# Patient Record
Sex: MALE | Race: WHITE
[De-identification: names, ages, dates, MRNs, and addresses within clinical notes are randomized per-mention and may not be internally consistent; named-entity substitution may affect disease eponyms.]

---

## 2017-04-04 ENCOUNTER — PRIOR ORIGINAL RECORDS (OUTPATIENT)
Dept: OTHER | Age: 82
End: 2017-04-04

## 2017-04-18 ENCOUNTER — PRIOR ORIGINAL RECORDS (OUTPATIENT)
Dept: OTHER | Age: 82
End: 2017-04-18

## 2017-04-27 LAB
ALBUMIN: 3.7 G/DL
ALKALINE PHOSPHATATE(ALK PHOS): 97 IU/L
ALT (SGPT): 38 U/L
AST (SGOT): 21 U/L
BILIRUBIN TOTAL: 0.8 MG/DL
BUN: 17 MG/DL
CALCIUM: 8.7 MG/DL
CHLORIDE: 100 MEQ/L
CHOLESTEROL, TOTAL: 133 MG/DL
CREATININE, SERUM: 0.75 MG/DL
GLUCOSE: 116 MG/DL
GLUCOSE: 116 MG/DL
GLYCOHEMOGLOBIN: 6.8 %
HDL CHOLESTEROL: 40 MG/DL
LDL CHOLESTEROL: 74 MG/DL
NON-HDL CHOLESTEROL: 93 MG/DL
POTASSIUM, SERUM: 4 MEQ/L
PROTEIN, TOTAL: 7.4 G/DL
SGOT (AST): 21 IU/L
SGPT (ALT): 38 IU/L
SODIUM: 138 MEQ/L
THYROID STIMULATING HORMONE: 1 MLU/L
TRIGLYCERIDES: 93 MG/DL

## 2017-04-28 ENCOUNTER — PRIOR ORIGINAL RECORDS (OUTPATIENT)
Dept: OTHER | Age: 82
End: 2017-04-28

## 2017-09-18 ENCOUNTER — HOSPITAL ENCOUNTER (OUTPATIENT)
Dept: GENERAL RADIOLOGY | Facility: HOSPITAL | Age: 82
Discharge: HOME | End: 2017-09-18
Attending: INTERNAL MEDICINE
Payer: MEDICARE

## 2017-09-18 DIAGNOSIS — J44.9 COPD (CHRONIC OBSTRUCTIVE PULMONARY DISEASE) (HCC): ICD-10-CM

## 2017-09-18 PROCEDURE — 71020 XR CHEST PA + LAT CHEST (CPT=71020): CPT | Performed by: INTERNAL MEDICINE

## 2017-10-05 ENCOUNTER — LAB REQUISITION (OUTPATIENT)
Dept: LAB | Facility: HOSPITAL | Age: 82
End: 2017-10-05
Payer: MEDICARE

## 2017-10-05 DIAGNOSIS — C44.311 BASAL CELL CARCINOMA OF SKIN OF NOSE: ICD-10-CM

## 2017-10-05 PROCEDURE — 88305 TISSUE EXAM BY PATHOLOGIST: CPT | Performed by: DERMATOLOGY

## 2017-10-05 PROCEDURE — 88331 PATH CONSLTJ SURG 1 BLK 1SPC: CPT | Performed by: DERMATOLOGY

## 2017-10-10 ENCOUNTER — PRIOR ORIGINAL RECORDS (OUTPATIENT)
Dept: OTHER | Age: 82
End: 2017-10-10

## 2017-12-15 ENCOUNTER — PRIOR ORIGINAL RECORDS (OUTPATIENT)
Dept: OTHER | Age: 82
End: 2017-12-15

## 2017-12-15 ENCOUNTER — MYAURORA ACCOUNT LINK (OUTPATIENT)
Dept: OTHER | Age: 82
End: 2017-12-15

## 2017-12-21 ENCOUNTER — PRIOR ORIGINAL RECORDS (OUTPATIENT)
Dept: OTHER | Age: 82
End: 2017-12-21

## 2017-12-21 LAB
ALT (SGPT): 57 U/L
AST (SGOT): 29 U/L
CHOLESTEROL, TOTAL: 146 MG/DL
HDL CHOLESTEROL: 46 MG/DL
HEMOGLOBIN A1C: 6.6 %
LDL CHOLESTEROL: 74 MG/DL
TRIGLYCERIDES: 131 MG/DL

## 2018-02-05 ENCOUNTER — APPOINTMENT (OUTPATIENT)
Dept: CT IMAGING | Facility: HOSPITAL | Age: 83
DRG: 516 | End: 2018-02-05
Attending: EMERGENCY MEDICINE
Payer: MEDICARE

## 2018-02-05 ENCOUNTER — APPOINTMENT (OUTPATIENT)
Dept: GENERAL RADIOLOGY | Facility: HOSPITAL | Age: 83
DRG: 516 | End: 2018-02-05
Payer: MEDICARE

## 2018-02-05 ENCOUNTER — HOSPITAL ENCOUNTER (INPATIENT)
Facility: HOSPITAL | Age: 83
LOS: 7 days | Discharge: NURSING, REHAB OR LONG-TERM CARE | DRG: 516 | End: 2018-02-12
Attending: EMERGENCY MEDICINE | Admitting: INTERNAL MEDICINE
Payer: MEDICARE

## 2018-02-05 DIAGNOSIS — E87.1 HYPONATREMIA: Primary | ICD-10-CM

## 2018-02-05 DIAGNOSIS — G44.209 TENSION HEADACHE: ICD-10-CM

## 2018-02-05 DIAGNOSIS — B34.9 VIRAL SYNDROME: ICD-10-CM

## 2018-02-05 DIAGNOSIS — R79.1 SUPRATHERAPEUTIC INR: ICD-10-CM

## 2018-02-05 LAB
ANION GAP SERPL CALC-SCNC: 11 MMOL/L (ref 0–18)
ANTIBODY SCREEN: NEGATIVE
BASOPHILS # BLD: 0.1 K/UL (ref 0–0.2)
BASOPHILS NFR BLD: 0 %
BUN SERPL-MCNC: 11 MG/DL (ref 8–20)
BUN/CREAT SERPL: 16.7 (ref 10–20)
CALCIUM SERPL-MCNC: 8.9 MG/DL (ref 8.5–10.5)
CHLORIDE SERPL-SCNC: 87 MMOL/L (ref 95–110)
CO2 SERPL-SCNC: 27 MMOL/L (ref 22–32)
CREAT SERPL-MCNC: 0.66 MG/DL (ref 0.5–1.5)
EOSINOPHIL # BLD: 0.2 K/UL (ref 0–0.7)
EOSINOPHIL NFR BLD: 2 %
ERYTHROCYTE [DISTWIDTH] IN BLOOD BY AUTOMATED COUNT: 13.2 % (ref 11–15)
FLUAV + FLUBV RNA SPEC NAA+PROBE: NEGATIVE
GLUCOSE SERPL-MCNC: 131 MG/DL (ref 70–99)
HCT VFR BLD AUTO: 42.1 % (ref 41–52)
HGB BLD-MCNC: 14.1 G/DL (ref 13.5–17.5)
INR BLD: 5 (ref 0.9–1.2)
LYMPHOCYTES # BLD: 2 K/UL (ref 1–4)
LYMPHOCYTES NFR BLD: 13 %
MCH RBC QN AUTO: 30.4 PG (ref 27–32)
MCHC RBC AUTO-ENTMCNC: 33.5 G/DL (ref 32–37)
MCV RBC AUTO: 91 FL (ref 80–100)
MONOCYTES # BLD: 2 K/UL (ref 0–1)
MONOCYTES NFR BLD: 14 %
NEUTROPHILS # BLD AUTO: 10.5 K/UL (ref 1.8–7.7)
NEUTROPHILS NFR BLD: 71 %
OSMOLALITY UR CALC.SUM OF ELEC: 261 MOSM/KG (ref 275–295)
PLATELET # BLD AUTO: 448 K/UL (ref 140–400)
PMV BLD AUTO: 6.3 FL (ref 7.4–10.3)
POTASSIUM SERPL-SCNC: 4.3 MMOL/L (ref 3.3–5.1)
PROTHROMBIN TIME: 46.2 SECONDS (ref 11.8–14.5)
RBC # BLD AUTO: 4.63 M/UL (ref 4.5–5.9)
RH BLOOD TYPE: POSITIVE
SODIUM SERPL-SCNC: 125 MMOL/L (ref 136–144)
WBC # BLD AUTO: 14.8 K/UL (ref 4–11)

## 2018-02-05 PROCEDURE — 96376 TX/PRO/DX INJ SAME DRUG ADON: CPT

## 2018-02-05 PROCEDURE — 80048 BASIC METABOLIC PNL TOTAL CA: CPT | Performed by: EMERGENCY MEDICINE

## 2018-02-05 PROCEDURE — 96374 THER/PROPH/DIAG INJ IV PUSH: CPT

## 2018-02-05 PROCEDURE — 86900 BLOOD TYPING SEROLOGIC ABO: CPT | Performed by: EMERGENCY MEDICINE

## 2018-02-05 PROCEDURE — 99285 EMERGENCY DEPT VISIT HI MDM: CPT

## 2018-02-05 PROCEDURE — 87631 RESP VIRUS 3-5 TARGETS: CPT | Performed by: EMERGENCY MEDICINE

## 2018-02-05 PROCEDURE — 86901 BLOOD TYPING SEROLOGIC RH(D): CPT | Performed by: EMERGENCY MEDICINE

## 2018-02-05 PROCEDURE — 85025 COMPLETE CBC W/AUTO DIFF WBC: CPT

## 2018-02-05 PROCEDURE — 80048 BASIC METABOLIC PNL TOTAL CA: CPT

## 2018-02-05 PROCEDURE — 71045 X-RAY EXAM CHEST 1 VIEW: CPT

## 2018-02-05 PROCEDURE — 85025 COMPLETE CBC W/AUTO DIFF WBC: CPT | Performed by: EMERGENCY MEDICINE

## 2018-02-05 PROCEDURE — 86850 RBC ANTIBODY SCREEN: CPT | Performed by: EMERGENCY MEDICINE

## 2018-02-05 PROCEDURE — 70450 CT HEAD/BRAIN W/O DYE: CPT | Performed by: EMERGENCY MEDICINE

## 2018-02-05 PROCEDURE — 85610 PROTHROMBIN TIME: CPT | Performed by: EMERGENCY MEDICINE

## 2018-02-05 PROCEDURE — 96375 TX/PRO/DX INJ NEW DRUG ADDON: CPT

## 2018-02-05 RX ORDER — ONDANSETRON 2 MG/ML
4 INJECTION INTRAMUSCULAR; INTRAVENOUS EVERY 6 HOURS PRN
Status: DISCONTINUED | OUTPATIENT
Start: 2018-02-05 | End: 2018-02-12

## 2018-02-05 RX ORDER — SOTALOL HYDROCHLORIDE 80 MG/1
80 TABLET ORAL
Status: DISCONTINUED | OUTPATIENT
Start: 2018-02-05 | End: 2018-02-12

## 2018-02-05 RX ORDER — DEXTROSE, SODIUM CHLORIDE, AND POTASSIUM CHLORIDE 5; .9; .15 G/100ML; G/100ML; G/100ML
INJECTION INTRAVENOUS CONTINUOUS
Status: DISCONTINUED | OUTPATIENT
Start: 2018-02-05 | End: 2018-02-08

## 2018-02-05 RX ORDER — MORPHINE SULFATE 2 MG/ML
2 INJECTION, SOLUTION INTRAMUSCULAR; INTRAVENOUS EVERY 2 HOUR PRN
Status: DISCONTINUED | OUTPATIENT
Start: 2018-02-05 | End: 2018-02-05

## 2018-02-05 RX ORDER — ALFUZOSIN HYDROCHLORIDE 10 MG/1
10 TABLET, EXTENDED RELEASE ORAL
Status: DISCONTINUED | OUTPATIENT
Start: 2018-02-06 | End: 2018-02-12

## 2018-02-05 RX ORDER — SODIUM CHLORIDE 9 MG/ML
125 INJECTION, SOLUTION INTRAVENOUS CONTINUOUS
Status: DISCONTINUED | OUTPATIENT
Start: 2018-02-05 | End: 2018-02-05

## 2018-02-05 RX ORDER — MORPHINE SULFATE 2 MG/ML
4 INJECTION, SOLUTION INTRAMUSCULAR; INTRAVENOUS ONCE
Status: COMPLETED | OUTPATIENT
Start: 2018-02-05 | End: 2018-02-05

## 2018-02-05 RX ORDER — ONDANSETRON 2 MG/ML
4 INJECTION INTRAMUSCULAR; INTRAVENOUS ONCE
Status: COMPLETED | OUTPATIENT
Start: 2018-02-05 | End: 2018-02-05

## 2018-02-05 RX ORDER — ATORVASTATIN CALCIUM 40 MG/1
40 TABLET, FILM COATED ORAL NIGHTLY
Status: DISCONTINUED | OUTPATIENT
Start: 2018-02-05 | End: 2018-02-09

## 2018-02-05 RX ORDER — ESCITALOPRAM OXALATE 10 MG/1
10 TABLET ORAL EVERY MORNING
Status: DISCONTINUED | OUTPATIENT
Start: 2018-02-05 | End: 2018-02-12

## 2018-02-05 RX ORDER — METOPROLOL SUCCINATE 25 MG/1
25 TABLET, EXTENDED RELEASE ORAL
Status: DISCONTINUED | OUTPATIENT
Start: 2018-02-06 | End: 2018-02-06

## 2018-02-05 RX ORDER — HYDROMORPHONE HYDROCHLORIDE 1 MG/ML
0.75 INJECTION, SOLUTION INTRAMUSCULAR; INTRAVENOUS; SUBCUTANEOUS EVERY 2 HOUR PRN
Status: DISCONTINUED | OUTPATIENT
Start: 2018-02-05 | End: 2018-02-12

## 2018-02-05 RX ORDER — DEXTROSE AND SODIUM CHLORIDE 5; .9 G/100ML; G/100ML
INJECTION, SOLUTION INTRAVENOUS CONTINUOUS
Status: DISCONTINUED | OUTPATIENT
Start: 2018-02-05 | End: 2018-02-05

## 2018-02-05 RX ORDER — FINASTERIDE 5 MG/1
5 TABLET, FILM COATED ORAL DAILY
Status: DISCONTINUED | OUTPATIENT
Start: 2018-02-05 | End: 2018-02-12

## 2018-02-05 NOTE — ED PROVIDER NOTES
Patient Seen in: [facility] AND [facility] Emergency Department    History   Patient presents with:  Headache (neurologic)    Stated Complaint: Headache,     HPI    Patient here with fever, body aches, headache, sore throat, cough, congestion for several days. Comment: basal cell carcinoma  1994(?): SHOULDER ARTHROSCOPY      Comment: Right rotator cuff    Medications :   Metoprolol Succinate ER (TOPROL XL) 25 MG Oral Tablet 24 Hr,     Warfarin Sodium (COUMADIN) 7.5 MG Oral Tab,     HYDROcodone-acetaminophen (NOR tired, non toxic  HEAD: normocephalic, atraumatic  EYES: sclera non icteric bilateral, conjunctiva injected bilateral  EARS:tm's clear bilateral  NOSE: nasal turbinates boggy  NECK: supple, no meningeal signs, no adenopathy, no thyromegaly  THROAT: pnd not Abnormality         Status                     ---------                               -----------         ------                     ABORH (BLOOD XGRA)[[id number]]                               Final result               ANTIBODY BTERYT[[id number]

## 2018-02-05 NOTE — ED INITIAL ASSESSMENT (HPI)
Headache and \"flu like symptoms\" with cough and N/V for the past 8 days.  States he was started on tamiflu on Thursday, symptoms have worsened

## 2018-02-06 ENCOUNTER — APPOINTMENT (OUTPATIENT)
Dept: CT IMAGING | Facility: HOSPITAL | Age: 83
DRG: 516 | End: 2018-02-06
Attending: INTERNAL MEDICINE
Payer: MEDICARE

## 2018-02-06 LAB
ALBUMIN SERPL BCP-MCNC: 2.4 G/DL (ref 3.5–4.8)
ALBUMIN/GLOB SERPL: 0.6 {RATIO} (ref 1–2)
ALP SERPL-CCNC: 113 U/L (ref 32–100)
ALT SERPL-CCNC: 28 U/L (ref 17–63)
ANION GAP SERPL CALC-SCNC: 9 MMOL/L (ref 0–18)
AST SERPL-CCNC: 18 U/L (ref 15–41)
BACTERIA UR QL AUTO: NEGATIVE /HPF
BASOPHILS # BLD: 0 K/UL (ref 0–0.2)
BASOPHILS NFR BLD: 0 %
BILIRUB SERPL-MCNC: 0.7 MG/DL (ref 0.3–1.2)
BILIRUB UR QL: NEGATIVE
BUN SERPL-MCNC: 8 MG/DL (ref 8–20)
BUN/CREAT SERPL: 14.3 (ref 10–20)
CALCIUM SERPL-MCNC: 8.4 MG/DL (ref 8.5–10.5)
CHLORIDE SERPL-SCNC: 90 MMOL/L (ref 95–110)
CLARITY UR: CLEAR
CO2 SERPL-SCNC: 30 MMOL/L (ref 22–32)
COLOR UR: YELLOW
CREAT SERPL-MCNC: 0.56 MG/DL (ref 0.5–1.5)
EOSINOPHIL # BLD: 0.1 K/UL (ref 0–0.7)
EOSINOPHIL NFR BLD: 1 %
ERYTHROCYTE [DISTWIDTH] IN BLOOD BY AUTOMATED COUNT: 13.2 % (ref 11–15)
GLOBULIN PLAS-MCNC: 3.8 G/DL (ref 2.5–3.7)
GLUCOSE SERPL-MCNC: 203 MG/DL (ref 70–99)
GLUCOSE UR-MCNC: NEGATIVE MG/DL
HCT VFR BLD AUTO: 37.4 % (ref 41–52)
HGB BLD-MCNC: 12.7 G/DL (ref 13.5–17.5)
HGB UR QL STRIP.AUTO: NEGATIVE
INR BLD: 5.3 (ref 0.9–1.2)
LEUKOCYTE ESTERASE UR QL STRIP.AUTO: NEGATIVE
LYMPHOCYTES # BLD: 1.1 K/UL (ref 1–4)
LYMPHOCYTES NFR BLD: 8 %
MCH RBC QN AUTO: 30.8 PG (ref 27–32)
MCHC RBC AUTO-ENTMCNC: 33.8 G/DL (ref 32–37)
MCV RBC AUTO: 90.9 FL (ref 80–100)
MONOCYTES # BLD: 1.4 K/UL (ref 0–1)
MONOCYTES NFR BLD: 11 %
NEUTROPHILS # BLD AUTO: 10.5 K/UL (ref 1.8–7.7)
NEUTROPHILS NFR BLD: 80 %
NITRITE UR QL STRIP.AUTO: NEGATIVE
OSMOLALITY UR CALC.SUM OF ELEC: 272 MOSM/KG (ref 275–295)
PH UR: 5 [PH] (ref 5–8)
PLATELET # BLD AUTO: 352 K/UL (ref 140–400)
PMV BLD AUTO: 6 FL (ref 7.4–10.3)
POTASSIUM SERPL-SCNC: 4.4 MMOL/L (ref 3.3–5.1)
PROT SERPL-MCNC: 6.2 G/DL (ref 5.9–8.4)
PROT UR-MCNC: 30 MG/DL
PROTHROMBIN TIME: 48.6 SECONDS (ref 11.8–14.5)
RBC # BLD AUTO: 4.12 M/UL (ref 4.5–5.9)
RBC #/AREA URNS AUTO: 15 /HPF
SODIUM SERPL-SCNC: 129 MMOL/L (ref 136–144)
SP GR UR STRIP: 1.02 (ref 1–1.03)
UROBILINOGEN UR STRIP-ACNC: 2
VIT C UR-MCNC: 40 MG/DL
WBC # BLD AUTO: 13.2 K/UL (ref 4–11)
WBC #/AREA URNS AUTO: 1 /HPF

## 2018-02-06 PROCEDURE — 85610 PROTHROMBIN TIME: CPT | Performed by: INTERNAL MEDICINE

## 2018-02-06 PROCEDURE — 80053 COMPREHEN METABOLIC PANEL: CPT | Performed by: INTERNAL MEDICINE

## 2018-02-06 PROCEDURE — 81001 URINALYSIS AUTO W/SCOPE: CPT | Performed by: EMERGENCY MEDICINE

## 2018-02-06 PROCEDURE — 70450 CT HEAD/BRAIN W/O DYE: CPT | Performed by: INTERNAL MEDICINE

## 2018-02-06 PROCEDURE — 85025 COMPLETE CBC W/AUTO DIFF WBC: CPT | Performed by: INTERNAL MEDICINE

## 2018-02-06 RX ORDER — BALANCED SALT SOLUTION 6.4; .75; .48; .3; 3.9; 1.7 MG/ML; MG/ML; MG/ML; MG/ML; MG/ML; MG/ML
15 SOLUTION OPHTHALMIC
Status: DISCONTINUED | OUTPATIENT
Start: 2018-02-06 | End: 2018-02-12

## 2018-02-06 RX ORDER — HYDROCODONE BITARTRATE AND ACETAMINOPHEN 7.5; 325 MG/1; MG/1
1 TABLET ORAL EVERY 6 HOURS PRN
Status: DISCONTINUED | OUTPATIENT
Start: 2018-02-06 | End: 2018-02-12

## 2018-02-06 NOTE — H&P
[facility] [place] [place]    PATIENT'S NAME: Mariluz Ramirezar   ATTENDING PHYSICIAN: Kyle Stephens MD   PATIENT ACCOUNT#:   [de-identified]    LOCATION:   [address] [address] RECORD #:   [id number]       YOB: 1935  ADMISSION DATE:       02/05/2018 neuroma removal; rotator cuff repair; bunionectomy; EGD with biopsy and balloon dilatation of the esophagus in February 2008; skin cancer excision x2 with a basal cell being removed by Dr. Mellisa Lara in May 2012; and plastic surgery by Dr. Conchita Peck with skin There is no dullness to percussion. There is no CVA tenderness. HEART:  Regular rate and rhythm with a normal S1 and S2. No S3, S4, or murmurs are appreciated. ABDOMEN:  Soft, but obese with positive active bowel sounds. It is nontender, nondistended. patient presented with 2-week history of worsening symptoms including cough, fever, chills, myalgias, nausea, dry heaves, and diarrhea.   Patient had been pushing water and was started on Tamiflu 4 to 5 days ago after testing negative for strep throat, but

## 2018-02-06 NOTE — PLAN OF CARE
Problem: Patient/Family Goals  Goal: Patient/Family Long Term Goal  Patient's Long Term Goal: to go home    Interventions:  - See additional Care Plan goals for specific interventions   Outcome: Not Progressing  Patient just admitted today  Goal: Patient/F Provide assistive devices as appropriate  - Consider OT/PT consult to assist with strengthening/mobility  - Encourage toileting schedule  Outcome: Progressing  Bed in low and locked position, ibed on, room near nurses station, call light within reach    Pr Progressing  Patient able to express needs

## 2018-02-06 NOTE — PROGRESS NOTES
[facility]    Progress Note    [address] Patient Status:  Inpatient    1935 MRN [id number]   Location [address] [place] [address] Attending Vijay Pickett MD   Hosp Day # 1 PCP Jayla Patel MD       Subjective:   5655 Mirela Carter is a(n 4. 63  4.12*   HGB  14.1  12.7*   HCT  42.1  37.4*   MCV  91.0  90.9   MCH  30.4  30.8   MCHC  33.5  33.8   RDW  13.2  13.2   WBC  14.8*  13.2*   PLT  448*  352       Recent Labs   Lab  02/05/18   1343  02/06/18   0556   GLU  131*  203*   BUN  11  8   CREAT

## 2018-02-06 NOTE — PLAN OF CARE
Dr Nasra Sauceda paged regarding patient's INR 5.3 this AM. He states to continue to hold patient's coumadin. No new orders.

## 2018-02-07 LAB
ALBUMIN SERPL BCP-MCNC: 2.2 G/DL (ref 3.5–4.8)
ANION GAP SERPL CALC-SCNC: 5 MMOL/L (ref 0–18)
BASOPHILS # BLD: 0 K/UL (ref 0–0.2)
BASOPHILS NFR BLD: 0 %
BUN SERPL-MCNC: 7 MG/DL (ref 8–20)
BUN/CREAT SERPL: 14 (ref 10–20)
CALCIUM SERPL-MCNC: 8.2 MG/DL (ref 8.5–10.5)
CHLORIDE SERPL-SCNC: 95 MMOL/L (ref 95–110)
CO2 SERPL-SCNC: 30 MMOL/L (ref 22–32)
CREAT SERPL-MCNC: 0.5 MG/DL (ref 0.5–1.5)
EOSINOPHIL # BLD: 0.6 K/UL (ref 0–0.7)
EOSINOPHIL NFR BLD: 5 %
ERYTHROCYTE [DISTWIDTH] IN BLOOD BY AUTOMATED COUNT: 13.2 % (ref 11–15)
GLUCOSE SERPL-MCNC: 181 MG/DL (ref 70–99)
HCT VFR BLD AUTO: 35.1 % (ref 41–52)
HGB BLD-MCNC: 11.9 G/DL (ref 13.5–17.5)
INR BLD: 2.4 (ref 0.9–1.2)
LYMPHOCYTES # BLD: 1.2 K/UL (ref 1–4)
LYMPHOCYTES NFR BLD: 10 %
MCH RBC QN AUTO: 30.7 PG (ref 27–32)
MCHC RBC AUTO-ENTMCNC: 33.8 G/DL (ref 32–37)
MCV RBC AUTO: 90.9 FL (ref 80–100)
MONOCYTES # BLD: 1.5 K/UL (ref 0–1)
MONOCYTES NFR BLD: 13 %
NEUTROPHILS # BLD AUTO: 8.4 K/UL (ref 1.8–7.7)
NEUTROPHILS NFR BLD: 72 %
OSMOLALITY UR CALC.SUM OF ELEC: 273 MOSM/KG (ref 275–295)
PHOSPHATE SERPL-MCNC: 3.2 MG/DL (ref 2.4–4.7)
PLATELET # BLD AUTO: 343 K/UL (ref 140–400)
PMV BLD AUTO: 5.9 FL (ref 7.4–10.3)
POTASSIUM SERPL-SCNC: 4.1 MMOL/L (ref 3.3–5.1)
PROTHROMBIN TIME: 25.5 SECONDS (ref 11.8–14.5)
RBC # BLD AUTO: 3.86 M/UL (ref 4.5–5.9)
SODIUM SERPL-SCNC: 130 MMOL/L (ref 136–144)
WBC # BLD AUTO: 11.7 K/UL (ref 4–11)

## 2018-02-07 PROCEDURE — 80069 RENAL FUNCTION PANEL: CPT | Performed by: INTERNAL MEDICINE

## 2018-02-07 PROCEDURE — 85610 PROTHROMBIN TIME: CPT | Performed by: INTERNAL MEDICINE

## 2018-02-07 PROCEDURE — 85025 COMPLETE CBC W/AUTO DIFF WBC: CPT | Performed by: INTERNAL MEDICINE

## 2018-02-07 PROCEDURE — A4216 STERILE WATER/SALINE, 10 ML: HCPCS

## 2018-02-07 RX ORDER — 0.9 % SODIUM CHLORIDE 0.9 %
VIAL (ML) INJECTION
Status: COMPLETED
Start: 2018-02-07 | End: 2018-02-07

## 2018-02-07 RX ORDER — WARFARIN SODIUM 4 MG/1
4 TABLET ORAL NIGHTLY
Status: DISCONTINUED | OUTPATIENT
Start: 2018-02-07 | End: 2018-02-08

## 2018-02-07 RX ORDER — FLUTICASONE PROPIONATE 50 MCG
2 SPRAY, SUSPENSION (ML) NASAL DAILY
Status: DISCONTINUED | OUTPATIENT
Start: 2018-02-07 | End: 2018-02-12

## 2018-02-07 NOTE — PLAN OF CARE
At 1540 patient was resting for about 10 min after return from the bathroom, suddenly he complained of decreased vision in his left eye and subjects became pink in color, patient had no other complains only the vision trouble of left eye. V/S and neuro chec

## 2018-02-07 NOTE — PROGRESS NOTES
[facility]    Progress Note    5655 Mirela Carter Patient Status:  Inpatient    1935 MRN [id number]   Location [address] [place] [address] Attending Alysha Rice MD   Hosp Day # 2 PCP Bebeto Thompson MD       Subjective:   5655 Mirela Carter is a(n 02/07/18   0556   GLU  131*  203*  181*   BUN  11  8  7*   CREATSERUM  0.66  0.56  0.50   GFRAA  >60  >60  >60   GFRNAA  >60  >60  >60   CA  8.9  8.4*  8.2*   ALB   --   2.4*  2.2*   NA  125*  129*  130*   K  4.3  4.4  4.1   CL  87*  90*  95   CO2  27  30 viral syndrome. Supratherapeutic INR  Vitamin K at 2.5mg PO today for INR therapeutic. Restart coumadin at 4mg tonight.       Tension headache  respondiing to fluuids bu pain is in thhe sinuses. CT clear for sinus congestion.   Add flonase.       [place]

## 2018-02-07 NOTE — PLAN OF CARE
Problem: Patient/Family Goals  Goal: Patient/Family Long Term Goal  Patient's Long Term Goal: to go home    Interventions:  - See additional Care Plan goals for specific interventions    Outcome: Progressing  Possible discharge in 1-2 days  Goal: Patient/F call light within reach, bed alarm on, Ibed on, non-skid socks on    Problem: GASTROINTESTINAL - ADULT  Goal: Minimal or absence of nausea and vomiting  INTERVENTIONS:  - Maintain adequate hydration with IV or PO as ordered and tolerated  - Nasogastric tub

## 2018-02-08 ENCOUNTER — APPOINTMENT (OUTPATIENT)
Dept: MRI IMAGING | Facility: HOSPITAL | Age: 83
DRG: 516 | End: 2018-02-08
Attending: INTERNAL MEDICINE
Payer: MEDICARE

## 2018-02-08 ENCOUNTER — APPOINTMENT (OUTPATIENT)
Dept: GENERAL RADIOLOGY | Facility: HOSPITAL | Age: 83
DRG: 516 | End: 2018-02-08
Attending: INTERNAL MEDICINE
Payer: MEDICARE

## 2018-02-08 LAB
ALBUMIN SERPL BCP-MCNC: 2.1 G/DL (ref 3.5–4.8)
ANION GAP SERPL CALC-SCNC: 9 MMOL/L (ref 0–18)
BASOPHILS # BLD: 0 K/UL (ref 0–0.2)
BASOPHILS NFR BLD: 0 %
BUN SERPL-MCNC: 5 MG/DL (ref 8–20)
BUN/CREAT SERPL: 12.2 (ref 10–20)
CALCIUM SERPL-MCNC: 8.4 MG/DL (ref 8.5–10.5)
CHLORIDE SERPL-SCNC: 89 MMOL/L (ref 95–110)
CO2 SERPL-SCNC: 32 MMOL/L (ref 22–32)
CREAT SERPL-MCNC: 0.41 MG/DL (ref 0.5–1.5)
EOSINOPHIL # BLD: 0.6 K/UL (ref 0–0.7)
EOSINOPHIL NFR BLD: 6 %
ERYTHROCYTE [DISTWIDTH] IN BLOOD BY AUTOMATED COUNT: 13.6 % (ref 11–15)
GLUCOSE SERPL-MCNC: 159 MG/DL (ref 70–99)
HCT VFR BLD AUTO: 35.3 % (ref 41–52)
HGB BLD-MCNC: 12.1 G/DL (ref 13.5–17.5)
INR BLD: 1.5 (ref 0.9–1.2)
LYMPHOCYTES # BLD: 1.4 K/UL (ref 1–4)
LYMPHOCYTES NFR BLD: 13 %
MCH RBC QN AUTO: 31.1 PG (ref 27–32)
MCHC RBC AUTO-ENTMCNC: 34.2 G/DL (ref 32–37)
MCV RBC AUTO: 90.9 FL (ref 80–100)
MONOCYTES # BLD: 1.4 K/UL (ref 0–1)
MONOCYTES NFR BLD: 13 %
NEUTROPHILS # BLD AUTO: 7.5 K/UL (ref 1.8–7.7)
NEUTROPHILS NFR BLD: 68 %
OSMOLALITY UR CALC.SUM OF ELEC: 271 MOSM/KG (ref 275–295)
PHOSPHATE SERPL-MCNC: 3.4 MG/DL (ref 2.4–4.7)
PLATELET # BLD AUTO: 387 K/UL (ref 140–400)
PMV BLD AUTO: 6.2 FL (ref 7.4–10.3)
POTASSIUM SERPL-SCNC: 4 MMOL/L (ref 3.3–5.1)
PROTHROMBIN TIME: 17.6 SECONDS (ref 11.8–14.5)
RBC # BLD AUTO: 3.88 M/UL (ref 4.5–5.9)
SODIUM SERPL-SCNC: 130 MMOL/L (ref 136–144)
WBC # BLD AUTO: 11 K/UL (ref 4–11)

## 2018-02-08 PROCEDURE — A4216 STERILE WATER/SALINE, 10 ML: HCPCS

## 2018-02-08 PROCEDURE — A4216 STERILE WATER/SALINE, 10 ML: HCPCS | Performed by: INTERNAL MEDICINE

## 2018-02-08 PROCEDURE — 85025 COMPLETE CBC W/AUTO DIFF WBC: CPT | Performed by: INTERNAL MEDICINE

## 2018-02-08 PROCEDURE — 97161 PT EVAL LOW COMPLEX 20 MIN: CPT

## 2018-02-08 PROCEDURE — 97535 SELF CARE MNGMENT TRAINING: CPT

## 2018-02-08 PROCEDURE — A9575 INJ GADOTERATE MEGLUMI 0.1ML: HCPCS | Performed by: INTERNAL MEDICINE

## 2018-02-08 PROCEDURE — 86022 PLATELET ANTIBODIES: CPT | Performed by: INTERNAL MEDICINE

## 2018-02-08 PROCEDURE — 97165 OT EVAL LOW COMPLEX 30 MIN: CPT

## 2018-02-08 PROCEDURE — 97116 GAIT TRAINING THERAPY: CPT

## 2018-02-08 PROCEDURE — C9113 INJ PANTOPRAZOLE SODIUM, VIA: HCPCS | Performed by: INTERNAL MEDICINE

## 2018-02-08 PROCEDURE — 85390 FIBRINOLYSINS SCREEN I&R: CPT | Performed by: INTERNAL MEDICINE

## 2018-02-08 PROCEDURE — 71046 X-RAY EXAM CHEST 2 VIEWS: CPT | Performed by: INTERNAL MEDICINE

## 2018-02-08 PROCEDURE — 70553 MRI BRAIN STEM W/O & W/DYE: CPT | Performed by: INTERNAL MEDICINE

## 2018-02-08 PROCEDURE — 80069 RENAL FUNCTION PANEL: CPT | Performed by: INTERNAL MEDICINE

## 2018-02-08 PROCEDURE — 85610 PROTHROMBIN TIME: CPT | Performed by: INTERNAL MEDICINE

## 2018-02-08 RX ORDER — METOCLOPRAMIDE HYDROCHLORIDE 5 MG/ML
10 INJECTION INTRAMUSCULAR; INTRAVENOUS EVERY 6 HOURS PRN
Status: DISCONTINUED | OUTPATIENT
Start: 2018-02-08 | End: 2018-02-12

## 2018-02-08 RX ORDER — WARFARIN SODIUM 7.5 MG/1
7.5 TABLET ORAL
Status: COMPLETED | OUTPATIENT
Start: 2018-02-08 | End: 2018-02-08

## 2018-02-08 RX ORDER — 0.9 % SODIUM CHLORIDE 0.9 %
VIAL (ML) INJECTION
Status: COMPLETED
Start: 2018-02-08 | End: 2018-02-08

## 2018-02-08 RX ORDER — ENOXAPARIN SODIUM 100 MG/ML
40 INJECTION SUBCUTANEOUS EVERY 12 HOURS SCHEDULED
Status: DISCONTINUED | OUTPATIENT
Start: 2018-02-08 | End: 2018-02-08

## 2018-02-08 RX ORDER — POLYETHYLENE GLYCOL 3350 17 G/17G
17 POWDER, FOR SOLUTION ORAL DAILY
Status: DISCONTINUED | OUTPATIENT
Start: 2018-02-08 | End: 2018-02-12

## 2018-02-08 RX ORDER — FUROSEMIDE 10 MG/ML
20 INJECTION INTRAMUSCULAR; INTRAVENOUS ONCE
Status: COMPLETED | OUTPATIENT
Start: 2018-02-08 | End: 2018-02-08

## 2018-02-08 RX ORDER — HEPARIN SODIUM 5000 [USP'U]/ML
5000 INJECTION, SOLUTION INTRAVENOUS; SUBCUTANEOUS EVERY 8 HOURS SCHEDULED
Status: DISCONTINUED | OUTPATIENT
Start: 2018-02-08 | End: 2018-02-08

## 2018-02-08 NOTE — PROGRESS NOTES
[facility]    Progress Note    5655 Mirela Carter Patient Status:  Inpatient    1935 MRN [id number]   Location [address] [place] [address] Attending Yanni Mckoy MD   Hosp Day # 3 PCP Dean Chen MD       Subjective:   5655 Mirela Carter is a(n 2. 4*  2.2*  2.1*   NA  129*  130*  130*   K  4.4  4.1  4.0   CL  90*  95  89*   CO2  30  30  32   ALKPHO  113*   --    --    AST  18   --    --    ALT  28   --    --    BILT  0.7   --    --    TP  6.2   --    --        No results for input(s): LIP, ROCHELLE in says his son is now getting sick. H/A severe but better. RSD  Start PT and OT. Social sx's consulted for d/c planning.         Elizabeth Fowler MD  2/8/2018

## 2018-02-08 NOTE — PLAN OF CARE
Problem: Patient/Family Goals  Goal: Patient/Family Long Term Goal  Patient's Long Term Goal: to go home    Interventions:  - See additional Care Plan goals for specific interventions    Outcome: Progressing    Goal: Patient/Family Short Term Goal  Patient ADULT  Goal: Minimal or absence of nausea and vomiting  INTERVENTIONS:  - Maintain adequate hydration with IV or PO as ordered and tolerated  - Nasogastric tube to low intermittent suction as ordered  - Evaluate effectiveness of ordered antiemetic medicati

## 2018-02-08 NOTE — OCCUPATIONAL THERAPY NOTE
OCCUPATIONAL THERAPY QUICK EVALUATION - INPATIENT    Room Number: 946/843-H  Evaluation Date: 2/8/2018     Type of Evaluation: Initial  Presenting Problem: hyponatremia    Physician Order: IP Consult to Occupational Therapy  Reason for Therapy:  ADL/IADL D SITUATION  Type of Home: House  Home Layout: One level  Lives With: Spouse    Toilet and Equipment: Standard height toilet  Shower/Tub and Equipment: Tub-shower combo  Other Equipment:  (rw w/ tray)    Occupation/Status: retired     Drives: Yes  Patient Re and oriented. Pt participating in oob activities w/ encouragement. Pt transitioning out/into bed w/ supervision. Pt able to manage le dressing tasks w/o assist using crossed leg technique. Pt performing sit to stand transfers from bed w/ supervision.  Pt am

## 2018-02-08 NOTE — CM/SW NOTE
SW received MD order for discharge planning. SW met w/ pt and pt's wife to discuss eventual discharge needs. Pt lives at home w/ his supportive wife in [facility]. Pt lives in a 1 level house w/ 1 external step.  Pt has adult children that live nearby, wh

## 2018-02-08 NOTE — PHYSICAL THERAPY NOTE
PHYSICAL THERAPY QUICK EVALUATION - INPATIENT    Room Number: 117/254-Q  Evaluation Date: 2/8/2018  Presenting Problem: Hyponatremia  Physician Order: PT Eval and Treat    Problem List  Principal Problem:    Hyponatremia  Active Problems:    Supratherape Equipment: Rolling walker  Patient Regularly Uses: None    Prior Level of [place]: Prior to admission, patient reports he was independent with functional mobility, ADLs, and driving. Uses a rolling walker at home, lives with his wife.  Patient reports of Session: In bed;Needs met;Call light within reach;RN aware of session/findings; All patient questions and concerns addressed    ASSESSMENT   Orders received and chart reviewed. KAMARI Lorenz approved participation in physical therapy.  Patient is a [de-identified]year old

## 2018-02-09 ENCOUNTER — SURGERY (OUTPATIENT)
Age: 83
End: 2018-02-09

## 2018-02-09 LAB
ALBUMIN SERPL BCP-MCNC: 2.2 G/DL (ref 3.5–4.8)
ANION GAP SERPL CALC-SCNC: 9 MMOL/L (ref 0–18)
BUN SERPL-MCNC: 7 MG/DL (ref 8–20)
BUN/CREAT SERPL: 12.5 (ref 10–20)
CALCIUM SERPL-MCNC: 8.5 MG/DL (ref 8.5–10.5)
CHLORIDE SERPL-SCNC: 86 MMOL/L (ref 95–110)
CO2 SERPL-SCNC: 34 MMOL/L (ref 22–32)
CREAT SERPL-MCNC: 0.56 MG/DL (ref 0.5–1.5)
ERYTHROCYTE [SEDIMENTATION RATE] IN BLOOD: 82 MM/HR (ref 0–20)
GLUCOSE SERPL-MCNC: 159 MG/DL (ref 70–99)
HEPARIN AB PPP QL PL AGG: NEGATIVE
INR BLD: 1.7 (ref 0.9–1.2)
OSMOLALITY UR CALC.SUM OF ELEC: 269 MOSM/KG (ref 275–295)
PHOSPHATE SERPL-MCNC: 3.7 MG/DL (ref 2.4–4.7)
POTASSIUM SERPL-SCNC: 3.8 MMOL/L (ref 3.3–5.1)
PROTHROMBIN TIME: 19.8 SECONDS (ref 11.8–14.5)
SODIUM SERPL-SCNC: 129 MMOL/L (ref 136–144)

## 2018-02-09 PROCEDURE — A4216 STERILE WATER/SALINE, 10 ML: HCPCS

## 2018-02-09 PROCEDURE — 80069 RENAL FUNCTION PANEL: CPT | Performed by: INTERNAL MEDICINE

## 2018-02-09 PROCEDURE — 85652 RBC SED RATE AUTOMATED: CPT | Performed by: INTERNAL MEDICINE

## 2018-02-09 PROCEDURE — 88305 TISSUE EXAM BY PATHOLOGIST: CPT | Performed by: SURGERY

## 2018-02-09 PROCEDURE — 03BT0ZX EXCISION OF LEFT TEMPORAL ARTERY, OPEN APPROACH, DIAGNOSTIC: ICD-10-PCS | Performed by: SURGERY

## 2018-02-09 PROCEDURE — 85610 PROTHROMBIN TIME: CPT | Performed by: INTERNAL MEDICINE

## 2018-02-09 RX ORDER — PANTOPRAZOLE SODIUM 40 MG/1
40 TABLET, DELAYED RELEASE ORAL
Status: DISCONTINUED | OUTPATIENT
Start: 2018-02-09 | End: 2018-02-12

## 2018-02-09 RX ORDER — METHYLPREDNISOLONE SODIUM SUCCINATE 125 MG/2ML
60 INJECTION, POWDER, LYOPHILIZED, FOR SOLUTION INTRAMUSCULAR; INTRAVENOUS EVERY 8 HOURS
Status: DISCONTINUED | OUTPATIENT
Start: 2018-02-09 | End: 2018-02-11

## 2018-02-09 RX ORDER — 0.9 % SODIUM CHLORIDE 0.9 %
VIAL (ML) INJECTION
Status: COMPLETED
Start: 2018-02-09 | End: 2018-02-09

## 2018-02-09 RX ORDER — SODIUM CHLORIDE, SODIUM LACTATE, POTASSIUM CHLORIDE, CALCIUM CHLORIDE 600; 310; 30; 20 MG/100ML; MG/100ML; MG/100ML; MG/100ML
INJECTION, SOLUTION INTRAVENOUS CONTINUOUS
Status: DISCONTINUED | OUTPATIENT
Start: 2018-02-09 | End: 2018-02-12

## 2018-02-09 RX ORDER — WARFARIN SODIUM 5 MG/1
5 TABLET ORAL NIGHTLY
Status: DISCONTINUED | OUTPATIENT
Start: 2018-02-09 | End: 2018-02-12

## 2018-02-09 RX ORDER — BISACODYL 10 MG
10 SUPPOSITORY, RECTAL RECTAL
Status: DISCONTINUED | OUTPATIENT
Start: 2018-02-09 | End: 2018-02-12

## 2018-02-09 RX ORDER — LIDOCAINE HYDROCHLORIDE 10 MG/ML
INJECTION, SOLUTION EPIDURAL; INFILTRATION; INTRACAUDAL; PERINEURAL AS NEEDED
Status: DISCONTINUED | OUTPATIENT
Start: 2018-02-09 | End: 2018-02-09 | Stop reason: HOSPADM

## 2018-02-09 NOTE — RESTORATIVE THERAPY
RESTORATIVE CARE TREATMENT NOTE    Presenting Problem  Presenting Problem: Hyponatremia  Presenting Problem: hyponatremia       Precautions  Precautions: None (neuropathy)       Weight Bearing Restriction  Weight Bearing Restriction: None

## 2018-02-09 NOTE — OPERATIVE REPORT
[facility]    PATIENT'S NAME: [address] [place] [address] PHYSICIAN: Joel Rodríguez MD   OPERATING PHYSICIAN: Anusha Allen.  Russ Moss MD   PATIENT ACCOUNT#:   [id number]    LOCATION:  [address]  MEDICAL RECORD #:   [id number]

## 2018-02-09 NOTE — PROGRESS NOTES
[facility]    Progress Note    5655 Mirela Carter Patient Status:  Inpatient    1935 MRN [id number]   Location [address] [place] [address] Attending Justin Romero MD   Hosp Day # 4 PCP Rashawn Hong MD       Subjective:   5655 Mirela Carter is a(n >60  >60  >60   GFRNAA  >60  >60  >60   CA  8.2*  8.4*  8.5   ALB  2.2*  2.1*  2.2*   NA  130*  130*  129*   K  4.1  4.0  3.8   CL  95  89*  86*   CO2  30  32  34*       No results for input(s): LIP, ROCHELLE in the last 72 hours.     No results for input(s): TR clinical pharm D.           Donovan Ruby MD  2/9/2018

## 2018-02-09 NOTE — CM/SW NOTE
SW spoke w/ pt and confirmed that he does not want HHC. Pt is agreeable w/ outpatient PT. SW told pt's RN to obtain PT script prior to discharge.      Popeye Russo, [address]

## 2018-02-09 NOTE — BRIEF OP NOTE
Pre-Operative Diagnosis: Tension headache [G44.209]     Post-Operative Diagnosis: Tension headache [G44.209]     Procedure Performed:   Procedure(s):  LEFT TEMPORAL ARTERY BIOPSY    Surgeon(s) and Role:     * Nilsa Rogers MD - Primary    Brianna Flores

## 2018-02-09 NOTE — PLAN OF CARE
Problem: Patient/Family Goals  Goal: Patient/Family Long Term Goal  Patient's Long Term Goal: to go home    Interventions:  - See additional Care Plan goals for specific interventions    Outcome: Progressing    Goal: Patient/Family Short Term Goal  Patient tolerated  - Nasogastric tube to low intermittent suction as ordered  - Evaluate effectiveness of ordered antiemetic medications  - Provide nonpharmacologic comfort measures as appropriate  - Advance diet as tolerated, if ordered  - Obtain nutritional cons

## 2018-02-10 LAB
ALBUMIN SERPL BCP-MCNC: 2.2 G/DL (ref 3.5–4.8)
ANION GAP SERPL CALC-SCNC: 10 MMOL/L (ref 0–18)
BASOPHILS # BLD: 0 K/UL (ref 0–0.2)
BASOPHILS NFR BLD: 0 %
BUN SERPL-MCNC: 13 MG/DL (ref 8–20)
BUN/CREAT SERPL: 20.6 (ref 10–20)
CALCIUM SERPL-MCNC: 8.8 MG/DL (ref 8.5–10.5)
CHLORIDE SERPL-SCNC: 85 MMOL/L (ref 95–110)
CO2 SERPL-SCNC: 35 MMOL/L (ref 22–32)
CREAT SERPL-MCNC: 0.63 MG/DL (ref 0.5–1.5)
CRP SERPL-MCNC: 13.9 MG/DL (ref 0–0.9)
EOSINOPHIL # BLD: 0 K/UL (ref 0–0.7)
EOSINOPHIL NFR BLD: 0 %
ERYTHROCYTE [DISTWIDTH] IN BLOOD BY AUTOMATED COUNT: 13 % (ref 11–15)
GLUCOSE BLDC GLUCOMTR-MCNC: 277 MG/DL (ref 70–99)
GLUCOSE BLDC GLUCOMTR-MCNC: 280 MG/DL (ref 70–99)
GLUCOSE BLDC GLUCOMTR-MCNC: 284 MG/DL (ref 70–99)
GLUCOSE BLDC GLUCOMTR-MCNC: 299 MG/DL (ref 70–99)
GLUCOSE SERPL-MCNC: 269 MG/DL (ref 70–99)
HBA1C MFR BLD: 7.4 % (ref 4–6)
HCT VFR BLD AUTO: 35.9 % (ref 41–52)
HGB BLD-MCNC: 12.5 G/DL (ref 13.5–17.5)
INR BLD: 1.9 (ref 0.9–1.2)
LYMPHOCYTES # BLD: 1.3 K/UL (ref 1–4)
LYMPHOCYTES NFR BLD: 8 %
MCH RBC QN AUTO: 31 PG (ref 27–32)
MCHC RBC AUTO-ENTMCNC: 34.7 G/DL (ref 32–37)
MCV RBC AUTO: 89.4 FL (ref 80–100)
MONOCYTES # BLD: 0.5 K/UL (ref 0–1)
MONOCYTES NFR BLD: 3 %
NEUTROPHILS # BLD AUTO: 14.7 K/UL (ref 1.8–7.7)
NEUTROPHILS NFR BLD: 89 %
OSMOLALITY UR CALC.SUM OF ELEC: 280 MOSM/KG (ref 275–295)
PHOSPHATE SERPL-MCNC: 2.9 MG/DL (ref 2.4–4.7)
PLATELET # BLD AUTO: 446 K/UL (ref 140–400)
PMV BLD AUTO: 5.7 FL (ref 7.4–10.3)
POTASSIUM SERPL-SCNC: 4.6 MMOL/L (ref 3.3–5.1)
PROTHROMBIN TIME: 21.5 SECONDS (ref 11.8–14.5)
RBC # BLD AUTO: 4.02 M/UL (ref 4.5–5.9)
SODIUM SERPL-SCNC: 130 MMOL/L (ref 136–144)
WBC # BLD AUTO: 16.5 K/UL (ref 4–11)

## 2018-02-10 PROCEDURE — 83036 HEMOGLOBIN GLYCOSYLATED A1C: CPT | Performed by: FAMILY MEDICINE

## 2018-02-10 PROCEDURE — A4216 STERILE WATER/SALINE, 10 ML: HCPCS

## 2018-02-10 PROCEDURE — 85610 PROTHROMBIN TIME: CPT | Performed by: INTERNAL MEDICINE

## 2018-02-10 PROCEDURE — 80069 RENAL FUNCTION PANEL: CPT | Performed by: INTERNAL MEDICINE

## 2018-02-10 PROCEDURE — 86140 C-REACTIVE PROTEIN: CPT | Performed by: INTERNAL MEDICINE

## 2018-02-10 PROCEDURE — 85025 COMPLETE CBC W/AUTO DIFF WBC: CPT | Performed by: INTERNAL MEDICINE

## 2018-02-10 PROCEDURE — 82962 GLUCOSE BLOOD TEST: CPT

## 2018-02-10 RX ORDER — DEXTROSE MONOHYDRATE 25 G/50ML
50 INJECTION, SOLUTION INTRAVENOUS AS NEEDED
Status: DISCONTINUED | OUTPATIENT
Start: 2018-02-10 | End: 2018-02-12

## 2018-02-10 RX ORDER — 0.9 % SODIUM CHLORIDE 0.9 %
VIAL (ML) INJECTION
Status: COMPLETED
Start: 2018-02-10 | End: 2018-02-10

## 2018-02-10 RX ORDER — SODIUM CHLORIDE 1000 MG
1 TABLET, SOLUBLE MISCELLANEOUS
Status: DISCONTINUED | OUTPATIENT
Start: 2018-02-10 | End: 2018-02-11

## 2018-02-10 NOTE — PROGRESS NOTES
[facility]    Progress Note    5655 Mirela Carter Patient Status:  Inpatient    1935 MRN [id number]   Location [address] [place] [address] Attending Justin Romero MD   Hosp Day # 5 PCP Rashawn Hong MD       Subjective:   5655 [address] Raul is a(n Improved pulmonary vascular congestion. Mri Brain (w+wo) (cpt=[id number])    Result Date: 2/8/2018  CONCLUSION:   No acute intracranial abnormality.   Generalized atrophy with nonspecific white matter changes most likely related to chronic microvascular isc

## 2018-02-10 NOTE — PLAN OF CARE
Problem: Patient/Family Goals  Goal: Patient/Family Long Term Goal  Patient's Long Term Goal: to go home    Interventions:  - See additional Care Plan goals for specific interventions    Outcome: Progressing    Goal: Patient/Family Short Term Goal  Patient to ambulate    Problem: GASTROINTESTINAL - ADULT  Goal: Minimal or absence of nausea and vomiting  INTERVENTIONS:  - Maintain adequate hydration with IV or PO as ordered and tolerated  - Nasogastric tube to low intermittent suction as ordered  - Evaluate e

## 2018-02-11 LAB
ANION GAP SERPL CALC-SCNC: 7 MMOL/L (ref 0–18)
BUN SERPL-MCNC: 20 MG/DL (ref 8–20)
BUN/CREAT SERPL: 28.6 (ref 10–20)
CALCIUM SERPL-MCNC: 8.5 MG/DL (ref 8.5–10.5)
CHLORIDE SERPL-SCNC: 87 MMOL/L (ref 95–110)
CO2 SERPL-SCNC: 34 MMOL/L (ref 22–32)
CREAT SERPL-MCNC: 0.7 MG/DL (ref 0.5–1.5)
GLUCOSE BLDC GLUCOMTR-MCNC: 287 MG/DL (ref 70–99)
GLUCOSE BLDC GLUCOMTR-MCNC: 315 MG/DL (ref 70–99)
GLUCOSE BLDC GLUCOMTR-MCNC: 325 MG/DL (ref 70–99)
GLUCOSE BLDC GLUCOMTR-MCNC: 338 MG/DL (ref 70–99)
GLUCOSE SERPL-MCNC: 300 MG/DL (ref 70–99)
OSMOLALITY UR CALC.SUM OF ELEC: 280 MOSM/KG (ref 275–295)
POTASSIUM SERPL-SCNC: 4.5 MMOL/L (ref 3.3–5.1)
SODIUM SERPL-SCNC: 128 MMOL/L (ref 136–144)

## 2018-02-11 PROCEDURE — 80048 BASIC METABOLIC PNL TOTAL CA: CPT | Performed by: FAMILY MEDICINE

## 2018-02-11 PROCEDURE — 82962 GLUCOSE BLOOD TEST: CPT

## 2018-02-11 RX ORDER — SODIUM CHLORIDE 1000 MG
2 TABLET, SOLUBLE MISCELLANEOUS
Status: DISCONTINUED | OUTPATIENT
Start: 2018-02-11 | End: 2018-02-12

## 2018-02-11 RX ORDER — MAGNESIUM CARB/ALUMINUM HYDROX 105-160MG
296 TABLET,CHEWABLE ORAL ONCE
Status: COMPLETED | OUTPATIENT
Start: 2018-02-11 | End: 2018-02-11

## 2018-02-11 NOTE — PLAN OF CARE
Problem: Patient/Family Goals  Goal: Patient/Family Long Term Goal  Patient's Long Term Goal: to go home    Interventions:  - See additional Care Plan goals for specific interventions    Outcome: Progressing    Goal: Patient/Family Short Term Goal  Patient Nasogastric tube to low intermittent suction as ordered  - Evaluate effectiveness of ordered antiemetic medications  - Provide nonpharmacologic comfort measures as appropriate  - Advance diet as tolerated, if ordered  - Obtain nutritional consult as needed

## 2018-02-11 NOTE — PROGRESS NOTES
[facility]    Progress Note    5655 Mirela Carter Patient Status:  Inpatient    1935 MRN [id number]   Location [address] [place] [address] Attending Melany Love MD   Hosp Day # 6 PCP Chavez Hollis MD       Subjective:   5655 Mirela Carter is a(n

## 2018-02-12 VITALS
HEIGHT: 73 IN | BODY MASS INDEX: 30.42 KG/M2 | SYSTOLIC BLOOD PRESSURE: 159 MMHG | OXYGEN SATURATION: 93 % | WEIGHT: 229.5 LBS | RESPIRATION RATE: 16 BRPM | HEART RATE: 55 BPM | DIASTOLIC BLOOD PRESSURE: 73 MMHG | TEMPERATURE: 99 F

## 2018-02-12 PROBLEM — B34.9 VIRAL SYNDROME: Status: RESOLVED | Noted: 2018-02-05 | Resolved: 2018-02-12

## 2018-02-12 PROBLEM — G44.209 TENSION HEADACHE: Status: RESOLVED | Noted: 2018-02-05 | Resolved: 2018-02-12

## 2018-02-12 PROBLEM — E87.1 HYPONATREMIA: Status: RESOLVED | Noted: 2018-02-05 | Resolved: 2018-02-12

## 2018-02-12 PROBLEM — R79.1 SUPRATHERAPEUTIC INR: Status: RESOLVED | Noted: 2018-02-05 | Resolved: 2018-02-12

## 2018-02-12 LAB
ANION GAP SERPL CALC-SCNC: 8 MMOL/L (ref 0–18)
BUN SERPL-MCNC: 22 MG/DL (ref 8–20)
BUN/CREAT SERPL: 31 (ref 10–20)
CALCIUM SERPL-MCNC: 8.6 MG/DL (ref 8.5–10.5)
CHLORIDE SERPL-SCNC: 86 MMOL/L (ref 95–110)
CO2 SERPL-SCNC: 34 MMOL/L (ref 22–32)
CREAT SERPL-MCNC: 0.71 MG/DL (ref 0.5–1.5)
GLUCOSE BLDC GLUCOMTR-MCNC: 213 MG/DL (ref 70–99)
GLUCOSE BLDC GLUCOMTR-MCNC: 314 MG/DL (ref 70–99)
GLUCOSE SERPL-MCNC: 241 MG/DL (ref 70–99)
INR BLD: 2.5 (ref 0.9–1.2)
OSMOLALITY UR CALC.SUM OF ELEC: 277 MOSM/KG (ref 275–295)
POTASSIUM SERPL-SCNC: 4.4 MMOL/L (ref 3.3–5.1)
PROTHROMBIN TIME: 26.4 SECONDS (ref 11.8–14.5)
SODIUM SERPL-SCNC: 128 MMOL/L (ref 136–144)

## 2018-02-12 PROCEDURE — 85610 PROTHROMBIN TIME: CPT | Performed by: FAMILY MEDICINE

## 2018-02-12 PROCEDURE — 82962 GLUCOSE BLOOD TEST: CPT

## 2018-02-12 PROCEDURE — 97164 PT RE-EVAL EST PLAN CARE: CPT

## 2018-02-12 PROCEDURE — 80048 BASIC METABOLIC PNL TOTAL CA: CPT | Performed by: FAMILY MEDICINE

## 2018-02-12 RX ORDER — PANTOPRAZOLE SODIUM 40 MG/1
40 TABLET, DELAYED RELEASE ORAL
Qty: 30 TABLET | Refills: 5 | Status: SHIPPED | OUTPATIENT
Start: 2018-02-13

## 2018-02-12 RX ORDER — PREDNISONE 10 MG/1
TABLET ORAL
Qty: 30 TABLET | Refills: 1 | Status: SHIPPED | OUTPATIENT
Start: 2018-02-12 | End: 2018-02-28

## 2018-02-12 RX ORDER — PREDNISONE 10 MG/1
20 TABLET ORAL 2 TIMES DAILY WITH MEALS
Qty: 30 TABLET | Refills: 1 | Status: SHIPPED | OUTPATIENT
Start: 2018-02-12 | End: 2018-02-12

## 2018-02-12 RX ORDER — POLYETHYLENE GLYCOL 3350 17 G/17G
17 POWDER, FOR SOLUTION ORAL DAILY
Qty: 30 EACH | Refills: 0 | Status: SHIPPED | OUTPATIENT
Start: 2018-02-12

## 2018-02-12 NOTE — CM/SW NOTE
2:30pm Update- SW informed by family of request for snf. Preference indicated for [facility], backup [facility]. Referral sent. No beds at [facility]. [facility] has accepted and a bed is available for today.  Pt is on room air, and able to tolerate family ca

## 2018-02-12 NOTE — DIABETES ED
[facility]OSP - [facility]    Diabetes Education  Note    [facility] Patient Status:  Inpatient   1935 MRN [id number]  Location [facility]5W Attending Wyatt Guerrero MD  Hosp Day # 7 PCP Lilibeth Almodovar MD    Reason for Visit: Md referral t

## 2018-02-12 NOTE — CM/SW NOTE
CTL received phone call from Barbie Villa- patients daughter regarding dc planning. Per physical therapist notes recommendation for dc is for patient to go to a rehab facility prior to going home.  Patients daughter in agreement for rehab and is requesting a refe

## 2018-02-12 NOTE — RESPIRATORY THERAPY NOTE
NAYAN ASSESSMENT:    Pt does not have a previous diagnosis of NAYAN. Pt does not routinely use a CPAP device at home. This pt is suspected to be at high risk for NAYAN and sleep lab packet was provided to patient for outpatient follow-up.

## 2018-02-12 NOTE — PLAN OF CARE
Problem: SAFETY ADULT - FALL  Goal: Free from fall injury  INTERVENTIONS:  - Assess pt frequently for physical needs  - Identify cognitive and physical deficits and behaviors that affect risk of falls.   - Sixes fall precautions as indicated by assessme

## 2018-02-12 NOTE — PLAN OF CARE
Patient has orders to go to [facility] today. Wife and son here to transport patient. Assisted patient with getting dressed. IV site removed and dressing applied to site. Discharge paperwork given to wife to give to staff at [facility].  Called [facility] and gave report to [facility]

## 2018-02-12 NOTE — PHYSICAL THERAPY NOTE
PHYSICAL THERAPY EVALUATION - INPATIENT     Room Number: 912/896-N  Evaluation Date: 2/12/2018  Type of Evaluation: Re-evaluation  Physician Order: PT Eval and Treat    Presenting Problem: Hyponatremia  Reason for Therapy: Mobility Dysfunction and Disc facility    PLAN  PT Treatment Plan: Bed mobility; Body mechanics; Patient education;Gait training;Transfer training;Stair training;Balance training  Rehab Potential : Good  Frequency (Obs): 3x/week       PHYSICAL THERAPY MEDICAL/SOCIAL HISTORY   Problem Lis Owned Equipment: Rolling walker  Patient Regularly Uses: None    Prior Level of [place]: Patient reports being independent in ADL's and ambulation with rolling walker prior to admission to the hospital. Patient with history of falls.      SUBJECTIVE  \ (CGA)  Distance (ft): 100ft x 2  Assistive Device: Rolling walker  Pattern: R Foot drag;R Steppage  Stoop/Curb Assistance: Not tested       Bed Mobility:Not tested     Transfers:[facility]    Exercise/Education Provided:   Body mechanics  Functional activity bronson

## 2018-02-12 NOTE — PROGRESS NOTES
[facility]    Progress Note    5655 [address]irela Carter Patient Status:  Inpatient    1935 MRN [id number]   Location [address] [place] [address] Attending Kane Samuel MD   Hosp Day # 7 PCP Christie Robertson MD       Subjective:   5655 Mirela Carter is a(n GLU  269*  300*  241*   BUN  13  20  22*   CREATSERUM  0.63  0.70  0.71   GFRAA  >60  >60  >60   GFRNAA  >60  >60  >60   CA  8.8  8.5  8.6   ALB  2.2*   --    --    NA  130*  128*  128*   K  4.6  4.5  4.4   CL  85*  87*  86*   CO2  35*  34*  34*       No

## 2018-02-13 PROCEDURE — 99308 SBSQ NF CARE LOW MDM 20: CPT | Performed by: INTERNAL MEDICINE

## 2018-02-14 ENCOUNTER — SNF VISIT (OUTPATIENT)
Dept: INTERNAL MEDICINE CLINIC | Facility: SKILLED NURSING FACILITY | Age: 83
End: 2018-02-14

## 2018-02-14 DIAGNOSIS — Z09 FOLLOW UP: ICD-10-CM

## 2018-02-14 DIAGNOSIS — E87.1 HYPONATREMIA: ICD-10-CM

## 2018-02-14 DIAGNOSIS — D72.829 LEUKOCYTOSIS, UNSPECIFIED TYPE: ICD-10-CM

## 2018-02-14 PROCEDURE — 99310 SBSQ NF CARE HIGH MDM 45: CPT | Performed by: NURSE PRACTITIONER

## 2018-02-14 NOTE — PROGRESS NOTES
Adaldi Thompson  : 1935  Age [de-identified]year old  male patient is admitted to [facility] for LISA.     Chief complaint: Initial Assessment & follow up Hyponatremia & Viral syndrome    HPI  Patient is a(n) [de-identified]year old male with medical hi cutting down his oral fluif intake to as ordered 1500ML and stopping oral fluid at 6pm to help with increased urination at night which he reported to be bothering him.       Past Medical History:   Diagnosis Date   • Arthritis    • Asbestos exposure     herlinda Alcohol use:  Yes              Comment: rarely      ALLERGIES:    Heparin                   Tetanus Immune Glob*        CODE STATUS: Full Code      CURRENT MEDICATIONS:Reviewed in SNF EMR    VITALS:Stable and afebrile     REVIEW OF SYSTEMS: No chest myalgias, nausea, dry heaves and diarrhea. - Pt had been pushing water and was started on Tamiflu 4-5 days ago after testing negative for strep throat but stopped this early due to lack of efficacy.    -Pt is recovering better w/ steroids.   H/a is gone a

## 2018-02-15 ENCOUNTER — HOSPITAL ENCOUNTER (EMERGENCY)
Facility: HOSPITAL | Age: 83
Discharge: HOME | End: 2018-02-15
Attending: EMERGENCY MEDICINE
Payer: MEDICARE

## 2018-02-15 ENCOUNTER — SNF VISIT (OUTPATIENT)
Dept: INTERNAL MEDICINE CLINIC | Facility: SKILLED NURSING FACILITY | Age: 83
End: 2018-02-15

## 2018-02-15 ENCOUNTER — APPOINTMENT (OUTPATIENT)
Dept: GENERAL RADIOLOGY | Facility: HOSPITAL | Age: 83
End: 2018-02-15
Attending: EMERGENCY MEDICINE
Payer: MEDICARE

## 2018-02-15 VITALS
HEART RATE: 66 BPM | DIASTOLIC BLOOD PRESSURE: 59 MMHG | OXYGEN SATURATION: 93 % | TEMPERATURE: 99 F | SYSTOLIC BLOOD PRESSURE: 112 MMHG | RESPIRATION RATE: 18 BRPM

## 2018-02-15 DIAGNOSIS — D72.829 LEUKOCYTOSIS, UNSPECIFIED TYPE: ICD-10-CM

## 2018-02-15 DIAGNOSIS — M31.6 TEMPORAL ARTERITIS (HCC): ICD-10-CM

## 2018-02-15 DIAGNOSIS — D75.839 THROMBOCYTOSIS: ICD-10-CM

## 2018-02-15 DIAGNOSIS — R79.89 ELEVATED PLATELET COUNT: ICD-10-CM

## 2018-02-15 DIAGNOSIS — D72.829 LEUKOCYTOSIS, UNSPECIFIED TYPE: Primary | ICD-10-CM

## 2018-02-15 LAB
ANION GAP SERPL CALC-SCNC: 11 MMOL/L (ref 0–18)
BACTERIA UR QL AUTO: NEGATIVE /HPF
BASOPHILS # BLD: 0 K/UL (ref 0–0.2)
BASOPHILS NFR BLD: 0 %
BILIRUB UR QL: NEGATIVE
BUN SERPL-MCNC: 26 MG/DL (ref 8–20)
BUN/CREAT SERPL: 31.7 (ref 10–20)
CALCIUM SERPL-MCNC: 8.2 MG/DL (ref 8.5–10.5)
CHLORIDE SERPL-SCNC: 93 MMOL/L (ref 95–110)
CLARITY UR: CLEAR
CO2 SERPL-SCNC: 22 MMOL/L (ref 22–32)
COLOR UR: YELLOW
CREAT SERPL-MCNC: 0.82 MG/DL (ref 0.5–1.5)
EOSINOPHIL # BLD: 0 K/UL (ref 0–0.7)
EOSINOPHIL NFR BLD: 0 %
ERYTHROCYTE [DISTWIDTH] IN BLOOD BY AUTOMATED COUNT: 13.5 % (ref 11–15)
GLUCOSE SERPL-MCNC: 287 MG/DL (ref 70–99)
GLUCOSE UR-MCNC: 150 MG/DL
HCT VFR BLD AUTO: 41.7 % (ref 41–52)
HGB BLD-MCNC: 14 G/DL (ref 13.5–17.5)
HGB UR QL STRIP.AUTO: NEGATIVE
KETONES UR-MCNC: NEGATIVE MG/DL
LEUKOCYTE ESTERASE UR QL STRIP.AUTO: NEGATIVE
LYMPHOCYTES # BLD: 1.9 K/UL (ref 1–4)
LYMPHOCYTES NFR BLD: 8 %
MCH RBC QN AUTO: 30.3 PG (ref 27–32)
MCHC RBC AUTO-ENTMCNC: 33.5 G/DL (ref 32–37)
MCV RBC AUTO: 90.5 FL (ref 80–100)
MONOCYTES # BLD: 1.2 K/UL (ref 0–1)
MONOCYTES NFR BLD: 5 %
NEUTROPHILS # BLD AUTO: 19.6 K/UL (ref 1.8–7.7)
NEUTROPHILS NFR BLD: 86 %
NITRITE UR QL STRIP.AUTO: NEGATIVE
OSMOLALITY UR CALC.SUM OF ELEC: 277 MOSM/KG (ref 275–295)
PH UR: 5 [PH] (ref 5–8)
PLATELET # BLD AUTO: 533 K/UL (ref 140–400)
PMV BLD AUTO: 6.4 FL (ref 7.4–10.3)
POTASSIUM SERPL-SCNC: 3.7 MMOL/L (ref 3.3–5.1)
PROCALCITONIN SERPL-MCNC: <0.05 NG/ML (ref ?–0.11)
PROT UR-MCNC: NEGATIVE MG/DL
RBC # BLD AUTO: 4.6 M/UL (ref 4.5–5.9)
RBC #/AREA URNS AUTO: 3 /HPF
SODIUM SERPL-SCNC: 126 MMOL/L (ref 136–144)
SP GR UR STRIP: 1.02 (ref 1–1.03)
UROBILINOGEN UR STRIP-ACNC: <2
VIT C UR-MCNC: 40 MG/DL
WBC # BLD AUTO: 22.8 K/UL (ref 4–11)
WBC #/AREA URNS AUTO: 1 /HPF

## 2018-02-15 PROCEDURE — 81001 URINALYSIS AUTO W/SCOPE: CPT | Performed by: EMERGENCY MEDICINE

## 2018-02-15 PROCEDURE — 99309 SBSQ NF CARE MODERATE MDM 30: CPT | Performed by: CLINICAL NURSE SPECIALIST

## 2018-02-15 PROCEDURE — 99284 EMERGENCY DEPT VISIT MOD MDM: CPT

## 2018-02-15 PROCEDURE — 80048 BASIC METABOLIC PNL TOTAL CA: CPT | Performed by: EMERGENCY MEDICINE

## 2018-02-15 PROCEDURE — 85025 COMPLETE CBC W/AUTO DIFF WBC: CPT | Performed by: EMERGENCY MEDICINE

## 2018-02-15 PROCEDURE — 36415 COLL VENOUS BLD VENIPUNCTURE: CPT

## 2018-02-15 PROCEDURE — 87040 BLOOD CULTURE FOR BACTERIA: CPT | Performed by: EMERGENCY MEDICINE

## 2018-02-15 PROCEDURE — 71046 X-RAY EXAM CHEST 2 VIEWS: CPT | Performed by: EMERGENCY MEDICINE

## 2018-02-15 PROCEDURE — 84145 PROCALCITONIN (PCT): CPT | Performed by: EMERGENCY MEDICINE

## 2018-02-15 NOTE — DISCHARGE SUMMARY
[facility]    PATIENT'S NAME: Haja Leos   ATTENDING PHYSICIAN: Danna Lawrence MD   PATIENT ACCOUNT#:   [de-identified]    LOCATION:   [address] [address] RECORD #:   [id number]       YOB: 1935  ADMISSION DATE:       02/05/2018 and started on Solu-Medrol at 60 mg q.8 h. 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   He was converted to prednisone at 50 mg daily, and due to elevated blood sugars; sodium level

## 2018-02-15 NOTE — PROGRESS NOTES
Sofya Chan : 1935 Age [de-identified]year old male patient is admitted to [facility] for LISA.         Chief complaint: leukocytosis, thrombocytosis & follow up Hyponatremia & Viral syndrome        HPI    Patient is a(n) [de-identified]year old mal • Arthritis    • Asbestos exposure     lung    • Atherosclerosis of coronary artery    • Basal cell carcinoma 11/22/2005     BCC right forehead, left upper nose, right perialar area; 1.wide excision BCC right forehead, left upper nose, right alar crease.  2 breath. No nausea, vomiting, or diarrhea. No abdominal pain. No sore throat. No cough. No dysuria. No rashes. Denies hematuria/dysuria. PHYSICAL EXAM:    Gen: A+Ox3. Appears as stated age. No distress. Calm and cooperative.      HEENT: NCAT, neck s NP/MD        4. RSD    - Continuing PT and OT. 5.  Hyponatremia    -Na 127 2/15   -? Low Na+ is due to over-consumption of water.    -On fluid restriction-1500cc/d.    -Daily wts X 2weeks          6. Hx clotting factor & DVT with PE     -On coumadin

## 2018-02-15 NOTE — ED PROVIDER NOTES
Patient Seen in: [facility] AND [facility] Emergency Department    History   Patient presents with:  Abnormal Labs    Stated Complaint: abnormal labs    HPI    Patient presents to the emergency department with complaint of elevated white blood cell count.   He ha Comment: right   No date: INGUINAL HERNIORRHAPHY  No date: IR IVC FILTER PLACEMENT      Comment: for blood clots  2013: OTHER SURGICAL HISTORY      Comment: heart bypass and stent  No date: OTHER SURGICAL HISTORY      Comment: basal cell carcinoma  1994(? ) no dysuria      Positive for stated complaint: abnormal labs  Other systems are as noted in HPI. Constitutional and vital signs reviewed. All other systems reviewed and negative except as noted above.     PSFH elements reviewed from today and agreed e URINALYSIS WITH CULTURE REFLEX - Abnormal; Notable for the following:        Result Value    Glucose Urine 150  (*)     Ascorbic Acid Urine 40  (*)     All other components within normal limits   BASIC METABOLIC PANEL (8) - Abnormal; Notable for the foll

## 2018-02-16 ENCOUNTER — SNF VISIT (OUTPATIENT)
Dept: INTERNAL MEDICINE CLINIC | Facility: SKILLED NURSING FACILITY | Age: 83
End: 2018-02-16

## 2018-02-16 DIAGNOSIS — E87.1 HYPONATREMIA: ICD-10-CM

## 2018-02-16 DIAGNOSIS — Z51.81 ENCOUNTER FOR MONITORING COUMADIN THERAPY: ICD-10-CM

## 2018-02-16 DIAGNOSIS — Z79.01 ENCOUNTER FOR MONITORING COUMADIN THERAPY: ICD-10-CM

## 2018-02-16 DIAGNOSIS — Z09 FOLLOW UP: ICD-10-CM

## 2018-02-16 DIAGNOSIS — D75.839 THROMBOCYTOSIS: ICD-10-CM

## 2018-02-16 DIAGNOSIS — J98.11 ATELECTASIS: ICD-10-CM

## 2018-02-16 DIAGNOSIS — D72.829 LEUKOCYTOSIS, UNSPECIFIED TYPE: ICD-10-CM

## 2018-02-16 PROCEDURE — 99309 SBSQ NF CARE MODERATE MDM 30: CPT | Performed by: NURSE PRACTITIONER

## 2018-02-16 NOTE — PROGRESS NOTES
[facility] : 1935 Age [de-identified]year old male patient is admitted to [facility] for LISA.         Chief complaint: Follow up leukocytosis, thrombocytosis & follow up Hyponatremia        HPI    Patient is a(n) [de-identified]year old male with leukocytosis. Patient is stable and afebrile.       Past Medical History:    Diagnosis Date    • Arthritis    • Asbestos exposure     lung    • Atherosclerosis of coronary artery    • Basal cell carcinoma 11/22/2005     BCC right forehead, left upper nose, VITALS:Stable and afebrile        REVIEW OF SYSTEMS: No chest pain, no shortness of breath. No nausea, vomiting, or diarrhea. No abdominal pain. No sore throat. No cough. No dysuria. No rashes. Denies hematuria/dysuria.         PHYSICAL EXAM:    Gen: insulin SS    -Monitor Accu checks    -Report BS <70mg/dl or >350mg/dl to NP/MD        4. RSD    - Continuing PT and OT. 5.  Hyponatremia    -Na 127 2/15, Na 128 2/18  -will monitor BMP   -?  Low Na+ is due to over-consumption of water.    -On fluid

## 2018-02-18 ENCOUNTER — PRIOR ORIGINAL RECORDS (OUTPATIENT)
Dept: OTHER | Age: 83
End: 2018-02-18

## 2018-02-19 ENCOUNTER — SNF VISIT (OUTPATIENT)
Dept: INTERNAL MEDICINE CLINIC | Facility: SKILLED NURSING FACILITY | Age: 83
End: 2018-02-19

## 2018-02-19 DIAGNOSIS — D72.829 LEUKOCYTOSIS, UNSPECIFIED TYPE: ICD-10-CM

## 2018-02-19 DIAGNOSIS — E87.1 HYPONATREMIA: ICD-10-CM

## 2018-02-19 DIAGNOSIS — M31.6 TEMPORAL ARTERITIS (HCC): ICD-10-CM

## 2018-02-19 PROCEDURE — 99308 SBSQ NF CARE LOW MDM 20: CPT | Performed by: CLINICAL NURSE SPECIALIST

## 2018-02-20 PROCEDURE — 99308 SBSQ NF CARE LOW MDM 20: CPT | Performed by: INTERNAL MEDICINE

## 2018-02-22 PROCEDURE — 99308 SBSQ NF CARE LOW MDM 20: CPT | Performed by: INTERNAL MEDICINE

## 2018-02-23 RX ORDER — SENNA AND DOCUSATE SODIUM 50; 8.6 MG/1; MG/1
1 TABLET, FILM COATED ORAL 2 TIMES DAILY
COMMUNITY

## 2018-02-23 RX ORDER — BISACODYL 10 MG
10 SUPPOSITORY, RECTAL RECTAL
COMMUNITY

## 2018-02-23 NOTE — PROGRESS NOTES
Ayana Cockayne : 1935 Age [de-identified]year old male patient is admitted to [facility] for LISA.      Chief complaint: Follow up leukocytosis, thrombocytosis & follow up Hyponatremia     HPI   Patient is a(n) [de-identified]year old male with medical carcinoma 11/22/2005   BCC right forehead, left upper nose, right perialar area; 1.wide excision BCC right forehead, left upper nose, right alar crease. 2. full thickness skin graft right forehaed, left nose.  3. V-Y flap right alar crease   • Basal cell ca stated age. No distress. Calm and cooperative. HEENT: NCAT, neck supple   CV: RRR, S1S2, and intact distal pulses. Pulm: Effort and breath sounds normal. CTAB/L. No distress, wheezes, rales, rhonchi.    Abd: Obese,Soft, NTND, BS normal, no mass, no rebo serial INR monitoring     7. Constipation   -CPM Miralax   -CPM docusate senna   -CPM dulcolax PRN     8. ?Anemia/GERD   -CPM pantoprazole     9. BPH   -CPM Flomax & fineasteride     10. Ischemic Heart disease   -CPM ASA 81mg Daily   -CPM Atorvastatin Daily

## 2018-02-24 ENCOUNTER — TELEPHONE (OUTPATIENT)
Dept: INTERNAL MEDICINE CLINIC | Facility: CLINIC | Age: 83
End: 2018-02-24

## 2018-02-24 NOTE — TELEPHONE ENCOUNTER
Morgan Stovall from [address] [place] [address] Drive senior living calling regarding INR. Labs not drawn yet. Awaiting INR. Will call back. INR was 2-19-18 was 2 on coumadin 4 mg qhs. Prior was 2.8 on 4. Now, on ABx. This week. 3 mg tonight still waiting on INR.  Will call in AM. Jorge L

## 2018-02-27 PROCEDURE — 99308 SBSQ NF CARE LOW MDM 20: CPT | Performed by: INTERNAL MEDICINE

## 2018-02-28 ENCOUNTER — SNF VISIT (OUTPATIENT)
Dept: INTERNAL MEDICINE CLINIC | Facility: SKILLED NURSING FACILITY | Age: 83
End: 2018-02-28

## 2018-02-28 DIAGNOSIS — Z02.9 DISCHARGE PLANNING ISSUES: ICD-10-CM

## 2018-02-28 PROCEDURE — 99310 SBSQ NF CARE HIGH MDM 45: CPT | Performed by: NURSE PRACTITIONER

## 2018-02-28 RX ORDER — BENZONATATE 100 MG/1
100 CAPSULE ORAL 2 TIMES DAILY PRN
COMMUNITY
Start: 2018-02-28 | End: 2018-03-10

## 2018-02-28 RX ORDER — DOXYCYCLINE HYCLATE 100 MG/1
100 CAPSULE ORAL 2 TIMES DAILY
COMMUNITY
Start: 2018-02-23 | End: 2018-03-02

## 2018-02-28 NOTE — PROGRESS NOTES
Adalid Thompson : 1935 Age [de-identified]year old male patient is admitted to [facility] for LISA.      Chief complaint:Discharge Planning    HPI   Patient is a(n) [de-identified]year old male with medical history of Reflex sympathetic dystrophy, perip BCC, Wide excision bcc of the chest , flap reconstruction   • Basal cell carcinoma of left side of nose 05/07/2010   Wide excision BCC left nose.  Nasolabial flap reconstruction   • Bleeding tendency (HCC)   • Ganglion, finger joint of right hand 09/19/2006 CN. Sensory/motor exams grossly normal deficit. Hx + neuropathy BLE and was ambulating with a walker at home prior to hospitalization. MS: Atraumatic, no cyanosis. + edema at the ankles and feet significantly improving.    Back:No Costoverbral tenderness to DC insulin and patient to follow up with his primary MD 1 week post discharge. Patient's spouse, Lorre Gowers called and informed to make sure patient follows up with his PCP in 1 week to check. 4. RSD   - Received PT/OT    5.  Hyponatremia   -Na 127 2/19 & 1

## 2018-03-04 ENCOUNTER — SNF/IP PROF CHARGE ONLY (OUTPATIENT)
Dept: INTERNAL MEDICINE CLINIC | Facility: CLINIC | Age: 83
End: 2018-03-04

## 2018-03-04 DIAGNOSIS — R06.2 WHEEZING: ICD-10-CM

## 2018-03-04 DIAGNOSIS — R53.81 PHYSICAL DECONDITIONING: ICD-10-CM

## 2018-03-04 DIAGNOSIS — R26.2 DIFFICULTY IN WALKING: ICD-10-CM

## 2018-03-04 DIAGNOSIS — R53.1 GENERAL WEAKNESS: ICD-10-CM

## 2018-03-04 DIAGNOSIS — R05.9 COUGH: ICD-10-CM

## 2018-03-11 ENCOUNTER — SNF/IP PROF CHARGE ONLY (OUTPATIENT)
Dept: INTERNAL MEDICINE CLINIC | Facility: CLINIC | Age: 83
End: 2018-03-11

## 2018-03-11 DIAGNOSIS — M54.50 CHRONIC BILATERAL LOW BACK PAIN WITHOUT SCIATICA: ICD-10-CM

## 2018-03-11 DIAGNOSIS — R05.9 COUGH IN ADULT: ICD-10-CM

## 2018-03-11 DIAGNOSIS — R06.2 WHEEZING: ICD-10-CM

## 2018-03-11 DIAGNOSIS — R26.2 DIFFICULTY IN WALKING: ICD-10-CM

## 2018-03-11 DIAGNOSIS — G89.29 CHRONIC BILATERAL LOW BACK PAIN WITHOUT SCIATICA: ICD-10-CM

## 2018-04-03 ENCOUNTER — LAB ENCOUNTER (OUTPATIENT)
Dept: LAB | Facility: HOSPITAL | Age: 83
End: 2018-04-03
Attending: INTERNAL MEDICINE
Payer: MEDICARE

## 2018-04-03 ENCOUNTER — PRIOR ORIGINAL RECORDS (OUTPATIENT)
Dept: OTHER | Age: 83
End: 2018-04-03

## 2018-04-03 DIAGNOSIS — I25.10 CORONARY ATHEROSCLEROSIS OF NATIVE CORONARY ARTERY: Primary | ICD-10-CM

## 2018-04-03 PROCEDURE — 80048 BASIC METABOLIC PNL TOTAL CA: CPT

## 2018-04-03 PROCEDURE — 36415 COLL VENOUS BLD VENIPUNCTURE: CPT

## 2018-04-05 LAB
BUN: 16 MG/DL
CALCIUM: 9.2 MG/DL
CHLORIDE: 94 MEQ/L
CREATININE, SERUM: 0.67 MG/DL
GLUCOSE: 147 MG/DL
HEMATOCRIT: 41.7 %
HEMOGLOBIN: 14 G/DL
PLATELETS: 533 K/UL
POTASSIUM, SERUM: 4.3 MEQ/L
RED BLOOD COUNT: 4.6 X 10-6/U
SODIUM: 132 MEQ/L
WHITE BLOOD COUNT: 22.8 X 10-3/U

## 2018-04-10 ENCOUNTER — PRIOR ORIGINAL RECORDS (OUTPATIENT)
Dept: OTHER | Age: 83
End: 2018-04-10

## 2018-04-10 ENCOUNTER — MYAURORA ACCOUNT LINK (OUTPATIENT)
Dept: OTHER | Age: 83
End: 2018-04-10

## 2018-04-24 ENCOUNTER — PRIOR ORIGINAL RECORDS (OUTPATIENT)
Dept: OTHER | Age: 83
End: 2018-04-24

## 2018-04-30 LAB
ALKALINE PHOSPHATATE(ALK PHOS): 121 IU/L
BILIRUBIN TOTAL: 0.9 MG/DL
BUN: 20 MG/DL
CALCIUM: 8.7 MG/DL
CHLORIDE: 98 MEQ/L
CREATININE, SERUM: 27 MG/DL
GLOBULIN: 3.5 G/DL
GLUCOSE: 147 MG/DL
POTASSIUM, SERUM: 4.7 MEQ/L
PROTEIN, TOTAL: 6.9 G/DL
SGOT (AST): 13 IU/L
SGPT (ALT): 47 IU/L
SODIUM: 133 MEQ/L

## 2018-05-01 ENCOUNTER — PRIOR ORIGINAL RECORDS (OUTPATIENT)
Dept: OTHER | Age: 83
End: 2018-05-01

## 2018-05-01 LAB
ALT (SGPT): 47 U/L
AST (SGOT): 13 U/L
CHOLESTEROL, TOTAL: 163 MG/DL
GLUCOSE: 147 MG/DL
HDL CHOLESTEROL: 61 MG/DL
HEMOGLOBIN A1C: 8.3 %
LDL CHOLESTEROL: 70 MG/DL
TRIGLYCERIDES: 160 MG/DL

## 2018-06-07 ENCOUNTER — PRIOR ORIGINAL RECORDS (OUTPATIENT)
Dept: OTHER | Age: 83
End: 2018-06-07

## 2018-08-18 ENCOUNTER — APPOINTMENT (OUTPATIENT)
Dept: GENERAL RADIOLOGY | Facility: HOSPITAL | Age: 83
End: 2018-08-18
Payer: MEDICARE

## 2018-08-18 ENCOUNTER — HOSPITAL ENCOUNTER (EMERGENCY)
Facility: HOSPITAL | Age: 83
Discharge: HOME | End: 2018-08-18
Payer: MEDICARE

## 2018-08-18 VITALS
TEMPERATURE: 99 F | DIASTOLIC BLOOD PRESSURE: 68 MMHG | HEART RATE: 59 BPM | SYSTOLIC BLOOD PRESSURE: 136 MMHG | RESPIRATION RATE: 16 BRPM | OXYGEN SATURATION: 93 % | BODY MASS INDEX: 31 KG/M2 | WEIGHT: 235 LBS

## 2018-08-18 DIAGNOSIS — S46.911A STRAIN OF RIGHT SHOULDER, INITIAL ENCOUNTER: Primary | ICD-10-CM

## 2018-08-18 PROCEDURE — 99283 EMERGENCY DEPT VISIT LOW MDM: CPT

## 2018-08-18 PROCEDURE — 96372 THER/PROPH/DIAG INJ SC/IM: CPT

## 2018-08-18 PROCEDURE — 73030 X-RAY EXAM OF SHOULDER: CPT

## 2018-08-18 RX ORDER — HYDROCODONE BITARTRATE AND ACETAMINOPHEN 5; 325 MG/1; MG/1
1 TABLET ORAL EVERY 6 HOURS PRN
Qty: 10 TABLET | Refills: 0 | Status: ON HOLD | OUTPATIENT
Start: 2018-08-18 | End: 2021-08-19

## 2018-08-18 RX ORDER — MORPHINE SULFATE 4 MG/ML
8 INJECTION, SOLUTION INTRAMUSCULAR; INTRAVENOUS ONCE
Status: COMPLETED | OUTPATIENT
Start: 2018-08-18 | End: 2018-08-18

## 2018-08-18 NOTE — ED INITIAL ASSESSMENT (HPI)
Patient presents to ED with complaints of right shoulder pain and limited ROM since yesterday. Patient thinks he over extended it reaching for a glass of water. Cap refill brisk.

## 2018-08-18 NOTE — ED PROVIDER NOTES
Patient Seen in: [facility] AND [facility] Emergency Department    History   Patient presents with:  Musculoskeletal Problem    Stated Complaint: right shoulder dislocation    HPI    72-year-old male with multiple medical problems including a remote history of r Past Surgical History:  No date: FOOT SURGERY      Comment: right   No date: INGUINAL HERNIORRHAPHY  No date: IR IVC FILTER PLACEMENT      Comment: for blood clots  2013: OTHER SURGICAL HISTORY      Comment: heart bypass and stent  No date: OTHER MARIAM swelling. Clavicles nontender. Neurological: Alert and oriented to person, place, and time. Skin: Skin is warm and dry. Psychiatric: Normal mood and affect. Behavior is normal.   Nursing note and vitals reviewed.     Differential diagnosis includes r shoulder, initial encounter  (primary encounter diagnosis)    Disposition:  Discharge  8/18/2018  5:54 pm    Follow-up:  Opal Sheppard MD  [id number] [facility] [phone]    Schedule an appointment as soon as possible for a visit in 2 d

## 2018-10-15 ENCOUNTER — PRIOR ORIGINAL RECORDS (OUTPATIENT)
Dept: OTHER | Age: 83
End: 2018-10-15

## 2018-10-15 ENCOUNTER — APPOINTMENT (OUTPATIENT)
Dept: LAB | Facility: HOSPITAL | Age: 83
End: 2018-10-15
Attending: INTERNAL MEDICINE
Payer: MEDICARE

## 2018-10-15 DIAGNOSIS — I48.91 ATRIAL FIBRILLATION, UNSPECIFIED TYPE (HCC): ICD-10-CM

## 2018-10-15 PROCEDURE — 80048 BASIC METABOLIC PNL TOTAL CA: CPT

## 2018-10-15 PROCEDURE — 36415 COLL VENOUS BLD VENIPUNCTURE: CPT

## 2018-10-16 ENCOUNTER — PRIOR ORIGINAL RECORDS (OUTPATIENT)
Dept: OTHER | Age: 83
End: 2018-10-16

## 2018-10-16 LAB
BUN: 14 MG/DL
CALCIUM: 9.2 MG/DL
CHLORIDE: 99 MEQ/L
CREATININE, SERUM: 0.81 MG/DL
GLUCOSE: 235 MG/DL
POTASSIUM, SERUM: 4 MEQ/L
SODIUM: 132 MEQ/L

## 2018-12-18 ENCOUNTER — PRIOR ORIGINAL RECORDS (OUTPATIENT)
Dept: OTHER | Age: 83
End: 2018-12-18

## 2018-12-18 ENCOUNTER — MYAURORA ACCOUNT LINK (OUTPATIENT)
Dept: OTHER | Age: 83
End: 2018-12-18

## 2019-02-28 VITALS
HEART RATE: 63 BPM | SYSTOLIC BLOOD PRESSURE: 100 MMHG | OXYGEN SATURATION: 91 % | DIASTOLIC BLOOD PRESSURE: 62 MMHG | HEIGHT: 72 IN | WEIGHT: 219 LBS | BODY MASS INDEX: 29.66 KG/M2

## 2019-02-28 VITALS
SYSTOLIC BLOOD PRESSURE: 130 MMHG | DIASTOLIC BLOOD PRESSURE: 80 MMHG | HEART RATE: 60 BPM | WEIGHT: 237 LBS | OXYGEN SATURATION: 98 % | BODY MASS INDEX: 33.18 KG/M2 | HEIGHT: 71 IN

## 2019-02-28 VITALS
WEIGHT: 230 LBS | SYSTOLIC BLOOD PRESSURE: 128 MMHG | DIASTOLIC BLOOD PRESSURE: 60 MMHG | HEART RATE: 74 BPM | HEIGHT: 72 IN | BODY MASS INDEX: 31.15 KG/M2

## 2019-03-01 VITALS
HEIGHT: 71 IN | BODY MASS INDEX: 32.34 KG/M2 | SYSTOLIC BLOOD PRESSURE: 144 MMHG | WEIGHT: 231 LBS | HEART RATE: 52 BPM | OXYGEN SATURATION: 95 % | RESPIRATION RATE: 10 BRPM | DIASTOLIC BLOOD PRESSURE: 78 MMHG

## 2019-03-13 LAB
ABSOLUTE IMMATURE GRANULOCYTES (OFFPRE24): NORMAL
ALBUMIN SERPL-MCNC: 3.5 G/DL
ALBUMIN/GLOB SERPL: NORMAL {RATIO}
ALP SERPL-CCNC: 79 U/L
ALT SERPL-CCNC: 25 U/L
ANION GAP SERPL CALC-SCNC: NORMAL MMOL/L
AST SERPL-CCNC: 17 U/L
BASO+EOS+MONOS # BLD: NORMAL 10*3/UL
BASO+EOS+MONOS NFR BLD: NORMAL %
BASOPHILS # BLD: NORMAL 10*3/UL
BASOPHILS NFR BLD: NORMAL %
BILIRUB SERPL-MCNC: 0.6 MG/DL
BUN SERPL-MCNC: 17 MG/DL
BUN/CREAT SERPL: NORMAL
CALCIUM SERPL-MCNC: 8.7 MG/DL
CHLORIDE SERPL-SCNC: 99 MMOL/L
CHOLEST SERPL-MCNC: 125 MG/DL
CHOLEST/HDLC SERPL: NORMAL {RATIO}
CK SERPL-CCNC: 62 U/L
CO2 SERPL-SCNC: NORMAL MMOL/L
CREAT SERPL-MCNC: 0.72 MG/DL
DIFFERENTIAL METHOD BLD: NORMAL
EOSINOPHIL # BLD: NORMAL 10*3/UL
EOSINOPHIL NFR BLD: NORMAL %
ERYTHROCYTE [DISTWIDTH] IN BLOOD: NORMAL %
GLOBULIN SER-MCNC: 3.7 G/DL
GLUCOSE SERPL-MCNC: 124 MG/DL
HCT VFR BLD CALC: 42.9 %
HDLC SERPL-MCNC: 43 MG/DL
HGB BLD-MCNC: 14.1 G/DL
IMMATURE GRANULOCYTES (OFFPRE25): NORMAL
LDLC SERPL CALC-MCNC: 60 MG/DL
LENGTH OF FAST TIME PATIENT: NORMAL H
LENGTH OF FAST TIME PATIENT: NORMAL H
LYMPHOCYTES # BLD: NORMAL 10*3/UL
LYMPHOCYTES NFR BLD: NORMAL %
MCH RBC QN AUTO: NORMAL PG
MCHC RBC AUTO-ENTMCNC: NORMAL G/DL
MCV RBC AUTO: NORMAL FL
MONOCYTES # BLD: NORMAL 10*3/UL
MONOCYTES NFR BLD: NORMAL %
MPV (OFFPRE2): NORMAL
NEUTROPHILS # BLD: NORMAL 10*3/UL
NEUTROPHILS NFR BLD: NORMAL %
NONHDLC SERPL-MCNC: NORMAL MG/DL
NRBC BLD MANUAL-RTO: NORMAL %
PLAT MORPH BLD: NORMAL
PLATELET # BLD: 332 10*3/UL
POTASSIUM SERPL-SCNC: 4 MMOL/L
PROT SERPL-MCNC: 7.2 G/DL
RBC # BLD: 4.5 10*6/UL
RBC MORPH BLD: NORMAL
SODIUM SERPL-SCNC: 134 MMOL/L
TRIGL SERPL-MCNC: 112 MG/DL
VLDLC SERPL CALC-MCNC: NORMAL MG/DL
WBC # BLD: 8.8 10*3/UL
WBC MORPH BLD: NORMAL

## 2019-03-14 RX ORDER — WARFARIN SODIUM 1 MG/1
1 TABLET ORAL
COMMUNITY
Start: 2017-12-15 | End: 2019-04-09 | Stop reason: CLARIF

## 2019-03-14 RX ORDER — WARFARIN SODIUM 5 MG/1
5 TABLET ORAL
COMMUNITY
Start: 2017-12-15

## 2019-03-14 RX ORDER — SOTALOL HYDROCHLORIDE 80 MG/1
TABLET ORAL
Qty: 180 TABLET | Refills: 0 | Status: SHIPPED | OUTPATIENT
Start: 2019-03-14 | End: 2019-06-11 | Stop reason: SDUPTHER

## 2019-03-14 RX ORDER — ATORVASTATIN CALCIUM 40 MG/1
40 TABLET, FILM COATED ORAL NIGHTLY
COMMUNITY
Start: 2013-04-09

## 2019-03-14 RX ORDER — ESCITALOPRAM OXALATE 20 MG/1
20 TABLET ORAL
COMMUNITY
Start: 2013-02-27

## 2019-03-14 RX ORDER — DUTASTERIDE 0.5 MG/1
0.5 CAPSULE, LIQUID FILLED ORAL
COMMUNITY
Start: 2012-12-14 | End: 2021-04-06 | Stop reason: ALTCHOICE

## 2019-03-14 RX ORDER — SOTALOL HYDROCHLORIDE 80 MG/1
80 TABLET ORAL 2 TIMES DAILY
COMMUNITY
Start: 2018-10-11 | End: 2019-03-14 | Stop reason: SDUPTHER

## 2019-03-14 RX ORDER — CHLORAL HYDRATE 500 MG
1000 CAPSULE ORAL
COMMUNITY
Start: 2010-12-01

## 2019-03-14 RX ORDER — MULTIVIT WITH MINERALS/LUTEIN
TABLET ORAL
COMMUNITY
Start: 2010-12-01

## 2019-03-14 RX ORDER — METOPROLOL SUCCINATE 25 MG/1
25 TABLET, EXTENDED RELEASE ORAL DAILY
COMMUNITY
Start: 2015-09-02

## 2019-03-14 RX ORDER — PREDNISONE 1 MG/1
1 TABLET ORAL EVERY OTHER DAY
COMMUNITY
Start: 2018-10-15

## 2019-03-14 RX ORDER — FAMOTIDINE 20 MG
TABLET ORAL
COMMUNITY
Start: 2012-12-14 | End: 2019-04-09

## 2019-03-21 ENCOUNTER — CLINICAL ABSTRACT (OUTPATIENT)
Dept: CARDIOLOGY | Age: 84
End: 2019-03-21

## 2019-03-26 ENCOUNTER — TELEPHONE (OUTPATIENT)
Dept: CARDIOLOGY | Age: 84
End: 2019-03-26

## 2019-03-26 DIAGNOSIS — E78.00 HYPERCHOLESTEREMIA: Primary | ICD-10-CM

## 2019-04-08 PROBLEM — I82.409 DVT (DEEP VENOUS THROMBOSIS) (CMD): Status: ACTIVE | Noted: 2019-04-08

## 2019-04-08 PROBLEM — R07.89 CHEST DISCOMFORT: Status: ACTIVE | Noted: 2019-04-08

## 2019-04-08 PROBLEM — E78.5 DYSLIPIDEMIA: Status: ACTIVE | Noted: 2019-04-08

## 2019-04-08 PROBLEM — Z95.1 HX OF CABG: Status: ACTIVE | Noted: 2019-04-08

## 2019-04-08 PROBLEM — E78.00 PURE HYPERCHOLESTEROLEMIA: Status: ACTIVE | Noted: 2019-04-08

## 2019-04-08 PROBLEM — I25.10 CORONARY ARTERY DISEASE: Status: ACTIVE | Noted: 2019-04-08

## 2019-04-09 ENCOUNTER — OFFICE VISIT (OUTPATIENT)
Dept: CARDIOLOGY | Age: 84
End: 2019-04-09

## 2019-04-09 VITALS
SYSTOLIC BLOOD PRESSURE: 130 MMHG | HEART RATE: 70 BPM | DIASTOLIC BLOOD PRESSURE: 64 MMHG | BODY MASS INDEX: 31.02 KG/M2 | HEIGHT: 72 IN | WEIGHT: 229 LBS | OXYGEN SATURATION: 90 %

## 2019-04-09 DIAGNOSIS — Z79.899 ENCOUNTER FOR LONG-TERM (CURRENT) DRUG USE: ICD-10-CM

## 2019-04-09 DIAGNOSIS — R00.2 PALPITATION: Primary | ICD-10-CM

## 2019-04-09 DIAGNOSIS — I49.3 PVC (PREMATURE VENTRICULAR CONTRACTION): ICD-10-CM

## 2019-04-09 DIAGNOSIS — R00.1 BRADYCARDIA, UNSPECIFIED: ICD-10-CM

## 2019-04-09 PROCEDURE — 93000 ELECTROCARDIOGRAM COMPLETE: CPT | Performed by: INTERNAL MEDICINE

## 2019-04-09 PROCEDURE — 99214 OFFICE O/P EST MOD 30 MIN: CPT | Performed by: INTERNAL MEDICINE

## 2019-06-11 RX ORDER — SOTALOL HYDROCHLORIDE 80 MG/1
TABLET ORAL
Qty: 180 TABLET | Refills: 0 | Status: SHIPPED | OUTPATIENT
Start: 2019-06-11 | End: 2019-09-22 | Stop reason: SDUPTHER

## 2019-09-23 RX ORDER — SOTALOL HYDROCHLORIDE 80 MG/1
TABLET ORAL
Qty: 180 TABLET | Refills: 0 | Status: SHIPPED | OUTPATIENT
Start: 2019-09-23 | End: 2019-12-18 | Stop reason: SDUPTHER

## 2019-12-17 ENCOUNTER — OFFICE VISIT (OUTPATIENT)
Dept: CARDIOLOGY | Age: 84
End: 2019-12-17

## 2019-12-17 VITALS
SYSTOLIC BLOOD PRESSURE: 120 MMHG | DIASTOLIC BLOOD PRESSURE: 60 MMHG | HEART RATE: 70 BPM | BODY MASS INDEX: 29.8 KG/M2 | HEIGHT: 72 IN | OXYGEN SATURATION: 98 % | WEIGHT: 220 LBS

## 2019-12-17 DIAGNOSIS — I25.10 CORONARY ARTERY DISEASE INVOLVING NATIVE CORONARY ARTERY OF NATIVE HEART WITHOUT ANGINA PECTORIS: Primary | ICD-10-CM

## 2019-12-17 DIAGNOSIS — I49.3 PVC (PREMATURE VENTRICULAR CONTRACTION): ICD-10-CM

## 2019-12-17 DIAGNOSIS — E78.00 PURE HYPERCHOLESTEROLEMIA: ICD-10-CM

## 2019-12-17 PROCEDURE — 99214 OFFICE O/P EST MOD 30 MIN: CPT | Performed by: INTERNAL MEDICINE

## 2019-12-17 ASSESSMENT — PATIENT HEALTH QUESTIONNAIRE - PHQ9
2. FEELING DOWN, DEPRESSED OR HOPELESS: NOT AT ALL
1. LITTLE INTEREST OR PLEASURE IN DOING THINGS: NOT AT ALL
SUM OF ALL RESPONSES TO PHQ9 QUESTIONS 1 AND 2: 0
SUM OF ALL RESPONSES TO PHQ9 QUESTIONS 1 AND 2: 0

## 2019-12-18 DIAGNOSIS — I49.3 PVC (PREMATURE VENTRICULAR CONTRACTION): Primary | ICD-10-CM

## 2019-12-18 RX ORDER — SOTALOL HYDROCHLORIDE 80 MG/1
TABLET ORAL
Qty: 180 TABLET | Refills: 0 | Status: SHIPPED | OUTPATIENT
Start: 2019-12-18 | End: 2020-02-26 | Stop reason: SDUPTHER

## 2019-12-19 ENCOUNTER — TELEPHONE (OUTPATIENT)
Dept: CARDIOLOGY | Age: 84
End: 2019-12-19

## 2020-01-13 ENCOUNTER — OFFICE VISIT (OUTPATIENT)
Dept: CARDIOLOGY | Age: 85
End: 2020-01-13
Attending: INTERNAL MEDICINE

## 2020-01-13 ENCOUNTER — TELEPHONE (OUTPATIENT)
Dept: CARDIOLOGY | Age: 85
End: 2020-01-13

## 2020-01-13 DIAGNOSIS — I49.3 PVC (PREMATURE VENTRICULAR CONTRACTION): Primary | ICD-10-CM

## 2020-01-13 PROCEDURE — 93000 ELECTROCARDIOGRAM COMPLETE: CPT | Performed by: INTERNAL MEDICINE

## 2020-01-30 DIAGNOSIS — R00.2 PALPITATIONS: Primary | ICD-10-CM

## 2020-01-30 PROCEDURE — 93000 ELECTROCARDIOGRAM COMPLETE: CPT | Performed by: NURSE PRACTITIONER

## 2020-02-27 RX ORDER — SOTALOL HYDROCHLORIDE 80 MG/1
TABLET ORAL
Qty: 180 TABLET | Refills: 0 | Status: SHIPPED | OUTPATIENT
Start: 2020-02-27 | End: 2020-05-26

## 2020-03-26 RX ORDER — TRIAMCINOLONE ACETONIDE 1 MG/G
OINTMENT TOPICAL
COMMUNITY
Start: 2020-02-25

## 2020-03-26 RX ORDER — FINASTERIDE 5 MG/1
TABLET, FILM COATED ORAL
COMMUNITY
Start: 2019-12-19

## 2020-03-26 RX ORDER — TAMSULOSIN HYDROCHLORIDE 0.4 MG/1
CAPSULE ORAL
COMMUNITY
Start: 2019-12-19

## 2020-03-26 RX ORDER — MONTELUKAST SODIUM 10 MG/1
TABLET ORAL
COMMUNITY
Start: 2020-02-03

## 2020-03-26 RX ORDER — TIOTROPIUM BROMIDE AND OLODATEROL 3.124; 2.736 UG/1; UG/1
SPRAY, METERED RESPIRATORY (INHALATION)
COMMUNITY
Start: 2020-02-03

## 2020-04-07 ENCOUNTER — OFFICE VISIT (OUTPATIENT)
Dept: CARDIOLOGY | Age: 85
End: 2020-04-07

## 2020-04-07 VITALS — BODY MASS INDEX: 31.15 KG/M2 | WEIGHT: 230 LBS | HEIGHT: 72 IN

## 2020-04-07 DIAGNOSIS — I49.3 PVC (PREMATURE VENTRICULAR CONTRACTION): Primary | ICD-10-CM

## 2020-04-07 DIAGNOSIS — E78.00 PURE HYPERCHOLESTEROLEMIA: ICD-10-CM

## 2020-04-07 DIAGNOSIS — I25.10 CORONARY ARTERY DISEASE INVOLVING NATIVE CORONARY ARTERY OF NATIVE HEART WITHOUT ANGINA PECTORIS: ICD-10-CM

## 2020-04-07 PROCEDURE — 99441 TELEPHONE E&M BY PHYSICIAN EST PT NOT ORIG PREV 7 DAYS 5-10 MIN: CPT | Performed by: INTERNAL MEDICINE

## 2020-04-07 ASSESSMENT — PATIENT HEALTH QUESTIONNAIRE - PHQ9
SUM OF ALL RESPONSES TO PHQ9 QUESTIONS 1 AND 2: 0
1. LITTLE INTEREST OR PLEASURE IN DOING THINGS: NOT AT ALL
SUM OF ALL RESPONSES TO PHQ9 QUESTIONS 1 AND 2: 0
2. FEELING DOWN, DEPRESSED OR HOPELESS: NOT AT ALL

## 2020-05-26 RX ORDER — SOTALOL HYDROCHLORIDE 80 MG/1
TABLET ORAL
Qty: 180 TABLET | Refills: 1 | Status: SHIPPED | OUTPATIENT
Start: 2020-05-26 | End: 2020-12-28

## 2020-12-28 RX ORDER — SOTALOL HYDROCHLORIDE 80 MG/1
TABLET ORAL
Qty: 180 TABLET | Refills: 1 | Status: SHIPPED | OUTPATIENT
Start: 2020-12-28 | End: 2021-06-01

## 2021-03-09 DIAGNOSIS — Z23 NEED FOR VACCINATION: ICD-10-CM

## 2021-03-23 ENCOUNTER — CLINICAL ABSTRACT (OUTPATIENT)
Dept: CARDIOLOGY | Age: 86
End: 2021-03-23

## 2021-03-23 ENCOUNTER — LAB ENCOUNTER (OUTPATIENT)
Dept: LAB | Facility: HOSPITAL | Age: 86
End: 2021-03-23
Attending: INTERNAL MEDICINE
Payer: MEDICARE

## 2021-03-23 DIAGNOSIS — E78.00 HYPERCHOLESTEREMIA: Primary | ICD-10-CM

## 2021-03-23 LAB
ALT SERPL-CCNC: 27 U/L
ALT SERPL-CCNC: 27 UNITS/L
AST SERPL-CCNC: 15 U/L (ref 15–37)
AST SERPL-CCNC: 15 UNITS/L
CHOLEST SERPL-MCNC: 131 MG/DL
CHOLEST SMN-MCNC: 131 MG/DL (ref ?–200)
CK SERPL-CCNC: 134 U/L
CK SERPL-CCNC: 134 U/L
HDLC SERPL-MCNC: 43 MG/DL
HDLC SERPL-MCNC: 43 MG/DL (ref 40–59)
LDLC SERPL CALC-MCNC: 63 MG/DL
LDLC SERPL CALC-MCNC: 63 MG/DL (ref ?–100)
NONHDLC SERPL-MCNC: 88 MG/DL (ref ?–130)
PATIENT FASTING Y/N/NP: YES
TRIGL SERPL-MCNC: 127 MG/DL
TRIGL SERPL-MCNC: 127 MG/DL (ref 30–149)
VLDLC SERPL CALC-MCNC: 25 MG/DL (ref 0–30)

## 2021-03-23 PROCEDURE — 80061 LIPID PANEL: CPT

## 2021-03-23 PROCEDURE — 84460 ALANINE AMINO (ALT) (SGPT): CPT

## 2021-03-23 PROCEDURE — 84450 TRANSFERASE (AST) (SGOT): CPT

## 2021-03-23 PROCEDURE — 36415 COLL VENOUS BLD VENIPUNCTURE: CPT

## 2021-03-23 PROCEDURE — 82550 ASSAY OF CK (CPK): CPT

## 2021-03-25 ENCOUNTER — TELEPHONE (OUTPATIENT)
Dept: CARDIOLOGY | Age: 86
End: 2021-03-25

## 2021-04-06 ENCOUNTER — OFFICE VISIT (OUTPATIENT)
Dept: CARDIOLOGY | Age: 86
End: 2021-04-06

## 2021-04-06 VITALS
WEIGHT: 220 LBS | HEART RATE: 57 BPM | DIASTOLIC BLOOD PRESSURE: 64 MMHG | HEIGHT: 72 IN | SYSTOLIC BLOOD PRESSURE: 124 MMHG | BODY MASS INDEX: 29.8 KG/M2

## 2021-04-06 DIAGNOSIS — Z79.899 ENCOUNTER FOR LONG-TERM (CURRENT) DRUG USE: ICD-10-CM

## 2021-04-06 DIAGNOSIS — Z95.1 HX OF CABG: ICD-10-CM

## 2021-04-06 DIAGNOSIS — E78.00 PURE HYPERCHOLESTEROLEMIA: ICD-10-CM

## 2021-04-06 DIAGNOSIS — I49.3 PVC (PREMATURE VENTRICULAR CONTRACTION): ICD-10-CM

## 2021-04-06 DIAGNOSIS — R00.2 PALPITATION: ICD-10-CM

## 2021-04-06 DIAGNOSIS — I25.10 CORONARY ARTERY DISEASE INVOLVING NATIVE CORONARY ARTERY OF NATIVE HEART WITHOUT ANGINA PECTORIS: Primary | ICD-10-CM

## 2021-04-06 PROCEDURE — 93000 ELECTROCARDIOGRAM COMPLETE: CPT | Performed by: INTERNAL MEDICINE

## 2021-04-06 PROCEDURE — 99214 OFFICE O/P EST MOD 30 MIN: CPT | Performed by: INTERNAL MEDICINE

## 2021-04-08 DIAGNOSIS — I49.3 PVC (PREMATURE VENTRICULAR CONTRACTION): ICD-10-CM

## 2021-04-20 LAB
ALBUMIN SERPL-MCNC: 3.9 G/DL
ALBUMIN SERPL-MCNC: 3.9 G/DL
ALP SERPL-CCNC: 107 U/L
ALP SERPL-CCNC: 107 U/L
ALT SERPL-CCNC: 18 UNITS/L
ALT SERPL-CCNC: 18 UNITS/L
AST SERPL-CCNC: 17 UNITS/L
AST SERPL-CCNC: 17 UNITS/L
BILIRUB SERPL-MCNC: 0.6 MG/DL
BILIRUB SERPL-MCNC: 0.6 MG/DL
BUN SERPL-MCNC: 18 MG/DL
BUN SERPL-MCNC: 18 MG/DL
CALCIUM SERPL-MCNC: 9.2 MG/DL
CALCIUM SERPL-MCNC: 9.2 MG/DL
CHLORIDE SERPL-SCNC: 98 MMOL/L
CHLORIDE SERPL-SCNC: 98 MMOL/L
CHOLEST SERPL-MCNC: 119 MG/DL
CHOLEST SERPL-MCNC: 119 MG/DL
CREAT SERPL-MCNC: 0.76 MG/DL
CREAT SERPL-MCNC: 0.76 MG/DL
GLOBULIN SER-MCNC: 3.1 G/DL
GLUCOSE SERPL-MCNC: 117 MG/DL
GLUCOSE SERPL-MCNC: 117 MG/DL
HBA1C MFR BLD: 6.5 % (ref 4.5–5.6)
HDLC SERPL-MCNC: 43 MG/DL
HDLC SERPL-MCNC: 43 MG/DL
HEMOGLOBIN A1C: 6.5 %
LDLC SERPL CALC-MCNC: 60 MG/DL
LDLC SERPL CALC-MCNC: 60 MG/DL
POTASSIUM SERPL-SCNC: 4.9 MMOL/L
POTASSIUM SERPL-SCNC: 4.9 MMOL/L
PROT SERPL-MCNC: 7 G/DL
PROT SERPL-MCNC: 7 G/DL
SODIUM SERPL-SCNC: 137 MMOL/L
SODIUM SERPL-SCNC: 137 MMOL/L
TRIGL SERPL-MCNC: 83 MG/DL
TRIGL SERPL-MCNC: 83 MG/DL

## 2021-04-22 ENCOUNTER — CLINICAL ABSTRACT (OUTPATIENT)
Dept: CARDIOLOGY | Age: 86
End: 2021-04-22

## 2021-04-23 ENCOUNTER — OFFICE VISIT (OUTPATIENT)
Dept: CARDIOLOGY | Age: 86
End: 2021-04-23

## 2021-04-23 VITALS
DIASTOLIC BLOOD PRESSURE: 66 MMHG | BODY MASS INDEX: 29.8 KG/M2 | HEART RATE: 68 BPM | HEIGHT: 72 IN | SYSTOLIC BLOOD PRESSURE: 120 MMHG | WEIGHT: 220 LBS | OXYGEN SATURATION: 93 %

## 2021-04-23 DIAGNOSIS — I25.10 CORONARY ARTERY DISEASE INVOLVING NATIVE CORONARY ARTERY OF NATIVE HEART WITHOUT ANGINA PECTORIS: Primary | ICD-10-CM

## 2021-04-23 DIAGNOSIS — I82.4Y9 DEEP VEIN THROMBOSIS (DVT) OF PROXIMAL LOWER EXTREMITY, UNSPECIFIED CHRONICITY, UNSPECIFIED LATERALITY (CMD): ICD-10-CM

## 2021-04-23 DIAGNOSIS — I49.3 PVC (PREMATURE VENTRICULAR CONTRACTION): ICD-10-CM

## 2021-04-23 PROCEDURE — 99214 OFFICE O/P EST MOD 30 MIN: CPT | Performed by: INTERNAL MEDICINE

## 2021-04-23 ASSESSMENT — PATIENT HEALTH QUESTIONNAIRE - PHQ9
CLINICAL INTERPRETATION OF PHQ9 SCORE: NO FURTHER SCREENING NEEDED
CLINICAL INTERPRETATION OF PHQ2 SCORE: NO FURTHER SCREENING NEEDED
2. FEELING DOWN, DEPRESSED OR HOPELESS: NOT AT ALL
SUM OF ALL RESPONSES TO PHQ9 QUESTIONS 1 AND 2: 0
1. LITTLE INTEREST OR PLEASURE IN DOING THINGS: NOT AT ALL
SUM OF ALL RESPONSES TO PHQ9 QUESTIONS 1 AND 2: 0

## 2021-04-27 ENCOUNTER — CLINICAL ABSTRACT (OUTPATIENT)
Dept: CARDIOLOGY | Age: 86
End: 2021-04-27

## 2021-06-01 RX ORDER — SOTALOL HYDROCHLORIDE 80 MG/1
TABLET ORAL
Qty: 180 TABLET | Refills: 1 | Status: SHIPPED | OUTPATIENT
Start: 2021-06-01

## 2021-08-03 ENCOUNTER — HOSPITAL ENCOUNTER (OUTPATIENT)
Dept: GENERAL RADIOLOGY | Facility: HOSPITAL | Age: 86
Discharge: HOME | End: 2021-08-03
Attending: INTERNAL MEDICINE
Payer: MEDICARE

## 2021-08-03 DIAGNOSIS — M13.10 ACUTE MONOARTHRITIS: ICD-10-CM

## 2021-08-03 PROCEDURE — 73130 X-RAY EXAM OF HAND: CPT | Performed by: INTERNAL MEDICINE

## 2021-08-03 PROCEDURE — 73110 X-RAY EXAM OF WRIST: CPT | Performed by: INTERNAL MEDICINE

## 2021-08-18 ENCOUNTER — ANESTHESIA EVENT (OUTPATIENT)
Dept: INTERVENTIONAL RADIOLOGY/VASCULAR | Facility: HOSPITAL | Age: 86
DRG: 242 | End: 2021-08-18
Payer: MEDICARE

## 2021-08-18 ENCOUNTER — HOSPITAL ENCOUNTER (INPATIENT)
Facility: HOSPITAL | Age: 86
LOS: 5 days | Discharge: HOME HEALTH | DRG: 242 | End: 2021-08-23
Attending: EMERGENCY MEDICINE | Admitting: INTERNAL MEDICINE
Payer: MEDICARE

## 2021-08-18 ENCOUNTER — APPOINTMENT (OUTPATIENT)
Dept: GENERAL RADIOLOGY | Facility: HOSPITAL | Age: 86
DRG: 242 | End: 2021-08-18
Attending: INTERNAL MEDICINE
Payer: MEDICARE

## 2021-08-18 ENCOUNTER — APPOINTMENT (OUTPATIENT)
Dept: GENERAL RADIOLOGY | Facility: HOSPITAL | Age: 86
DRG: 242 | End: 2021-08-18
Attending: EMERGENCY MEDICINE
Payer: MEDICARE

## 2021-08-18 ENCOUNTER — APPOINTMENT (OUTPATIENT)
Dept: INTERVENTIONAL RADIOLOGY/VASCULAR | Facility: HOSPITAL | Age: 86
DRG: 242 | End: 2021-08-18
Attending: NURSE PRACTITIONER
Payer: MEDICARE

## 2021-08-18 DIAGNOSIS — R00.1 BRADYCARDIA: Primary | ICD-10-CM

## 2021-08-18 LAB
ANION GAP SERPL CALC-SCNC: 4 MMOL/L (ref 0–18)
BASOPHILS # BLD AUTO: 0.06 X10(3) UL (ref 0–0.2)
BASOPHILS NFR BLD AUTO: 0.5 %
BUN BLD-MCNC: 29 MG/DL (ref 7–18)
BUN/CREAT SERPL: 33.3 (ref 10–20)
CALCIUM BLD-MCNC: 10 MG/DL (ref 8.5–10.1)
CHLORIDE SERPL-SCNC: 103 MMOL/L (ref 98–112)
CO2 SERPL-SCNC: 29 MMOL/L (ref 21–32)
CREAT BLD-MCNC: 0.87 MG/DL
DEPRECATED RDW RBC AUTO: 45 FL (ref 35.1–46.3)
EOSINOPHIL # BLD AUTO: 0.2 X10(3) UL (ref 0–0.7)
EOSINOPHIL NFR BLD AUTO: 1.7 %
ERYTHROCYTE [DISTWIDTH] IN BLOOD BY AUTOMATED COUNT: 12.7 % (ref 11–15)
GLUCOSE BLD-MCNC: 118 MG/DL (ref 70–99)
GLUCOSE BLDC GLUCOMTR-MCNC: 119 MG/DL (ref 70–99)
GLUCOSE BLDC GLUCOMTR-MCNC: 90 MG/DL (ref 70–99)
HAV IGM SER QL: 1.6 MG/DL (ref 1.6–2.6)
HCT VFR BLD AUTO: 40.8 %
HGB BLD-MCNC: 13.8 G/DL
IMM GRANULOCYTES # BLD AUTO: 0.1 X10(3) UL (ref 0–1)
IMM GRANULOCYTES NFR BLD: 0.9 %
INR BLD: 2.97 (ref 0.9–1.2)
LYMPHOCYTES # BLD AUTO: 2.67 X10(3) UL (ref 1–4)
LYMPHOCYTES NFR BLD AUTO: 22.8 %
MCH RBC QN AUTO: 32.2 PG (ref 26–34)
MCHC RBC AUTO-ENTMCNC: 33.8 G/DL (ref 31–37)
MCV RBC AUTO: 95.3 FL
MONOCYTES # BLD AUTO: 0.92 X10(3) UL (ref 0.1–1)
MONOCYTES NFR BLD AUTO: 7.8 %
NEUTROPHILS # BLD AUTO: 7.78 X10 (3) UL (ref 1.5–7.7)
NEUTROPHILS # BLD AUTO: 7.78 X10(3) UL (ref 1.5–7.7)
NEUTROPHILS NFR BLD AUTO: 66.3 %
NT-PROBNP SERPL-MCNC: 4466 PG/ML (ref ?–450)
OSMOLALITY SERPL CALC.SUM OF ELEC: 289 MOSM/KG (ref 275–295)
PLATELET # BLD AUTO: 256 10(3)UL (ref 150–450)
POTASSIUM SERPL-SCNC: 4.7 MMOL/L (ref 3.5–5.1)
PROTHROMBIN TIME: 30.6 SECONDS (ref 11.8–14.5)
RBC # BLD AUTO: 4.28 X10(6)UL
SARS-COV-2 RNA RESP QL NAA+PROBE: NOT DETECTED
SODIUM SERPL-SCNC: 136 MMOL/L (ref 136–145)
TROPONIN I SERPL-MCNC: <0.045 NG/ML (ref ?–0.04)
TSI SER-ACNC: 1.49 MIU/ML (ref 0.36–3.74)
WBC # BLD AUTO: 11.7 X10(3) UL (ref 4–11)

## 2021-08-18 PROCEDURE — 5A12012 PERFORMANCE OF CARDIAC OUTPUT, SINGLE, MANUAL: ICD-10-PCS | Performed by: INTERNAL MEDICINE

## 2021-08-18 PROCEDURE — 02H63JZ INSERTION OF PACEMAKER LEAD INTO RIGHT ATRIUM, PERCUTANEOUS APPROACH: ICD-10-PCS | Performed by: INTERNAL MEDICINE

## 2021-08-18 PROCEDURE — 0JH606Z INSERTION OF PACEMAKER, DUAL CHAMBER INTO CHEST SUBCUTANEOUS TISSUE AND FASCIA, OPEN APPROACH: ICD-10-PCS | Performed by: INTERNAL MEDICINE

## 2021-08-18 PROCEDURE — 71045 X-RAY EXAM CHEST 1 VIEW: CPT | Performed by: INTERNAL MEDICINE

## 2021-08-18 PROCEDURE — 71045 X-RAY EXAM CHEST 1 VIEW: CPT | Performed by: EMERGENCY MEDICINE

## 2021-08-18 PROCEDURE — B51N1ZZ FLUOROSCOPY OF LEFT UPPER EXTREMITY VEINS USING LOW OSMOLAR CONTRAST: ICD-10-PCS | Performed by: INTERNAL MEDICINE

## 2021-08-18 PROCEDURE — 02HK3JZ INSERTION OF PACEMAKER LEAD INTO RIGHT VENTRICLE, PERCUTANEOUS APPROACH: ICD-10-PCS | Performed by: INTERNAL MEDICINE

## 2021-08-18 PROCEDURE — 0BH17EZ INSERTION OF ENDOTRACHEAL AIRWAY INTO TRACHEA, VIA NATURAL OR ARTIFICIAL OPENING: ICD-10-PCS | Performed by: EMERGENCY MEDICINE

## 2021-08-18 PROCEDURE — 5A1935Z RESPIRATORY VENTILATION, LESS THAN 24 CONSECUTIVE HOURS: ICD-10-PCS | Performed by: INTERNAL MEDICINE

## 2021-08-18 PROCEDURE — 99291 CRITICAL CARE FIRST HOUR: CPT | Performed by: INTERNAL MEDICINE

## 2021-08-18 RX ORDER — SODIUM CHLORIDE 9 MG/ML
INJECTION, SOLUTION INTRAVENOUS CONTINUOUS PRN
Status: DISCONTINUED | OUTPATIENT
Start: 2021-08-18 | End: 2021-08-18 | Stop reason: SURG

## 2021-08-18 RX ORDER — SODIUM CHLORIDE 9 MG/ML
INJECTION, SOLUTION INTRAVENOUS
Status: COMPLETED | OUTPATIENT
Start: 2021-08-19 | End: 2021-08-18

## 2021-08-18 RX ORDER — CEFAZOLIN SODIUM/WATER 2 G/20 ML
2 SYRINGE (ML) INTRAVENOUS
Status: COMPLETED | OUTPATIENT
Start: 2021-08-18 | End: 2021-08-18

## 2021-08-18 RX ORDER — GLYCOPYRROLATE 0.2 MG/ML
INJECTION, SOLUTION INTRAMUSCULAR; INTRAVENOUS AS NEEDED
Status: DISCONTINUED | OUTPATIENT
Start: 2021-08-18 | End: 2021-08-18 | Stop reason: SURG

## 2021-08-18 RX ORDER — TIOTROPIUM BROMIDE AND OLODATEROL 3.124; 2.736 UG/1; UG/1
SPRAY, METERED RESPIRATORY (INHALATION)
COMMUNITY
Start: 2021-08-11

## 2021-08-18 RX ORDER — CHLORHEXIDINE GLUCONATE 4 G/100ML
30 SOLUTION TOPICAL
Status: ACTIVE | OUTPATIENT
Start: 2021-08-19 | End: 2021-08-19

## 2021-08-18 RX ORDER — ONDANSETRON 2 MG/ML
4 INJECTION INTRAMUSCULAR; INTRAVENOUS ONCE
Status: COMPLETED | OUTPATIENT
Start: 2021-08-18 | End: 2021-08-18

## 2021-08-18 RX ORDER — NEOSTIGMINE METHYLSULFATE 1 MG/ML
INJECTION INTRAVENOUS AS NEEDED
Status: DISCONTINUED | OUTPATIENT
Start: 2021-08-18 | End: 2021-08-18 | Stop reason: SURG

## 2021-08-18 RX ORDER — MIDAZOLAM HYDROCHLORIDE 1 MG/ML
INJECTION INTRAMUSCULAR; INTRAVENOUS
Status: DISCONTINUED
Start: 2021-08-18 | End: 2021-08-18 | Stop reason: WASHOUT

## 2021-08-18 RX ORDER — ETOMIDATE 2 MG/ML
INJECTION INTRAVENOUS
Status: DISCONTINUED
Start: 2021-08-18 | End: 2021-08-19

## 2021-08-18 RX ORDER — DOPAMINE HYDROCHLORIDE 320 MG/100ML
INJECTION, SOLUTION INTRAVENOUS CONTINUOUS PRN
Status: DISCONTINUED | OUTPATIENT
Start: 2021-08-18 | End: 2021-08-18 | Stop reason: SURG

## 2021-08-18 RX ORDER — DOPAMINE HYDROCHLORIDE 320 MG/100ML
INJECTION, SOLUTION INTRAVENOUS CONTINUOUS
Status: DISCONTINUED | OUTPATIENT
Start: 2021-08-18 | End: 2021-08-18

## 2021-08-18 RX ORDER — ONDANSETRON 2 MG/ML
INJECTION INTRAMUSCULAR; INTRAVENOUS
Status: COMPLETED
Start: 2021-08-18 | End: 2021-08-18

## 2021-08-18 RX ORDER — MIDAZOLAM HYDROCHLORIDE 10 MG/2ML
2.5 INJECTION, SOLUTION INTRAMUSCULAR; INTRAVENOUS ONCE
Status: COMPLETED | OUTPATIENT
Start: 2021-08-18 | End: 2021-08-18

## 2021-08-18 RX ORDER — ASPIRIN 81 MG/1
81 TABLET, CHEWABLE ORAL DAILY
Status: DISCONTINUED | OUTPATIENT
Start: 2021-08-18 | End: 2021-08-23

## 2021-08-18 RX ORDER — MAGNESIUM SULFATE HEPTAHYDRATE 40 MG/ML
2 INJECTION, SOLUTION INTRAVENOUS ONCE
Status: COMPLETED | OUTPATIENT
Start: 2021-08-18 | End: 2021-08-18

## 2021-08-18 RX ORDER — EPHEDRINE SULFATE 50 MG/ML
INJECTION, SOLUTION INTRAVENOUS AS NEEDED
Status: DISCONTINUED | OUTPATIENT
Start: 2021-08-18 | End: 2021-08-18 | Stop reason: SURG

## 2021-08-18 RX ORDER — DEXTROSE MONOHYDRATE 25 G/50ML
50 INJECTION, SOLUTION INTRAVENOUS
Status: DISCONTINUED | OUTPATIENT
Start: 2021-08-18 | End: 2021-08-23

## 2021-08-18 RX ORDER — ETOMIDATE 2 MG/ML
INJECTION INTRAVENOUS
Status: COMPLETED | OUTPATIENT
Start: 2021-08-18 | End: 2021-08-18

## 2021-08-18 RX ORDER — ROCURONIUM BROMIDE 10 MG/ML
INJECTION, SOLUTION INTRAVENOUS AS NEEDED
Status: DISCONTINUED | OUTPATIENT
Start: 2021-08-18 | End: 2021-08-18 | Stop reason: SURG

## 2021-08-18 RX ORDER — LIDOCAINE HYDROCHLORIDE AND EPINEPHRINE 10; 10 MG/ML; UG/ML
INJECTION, SOLUTION INFILTRATION; PERINEURAL
Status: COMPLETED
Start: 2021-08-18 | End: 2021-08-18

## 2021-08-18 RX ORDER — ATORVASTATIN CALCIUM 40 MG/1
40 TABLET, FILM COATED ORAL NIGHTLY
Status: DISCONTINUED | OUTPATIENT
Start: 2021-08-18 | End: 2021-08-23

## 2021-08-18 RX ADMIN — Medication 0.5 MG: at 16:29:00

## 2021-08-18 RX ADMIN — ROCURONIUM BROMIDE 20 MG: 10 INJECTION, SOLUTION INTRAVENOUS at 16:35:00

## 2021-08-18 RX ADMIN — Medication 0.5 MG: at 16:37:00

## 2021-08-18 RX ADMIN — NEOSTIGMINE METHYLSULFATE 5 MG: 1 INJECTION INTRAVENOUS at 17:40:00

## 2021-08-18 RX ADMIN — DOPAMINE HYDROCHLORIDE 20 MCG/KG/MIN: 320 INJECTION, SOLUTION INTRAVENOUS at 16:22:00

## 2021-08-18 RX ADMIN — SODIUM CHLORIDE: 9 INJECTION, SOLUTION INTRAVENOUS at 16:22:00

## 2021-08-18 RX ADMIN — SODIUM CHLORIDE: 9 INJECTION, SOLUTION INTRAVENOUS at 16:33:00

## 2021-08-18 RX ADMIN — GLYCOPYRROLATE 0.8 MG: 0.2 INJECTION, SOLUTION INTRAMUSCULAR; INTRAVENOUS at 17:40:00

## 2021-08-18 RX ADMIN — CEFAZOLIN SODIUM/WATER 2 G: 2 G/20 ML SYRINGE (ML) INTRAVENOUS at 16:37:00

## 2021-08-18 RX ADMIN — EPHEDRINE SULFATE 10 MG: 50 INJECTION, SOLUTION INTRAVENOUS at 16:29:00

## 2021-08-18 NOTE — SIGNIFICANT EVENT
Eric Rushing [address]                                                            PROGRESS NOTE      Patient Name: Chuy Villarreal MRN: [id number]   : 1935 CSN: [id number]

## 2021-08-18 NOTE — ED QUICK NOTES
Orders for admission, patient is aware of plan and ready to go upstairs. Any questions, please call ED RN Merleen Denver  at extension [phone].    Type of COVID test sent:rapid  COVID Suspicion level: Low    Titratable drug(s)  Infusing:dopamine 20mcg  Rate:39.4ml/hr

## 2021-08-18 NOTE — ED PROVIDER NOTES
Patient Seen in: [facility] AND [facility] Emergency Department      History   Patient presents with:  Chest Pain Angina    Stated Complaint: cp    HPI/Subjective:   HPI  Patient is a 61-year-old male history of CAD, DVT/PE on Coumadin, presenting with fatigue a Diabetes Other         Granddaughter has DM      Social History    Tobacco Use      Smoking status: Former Smoker        Quit date: 6/15/2005        Years since quittin.1      Smokeless tobacco: Never Used    Alcohol use: Yes      Comment: rarely    D person, place, and time. Mental status is at baseline.    Psychiatric:         Mood and Affect: Mood normal.           ED Course     Labs Reviewed   BASIC METABOLIC PANEL (8) - Abnormal; Notable for the following components:       Result Value    Glucose 11 x-ray.  Consider follow-up with chest CT. Dictated by (CST): Esequiel Gomez MD on 8/18/2021 at 3:01 PM     Finalized by (CST): Esequiel Gomez MD on 8/18/2021 at 3:04 PM            EKG    Rate, intervals and axes as noted on EKG Report.   Rate: 27  Rhythm: passing the cords. There was good misting on the tube; breath sounds were auscultated equally bilaterally; good color change with [place] CO2 detector. Chest X-ray shows ETT in good position. The procedure was performed by myself.

## 2021-08-18 NOTE — ED INITIAL ASSESSMENT (HPI)
Pt here with burning in the chest since 130 am. Pt reports increased light headedness this morning. Pt currently reports mid sternal discomfort that radiates to his neck.

## 2021-08-18 NOTE — CONSULTS
[address]       Patient Name: Santiago Larios MRN: [id number]   : 1935 CSN: [id number] degree AV block. ROS:   Constitutional: No fevers, chills, fatigue or night sweats. ENT: No mouth pain, neck pain, running nose, headaches or swollen glands.   Skin: No rashes, pruritus or skin changes,  Respiratory: No cough, wheezing  CV: No chest robin Cancer Mother    • Stroke Mother    • Diabetes Other         Granddaughter has DM     SHX:  The patient reports that he quit smoking about 16 years ago. He has never used smokeless tobacco. He reports current alcohol use.  He reports that he does not use dr nodules, potentially calcified granulomas but not definitively calcified by x-ray. Consider follow-up with chest CT.      Dictated by (CST): Seferino Baez MD on 8/18/2021 at 3:01 PM     Finalized by (CST): Seferino Baez MD on 8/18/2021 at 3:04 PM

## 2021-08-18 NOTE — PROGRESS NOTES
Patient started on dopamine with no recovery in HR. Renal function normal, no explanation for bradycardia and heart block. Plan to implant dual chamber pacemaker urgently today. Orders placed. [employer] to be notified.  Will implant with elevated IN

## 2021-08-18 NOTE — ANESTHESIA POSTPROCEDURE EVALUATION
Patient: Meghana Berger [facility]    Procedure Summary     Date: 08/18/21 Room / Location: [place] [address].    Anesthesia Start: 3008 Anesthesia Stop: 7398    Procedure: EP DUAL CHAMBER PACEMAKER Diagnosis: (bradycardia/heart block)    Schedu

## 2021-08-18 NOTE — ED QUICK NOTES
Manual blood pressure obtained by lane bolaños verbally reported to Dr. Sathish Champagne. Blood pressure readings were difficult to obtain on the x2 monitor. Tried both arms. Pulse recorded. Pulse palpable as well.

## 2021-08-18 NOTE — ANESTHESIA PREPROCEDURE EVALUATION
Anesthesia PreOp Note    HPI:     Le Ramos is a 82year old male who presents for preoperative consultation requested by: * No surgeons listed *    Date of Surgery: 8/18/2021    * No procedures listed *  Indication: * No pre-op diagnosis entered * SHOULDER ARTHROSCOPY  1994(?)    Right rotator cuff       (Not in a hospital admission)    [START ON 8/19/2021] Chlorhexidine Gluconate (HIBICLENS) 4 % liquid 30 mL, 30 mL, Topical, On Call, DARREL [place]  [START ON 8/19/2021] 0.9% NaCl infusion, Family History   Problem Relation Age of Onset   • Cancer Mother    • Stroke Mother    • Diabetes Other         Granddaughter has DM     Social History    Socioeconomic History      Marital status:       Spouse name: Not on file      Number o Lab Results   Component Value Date     08/18/2021    K 4.7 08/18/2021     08/18/2021    CO2 29.0 08/18/2021    BUN 29 (H) 08/18/2021    CREATSERUM 0.87 08/18/2021     (H) 08/18/2021    CA 10.0 08/18/2021     Lab Results   Component V

## 2021-08-19 ENCOUNTER — APPOINTMENT (OUTPATIENT)
Dept: GENERAL RADIOLOGY | Facility: HOSPITAL | Age: 86
DRG: 242 | End: 2021-08-19
Attending: INTERNAL MEDICINE
Payer: MEDICARE

## 2021-08-19 ENCOUNTER — APPOINTMENT (OUTPATIENT)
Dept: CV DIAGNOSTICS | Facility: HOSPITAL | Age: 86
DRG: 242 | End: 2021-08-19
Attending: INTERNAL MEDICINE
Payer: MEDICARE

## 2021-08-19 LAB
ANION GAP SERPL CALC-SCNC: 7 MMOL/L (ref 0–18)
BASOPHILS # BLD AUTO: 0.07 X10(3) UL (ref 0–0.2)
BASOPHILS NFR BLD AUTO: 0.5 %
BUN BLD-MCNC: 26 MG/DL (ref 7–18)
BUN/CREAT SERPL: 35.1 (ref 10–20)
CALCIUM BLD-MCNC: 8.4 MG/DL (ref 8.5–10.1)
CHLORIDE SERPL-SCNC: 104 MMOL/L (ref 98–112)
CO2 SERPL-SCNC: 24 MMOL/L (ref 21–32)
CREAT BLD-MCNC: 0.74 MG/DL
DEPRECATED RDW RBC AUTO: 42.8 FL (ref 35.1–46.3)
EOSINOPHIL # BLD AUTO: 0.26 X10(3) UL (ref 0–0.7)
EOSINOPHIL NFR BLD AUTO: 1.8 %
ERYTHROCYTE [DISTWIDTH] IN BLOOD BY AUTOMATED COUNT: 12.4 % (ref 11–15)
EST. AVERAGE GLUCOSE BLD GHB EST-MCNC: 143 MG/DL (ref 68–126)
GLUCOSE BLD-MCNC: 114 MG/DL (ref 70–99)
GLUCOSE BLDC GLUCOMTR-MCNC: 110 MG/DL (ref 70–99)
GLUCOSE BLDC GLUCOMTR-MCNC: 115 MG/DL (ref 70–99)
GLUCOSE BLDC GLUCOMTR-MCNC: 144 MG/DL (ref 70–99)
GLUCOSE BLDC GLUCOMTR-MCNC: 179 MG/DL (ref 70–99)
HAV IGM SER QL: 2 MG/DL (ref 1.6–2.6)
HBA1C MFR BLD HPLC: 6.6 % (ref ?–5.7)
HCT VFR BLD AUTO: 37.9 %
HGB BLD-MCNC: 12.8 G/DL
IMM GRANULOCYTES # BLD AUTO: 0.05 X10(3) UL (ref 0–1)
IMM GRANULOCYTES NFR BLD: 0.3 %
INR BLD: 2.93 (ref 0.9–1.2)
LYMPHOCYTES # BLD AUTO: 1.72 X10(3) UL (ref 1–4)
LYMPHOCYTES NFR BLD AUTO: 12 %
MCH RBC QN AUTO: 31.7 PG (ref 26–34)
MCHC RBC AUTO-ENTMCNC: 33.8 G/DL (ref 31–37)
MCV RBC AUTO: 93.8 FL
MONOCYTES # BLD AUTO: 1.04 X10(3) UL (ref 0.1–1)
MONOCYTES NFR BLD AUTO: 7.3 %
MRSA DNA SPEC QL NAA+PROBE: NEGATIVE
NEUTROPHILS # BLD AUTO: 11.17 X10 (3) UL (ref 1.5–7.7)
NEUTROPHILS # BLD AUTO: 11.17 X10(3) UL (ref 1.5–7.7)
NEUTROPHILS NFR BLD AUTO: 78.1 %
OSMOLALITY SERPL CALC.SUM OF ELEC: 286 MOSM/KG (ref 275–295)
PLATELET # BLD AUTO: 234 10(3)UL (ref 150–450)
POTASSIUM SERPL-SCNC: 4 MMOL/L (ref 3.5–5.1)
PROTHROMBIN TIME: 30.3 SECONDS (ref 11.8–14.5)
RBC # BLD AUTO: 4.04 X10(6)UL
SODIUM SERPL-SCNC: 135 MMOL/L (ref 136–145)
WBC # BLD AUTO: 14.3 X10(3) UL (ref 4–11)

## 2021-08-19 PROCEDURE — 71045 X-RAY EXAM CHEST 1 VIEW: CPT | Performed by: INTERNAL MEDICINE

## 2021-08-19 PROCEDURE — 99291 CRITICAL CARE FIRST HOUR: CPT | Performed by: INTERNAL MEDICINE

## 2021-08-19 PROCEDURE — 93306 TTE W/DOPPLER COMPLETE: CPT | Performed by: INTERNAL MEDICINE

## 2021-08-19 RX ORDER — HYDRALAZINE HYDROCHLORIDE 20 MG/ML
10 INJECTION INTRAMUSCULAR; INTRAVENOUS EVERY 6 HOURS PRN
Status: DISCONTINUED | OUTPATIENT
Start: 2021-08-19 | End: 2021-08-23

## 2021-08-19 RX ORDER — TAMSULOSIN HYDROCHLORIDE 0.4 MG/1
0.4 CAPSULE ORAL DAILY
Status: DISCONTINUED | OUTPATIENT
Start: 2021-08-20 | End: 2021-08-23

## 2021-08-19 RX ORDER — FINASTERIDE 5 MG/1
5 TABLET, FILM COATED ORAL DAILY
Status: DISCONTINUED | OUTPATIENT
Start: 2021-08-20 | End: 2021-08-23

## 2021-08-19 RX ORDER — SENNA AND DOCUSATE SODIUM 50; 8.6 MG/1; MG/1
1 TABLET, FILM COATED ORAL 2 TIMES DAILY
Status: DISCONTINUED | OUTPATIENT
Start: 2021-08-19 | End: 2021-08-23

## 2021-08-19 RX ORDER — POLYETHYLENE GLYCOL 3350 17 G/17G
17 POWDER, FOR SOLUTION ORAL DAILY
Status: DISCONTINUED | OUTPATIENT
Start: 2021-08-20 | End: 2021-08-23

## 2021-08-19 RX ORDER — PANTOPRAZOLE SODIUM 40 MG/1
40 TABLET, DELAYED RELEASE ORAL
Status: DISCONTINUED | OUTPATIENT
Start: 2021-08-20 | End: 2021-08-23

## 2021-08-19 RX ORDER — ESCITALOPRAM OXALATE 10 MG/1
20 TABLET ORAL EVERY MORNING
Status: DISCONTINUED | OUTPATIENT
Start: 2021-08-20 | End: 2021-08-23

## 2021-08-19 RX ORDER — ACETAMINOPHEN 325 MG/1
650 TABLET ORAL EVERY 6 HOURS PRN
Status: DISCONTINUED | OUTPATIENT
Start: 2021-08-19 | End: 2021-08-23

## 2021-08-19 NOTE — PLAN OF CARE
Problem: Safety Risk - Non-Violent Restraints  Goal: Patient will remain free from self-harm  Description: INTERVENTIONS:  - Apply the least restrictive restraint to prevent harm  - Notify patient and family of reasons restraints applied  - Assess for an effort  - Oxygen supplementation based on oxygen saturation or ABGs  - Provide Smoking Cessation handout, if applicable  - Encourage broncho-pulmonary hygiene including cough, deep breathe, Incentive Spirometry  - Assess the need for suctioning and perform

## 2021-08-19 NOTE — PLAN OF CARE
Ruddy Clayton is resting in bed. He remains intubated and sedated. Bed alarm is on, in the lowest position, and the call light is within reach.      Problem: RESPIRATORY - ADULT  Goal: Achieves optimal ventilation and oxygenation  Description: INTERVENTIONS:  - As Non-Violent Restraints  Goal: Patient will remain free from self-harm  Description: INTERVENTIONS:  - Apply the least restrictive restraint to prevent harm  - Notify patient and family of reasons restraints applied  - Assess for any contributing factors to

## 2021-08-19 NOTE — SLP NOTE
ADULT SWALLOWING EVALUATION    ASSESSMENT    ASSESSMENT/OVERALL IMPRESSION:  PPE REQUIRED. THIS SLP WORE, GLOVES AND DROPLET MASK. HANDS SANITIZED/WASHED UPON ENTRANCE/EXIT. SLP BSSE orders received and acknowledged.  A swallow evaluation warranted as p Strategies Recommended: Slow rate; No straws; Extra sauce/gravy;Small bites and sips  Aspiration Precautions: Upright position; Slow rate;Small bites and sips; No straw  Medication Administration Recommendations: Crushed in puree  Treatment Plan/Recommendation restrictive diet. Pt requesting pudding. SWALLOWING HISTORY  Current Diet Consistency: NPO  Dysphagia History: None at [address] [place] [address]   Imaging Results:     CXR Results 8/19/2021    CONCLUSION:   1. Cardiomegaly.  Prominent central vasculature.    2. Mild perihila strategies independently over 2 session(s).     In Progress   Goal #3 The patient will utilize compensatory strategies as outlined by  BSSE (clinical evaluation) including Slow rate, Small bites, Small sips, No straws with 1:1 FEEDING assistance 100 % of th

## 2021-08-19 NOTE — PROGRESS NOTES
[place] [address] NOTE      [address] Patient Status:  Inpatient    1935 MRN [id number]   Location [facility] 2W/SW Attending Syed Camacho MD   [address] Day # 1 PCP Billie Johansen MD         Assessment and Plan: 4.0 08/19/2021     08/19/2021    CO2 24.0 08/19/2021     (H) 08/19/2021    CA 8.4 (L) 08/19/2021    ALB 2.2 (L) 02/10/2018    ALKPHO 113 (H) 02/06/2018    BILT 0.7 02/06/2018    TP 6.2 02/06/2018    AST 15 03/23/2021    ALT 27 03/23/2021    IN x-ray.  Consider follow-up with chest CT.      Dictated by (CST): Nancy Cody MD on 8/18/2021 at 3:01 PM     Finalized by (CST): Nancy Cody MD on 8/18/2021 at 3:04 PM          EKG 12 Lead    Result Date: 8/18/2021  ECG Report  Interpretation  ---------

## 2021-08-19 NOTE — OPERATIVE REPORT
[facility]    Cardiac Electrophysiology Operative Note    [facility]   [facility] [id number] MRN [id number]   Admission Date 8/18/2021 Procedure Date 8/18/2021   Attending Physician Ayah Graham MD Procedure Physician appendage. Lead stability and electrical parameters were satisfactory, and the lead was secured to the pectoralis muscle using 3 separate ties of 0-ethibond suture and the collar. 10-volt output from both leads produced no extracardiac stimulation.   The

## 2021-08-19 NOTE — PHYSICAL THERAPY NOTE
PHYSICAL THERAPY EVALUATION - INPATIENT     Room Number: 211/211-A  Evaluation Date: 8/19/2021  Type of Evaluation: Initial   Physician Order: PT Eval and Treat    Presenting Problem: complete heart block; cardiac arrest, CPR & intubated in ED; s/p pacema RECOMMENDATIONS  PT Discharge Recommendations: Sub-acute rehabilitation    PLAN  PT Treatment Plan: Bed mobility;Transfer training;Gait training;Family education;Patient education; Energy conservation; Endurance; Body mechanics; Coordination;Balance training;S Heart attack ([facility] [address]    • Heart disease    • Heart valve disease    • High blood pressure    • High cholesterol    • History of blood clots     legs & lungs   • Hyperlipidemia    • IBS (irritable bowel syndrome)    • Incontinence    • Malignant hyperthermia -  Dynamic Standing: Poor +    ADDITIONAL TESTS                                    NEUROLOGICAL FINDINGS                      ACTIVITY TOLERANCE                         O2 WALK       AM-PAC '6-Clicks' INPATIENT SHORT FORM - BASIC MOBILITY  How much difficu Current Status    Goal #4 Patient will negotiate 1 stairs/one curb w/ assistive device and supervision   Goal #4   Current Status    Goal #5 Patient to demonstrate independence with home activity/exercise instructions provided to patient in preparation f

## 2021-08-19 NOTE — CONSULTS
Pulmonary/Critical Care Consultation Note    HPI:   Mariana Moreland is a 82year old male with chief complaint of Patient presents with:  Chest Pain Angina    Atul Murry MD    I was asked by the attending physician to see this patient in pulmonary/critic Thrombophlebitis    • Visual impairment     glasses for reading         PSH:  Past Surgical History:   Procedure Laterality Date   • CABG     • CARDIAC PACEMAKER PLACEMENT     • CATH PERCUTANEOUS  TRANSLUMINAL CORONARY ANGIOPLASTY     • FOOT SURGERY      r Once            Allergies:    Heparin                   Tetanus Immune Glob*      Tetanus Toxoids         UNKNOWN    Social History:  Social History    Socioeconomic History      Marital status:       Spouse name: Not on file      Number of children K 4.7 08/18/2021     08/18/2021    CO2 29.0 08/18/2021     08/18/2021    CA 10.0 08/18/2021    INR 2.97 08/18/2021    TSH 1.490 08/18/2021    MG 1.6 08/18/2021    TROP <0.045 08/18/2021       CXR ? New pulm nodules   ?  LLL opacity vs atelec

## 2021-08-19 NOTE — CM/SW NOTE
PT/OT evaluations pending. Reviewed SW assessment from previous admission. Patient lives with his wife in a one level home w/ 1 [address]. He is independent with ADLs. He has awalker at home. His children live nearby and can assist as needed.  No current home ser

## 2021-08-19 NOTE — OCCUPATIONAL THERAPY NOTE
OCCUPATIONAL THERAPY EVALUATION - INPATIENT     Room Number: 211/211-A  Evaluation Date: 8/19/2021  Type of Evaluation: Initial  Presenting Problem:  (bradycardia)    Physician Order: IP Consult to Occupational Therapy  Reason for Therapy: ADL/IADL Dysfunc Plan: Balance activities; Energy conservation/work simplification techniques;ADL training;Functional transfer training; Endurance training;Patient/Family education;Patient/Family training;Equipment eval/education; Compensatory technique education       [facility] SURGICAL HISTORY  2013    heart bypass and stent   • OTHER SURGICAL HISTORY      basal cell carcinoma   • SHOULDER ARTHROSCOPY  1994(?)    Right rotator cuff       HOME SITUATION  Type of Home: House  Home Layout: One level  Lives With: Spouse  Toilet and Sit : Maximum assistance  Sit to Stand:  Moderate assistance (min a x2)  Toilet Transfer: NT  Shower Transfer: NT  Chair Transfer: MOD A X2    Bedroom Mobility: MOD A X2 WITH RW to take steps to bedside chair    FUNCTIONAL ADL ASSESSMENT - per obs  Grooming

## 2021-08-19 NOTE — PROGRESS NOTES
[facility]    Progress Note    [address] Patient Status:  Inpatient    1935 MRN [id number]   Location [facility] 2W/SW Attending Manuel Maxwell MD   Hosp Day # 1 PCP Sharon Santos MD     HPI/Subjective:     Unable to pe 08/19/2021    TSH 1.490 08/18/2021    ESRML 82 (H) 02/09/2018    CRP 13.9 (H) 02/10/2018    MG 2.0 08/19/2021    PHOS 2.9 02/10/2018    TROP <0.045 08/18/2021     03/23/2021       XR CHEST AP PORTABLE  (CPT=71045)    Result Date: 8/19/2021  CONCLUSIO -------------------------- Sinus rhythm with complete heart block and ventricular escape  - Negative T-waves -Possible Anterolateral ischemia.  ABNORMAL When compared with ECG of 12/31/2012 02:05:00 Complete heart block now present Electronically signed on

## 2021-08-19 NOTE — H&P
[address] [facility] Patient Status:  Inpatient    1935 MRN [id number]   Location [facility] 2W/ Attending Primitivo Perkins MD   Hosp Day # 1 PCP Lorelei Nelson MD     Date:  2021  Date of Adm Take 17 g by mouth daily. Pantoprazole Sodium 40 MG Oral Tab EC, Take 1 tablet (40 mg total) by mouth every morning before breakfast.  Warfarin Sodium (COUMADIN) 7.5 MG Oral Tab, 7 mg nightly.     Escitalopram Oxalate (LEXAPRO) 20 MG Oral Tab,   tamsulosin 08/19/2021    CREATSERUM 0.74 08/19/2021    BUN 26 (H) 08/19/2021     (L) 08/19/2021    K 4.0 08/19/2021     08/19/2021    CO2 24.0 08/19/2021     (H) 08/19/2021    CA 8.4 (L) 08/19/2021    ALB 2.2 (L) 02/10/2018    ALKPHO 113 (H) 02/06/ bilateral pulmonary nodules, potentially calcified granulomas but not definitively calcified by x-ray. Consider follow-up with chest CT.      Dictated by (CST): Bakari To MD on 8/18/2021 at 3:01 PM     Finalized by (CST): Bakari To MD on 8/18/2021

## 2021-08-20 ENCOUNTER — APPOINTMENT (OUTPATIENT)
Dept: GENERAL RADIOLOGY | Facility: HOSPITAL | Age: 86
DRG: 242 | End: 2021-08-20
Attending: INTERNAL MEDICINE
Payer: MEDICARE

## 2021-08-20 LAB
ANION GAP SERPL CALC-SCNC: 5 MMOL/L (ref 0–18)
BASOPHILS # BLD AUTO: 0.06 X10(3) UL (ref 0–0.2)
BASOPHILS NFR BLD AUTO: 0.5 %
BUN BLD-MCNC: 18 MG/DL (ref 7–18)
BUN/CREAT SERPL: 29.5 (ref 10–20)
CALCIUM BLD-MCNC: 7.9 MG/DL (ref 8.5–10.1)
CHLORIDE SERPL-SCNC: 103 MMOL/L (ref 98–112)
CO2 SERPL-SCNC: 22 MMOL/L (ref 21–32)
CREAT BLD-MCNC: 0.61 MG/DL
DEPRECATED RDW RBC AUTO: 47.3 FL (ref 35.1–46.3)
EOSINOPHIL # BLD AUTO: 0.26 X10(3) UL (ref 0–0.7)
EOSINOPHIL NFR BLD AUTO: 2.1 %
ERYTHROCYTE [DISTWIDTH] IN BLOOD BY AUTOMATED COUNT: 12.7 % (ref 11–15)
GLUCOSE BLD-MCNC: 143 MG/DL (ref 70–99)
GLUCOSE BLDC GLUCOMTR-MCNC: 106 MG/DL (ref 70–99)
GLUCOSE BLDC GLUCOMTR-MCNC: 146 MG/DL (ref 70–99)
GLUCOSE BLDC GLUCOMTR-MCNC: 157 MG/DL (ref 70–99)
HCT VFR BLD AUTO: 39.7 %
HGB BLD-MCNC: 12.4 G/DL
IMM GRANULOCYTES # BLD AUTO: 0.07 X10(3) UL (ref 0–1)
IMM GRANULOCYTES NFR BLD: 0.6 %
INR BLD: 2.24 (ref 0.9–1.2)
LYMPHOCYTES # BLD AUTO: 2.24 X10(3) UL (ref 1–4)
LYMPHOCYTES NFR BLD AUTO: 18.5 %
MCH RBC QN AUTO: 32.1 PG (ref 26–34)
MCHC RBC AUTO-ENTMCNC: 31.2 G/DL (ref 31–37)
MCV RBC AUTO: 102.8 FL
MONOCYTES # BLD AUTO: 1.27 X10(3) UL (ref 0.1–1)
MONOCYTES NFR BLD AUTO: 10.5 %
NEUTROPHILS # BLD AUTO: 8.21 X10 (3) UL (ref 1.5–7.7)
NEUTROPHILS # BLD AUTO: 8.21 X10(3) UL (ref 1.5–7.7)
NEUTROPHILS NFR BLD AUTO: 67.8 %
OSMOLALITY SERPL CALC.SUM OF ELEC: 274 MOSM/KG (ref 275–295)
PLATELET # BLD AUTO: 137 10(3)UL (ref 150–450)
POTASSIUM SERPL-SCNC: 4.7 MMOL/L (ref 3.5–5.1)
PROTHROMBIN TIME: 24.5 SECONDS (ref 11.8–14.5)
RBC # BLD AUTO: 3.86 X10(6)UL
SODIUM SERPL-SCNC: 130 MMOL/L (ref 136–145)
WBC # BLD AUTO: 12.1 X10(3) UL (ref 4–11)

## 2021-08-20 PROCEDURE — 99233 SBSQ HOSP IP/OBS HIGH 50: CPT | Performed by: INTERNAL MEDICINE

## 2021-08-20 PROCEDURE — 71045 X-RAY EXAM CHEST 1 VIEW: CPT | Performed by: INTERNAL MEDICINE

## 2021-08-20 RX ORDER — FUROSEMIDE 10 MG/ML
20 INJECTION INTRAMUSCULAR; INTRAVENOUS ONCE
Status: COMPLETED | OUTPATIENT
Start: 2021-08-20 | End: 2021-08-20

## 2021-08-20 RX ORDER — FLUTICASONE PROPIONATE 50 MCG
1 SPRAY, SUSPENSION (ML) NASAL DAILY
Status: DISCONTINUED | OUTPATIENT
Start: 2021-08-20 | End: 2021-08-23

## 2021-08-20 NOTE — PLAN OF CARE
Problem: Patient Centered Care  Goal: Patient preferences are identified and integrated in the patient's plan of care  Description: Interventions:  - What would you like us to know as we care for you?  I want to go back home not rehab   - Provide timely, INTERVENTIONS:  - Monitor vital signs, rhythm, and trends  - Monitor for bleeding, hypotension and signs of decreased cardiac output  - Evaluate effectiveness of vasoactive medications to optimize hemodynamic stability  - Monitor arterial and/or venous pun

## 2021-08-20 NOTE — SLP NOTE
SPEECH DAILY NOTE - INPATIENT    ASSESSMENT & PLAN   ASSESSMENT    Proper PPE worn. Hands sanitized upon entrance/exit Pt room. Pt alert, afebrile and on room air. Pt seen for swallow analysis per BSE recommendations (after consulting with RN).  Antonella Padilla Administration Recommendations: Crushed in puree    Discharge Recommendations  Discharge Recommendations/Plan: Undetermined    Treatment Plan  Treatment Plan/Recommendations: Dysphagia therapy; Aspiration precautions    Interdisciplinary Communication: Disc 08/21/21  Number of Visits to Meet Established Goals:  (2-3)    Session: 1    If you have any questions, please contact  Sangita Correa M.S. STEF/SLP  Speech-Language Pathologist  [facility]  #[phone]

## 2021-08-20 NOTE — PROGRESS NOTES
Double RN skin check done prior to transfer off Unit. Skin check performed by this RN and [facility]. Wounds are as follows: see flowsheet charting. Will remain available for any further questions or concerns.

## 2021-08-20 NOTE — PROGRESS NOTES
[facility] NOTE      [address] [facility] [address] Patient Status:  Inpatient    1935 MRN [id number]   Location [facility] 2W/ Attending Ayah Graham MD   Hosp Day # 2 PCP Destiny Carlton MD         Assessment and Plan: umeclidinium-vilanterol  1 puff Inhalation Daily   • tamsulosin HCl  0.4 mg Oral Daily   • Insulin Aspart Pen  1-5 Units Subcutaneous TID CC   • aspirin  81 mg Oral Daily   • atorvastatin  40 mg Oral Nightly       Continuous Infusions:       Results:     L PORTABLE  (CPT=71045)    Result Date: 8/19/2021  CONCLUSION:  1. ET tube in the trachea at the level of the aortic knob. NG tube curls back upon itself in the fundus of the stomach. Suboptimal inspiration. Patchy bibasilar atelectasis/scarring.   New rig

## 2021-08-20 NOTE — PROGRESS NOTES
Pulmonary/Critical Care Follow Up Note    HPI:   Lilliana Wen is a 82year old male with Patient presents with:  Chest Pain Angina      PCP Doreen Ramos MD  Admission Attending Frank Lazcano MD    Hospital Day #2    Extubated yesterday  Now has diet  Fee powder packet 17 g, 17 g, Oral, Daily  •  Senna-Docusate Sodium (SENOKOT S) 8.6-50 MG tab 1 tablet, 1 tablet, Oral, BID  •  umeclidinium-vilanterol (ANORO ELLIPTA) 62.5-25 MCG/INH inhaler 1 puff, 1 puff, Inhalation, Daily  •  tamsulosin HCl (FLOMAX) cap 0. ASSESSMENT/PLAN:     Brief Cardiac arrest   Pt presented with sob and fatigue  Found to have sinus john and high degree av block  Then asystolic s/p 30 sec CPR  Now s/p emergent pace maker  No pressors  No UOP and no valladares  Pt on sotalol but not

## 2021-08-20 NOTE — PROGRESS NOTES
[facility]    Progress Note    [address] Patient Status:  Inpatient    1935 MRN [id number]   Location [facility] 2W/SW Attending Yanni Mckoy MD   Hosp Day # 2 PCP Dean Chen MD       Subjective:   [address] is 08/20/2021    CA 7.9 (L) 08/20/2021    ALB 2.2 (L) 02/10/2018    ALKPHO 113 (H) 02/06/2018    BILT 0.7 02/06/2018    TP 6.2 02/06/2018    AST 15 03/23/2021    ALT 27 03/23/2021    INR 2.24 (H) 08/20/2021    TSH 1.490 08/18/2021    ESRML 82 (H) 02/09/2018 CHEST AP PORTABLE  (CPT=71045)    Result Date: 8/18/2021  CONCLUSION:   Mild cardiomegaly and post sternotomy changes. Lungs are hypoexpanded with probable atelectasis and chronic scarring in the lung bases. Possible trace bilateral pleural effusions.   Lokesh Rowan

## 2021-08-20 NOTE — PLAN OF CARE
Joce Schaffer is resting in bed. Bed alarm is on, in the lowest position, and the call light is within reach.      Problem: RESPIRATORY - ADULT  Goal: Achieves optimal ventilation and oxygenation  Description: INTERVENTIONS:  - Assess for changes in respiratory st

## 2021-08-21 ENCOUNTER — APPOINTMENT (OUTPATIENT)
Dept: GENERAL RADIOLOGY | Facility: HOSPITAL | Age: 86
DRG: 242 | End: 2021-08-21
Attending: INTERNAL MEDICINE
Payer: MEDICARE

## 2021-08-21 LAB
ANION GAP SERPL CALC-SCNC: 7 MMOL/L (ref 0–18)
BASOPHILS # BLD AUTO: 0.06 X10(3) UL (ref 0–0.2)
BASOPHILS NFR BLD AUTO: 0.4 %
BUN BLD-MCNC: 22 MG/DL (ref 7–18)
BUN/CREAT SERPL: 16.3 (ref 10–20)
CALCIUM BLD-MCNC: 8.2 MG/DL (ref 8.5–10.1)
CHLORIDE SERPL-SCNC: 100 MMOL/L (ref 98–112)
CO2 SERPL-SCNC: 27 MMOL/L (ref 21–32)
CREAT BLD-MCNC: 1.35 MG/DL
DEPRECATED RDW RBC AUTO: 43.1 FL (ref 35.1–46.3)
EOSINOPHIL # BLD AUTO: 0.04 X10(3) UL (ref 0–0.7)
EOSINOPHIL NFR BLD AUTO: 0.3 %
ERYTHROCYTE [DISTWIDTH] IN BLOOD BY AUTOMATED COUNT: 12.5 % (ref 11–15)
GLUCOSE BLD-MCNC: 156 MG/DL (ref 70–99)
GLUCOSE BLDC GLUCOMTR-MCNC: 121 MG/DL (ref 70–99)
GLUCOSE BLDC GLUCOMTR-MCNC: 154 MG/DL (ref 70–99)
GLUCOSE BLDC GLUCOMTR-MCNC: 170 MG/DL (ref 70–99)
GLUCOSE BLDC GLUCOMTR-MCNC: 173 MG/DL (ref 70–99)
HCT VFR BLD AUTO: 36.8 %
HGB BLD-MCNC: 12.5 G/DL
IMM GRANULOCYTES # BLD AUTO: 0.09 X10(3) UL (ref 0–1)
IMM GRANULOCYTES NFR BLD: 0.6 %
INR BLD: 1.61 (ref 0.9–1.2)
LYMPHOCYTES # BLD AUTO: 2.13 X10(3) UL (ref 1–4)
LYMPHOCYTES NFR BLD AUTO: 13.7 %
MCH RBC QN AUTO: 31.9 PG (ref 26–34)
MCHC RBC AUTO-ENTMCNC: 34 G/DL (ref 31–37)
MCV RBC AUTO: 93.9 FL
MONOCYTES # BLD AUTO: 1.18 X10(3) UL (ref 0.1–1)
MONOCYTES NFR BLD AUTO: 7.6 %
NEUTROPHILS # BLD AUTO: 12.05 X10 (3) UL (ref 1.5–7.7)
NEUTROPHILS # BLD AUTO: 12.05 X10(3) UL (ref 1.5–7.7)
NEUTROPHILS NFR BLD AUTO: 77.4 %
OSMOLALITY SERPL CALC.SUM OF ELEC: 285 MOSM/KG (ref 275–295)
PLATELET # BLD AUTO: 237 10(3)UL (ref 150–450)
POTASSIUM SERPL-SCNC: 4 MMOL/L (ref 3.5–5.1)
PROTHROMBIN TIME: 18.9 SECONDS (ref 11.8–14.5)
RBC # BLD AUTO: 3.92 X10(6)UL
SODIUM SERPL-SCNC: 134 MMOL/L (ref 136–145)
WBC # BLD AUTO: 15.6 X10(3) UL (ref 4–11)

## 2021-08-21 PROCEDURE — 99232 SBSQ HOSP IP/OBS MODERATE 35: CPT | Performed by: INTERNAL MEDICINE

## 2021-08-21 PROCEDURE — 71045 X-RAY EXAM CHEST 1 VIEW: CPT | Performed by: INTERNAL MEDICINE

## 2021-08-21 NOTE — PROGRESS NOTES
[facility]    Progress Note    [address] Patient Status:  Inpatient    1935 MRN [id number]   Location [address] [place] [address] Attending Maryjo Long MD   Hosp Day # 3 PCP Kenneth Dunlap MD       Subjective:   [address] is a(n clear to auscultation bilaterally after deep breaths but originally with left basilar dry crackles. . No wheezes,rales or rhonchi.    Cardiovascular: S1, S2 normal, no murmur, click, rub or gallop, regular rate and rhythm  Abdominal: soft, non-tender; bowel advised.     Dictated by (CST): Fawn Singleton MD on 8/20/2021 at 8:12 AM     Finalized by (CST): Fawn Singleton MD on 8/20/2021 at 8:14 AM                Assessment and Plan:     Complete heart block, with symptomatic bradycardia and cardiac arrest  -Statu

## 2021-08-21 NOTE — CM/SW NOTE
PT/OT recommending LISA. SW spoke w/ pt via phone to discuss recommendations of LISA. Per chart review hx, pt has a hx at [facility]. Pt stated that he prefers to go home w/ HHC. SW encouraged pt to discuss home plans w/ spouse & family.  Pt stated that hi

## 2021-08-21 NOTE — PROGRESS NOTES
[facility]      Subjective:  Nausea and anorexia overnight.   No pain or dyspnea no pain at pacemaker site    Objective:  /71 (BP Location: Right arm)   Pulse 73   Temp 98.6 °F (37 °C) (Axillary)   Resp 18   Ht 6' (1.829 m)   [employer] Daily   • atorvastatin  40 mg Oral Nightly     Assessment:  · CHB s/p emergent pacemaker 8/18/21 ([employer])  · Cardiac arrest CPR intubated one day  · CAD s/p CABG PCI EF 55% possible apical HK no pain or dyspnea  · Mild volume overload got IV Lasix yeste

## 2021-08-21 NOTE — PLAN OF CARE
Problem: Patient/Family Goals  Goal: Patient/Family Long Term Goal  Description: Patient's Long Term Goal: to be discharged    Interventions:  - administer medications  -alleviate pain   -educate on plan of care  - See additional Care Plan goals for spec skin color and temperature  - Assess for signs of decreased coronary artery perfusion - ex.  Angina  - Evaluate fluid balance, assess for edema, trend weights  Outcome: Progressing  Note: Vss- new pacemaker placed during this hospital stay       Patient had

## 2021-08-21 NOTE — PLAN OF CARE
A&Ox4, RA, slight mild abdominal pain. Per pt has not had a bowel movement in 2-3 days. Laxative given. PRN pain medications given as well. Up with rolling chair, pt deconditioned. Pacemaker site open to air. No bleeding noted.  Bed locked/lowest position a or ABGs  - Provide Smoking Cessation handout, if applicable  - Encourage broncho-pulmonary hygiene including cough, deep breathe, Incentive Spirometry  - Assess the need for suctioning and perform as needed  - Assess and instruct to report SOB or any respi

## 2021-08-21 NOTE — PROGRESS NOTES
[facility]     Progress Note        [address] Carter Patient Status:  Inpatient    1935 MRN [id number]   Location [facility] Attending Wynette Felty, MD   Hosp Day # 3 PCP Atul Murry MD       Subjective:   Patient seen and Or  glucose-vitamin C (DEX-4) chewable tab 8 tablet, 8 tablet, Oral, Q15 Min PRN        Continuous Infusions:     Physical Exam  Constitutional: no acute distress  Eyes: PERRL  ENT: nares pateint  Neck: supple, no JVD  Cardio: RRR, S1 S2  Respiratory: sandro

## 2021-08-22 LAB
ALBUMIN SERPL-MCNC: 2.4 G/DL (ref 3.4–5)
ANION GAP SERPL CALC-SCNC: 5 MMOL/L (ref 0–18)
BASOPHILS # BLD AUTO: 0.06 X10(3) UL (ref 0–0.2)
BASOPHILS NFR BLD AUTO: 0.5 %
BUN BLD-MCNC: 21 MG/DL (ref 7–18)
BUN/CREAT SERPL: 32.8 (ref 10–20)
CALCIUM BLD-MCNC: 8 MG/DL (ref 8.5–10.1)
CHLORIDE SERPL-SCNC: 101 MMOL/L (ref 98–112)
CO2 SERPL-SCNC: 27 MMOL/L (ref 21–32)
CREAT BLD-MCNC: 0.64 MG/DL
DEPRECATED RDW RBC AUTO: 43.5 FL (ref 35.1–46.3)
EOSINOPHIL # BLD AUTO: 0.54 X10(3) UL (ref 0–0.7)
EOSINOPHIL NFR BLD AUTO: 4.3 %
ERYTHROCYTE [DISTWIDTH] IN BLOOD BY AUTOMATED COUNT: 12.7 % (ref 11–15)
GLUCOSE BLD-MCNC: 122 MG/DL (ref 70–99)
GLUCOSE BLDC GLUCOMTR-MCNC: 115 MG/DL (ref 70–99)
GLUCOSE BLDC GLUCOMTR-MCNC: 135 MG/DL (ref 70–99)
GLUCOSE BLDC GLUCOMTR-MCNC: 139 MG/DL (ref 70–99)
GLUCOSE BLDC GLUCOMTR-MCNC: 144 MG/DL (ref 70–99)
HCT VFR BLD AUTO: 34 %
HGB BLD-MCNC: 11.5 G/DL
IMM GRANULOCYTES # BLD AUTO: 0.08 X10(3) UL (ref 0–1)
IMM GRANULOCYTES NFR BLD: 0.6 %
INR BLD: 1.47 (ref 0.9–1.2)
LYMPHOCYTES # BLD AUTO: 2.29 X10(3) UL (ref 1–4)
LYMPHOCYTES NFR BLD AUTO: 18.4 %
MCH RBC QN AUTO: 31.4 PG (ref 26–34)
MCHC RBC AUTO-ENTMCNC: 33.8 G/DL (ref 31–37)
MCV RBC AUTO: 92.9 FL
MONOCYTES # BLD AUTO: 0.94 X10(3) UL (ref 0.1–1)
MONOCYTES NFR BLD AUTO: 7.6 %
NEUTROPHILS # BLD AUTO: 8.52 X10 (3) UL (ref 1.5–7.7)
NEUTROPHILS # BLD AUTO: 8.52 X10(3) UL (ref 1.5–7.7)
NEUTROPHILS NFR BLD AUTO: 68.6 %
OSMOLALITY SERPL CALC.SUM OF ELEC: 280 MOSM/KG (ref 275–295)
PHOSPHATE SERPL-MCNC: 2.4 MG/DL (ref 2.5–4.9)
PLATELET # BLD AUTO: 210 10(3)UL (ref 150–450)
POTASSIUM SERPL-SCNC: 3.6 MMOL/L (ref 3.5–5.1)
PROTHROMBIN TIME: 17.6 SECONDS (ref 11.8–14.5)
RBC # BLD AUTO: 3.66 X10(6)UL
SODIUM SERPL-SCNC: 133 MMOL/L (ref 136–145)
WBC # BLD AUTO: 12.4 X10(3) UL (ref 4–11)

## 2021-08-22 RX ORDER — MORPHINE SULFATE 2 MG/ML
2 INJECTION, SOLUTION INTRAMUSCULAR; INTRAVENOUS EVERY 10 MIN PRN
Status: DISCONTINUED | OUTPATIENT
Start: 2021-08-22 | End: 2021-08-22 | Stop reason: ALTCHOICE

## 2021-08-22 RX ORDER — ONDANSETRON 2 MG/ML
4 INJECTION INTRAMUSCULAR; INTRAVENOUS ONCE AS NEEDED
Status: DISCONTINUED | OUTPATIENT
Start: 2021-08-22 | End: 2021-08-22 | Stop reason: ALTCHOICE

## 2021-08-22 RX ORDER — POTASSIUM CHLORIDE 14.9 MG/ML
20 INJECTION INTRAVENOUS ONCE
Status: COMPLETED | OUTPATIENT
Start: 2021-08-22 | End: 2021-08-23

## 2021-08-22 RX ORDER — HYDROMORPHONE HYDROCHLORIDE 1 MG/ML
0.6 INJECTION, SOLUTION INTRAMUSCULAR; INTRAVENOUS; SUBCUTANEOUS EVERY 5 MIN PRN
Status: DISCONTINUED | OUTPATIENT
Start: 2021-08-22 | End: 2021-08-22 | Stop reason: ALTCHOICE

## 2021-08-22 RX ORDER — HYDROMORPHONE HYDROCHLORIDE 1 MG/ML
0.4 INJECTION, SOLUTION INTRAMUSCULAR; INTRAVENOUS; SUBCUTANEOUS EVERY 5 MIN PRN
Status: DISCONTINUED | OUTPATIENT
Start: 2021-08-22 | End: 2021-08-22 | Stop reason: ALTCHOICE

## 2021-08-22 RX ORDER — MORPHINE SULFATE 4 MG/ML
6 INJECTION, SOLUTION INTRAMUSCULAR; INTRAVENOUS EVERY 10 MIN PRN
Status: DISCONTINUED | OUTPATIENT
Start: 2021-08-22 | End: 2021-08-22 | Stop reason: ALTCHOICE

## 2021-08-22 RX ORDER — NALOXONE HYDROCHLORIDE 0.4 MG/ML
80 INJECTION, SOLUTION INTRAMUSCULAR; INTRAVENOUS; SUBCUTANEOUS AS NEEDED
Status: DISCONTINUED | OUTPATIENT
Start: 2021-08-22 | End: 2021-08-22 | Stop reason: ALTCHOICE

## 2021-08-22 RX ORDER — HALOPERIDOL 5 MG/ML
0.25 INJECTION INTRAMUSCULAR ONCE AS NEEDED
Status: DISCONTINUED | OUTPATIENT
Start: 2021-08-22 | End: 2021-08-22 | Stop reason: ALTCHOICE

## 2021-08-22 RX ORDER — BISACODYL 10 MG
10 SUPPOSITORY, RECTAL RECTAL
Status: DISCONTINUED | OUTPATIENT
Start: 2021-08-22 | End: 2021-08-23

## 2021-08-22 RX ORDER — HYDROMORPHONE HYDROCHLORIDE 1 MG/ML
0.2 INJECTION, SOLUTION INTRAMUSCULAR; INTRAVENOUS; SUBCUTANEOUS EVERY 5 MIN PRN
Status: DISCONTINUED | OUTPATIENT
Start: 2021-08-22 | End: 2021-08-22 | Stop reason: ALTCHOICE

## 2021-08-22 RX ORDER — SODIUM CHLORIDE, SODIUM LACTATE, POTASSIUM CHLORIDE, CALCIUM CHLORIDE 600; 310; 30; 20 MG/100ML; MG/100ML; MG/100ML; MG/100ML
INJECTION, SOLUTION INTRAVENOUS CONTINUOUS
Status: DISCONTINUED | OUTPATIENT
Start: 2021-08-22 | End: 2021-08-22 | Stop reason: ALTCHOICE

## 2021-08-22 RX ORDER — PROCHLORPERAZINE EDISYLATE 5 MG/ML
5 INJECTION INTRAMUSCULAR; INTRAVENOUS ONCE AS NEEDED
Status: DISCONTINUED | OUTPATIENT
Start: 2021-08-22 | End: 2021-08-22 | Stop reason: ALTCHOICE

## 2021-08-22 RX ORDER — CEFAZOLIN SODIUM/WATER 2 G/20 ML
2 SYRINGE (ML) INTRAVENOUS EVERY 8 HOURS
Status: DISCONTINUED | OUTPATIENT
Start: 2021-08-22 | End: 2021-08-22 | Stop reason: CLARIF

## 2021-08-22 RX ORDER — MORPHINE SULFATE 4 MG/ML
4 INJECTION, SOLUTION INTRAMUSCULAR; INTRAVENOUS EVERY 10 MIN PRN
Status: DISCONTINUED | OUTPATIENT
Start: 2021-08-22 | End: 2021-08-22 | Stop reason: ALTCHOICE

## 2021-08-22 RX ORDER — BISACODYL 10 MG
10 SUPPOSITORY, RECTAL RECTAL
Status: DISCONTINUED | OUTPATIENT
Start: 2021-08-22 | End: 2021-08-22

## 2021-08-22 NOTE — CM/SW NOTE
08/22/21 1100   Choice of Post-Acute Provider   Informed patient of right to choose their preferred provider Yes   List of appropriate post-acute services provided to patient/family with quality data Yes   Patient/family choice   ([facility]

## 2021-08-22 NOTE — PLAN OF CARE
Yasir Knott is alert and orientated x4. He is on room air. AV paced. Pacemaker incision site open to air. Red, painful, and has hematoma. MD aware. He has a cough with sputum - scant amount of blood in sputum.  Small amount of blood noted at indwelling catheter signs, rhythm, and trends  - Monitor for bleeding, hypotension and signs of decreased cardiac output  - Evaluate effectiveness of vasoactive medications to optimize hemodynamic stability  - Monitor arterial and/or venous puncture sites for bleeding and/or

## 2021-08-22 NOTE — PROGRESS NOTES
[facility]    Progress Note    5[address] Mirela Carter Patient Status:  Inpatient    1935 MRN [id number]   Location [address] [place] [address] Attending Yanni Mckoy MD   Hosp Day # 4 PCP Dean Chen MD       Subjective:   5[address] Mirela Carter is a(n wheezes,rales or rhonchi. Cardiovascular: S1, S2 normal, no murmur, click, rub or gallop, regular rate and rhythm  Abdominal: soft, non-tender; bowel sounds normal; no palpable masses,  no organomegaly  Extremities: atraumatic, no cyanosis.   Trace edema cardiac pacemaker placement.     Dictated by (CST): Kristen Cooley MD on 8/21/2021 at 2:59 PM     Finalized by (CST): Kristen Cooley MD on 8/21/2021 at 3:01 PM                Assessment and Plan:     Complete heart block, with symptomatic bradycardi

## 2021-08-22 NOTE — PHYSICAL THERAPY NOTE
PHYSICAL THERAPY TREATMENT NOTE - INPATIENT     Room Number: 524/991-U       Presenting Problem: complete heart block; cardiac arrest, CPR & intubated in ED; s/p pacemaker on 8/18/21; extubated 8/19    Problem List  Principal Problem:    Bradycardia      P +  Dynamic Sitting: Fair +           Static Standing: Fair -  Dynamic Standing: Poor +    ACTIVITY TOLERANCE                         O2 WALK       AM-PAC '6-Clicks' INPATIENT SHORT FORM - BASIC MOBILITY  How much difficulty does the patient currently have. Current Status 30 ft with RW min A   Goal #4 Patient will negotiate 1 stairs/one curb w/ assistive device and supervision   Goal #4   Current Status NT   Goal #5 Patient to demonstrate independence with home activity/exercise instructions provided to pat

## 2021-08-22 NOTE — HOME CARE LIAISON
Received referral from [address] [place] [address], [address]. Per patient request spoke with patient's daughter Verenice Espino, provided with list of [facility] providers from [address]. Daughter Evangelina/ patient choice is [facility]. Financial interest disclosure provided to patient.   A

## 2021-08-22 NOTE — PROGRESS NOTES
[facility]      Subjective:  No new issues overnight mild pain at PM site     Objective:  /61 (BP Location: Right arm)   Pulse 73   Temp 98.3 °F (36.8 °C) (Oral)   Resp 18   Ht 6' (1.829 m)   Wt 220 lb 4.8 oz (99.9 kg)   SpO2 92 Assessment:  · CHB s/p emergent pacemaker 8/18/21 ([employer]).   Site improving daily less eccymotic less edema no drainage  · Cardiac arrest CPR intubated one day  · CAD s/p CABG PCI EF 55% possible apical HK no pain or dyspnea  · Mild volume overload

## 2021-08-23 ENCOUNTER — APPOINTMENT (OUTPATIENT)
Dept: GENERAL RADIOLOGY | Facility: HOSPITAL | Age: 86
DRG: 242 | End: 2021-08-23
Attending: INTERNAL MEDICINE
Payer: MEDICARE

## 2021-08-23 VITALS
RESPIRATION RATE: 18 BRPM | DIASTOLIC BLOOD PRESSURE: 75 MMHG | HEIGHT: 72 IN | OXYGEN SATURATION: 95 % | WEIGHT: 221.38 LBS | HEART RATE: 89 BPM | BODY MASS INDEX: 29.99 KG/M2 | TEMPERATURE: 98 F | SYSTOLIC BLOOD PRESSURE: 165 MMHG

## 2021-08-23 PROBLEM — R00.1 BRADYCARDIA: Status: RESOLVED | Noted: 2021-08-18 | Resolved: 2021-08-23

## 2021-08-23 LAB
GLUCOSE BLDC GLUCOMTR-MCNC: 109 MG/DL (ref 70–99)
GLUCOSE BLDC GLUCOMTR-MCNC: 120 MG/DL (ref 70–99)
GLUCOSE BLDC GLUCOMTR-MCNC: 145 MG/DL (ref 70–99)
INR BLD: 1.34 (ref 0.9–1.2)
PHOSPHATE SERPL-MCNC: 3.1 MG/DL (ref 2.5–4.9)
POTASSIUM SERPL-SCNC: 4.2 MMOL/L (ref 3.5–5.1)
PROTHROMBIN TIME: 16.4 SECONDS (ref 11.8–14.5)

## 2021-08-23 PROCEDURE — 71046 X-RAY EXAM CHEST 2 VIEWS: CPT | Performed by: INTERNAL MEDICINE

## 2021-08-23 NOTE — CM/SW NOTE
Per RN pt is cleared for home w/HH today. Pt has chosen [facility] and accepted. CM/SW to remain available for support and/or discharge planning.     Amrik Catalan RN, [facility]    Ext. [phone]

## 2021-08-23 NOTE — CONSULTS
Daniel Silvestre is a 82year old male. Patient with urinary retention, obstructive uropathy, and neurogenic bladder. HPI:   Patient presents with:  Chest Pain Angina  I was called by Dr. Xie Render see this patient this evening.   He has been in the hospital exposure     lung   • Atherosclerosis of coronary artery    • Bleeding tendency (HCC)    • Blood disorder    • BPH (benign prostatic hyperplasia)    • Colitis    • Congestive heart disease ([facility]    • Coronary atherosclerosis    • Deep vein thrombosis ([facility] Vaping Use: Never assessed    Alcohol use: Yes      Comment: rarely    Drug use: No       Medications :    Current Facility-Administered Medications:   •  warfarin (COUMADIN) tab 7 mg, 7 mg, Oral, Nightly  •  magnesium hydroxide (MILK OF MAGNESIA) 400 MG/5 bradycardia with recent placement of pacemaker.   GI: denies abdominal pain, heartburn, diarrhea, blood in bm, tarry bm, constipation,,  : denies difficulty urinating, pain, blood in urine, or frequency, he has a history of urinary retention, history of o which was smooth with no nodules nontender and symmetric. There were no rectal masses and his stool was brown. Laboratory Data:  No results for input(s): URINE, CULTI, BLDSMR in the last 168 hours.     Chemistries:  Lab Results   Component Value Date    CR TECHNIQUE:   Two views. FINDINGS: CARDIAC/VASC: Status post sternotomy and coronary bypass. Right atrial right ventricular leads. .  No cardiac silhouette abnormality or cardiomegaly. Unremarkable pulmonary vasculature.   MEDIAST/MORENA: No visible mass or (MIN 3 VIEWS), LEFT (CPT=73110)  COMPARISON: [facility], X WRIST LT, 8/24/2016, 10:44 AM.  INDICATIONS: Left ulnar-sided wrist pain, swelling, and bruising x3 days. No known acute trauma. TECHNIQUE: 3 views were obtained. There is no central pulmonary venous congestion. MEDIAST/MORENA:   No visible mass or adenopathy. LUNGS/PLEURA: The lungs are again hypoinflated with stable streaky opacities at both lung bases consistent with subsegmental atelectasis and/or scarring.   Chron Herrera Correa MD on 8/20/2021 at 8:12 AM     Finalized by (CST): Fawn Singleton MD on 8/20/2021 at 8:14 AM          XR CHEST AP PORTABLE  (CPT=71045)    Result Date: 8/19/2021  PROCEDURE: XR CHEST AP PORTABLE  (CPT=71045) TIME: 0632 hours.    COMPARISON: [place] CHEST AP PORTABLE (CPT=71045), 8/18/2021, 2:39 PM.  INDICATIONS: Verify OG tube placement. Difficulty breathing. TECHNIQUE:   Single view. FINDINGS:  CARDIAC/VASC: Right atrial and right ventricular leads. Status post sternotomy and coronary bypass. scarring. There is a new pulmonary nodule in the right lower lobe measuring 9 mm and another in the left upper lobe measuring 7 mm. These may be calcified granulomas but not definitively so by x-ray.   Minimally blunted costophrenic angles, possibly trace ventricle: The cavity size was normal. Wall thickness was    increased in a pattern of mild LVH. Systolic function was at the    lower limits of normal. The estimated ejection fraction was    50-55%, by visual assessment.  Possible hypokinesis of the apical concomitant abnormal relaxation and increased filling pressure (grade 2 diastolic dysfunction). Aortic valve:   Trileaflet; mildly thickened, mildly calcified leaflets. Doppler: There was no stenosis. Trivial regurgitation. Aorta:   Aortic root: The a 75  Teichholz MM   Ventricular septum                          Value        Reference  IVS thickness, ED                   (H)     1.4   cm     0.6 - 1.0   LVOT                                        Value        Reference  LVOT ID, S hernesto values outside specified reference range.  Electronically signed       08/19/2021 14:41 China Kim MD        Review of previous records: reviewed- if available        ASSESSMENT/PLAN:   Assessment   Patient with urinary retention  Obstructive uropath

## 2021-08-23 NOTE — SLP NOTE
SPEECH DAILY NOTE - INPATIENT    ASSESSMENT & PLAN   ASSESSMENT  PPE REQUIRED. THIS SLP WORE GLOVES, GOGGLES, AND DROPLET MASK. HANDS SANITIZED/WASHED UPON ENTRANCE/EXIT. SLP f/u for ongoing meal assessment x1 per established POC.   RN reports pt tolera Naa Kessler MD on 8/23/2021 at 9:04 AM    Diet Recommendations - Solids: Soft/ Easy to chew (extra sauce/gravy)  Diet Recommendations - Liquids: Thin Liquids    Compensatory Strategies Recommended: Slow rate;Small bites and sips; Extra sauce/gravy; Cristine Favors sauces/gravies to solid foods. Pt v/u.    GOAL DISCONTINUED       FOLLOW UP  Follow Up Needed (Documentation Required): No  SLP Follow-up Date: 08/21/21  Number of Visits to Meet Established Goals:  (2-3)    Session: 2    If you have any questions, please c

## 2021-08-23 NOTE — PLAN OF CARE
Pt is alert and oriented. Denies chest pain/SOB. Reports pain to incisional site. Tylenol administered for pain management with good relief. Sling on during the night. Pacemaker site with redness and small hematoma.    Mcelroy patent with clear yellow uri hypotension and signs of decreased cardiac output  - Evaluate effectiveness of vasoactive medications to optimize hemodynamic stability  - Monitor arterial and/or venous puncture sites for bleeding and/or hematoma  - Assess quality of pulses, skin color an

## 2021-08-23 NOTE — OCCUPATIONAL THERAPY NOTE
OCCUPATIONAL THERAPY TREATMENT NOTE - INPATIENT        Room Number: 768/813-W           Presenting Problem:  (bradycardia)    Problem List  Active Problems:    * No active hospital problems.  *      OCCUPATIONAL THERAPY ASSESSMENT   The patient's Approx Deg ASSESSMENT  Ratin           ACTIVITY TOLERANCE  Pulse: 89        BP: (!) 165/75  BP Location: Right arm  BP Method: Automatic  Patient Position: Sitting    O2 SATURATIONS  Oxygen Therapy  SPO2% on Room Air at Rest: 92  SPO2% Ambulation on Room Air: 96

## 2021-08-23 NOTE — PROGRESS NOTES
[facility] - [facility]    Progress Note    5655 Mirela Carter Patient Status:  Inpatient    1935 MRN [id number]   Location [address] [place] [address] Attending Kinsey Cha MD   Hosp Day # 5 PCP Feliberto Osler, MD       Subjective:   5655 Mirela Carter is a(n 118*   < > 114*   < > 143*  --  156* 122*  --    BUN 29*   < > 26*   < > 18  --  22* 21*  --    CREATSERUM 0.87   < > 0.74   < > 0.61*  --  1.35* 0.64*  --    GFRAA 92   < > 98   < > 106  --  55* 104  --    GFRNAA 79   < > 85   < > 92  --  48* 90  --    [facility]. PT/INR to be done in a week. He will continue physical therapy and Occupational Therapy as an outpatient with home health care.       Steph Brunner MD  8/23/2021

## 2021-08-23 NOTE — PHYSICAL THERAPY NOTE
PHYSICAL THERAPY TREATMENT NOTE - INPATIENT     Room Number: 030/074-Q       Presenting Problem: complete heart block; cardiac arrest, CPR & intubated in ED; s/p pacemaker on 8/18/21; extubated 8/19    Problem List  Active Problems:    * No active hospital Sub-acute rehabilitation     PLAN  PT Treatment Plan: Endurance; Patient education;Gait training;Strengthening;Transfer training;Balance training    SUBJECTIVE  Patient agreeable to therapy session     OBJECTIVE  Precautions: Cardiac;Limb alert - left (LUW level: supervision with walker - rolling     Goal #2  Current Status Min assist    Goal #3 Patient is able to ambulate 100 feet with assist device: walker - rolling at assistance level: supervision   Goal #3   Current Status 80 feet with RW Min assist    G

## 2021-09-13 ENCOUNTER — HOSPITAL ENCOUNTER (EMERGENCY)
Facility: HOSPITAL | Age: 86
Discharge: HOME | End: 2021-09-14
Attending: EMERGENCY MEDICINE
Payer: MEDICARE

## 2021-09-13 DIAGNOSIS — R33.9 URINARY RETENTION: Primary | ICD-10-CM

## 2021-09-13 DIAGNOSIS — R31.9 HEMATURIA, UNSPECIFIED TYPE: ICD-10-CM

## 2021-09-13 PROCEDURE — 51702 INSERT TEMP BLADDER CATH: CPT

## 2021-09-13 PROCEDURE — 99283 EMERGENCY DEPT VISIT LOW MDM: CPT

## 2021-09-14 VITALS
TEMPERATURE: 98 F | HEIGHT: 72 IN | SYSTOLIC BLOOD PRESSURE: 149 MMHG | RESPIRATION RATE: 16 BRPM | HEART RATE: 60 BPM | BODY MASS INDEX: 29.8 KG/M2 | DIASTOLIC BLOOD PRESSURE: 72 MMHG | OXYGEN SATURATION: 96 % | WEIGHT: 220 LBS

## 2021-09-14 LAB
BILIRUB UR QL: NEGATIVE
GLUCOSE UR-MCNC: NEGATIVE MG/DL
KETONES UR-MCNC: NEGATIVE MG/DL
NITRITE UR QL STRIP.AUTO: NEGATIVE
PH UR: 6 [PH] (ref 5–8)
PROT UR-MCNC: 100 MG/DL
RBC #/AREA URNS AUTO: >10 /HPF
SP GR UR STRIP: 1.02 (ref 1–1.03)
UROBILINOGEN UR STRIP-ACNC: <2

## 2021-09-14 PROCEDURE — 81001 URINALYSIS AUTO W/SCOPE: CPT | Performed by: EMERGENCY MEDICINE

## 2021-09-14 PROCEDURE — 87086 URINE CULTURE/COLONY COUNT: CPT | Performed by: EMERGENCY MEDICINE

## 2021-09-14 PROCEDURE — 87077 CULTURE AEROBIC IDENTIFY: CPT | Performed by: EMERGENCY MEDICINE

## 2021-09-14 NOTE — ED PROVIDER NOTES
Patient Seen in: [facility] AND [facility] Emergency Department      History   Patient presents with:  Urinary Symptoms    Stated Complaint: Urinary Problem    Subjective:   HPI    History provided by patient and patient's daughter.     82-year-old male with hist Screening PSA (prostate specific antigen) 03/13/2013   • Shortness of breath    • Thrombophlebitis    • Visual impairment     glasses for reading              Past Surgical History:   Procedure Laterality Date   • CABG     • CARDIAC PACEMAKER PLACEMENT BMI 29.84 kg/m²         Physical Exam  Vitals and nursing note reviewed. HENT:      Head: Normocephalic. Mouth/Throat:      Mouth: Mucous membranes are moist.   Eyes:      Extraocular Movements: Extraocular movements intact.    Cardiovascular:      R imaging and found no bacteriuria  - bladder scan with >250cc  - valladares placed - flushed clear urine      The patient was informed of their elevated blood pressure reading in the Emergency Department.   They were informed of the dangers of undiagnosed and unt AM

## 2021-09-14 NOTE — ED INITIAL ASSESSMENT (HPI)
Pt presents to ED for hematuria that started today. Pt states he started self cathing today and now has blood with clots in his urine and is unable to cath himself.

## 2021-09-14 NOTE — ED QUICK NOTES
3-way valladares was irrigated w/ 300cc sterile saline, w/ equal output, and urine now noted clear, from initial red color. MD Tapan Araya notified.

## 2021-09-14 NOTE — ED QUICK NOTES
Pt awake, alert, respirations unlabored, speech clear, no acute distress. Reports relief to urinary urgency sensation since valladares was placed. Urine output remains clear since flush was completed. Pt prefers to keep standard drainage bag vs. A leg bag.  Pt a

## 2021-09-22 ENCOUNTER — IMMUNIZATION (OUTPATIENT)
Dept: LAB | Facility: HOSPITAL | Age: 86
End: 2021-09-22
Attending: EMERGENCY MEDICINE
Payer: MEDICARE

## 2021-09-22 DIAGNOSIS — Z23 NEED FOR VACCINATION: Primary | ICD-10-CM

## 2021-09-22 PROCEDURE — 0003A SARSCOV2 VAC 30MCG/0.3ML IM: CPT

## 2021-10-20 ENCOUNTER — LAB ENCOUNTER (OUTPATIENT)
Dept: LAB | Facility: HOSPITAL | Age: 86
End: 2021-10-20
Attending: UROLOGY
Payer: MEDICARE

## 2021-10-20 ENCOUNTER — HOSPITAL ENCOUNTER (OUTPATIENT)
Dept: ULTRASOUND IMAGING | Facility: HOSPITAL | Age: 86
Discharge: HOME | End: 2021-10-20
Attending: UROLOGY
Payer: MEDICARE

## 2021-10-20 DIAGNOSIS — R33.9 URINARY RETENTION: Primary | ICD-10-CM

## 2021-10-20 DIAGNOSIS — N39.0 URINARY TRACT INFECTION, SITE NOT SPECIFIED: ICD-10-CM

## 2021-10-20 DIAGNOSIS — R33.9 URINARY RETENTION: ICD-10-CM

## 2021-10-20 PROCEDURE — 82565 ASSAY OF CREATININE: CPT

## 2021-10-20 PROCEDURE — 36415 COLL VENOUS BLD VENIPUNCTURE: CPT

## 2021-10-20 PROCEDURE — 76770 US EXAM ABDO BACK WALL COMP: CPT | Performed by: UROLOGY

## 2021-10-20 PROCEDURE — 87086 URINE CULTURE/COLONY COUNT: CPT

## 2021-10-20 PROCEDURE — 87186 SC STD MICRODIL/AGAR DIL: CPT

## 2021-10-20 PROCEDURE — 87077 CULTURE AEROBIC IDENTIFY: CPT

## 2021-11-13 ENCOUNTER — LAB REQUISITION (OUTPATIENT)
Dept: LAB | Facility: HOSPITAL | Age: 86
End: 2021-11-13
Payer: MEDICARE

## 2021-11-13 DIAGNOSIS — N39.0 URINARY TRACT INFECTION, SITE NOT SPECIFIED: ICD-10-CM

## 2021-11-13 PROCEDURE — 87086 URINE CULTURE/COLONY COUNT: CPT | Performed by: UROLOGY

## 2021-11-13 PROCEDURE — 87186 SC STD MICRODIL/AGAR DIL: CPT | Performed by: UROLOGY

## 2021-11-13 PROCEDURE — 81001 URINALYSIS AUTO W/SCOPE: CPT | Performed by: UROLOGY

## 2021-11-13 PROCEDURE — 87088 URINE BACTERIA CULTURE: CPT | Performed by: UROLOGY

## 2021-11-30 ENCOUNTER — OFFICE VISIT (OUTPATIENT)
Dept: SURGERY | Facility: CLINIC | Age: 86
End: 2021-11-30
Payer: MEDICARE

## 2021-11-30 DIAGNOSIS — I26.99 PULMONARY EMBOLISM, UNSPECIFIED CHRONICITY, UNSPECIFIED PULMONARY EMBOLISM TYPE, UNSPECIFIED WHETHER ACUTE COR PULMONALE PRESENT (HCC): ICD-10-CM

## 2021-11-30 DIAGNOSIS — K58.2 IRRITABLE BOWEL SYNDROME WITH BOTH CONSTIPATION AND DIARRHEA: ICD-10-CM

## 2021-11-30 DIAGNOSIS — R33.9 URINARY RETENTION: Primary | ICD-10-CM

## 2021-11-30 DIAGNOSIS — Z79.01 ON WARFARIN THERAPY: ICD-10-CM

## 2021-11-30 DIAGNOSIS — Z87.891 FORMER SMOKER: ICD-10-CM

## 2021-11-30 DIAGNOSIS — R33.8 BENIGN PROSTATIC HYPERPLASIA WITH URINARY RETENTION: ICD-10-CM

## 2021-11-30 DIAGNOSIS — N40.1 BENIGN PROSTATIC HYPERPLASIA WITH URINARY RETENTION: ICD-10-CM

## 2021-11-30 DIAGNOSIS — N39.0 RECURRENT UTI: ICD-10-CM

## 2021-11-30 DIAGNOSIS — N28.9 LESION OF LEFT NATIVE KIDNEY: ICD-10-CM

## 2021-11-30 DIAGNOSIS — N13.8 BPH WITH OBSTRUCTION/LOWER URINARY TRACT SYMPTOMS: ICD-10-CM

## 2021-11-30 DIAGNOSIS — R31.0 GROSS HEMATURIA: ICD-10-CM

## 2021-11-30 DIAGNOSIS — N40.1 BPH WITH OBSTRUCTION/LOWER URINARY TRACT SYMPTOMS: ICD-10-CM

## 2021-11-30 DIAGNOSIS — Z79.82 LONG TERM (CURRENT) USE OF ASPIRIN: ICD-10-CM

## 2021-11-30 DIAGNOSIS — N32.89 BLADDER SPASM: ICD-10-CM

## 2021-11-30 DIAGNOSIS — I82.409 RECURRENT DEEP VEIN THROMBOSIS (DVT) (HCC): ICD-10-CM

## 2021-11-30 PROCEDURE — 99205 OFFICE O/P NEW HI 60 MIN: CPT | Performed by: UROLOGY

## 2021-11-30 PROCEDURE — 51725 SIMPLE CYSTOMETROGRAM: CPT | Performed by: UROLOGY

## 2021-11-30 RX ORDER — PREDNISONE 1 MG/1
1 TABLET ORAL
COMMUNITY

## 2021-11-30 RX ORDER — METOPROLOL SUCCINATE 25 MG/1
TABLET, EXTENDED RELEASE ORAL
COMMUNITY
Start: 2021-08-20

## 2021-11-30 RX ORDER — MONTELUKAST SODIUM 10 MG/1
TABLET ORAL
COMMUNITY
Start: 2021-10-11

## 2021-11-30 NOTE — PROGRESS NOTES
Santiago Larios is a 82year old male. HPI:   Patient presents with:  Consult: Patient came in with daughter for second opinion. Present for urinary retention. Had cardiac arrest in August of this year & has not voided since.  Does have a valladares, which is recent 11/13/2021 Urine culture = >100,000 CFU/ML Proteus mirabilis (Resistant: Nitrofurantoin) -- treated with Bactrim DS BID x 7 days.  Patient states that during these recurrent UTIs, he has intense bladder spasms causing leaking around the valladares cathete Recommended every 4 hr intermittent catheterization with sterile technique; states he would rather have an indwelling Mcelroy; may still need intermittent catheterization even after a TURP; Rectal exam 20 g; irrigate as needed if no drainage or blood clot; t Neuropathy    • Osteoarthritis    • PE (pulmonary embolism)    • Peripheral vascular disease (HCC)    • Pulmonary embolism (HCC)    • RSD (reflex sympathetic dystrophy)     right arm, little in the left   • Screening PSA (prostate specific antigen) 03/13/2 Tab 7 mg nightly. • Escitalopram Oxalate (LEXAPRO) 20 MG Oral Tab      • tamsulosin HCl (FLOMAX) 0.4 MG Oral Cap      • aspirin (BABY ASPIRIN) 81 MG Oral Chew Tab Chew 81 mg by mouth daily.        • atorvastatin 40 MG Oral Tab Take 40 mg by mouth nightl appropriate for age;   Head/Face: normocephalic  Eyes/Vision: no scleral icterus  Neck/Thyroid: neck is supple without adenopathy  Lymphatic: no abnormal cervical, supraclavicular or inguinal  adenopathy is noted  Respiratory: normal to inspection lungs ar 2. 1      UROLOGICAL IMAGING   10/20/2021 US KIDNEY/BLADDER = echogenic focus in the upper pole the LEFT kidney may represent surrounding perirenal fat interposed within area of cotrical cleft or scar; follow up CT scanning would help; Enlarged prostate cau reviewing past medical records, current medications, allergies, laboratories and imaging studies with patient, discussing treatment options, answering questions about treatment, and coordinating care, then documenting the encounter.       ASSESSMENT/PLAN: understand. Patient agrees to undergo cystoscopy without biopsy under local anesthesia.  This will insure patient does not have any false passages from history of gross hematuria following intermittent catheterization.    (N28.9) Lesion of left native kidn increasing his risk of bleeding. Additionally, patient has 125 pack year history of smoking.   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 Patient states that during these recurrent UTIs, he has intense bladder spasms causing leaking around the valladares catheter. He currently denies symptoms to suggest recurrent UTI.  He feels this is stable and chooses to submit a u thrombosis (DVT) ([facility]  Please see Long term (current) use of aspirin and On warfarin therapy above    (I26.99) Pulmonary embolism, unspecified chronicity, unspecified pulmonary embolism type, unspecified whether acute cor pulmonale present ([facility]  Please s bladder to contract is intact and it is not impaired. This suggests that your inability to urinate is from either an enlarged prostate and/or longstanding constipation which can send inhibitory signals to the bladder . ...    9.  Office cystoscopy--no biop

## 2021-11-30 NOTE — PATIENT INSTRUCTIONS
Samuel Calles M.D.    1.  Urine specimen from [facility] today for culture and sensitivity; we might only prescribe antibiotic IF there is only 1 species of bacteria in the specimen and only IF there appears to be a sudd you do not have bladder cancer in light of long history of smoking and also the recurrent visible blood in your urine.   A urine specimen for urine culture has to be obtained from your catheter 7 to 10  days before the procedure so that any bacteria in your

## 2021-12-01 ENCOUNTER — TELEPHONE (OUTPATIENT)
Dept: SURGERY | Facility: CLINIC | Age: 86
End: 2021-12-01

## 2021-12-01 NOTE — TELEPHONE ENCOUNTER
Patients daughter Damaris Robertson states she would like to schedule patient cystoscopy procedure but before would like to get some answers about it.  Please advise   Oralia: [phone]  if no answer please call [phone]

## 2021-12-03 ENCOUNTER — TELEPHONE (OUTPATIENT)
Dept: SURGERY | Facility: CLINIC | Age: 86
End: 2021-12-03

## 2021-12-03 RX ORDER — SULFAMETHOXAZOLE AND TRIMETHOPRIM 800; 160 MG/1; MG/1
1 TABLET ORAL EVERY 12 HOURS
Qty: 5 TABLET | Refills: 0 | Status: SHIPPED | OUTPATIENT
Start: 2021-12-03 | End: 2021-12-06

## 2021-12-03 NOTE — TELEPHONE ENCOUNTER
----- Message from DARREL Richardson sent at 12/3/2021 11:07 AM CST -----  Please let patient know his urine culture is positive for bacteria. Per [facility]K LOV he wanted to consider treatment ONLY if patient is symptomatic.   Please see if patient is ha

## 2021-12-03 NOTE — TELEPHONE ENCOUNTER
I called pt and informed him of the info and instructions in [facility] as stated below and pt is having spasms and constant leaking around the cath.  He  verbalized understanding and compliance and asked that I contact his dtr to give her all of the info al

## 2021-12-06 RX ORDER — SULFAMETHOXAZOLE AND TRIMETHOPRIM 800; 160 MG/1; MG/1
1 TABLET ORAL EVERY 12 HOURS
Qty: 5 TABLET | Refills: 0 | Status: SHIPPED | OUTPATIENT
Start: 2021-12-06 | End: 2021-12-11

## 2021-12-06 NOTE — TELEPHONE ENCOUNTER
Spoke with daughter Tariq Dick and went over cystoscopy procedure. Pt scheduled for procedure on 1/19/22. Daughter is wondering about how often bladder irrigation should occur for pt.  Let her know [facility] recommending bladder irrigation if pt develops pyuria or if

## 2021-12-06 NOTE — TELEPHONE ENCOUNTER
Initial order dispensed 5 tablets however [facility] ordered Bactrim BID x 5 days therefore pt needed 5 more tablets to total 10. 5 tablets sent to [facility] in [place]. Called daughter and let her know.

## 2021-12-06 NOTE — TELEPHONE ENCOUNTER
Daughter states rx should have been for 10 pills not 5  - asking for rx for 5 more pills to be sent today - he has one pill left - please call when sent

## 2021-12-07 ENCOUNTER — TELEPHONE (OUTPATIENT)
Dept: SURGERY | Facility: CLINIC | Age: 86
End: 2021-12-07

## 2021-12-07 DIAGNOSIS — N13.8 NODULAR PROSTATE WITH URINARY OBSTRUCTION: ICD-10-CM

## 2021-12-07 DIAGNOSIS — N40.1 HYPERTROPHY OF PROSTATE WITH URINARY OBSTRUCTION: Primary | ICD-10-CM

## 2021-12-07 DIAGNOSIS — N40.3 NODULAR PROSTATE WITH URINARY OBSTRUCTION: ICD-10-CM

## 2021-12-07 DIAGNOSIS — N13.8 HYPERTROPHY OF PROSTATE WITH URINARY OBSTRUCTION: Primary | ICD-10-CM

## 2021-12-07 NOTE — TELEPHONE ENCOUNTER
Per Alyx Basilio, pt is scheduled for US on 12/15 and is not passing medicare necessity, needing diagnosis changed.  Please advise

## 2021-12-08 NOTE — TELEPHONE ENCOUNTER
I called Kayla Hinton back in registration and she asked that we place a new order for the prostate us and remove the urinary retention code and just use the BPH and the obstructive uropathy to see if they will pass. She will c/b if the is a problem. I placed the new order and will wait for a c/b.

## 2021-12-14 ENCOUNTER — HOSPITAL ENCOUNTER (OUTPATIENT)
Dept: CT IMAGING | Facility: HOSPITAL | Age: 86
Discharge: HOME | End: 2021-12-14
Attending: UROLOGY
Payer: MEDICARE

## 2021-12-14 DIAGNOSIS — R31.0 GROSS HEMATURIA: ICD-10-CM

## 2021-12-14 DIAGNOSIS — N28.9 LESION OF LEFT NATIVE KIDNEY: ICD-10-CM

## 2021-12-14 PROCEDURE — 74178 CT ABD&PLV WO CNTR FLWD CNTR: CPT | Performed by: UROLOGY

## 2021-12-14 PROCEDURE — 76377 3D RENDER W/INTRP POSTPROCES: CPT | Performed by: UROLOGY

## 2021-12-14 PROCEDURE — 82565 ASSAY OF CREATININE: CPT

## 2021-12-15 ENCOUNTER — HOSPITAL ENCOUNTER (OUTPATIENT)
Dept: ULTRASOUND IMAGING | Facility: HOSPITAL | Age: 86
Discharge: HOME | End: 2021-12-15
Attending: UROLOGY
Payer: MEDICARE

## 2021-12-15 DIAGNOSIS — N40.1 HYPERTROPHY OF PROSTATE WITH URINARY OBSTRUCTION: ICD-10-CM

## 2021-12-15 DIAGNOSIS — N40.3 NODULAR PROSTATE WITH URINARY OBSTRUCTION: ICD-10-CM

## 2021-12-15 DIAGNOSIS — N13.8 NODULAR PROSTATE WITH URINARY OBSTRUCTION: ICD-10-CM

## 2021-12-15 DIAGNOSIS — N13.8 HYPERTROPHY OF PROSTATE WITH URINARY OBSTRUCTION: ICD-10-CM

## 2021-12-15 PROCEDURE — 76872 US TRANSRECTAL: CPT | Performed by: UROLOGY

## 2021-12-26 ENCOUNTER — HOSPITAL ENCOUNTER (EMERGENCY)
Facility: HOSPITAL | Age: 86
Discharge: HOME | End: 2021-12-26
Attending: EMERGENCY MEDICINE
Payer: MEDICARE

## 2021-12-26 ENCOUNTER — HOSPITAL ENCOUNTER (OUTPATIENT)
Age: 86
Discharge: ANOTHER HOSPITAL | End: 2021-12-26
Attending: EMERGENCY MEDICINE
Payer: MEDICARE

## 2021-12-26 VITALS
RESPIRATION RATE: 24 BRPM | DIASTOLIC BLOOD PRESSURE: 90 MMHG | OXYGEN SATURATION: 95 % | SYSTOLIC BLOOD PRESSURE: 184 MMHG | TEMPERATURE: 99 F | HEART RATE: 88 BPM

## 2021-12-26 VITALS
TEMPERATURE: 99 F | SYSTOLIC BLOOD PRESSURE: 174 MMHG | HEIGHT: 72 IN | RESPIRATION RATE: 20 BRPM | HEART RATE: 80 BPM | BODY MASS INDEX: 31.83 KG/M2 | OXYGEN SATURATION: 92 % | DIASTOLIC BLOOD PRESSURE: 81 MMHG | WEIGHT: 235 LBS

## 2021-12-26 DIAGNOSIS — R33.9 URINARY RETENTION: Primary | ICD-10-CM

## 2021-12-26 DIAGNOSIS — R31.0 GROSS HEMATURIA: Primary | ICD-10-CM

## 2021-12-26 DIAGNOSIS — N30.01 ACUTE CYSTITIS WITH HEMATURIA: ICD-10-CM

## 2021-12-26 DIAGNOSIS — T83.011A MALFUNCTION OF FOLEY CATHETER, INITIAL ENCOUNTER (HCC): ICD-10-CM

## 2021-12-26 DIAGNOSIS — T83.9XXA FOLEY CATHETER PROBLEM, INITIAL ENCOUNTER (HCC): ICD-10-CM

## 2021-12-26 LAB
BASOPHILS # BLD AUTO: 0.06 X10(3) UL (ref 0–0.2)
BASOPHILS NFR BLD AUTO: 0.3 %
BILIRUB UR QL: NEGATIVE
DEPRECATED RDW RBC AUTO: 43.2 FL (ref 35.1–46.3)
EOSINOPHIL # BLD AUTO: 0 X10(3) UL (ref 0–0.7)
EOSINOPHIL NFR BLD AUTO: 0 %
ERYTHROCYTE [DISTWIDTH] IN BLOOD BY AUTOMATED COUNT: 12.5 % (ref 11–15)
GLUCOSE UR-MCNC: NEGATIVE MG/DL
HCT VFR BLD AUTO: 39.5 %
HGB BLD-MCNC: 13.4 G/DL
IMM GRANULOCYTES # BLD AUTO: 0.11 X10(3) UL (ref 0–1)
IMM GRANULOCYTES NFR BLD: 0.6 %
INR BLD: 1.97 (ref 0.8–1.2)
KETONES UR-MCNC: NEGATIVE MG/DL
LYMPHOCYTES # BLD AUTO: 1.42 X10(3) UL (ref 1–4)
LYMPHOCYTES NFR BLD AUTO: 7.8 %
MCH RBC QN AUTO: 31.8 PG (ref 26–34)
MCHC RBC AUTO-ENTMCNC: 33.9 G/DL (ref 31–37)
MCV RBC AUTO: 93.6 FL
MONOCYTES # BLD AUTO: 1.04 X10(3) UL (ref 0.1–1)
MONOCYTES NFR BLD AUTO: 5.7 %
NEUTROPHILS # BLD AUTO: 15.53 X10 (3) UL (ref 1.5–7.7)
NEUTROPHILS # BLD AUTO: 15.53 X10(3) UL (ref 1.5–7.7)
NEUTROPHILS NFR BLD AUTO: 85.6 %
NITRITE UR QL STRIP.AUTO: NEGATIVE
PH UR: 8 [PH] (ref 5–8)
PLATELET # BLD AUTO: 284 10(3)UL (ref 150–450)
PROT UR-MCNC: 100 MG/DL
PROTHROMBIN TIME: 22.7 SECONDS (ref 11.6–14.8)
RBC # BLD AUTO: 4.22 X10(6)UL
RBC #/AREA URNS AUTO: >10 /HPF
SP GR UR STRIP: 1.01 (ref 1–1.03)
UROBILINOGEN UR STRIP-ACNC: <2
WBC # BLD AUTO: 18.2 X10(3) UL (ref 4–11)
WBC #/AREA URNS AUTO: >50 /HPF

## 2021-12-26 PROCEDURE — 36415 COLL VENOUS BLD VENIPUNCTURE: CPT

## 2021-12-26 PROCEDURE — 87186 SC STD MICRODIL/AGAR DIL: CPT | Performed by: EMERGENCY MEDICINE

## 2021-12-26 PROCEDURE — 99283 EMERGENCY DEPT VISIT LOW MDM: CPT

## 2021-12-26 PROCEDURE — 99213 OFFICE O/P EST LOW 20 MIN: CPT

## 2021-12-26 PROCEDURE — 87077 CULTURE AEROBIC IDENTIFY: CPT | Performed by: EMERGENCY MEDICINE

## 2021-12-26 PROCEDURE — 81001 URINALYSIS AUTO W/SCOPE: CPT | Performed by: EMERGENCY MEDICINE

## 2021-12-26 PROCEDURE — 85025 COMPLETE CBC W/AUTO DIFF WBC: CPT | Performed by: EMERGENCY MEDICINE

## 2021-12-26 PROCEDURE — 85610 PROTHROMBIN TIME: CPT | Performed by: EMERGENCY MEDICINE

## 2021-12-26 PROCEDURE — 87086 URINE CULTURE/COLONY COUNT: CPT | Performed by: EMERGENCY MEDICINE

## 2021-12-26 RX ORDER — SULFAMETHOXAZOLE AND TRIMETHOPRIM 800; 160 MG/1; MG/1
1 TABLET ORAL 2 TIMES DAILY
Qty: 14 TABLET | Refills: 0 | Status: SHIPPED | OUTPATIENT
Start: 2021-12-26 | End: 2022-01-02

## 2021-12-26 NOTE — ED PROVIDER NOTES
Patient Seen in: [facility] AND [facility] Emergency Department      History   No chief complaint on file.     Stated Complaint: Catheter Problems    Subjective:   HPI  Patient is a 29-year-old male history of CAD, CHF, diabetes, hypertension, hyperlipidemia, DVT sympathetic dystrophy)     right arm, little in the left   • Screening PSA (prostate specific antigen) 03/13/2013   • Shortness of breath    • Thrombophlebitis    • Visual impairment     glasses for reading              No pertinent past surgical history. is warm. Capillary Refill: Capillary refill takes less than 2 seconds. Neurological:      General: No focal deficit present. Mental Status: He is alert and oriented to person, place, and time. Mental status is at baseline.    Psychiatric: well-appearing, suprapubic tenderness with Mcelroy catheter in place with minimal drainage of blood-tinged urine. Mcelroy removed and replaced with blood clot removed. Initial port wine colored urine, patient irrigated manually with clearance of blood.   INR

## 2021-12-26 NOTE — ED PROVIDER NOTES
Patient Seen in: [facility]      History   Patient presents with:  Urinary Symptoms    Stated Complaint: urine issues    Subjective:   HPI    The patient is an 82-year-old male coronary artery disease, status post pacemaker, prostate problems reading              Past Surgical History:   Procedure Laterality Date   • CABG     • CARDIAC PACEMAKER PLACEMENT     • CATH PERCUTANEOUS  TRANSLUMINAL CORONARY ANGIOPLASTY     • FOOT SURGERY      right    • INGUINAL HERNIORRHAPHY     • IR IVC FILTER PLAC the touch      ED Course   Labs Reviewed - No data to display       Patient was reexamined at 8:30 AM.  After attempted irrigation by the nursing staff, there is minimal flow in the Mcelroy catheter.   The patient still has significant suprapubic tenderness a

## 2021-12-26 NOTE — ED QUICK NOTES
500ml NS catheter irrigation done with some relief. Return of cloudy, pink-tinged urine. Patient on coumadin.

## 2021-12-26 NOTE — ED INITIAL ASSESSMENT (HPI)
Pt to ED c/o urinary retention and clogged catheter. Attempts at irrigation at home with no relief. Pt also c/o vomiting and nausea since this morning.

## 2021-12-26 NOTE — ED INITIAL ASSESSMENT (HPI)
Indwelling valladares catheter replaced by home health RN 3 days ago. Patient with decreased output. Severe abdominal pain with distention with decreased output for most of the night. Vomited x 2. + bladder spasms. No fever.

## 2021-12-26 NOTE — ED QUICK NOTES
Valladares cath replaced with 16fr latex valladares without difficulty. Drained 800ml dark red urine and irrigated until clear. Pt reports significant relief.  DR. Rodas June aware

## 2021-12-27 ENCOUNTER — TELEPHONE (OUTPATIENT)
Dept: SURGERY | Facility: CLINIC | Age: 86
End: 2021-12-27

## 2021-12-27 NOTE — TELEPHONE ENCOUNTER
Spoke with daughter Tariq Dick verified name and  of pt.  Daughter states pt began having bladder spasms on Thursday and home health nurse irrigated valladares and spasms resolved until  when pt began to have them again and then pt was taken to ER where cath

## 2021-12-27 NOTE — TELEPHONE ENCOUNTER
Per daughter pt was in the emergency room with flank pain. Pt's catheter was changed and there was a lot of blood and clots.  Per daughter the emergency room doctor gave him antibiotics, but was instructed to get test results from Dr. Mack Mcpherson before start

## 2021-12-28 ENCOUNTER — TELEPHONE (OUTPATIENT)
Dept: SURGERY | Facility: CLINIC | Age: 86
End: 2021-12-28

## 2021-12-28 NOTE — TELEPHONE ENCOUNTER
Patients daughter requesting call back with lab results. Also were wondering if patient should continue antibiotic. As well as wondering some home health questions.  Please advise

## 2021-12-28 NOTE — TELEPHONE ENCOUNTER
Orders from [facility] signed by MD and faxed. Confirmation received, copy sent to scanning.      Order: [id number]    Per [facility] for [facility] skilled nurse may irrigate indwelling catheter on an as needed basis up to 2 PRN visits per month f

## 2021-12-31 NOTE — TELEPHONE ENCOUNTER
Daughter called back. Notified her to continue to have pt take antibiotic as prescribed. Daughter asking if irrigation should be done on a regular basis. Let her know PVK recommended to irrigate prn for gross pyuria , not on a regular basis.  Daughter Adbradley Lanes

## 2022-01-13 ENCOUNTER — LAB REQUISITION (OUTPATIENT)
Dept: LAB | Facility: HOSPITAL | Age: 87
End: 2022-01-13
Payer: MEDICARE

## 2022-01-13 DIAGNOSIS — N40.0 BENIGN PROSTATIC HYPERPLASIA WITHOUT LOWER URINARY TRACT SYMPTOMS: ICD-10-CM

## 2022-01-13 DIAGNOSIS — N39.0 URINARY TRACT INFECTION, SITE NOT SPECIFIED: ICD-10-CM

## 2022-01-13 PROCEDURE — 87086 URINE CULTURE/COLONY COUNT: CPT

## 2022-01-18 ENCOUNTER — TELEPHONE (OUTPATIENT)
Dept: SURGERY | Facility: CLINIC | Age: 87
End: 2022-01-18

## 2022-01-18 ENCOUNTER — LAB REQUISITION (OUTPATIENT)
Dept: LAB | Facility: HOSPITAL | Age: 87
End: 2022-01-18
Payer: MEDICARE

## 2022-01-18 DIAGNOSIS — Z87.440 PERSONAL HISTORY OF URINARY (TRACT) INFECTIONS: ICD-10-CM

## 2022-01-18 DIAGNOSIS — N39.0 RECURRENT UTI: Primary | ICD-10-CM

## 2022-01-18 PROCEDURE — 87086 URINE CULTURE/COLONY COUNT: CPT | Performed by: UROLOGY

## 2022-01-18 PROCEDURE — 81015 MICROSCOPIC EXAM OF URINE: CPT | Performed by: UROLOGY

## 2022-01-18 PROCEDURE — 87077 CULTURE AEROBIC IDENTIFY: CPT | Performed by: UROLOGY

## 2022-01-18 PROCEDURE — 87186 SC STD MICRODIL/AGAR DIL: CPT | Performed by: UROLOGY

## 2022-01-18 RX ORDER — SULFAMETHOXAZOLE AND TRIMETHOPRIM 800; 160 MG/1; MG/1
1 TABLET ORAL 2 TIMES DAILY
Qty: 6 TABLET | Refills: 0 | Status: SHIPPED | OUTPATIENT
Start: 2022-01-18 | End: 2022-01-21

## 2022-01-18 NOTE — TELEPHONE ENCOUNTER
I called pt to confirm his scheduled office cystoscopy with Dr. Hazel Núñez for tomorrow 1/19/22 at 2pm, pt states he thinks he has UTI. Since last night c/o: burning, has valladares; urine in bag is dark orange with white \"clumps\" denies chills and/or fevers.  Pt sta

## 2022-01-18 NOTE — TELEPHONE ENCOUNTER
I agree with this plan.   Plan patient/daughter command, nurse to interview them for severity of symptoms; then report to me tomorrow and THEN we will decide whether to proceed with office cystoscopy or whether to cancel cystoscopy and convert to office vis

## 2022-01-18 NOTE — TELEPHONE ENCOUNTER
Pt states burning began last night, feels better this morning. Color is orange to light yellow and has had pyuria since last night. Spoke with daughter Margie Savage and daughter states [facility] nurse is coming out today at 3pm to irrigate pt's valladares catheter.  Call

## 2022-01-18 NOTE — TELEPHONE ENCOUNTER
Spoke with PVK and recommending that pt start bactrim today so pt can receive 2 doses , one in afternoon and one in evening, that way pt is covered for cysto tomorrow. Called daughter Merritt Cornelius and notified her of this and was in agreement.  3 day supply of ba

## 2022-01-19 ENCOUNTER — PROCEDURE (OUTPATIENT)
Dept: SURGERY | Facility: CLINIC | Age: 87
End: 2022-01-19
Payer: MEDICARE

## 2022-01-19 VITALS — DIASTOLIC BLOOD PRESSURE: 80 MMHG | SYSTOLIC BLOOD PRESSURE: 126 MMHG

## 2022-01-19 DIAGNOSIS — D17.9 ANGIOMYOLIPOMA: ICD-10-CM

## 2022-01-19 DIAGNOSIS — I26.99 PULMONARY EMBOLISM, UNSPECIFIED CHRONICITY, UNSPECIFIED PULMONARY EMBOLISM TYPE, UNSPECIFIED WHETHER ACUTE COR PULMONALE PRESENT (HCC): ICD-10-CM

## 2022-01-19 DIAGNOSIS — K58.2 IRRITABLE BOWEL SYNDROME WITH BOTH CONSTIPATION AND DIARRHEA: ICD-10-CM

## 2022-01-19 DIAGNOSIS — R33.9 URINARY RETENTION: ICD-10-CM

## 2022-01-19 DIAGNOSIS — Z79.01 ON WARFARIN THERAPY: ICD-10-CM

## 2022-01-19 DIAGNOSIS — Z87.891 FORMER SMOKER: ICD-10-CM

## 2022-01-19 DIAGNOSIS — N40.1 BENIGN PROSTATIC HYPERPLASIA WITH URINARY RETENTION: ICD-10-CM

## 2022-01-19 DIAGNOSIS — R33.8 BENIGN PROSTATIC HYPERPLASIA WITH URINARY RETENTION: ICD-10-CM

## 2022-01-19 DIAGNOSIS — Z79.82 LONG TERM (CURRENT) USE OF ASPIRIN: ICD-10-CM

## 2022-01-19 DIAGNOSIS — R31.0 GROSS HEMATURIA: Primary | ICD-10-CM

## 2022-01-19 DIAGNOSIS — N32.89 BLADDER SPASM: ICD-10-CM

## 2022-01-19 DIAGNOSIS — I82.409 RECURRENT DEEP VEIN THROMBOSIS (DVT) (HCC): ICD-10-CM

## 2022-01-19 DIAGNOSIS — N39.0 RECURRENT UTI: ICD-10-CM

## 2022-01-19 PROCEDURE — 99214 OFFICE O/P EST MOD 30 MIN: CPT | Performed by: UROLOGY

## 2022-01-19 PROCEDURE — 52000 CYSTOURETHROSCOPY: CPT | Performed by: UROLOGY

## 2022-01-19 NOTE — PATIENT INSTRUCTIONS
Divine Garcia M.D.    1.   Your Mcelroy catheter was changed today during the cystoscopy procedure. It needs to be changed again in 1 month    2. Important to continue finasteride 5 mg daily    3.    Need to take your wa results of urine culture; we will have to prescribe a longer course of antibiotics; if urine culture was not performed, we will likely have you continue Bactrim DS for at least 10 days total, since clinically you are better on this antibiotic.   You started

## 2022-01-19 NOTE — PROGRESS NOTES
PREOPERATIVE DIAGNOSIS:     Gross Hematuria; BPH; recurrent UTI     POSTOP DIAGNOSIS:               The same;  No tumors, stones, or CIS of the bladder or bladder neck    PROCEDURE:              Cystoscopy              ANESTHESIA:     2% Xylocaine jelly loc reinserted, residual 1000 mL. Patient was instructed to perform intermittent self-catheterization; due to recurrent gross hematuria with self-catheterization, patient now has a valladares catheter in place.  Patient states that due to his chronic warfarin and As place. He is currently taking tamsulosin 0.4 mg and finasteride 5 mg; denies side effect of either medication. Patient is at increased risk of falling. He denies dysuria of gross hematuria.  He feels this is moderate     Fromer Smoker  Patient smoked 5 pack catheterization or continue with Mcelroy, have it changed by home visitng nurse once per months;   09/14/2021 Dr. Samantha Luke, Emergency Medicine; 81 yo male Hx BPH, CHF, DVT, diabetes, hypertension, hyperlipidemia; urinary retention, bladder scan >250 mL, culture = >100,000 CFU/ML Proteus mirabilis (Resistant: Nitrofurantoin) -- treated with Bactrim DS BID x 7 days; UA blood = moderate; protein = 100; WBC = >50; RBC = >10  10/20/2021 Urine culture = >100,000 CFU/ML Citrobacter freundii (Resistant: Cefazolin Mcelroy catheter, he has bladder spasm. Patient previousuly saw  Dr. Srinivas Pinedo, Urologist, for a voiding trail. He failed; catheter reinserted, residual 1000 mL.  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 Patient is currently taking finasteride 5 mg; I explain to patient and daughter that his prostate would likely be larger if he was not on finasteride.  Patient is on ASA and warfarin f 11/13/2021 Urine culture = >100,000 CFU/ML Proteus mirabilis (Resistant: Nitrofurantoin) -- treated with Bactrim DS BID x 7 days. Patient states that during these recurrent UTIs, he has intense bladder spasms causing leaking around the valladares catheter.  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  He is at risk for future surgical procedures and will need to hold medication prior to any surgical procedure.      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     6.  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 I have reviewed the chart and discharge instructions (if applicable) and agree that the record reflects my personal performance and is accurate and complete.   Bharti Crawford MD, 1/21/2

## 2022-01-21 ENCOUNTER — TELEPHONE (OUTPATIENT)
Dept: SURGERY | Facility: CLINIC | Age: 87
End: 2022-01-21

## 2022-01-21 RX ORDER — SULFAMETHOXAZOLE AND TRIMETHOPRIM 800; 160 MG/1; MG/1
1 TABLET ORAL 2 TIMES DAILY
Qty: 20 TABLET | Refills: 0 | Status: SHIPPED | OUTPATIENT
Start: 2022-01-21 | End: 2022-01-31

## 2022-01-21 NOTE — TELEPHONE ENCOUNTER
Called alexandra Spoke with daughter Amparo Roger  She relayed to me he is finishing out his 3 day bactrim dose. Per PVK After visit summary from this week -   9.   Please call office for final results of urine culture; we will have to prescribe a longer course of Cyclophosphamide Pregnancy And Lactation Text: This medication is Pregnancy Category D and it isn't considered safe during pregnancy. This medication is excreted in breast milk.

## 2022-01-21 NOTE — TELEPHONE ENCOUNTER
Please call patient/daughter---    Urine culture growing Proteus mirabilis, same bacteria that he had 11/30/2021 urine culture.  We will not know today what antibiotic effective against the bacteria but on November 30, Bactrim DS generic (sulfamethoxazole t

## 2022-01-21 NOTE — TELEPHONE ENCOUNTER
Per daughter has 1 more antibiotic and asking if he is supposed to stop after that or if he needs a new prescription.  Please advise

## 2022-01-21 NOTE — TELEPHONE ENCOUNTER
Called daughter Asuncion Head of pt. Relayed the msg from [address] below. She verbalized understanding and will  the bactrim extension. She will call back Monday for final Ucx.

## 2022-01-24 ENCOUNTER — TELEPHONE (OUTPATIENT)
Dept: SURGERY | Facility: CLINIC | Age: 87
End: 2022-01-24

## 2022-01-24 NOTE — TELEPHONE ENCOUNTER
Called daughter Oma Burgos to relay msg from [facility]. Daughter verbalized understanding.

## 2022-01-24 NOTE — TELEPHONE ENCOUNTER
----- Message from Roney Severance, MD sent at 1/23/2022  9:46 PM CST -----  Please call patient.   The final urine culture results show that the Bactrim DS generic = sulfamethoxazole trimethoprim antibiotic is appropriate for this clinically symptomatic i

## 2022-01-26 ENCOUNTER — TELEPHONE (OUTPATIENT)
Dept: SURGERY | Facility: CLINIC | Age: 87
End: 2022-01-26

## 2022-01-26 NOTE — TELEPHONE ENCOUNTER
[facility] faxed over order for prn UA/UC requiring physician signature. PVK signed and order faxed back to [phone], fax confirmed.

## 2022-02-02 NOTE — TELEPHONE ENCOUNTER
Per residential [facility] did not receive signed order, asking to fax again to [phone]. Will also be faxing additional order that needs to be signed. Thank you.

## 2022-02-02 NOTE — TELEPHONE ENCOUNTER
I faxed back the signed order to [facility] and received a fax conf. I sent the order to scanning.

## 2022-02-17 ENCOUNTER — TELEPHONE (OUTPATIENT)
Dept: SURGERY | Facility: CLINIC | Age: 87
End: 2022-02-17

## 2022-02-17 NOTE — TELEPHONE ENCOUNTER
Higinio Huynh,    Fax received for you to sign and date paper order for patient in [address]. I will place order in your desk.

## 2022-03-01 ENCOUNTER — TELEPHONE (OUTPATIENT)
Dept: SURGERY | Facility: CLINIC | Age: 87
End: 2022-03-01

## 2022-03-01 NOTE — TELEPHONE ENCOUNTER
-Routed to Dr. Richmond Rees:  Please review & advise. Thank Fernie Coleman    -S/w Marlyn Crocker RN/ [facility]; pt identity verified with name & .     -She reports HH changed the pt's catheter 2X in last 24hrs. This am she irrigated with 350ml sterile NS till clear; clots noted.       -Pt's Dtr Oren King is an RN & plans to irrigate as needed.    -Current order states catheter change frequency is 1Xmonth with 2 PRN changes.

## 2022-03-01 NOTE — TELEPHONE ENCOUNTER
[facility] pt had cath changed yesterday, is currently with pt, states pt has UTI symptoms, states pt has not urinated much, requesting to speak to RN. Please call thank you.

## 2022-03-02 ENCOUNTER — LAB REQUISITION (OUTPATIENT)
Dept: LAB | Facility: HOSPITAL | Age: 87
End: 2022-03-02
Payer: MEDICARE

## 2022-03-02 PROCEDURE — 87088 URINE BACTERIA CULTURE: CPT | Performed by: UROLOGY

## 2022-03-02 PROCEDURE — 87086 URINE CULTURE/COLONY COUNT: CPT | Performed by: UROLOGY

## 2022-03-02 PROCEDURE — 87186 SC STD MICRODIL/AGAR DIL: CPT | Performed by: UROLOGY

## 2022-03-02 NOTE — TELEPHONE ENCOUNTER
Urology nurses,    Please call back Juliann Hong. They are to stop changing catheter on a daily basis; do not understand why it is being changed daily  Please obtain urine culture from Mcelroy using proper technique. If urine is red, bloody, hand irrigate with normal saline. ATTACH STICKY WHITE CLOTH ADHESIVE PAD OF STATLOCK TO UPPERMOST AND     INNER MOST THIGH, SO THAT UPPER EDGE OF CLOTH ADHESIVE TOUCHING CREASE     BETWEEN THE TRUNK OF THE BODY AND THE THIGH, AND SO THAT THE WHITE     PLASTIC SNAP OF THE DEVICE IS IMMEDIATELY NEXT TO THE MEATUS (OPENING) OF     THE URETHRA.       Thank you,    Dr. Sharona Franco

## 2022-03-03 NOTE — TELEPHONE ENCOUNTER
Preliminary urine culture results faxed to us from [facility]. Final culture to follow as soon as resulted. Prelim reads 10,000-50,000 CFU/ML Proteus mirabilis. Routed to [address] [facility];    Please advise, thank you.

## 2022-03-03 NOTE — TELEPHONE ENCOUNTER
Please ensure apnea when you get the sensitivity of the urine culture; can prescribe antibiotics without it. Also please let me know what patient's kidney function is and what his allergies are.   Thank you, Dr. Dora Singletary

## 2022-03-04 RX ORDER — SULFAMETHOXAZOLE AND TRIMETHOPRIM 800; 160 MG/1; MG/1
1 TABLET ORAL 2 TIMES DAILY
Qty: 20 TABLET | Refills: 0 | Status: SHIPPED | OUTPATIENT
Start: 2022-03-04 | End: 2022-03-14

## 2022-03-04 NOTE — TELEPHONE ENCOUNTER
Spoke with daughter Marek Landa in regards to positive culture results. Daughter states pt did go see ID for recurrent UTI's and ID recommended to change catheter q 2 weeks instead of 4 weeks to see if that would help prevent colonization of bacteria. Daughter states this had helped up until recently where pt became symptomatic from infection. Notified her of bactrim prescription. Verbalized understanding.

## 2022-03-04 NOTE — TELEPHONE ENCOUNTER
[facility] sent final urine culture result. Placed on [address] [facility] desk for review.

## 2022-03-04 NOTE — TELEPHONE ENCOUNTER
Spoke with PVK, if not allergic to sulfa and GFR > or equal to 50 ok to send in Bactrim DS BID x 10 days. Most recent GFR 94 on 10/20/21, not allergic to sulfa. Called pt and notified of culture results and of ABX being prescribed to his pharmacy. Pt requesting I call daughter Danni Odonnell to notify her as well. Called daughter and left msg letting her know bactrim ds has been prescribed. Routed to [address]; Pt wondering if there is any other way to help prevent recurrent urinary tract infections? Please advise, thank you.

## 2022-03-09 ENCOUNTER — TELEPHONE (OUTPATIENT)
Dept: SURGERY | Facility: CLINIC | Age: 87
End: 2022-03-09

## 2022-03-09 NOTE — TELEPHONE ENCOUNTER
Received fax from [facility] for order: [id number]    Order description: RN spoke to Juni Ferguson for [address] it is okay for RN PRN visit to collect urine sample for urine culture for DX: N39.0 today with results faxed to [facility] [address]. Order placed on MD's desk.

## 2022-03-24 NOTE — TELEPHONE ENCOUNTER
KATHIAK gave me the order for [facility] back and I faxed it ot the [facility] and received a fax confirmation.

## 2022-03-24 NOTE — TELEPHONE ENCOUNTER
Per residential home [facility] has faxed [employer]#[id number] 3 times already, requesting signature and for it to be faxed back.

## 2022-03-24 NOTE — TELEPHONE ENCOUNTER
I s/w Amadou Vergara at [facility] and informed that we received their fax for order # [id number] and are waiting for PVK to sign since he is out of the office and we will send it back ASA. I placed the order on PVK's desk with the visit not of that date. Will wait for him to sign and return.

## 2022-04-11 ENCOUNTER — APPOINTMENT (OUTPATIENT)
Dept: CARDIOLOGY | Age: 87
End: 2022-04-11
Attending: INTERNAL MEDICINE

## 2022-04-22 ENCOUNTER — APPOINTMENT (OUTPATIENT)
Dept: CARDIOLOGY | Age: 87
End: 2022-04-22

## 2022-05-17 ENCOUNTER — OFFICE VISIT (OUTPATIENT)
Dept: OTOLARYNGOLOGY | Facility: CLINIC | Age: 87
End: 2022-05-17
Payer: MEDICARE

## 2022-05-17 VITALS — BODY MASS INDEX: 33.05 KG/M2 | HEIGHT: 72 IN | WEIGHT: 244 LBS

## 2022-05-17 DIAGNOSIS — R04.0 EPISTAXIS: Primary | ICD-10-CM

## 2022-05-17 DIAGNOSIS — H61.23 CERUMEN DEBRIS ON TYMPANIC MEMBRANE OF BOTH EARS: ICD-10-CM

## 2022-05-17 DIAGNOSIS — T16.2XXA FOREIGN BODY IN AURICLE OF LEFT EAR, INITIAL ENCOUNTER: ICD-10-CM

## 2022-05-17 PROCEDURE — 99214 OFFICE O/P EST MOD 30 MIN: CPT | Performed by: SPECIALIST

## 2022-05-17 PROCEDURE — 30903 CONTROL OF NOSEBLEED: CPT | Performed by: SPECIALIST

## 2022-05-17 RX ORDER — OFLOXACIN 3 MG/ML
10 SOLUTION AURICULAR (OTIC) DAILY
Qty: 5 ML | Refills: 0 | Status: SHIPPED | OUTPATIENT
Start: 2022-05-17 | End: 2022-05-20

## 2022-05-17 RX ORDER — WARFARIN SODIUM 5 MG/1
TABLET ORAL
COMMUNITY
Start: 2022-04-25 | End: 2022-05-17

## 2022-05-17 NOTE — PATIENT INSTRUCTIONS
Large anterior vessel was cauterized on the right. No nose blowing for 1 week's time.  a product called nasal cease or bleed cease. Floxin drops for the left ear for 3 days. Okay to place the hearing aid in after 3 days.

## 2022-05-18 ENCOUNTER — TELEPHONE (OUTPATIENT)
Dept: SURGERY | Facility: CLINIC | Age: 87
End: 2022-05-18

## 2022-05-18 NOTE — TELEPHONE ENCOUNTER
Per residential home health pt uses 16 Ukrainian valladares, asking if longer valladares can be used? Please advise thank you.

## 2022-05-19 ENCOUNTER — HOSPITAL ENCOUNTER (EMERGENCY)
Facility: HOSPITAL | Age: 87
Discharge: HOME | End: 2022-05-20
Attending: EMERGENCY MEDICINE
Payer: MEDICARE

## 2022-05-19 DIAGNOSIS — T83.9XXA PROBLEM WITH FOLEY CATHETER, INITIAL ENCOUNTER (HCC): Primary | ICD-10-CM

## 2022-05-19 DIAGNOSIS — N30.90 CYSTITIS: ICD-10-CM

## 2022-05-19 PROCEDURE — 99283 EMERGENCY DEPT VISIT LOW MDM: CPT

## 2022-05-19 PROCEDURE — 36415 COLL VENOUS BLD VENIPUNCTURE: CPT

## 2022-05-19 NOTE — TELEPHONE ENCOUNTER
I s/w Paddy Segovia the [facility] nurse for pt and told her that I had asked our rep from [address] [place] [address] about this in the past and he informed that [employer] makes a 25 in xl long acth in a 14 FR. I told her that I did not know if it came in other FR sizes but that she could go on the website to see if they do. She was thankful for the C/B.

## 2022-05-20 ENCOUNTER — TELEPHONE (OUTPATIENT)
Dept: SURGERY | Facility: CLINIC | Age: 87
End: 2022-05-20

## 2022-05-20 VITALS
BODY MASS INDEX: 30.48 KG/M2 | WEIGHT: 225 LBS | TEMPERATURE: 98 F | RESPIRATION RATE: 20 BRPM | DIASTOLIC BLOOD PRESSURE: 61 MMHG | HEART RATE: 65 BPM | OXYGEN SATURATION: 90 % | HEIGHT: 72 IN | SYSTOLIC BLOOD PRESSURE: 123 MMHG

## 2022-05-20 LAB
ANION GAP SERPL CALC-SCNC: 9 MMOL/L (ref 0–18)
BASOPHILS # BLD AUTO: 0.08 X10(3) UL (ref 0–0.2)
BASOPHILS NFR BLD AUTO: 0.6 %
BILIRUB UR QL: NEGATIVE
BUN BLD-MCNC: 16 MG/DL (ref 7–18)
BUN/CREAT SERPL: 18 (ref 10–20)
CALCIUM BLD-MCNC: 9.7 MG/DL (ref 8.5–10.1)
CHLORIDE SERPL-SCNC: 100 MMOL/L (ref 98–112)
CO2 SERPL-SCNC: 27 MMOL/L (ref 21–32)
COLOR UR: YELLOW
CREAT BLD-MCNC: 0.89 MG/DL
DEPRECATED RDW RBC AUTO: 43.1 FL (ref 35.1–46.3)
EOSINOPHIL # BLD AUTO: 0.25 X10(3) UL (ref 0–0.7)
EOSINOPHIL NFR BLD AUTO: 1.8 %
ERYTHROCYTE [DISTWIDTH] IN BLOOD BY AUTOMATED COUNT: 12.6 % (ref 11–15)
GLUCOSE BLD-MCNC: 172 MG/DL (ref 70–99)
GLUCOSE UR-MCNC: NEGATIVE MG/DL
HCT VFR BLD AUTO: 41.4 %
HGB BLD-MCNC: 13.9 G/DL
IMM GRANULOCYTES # BLD AUTO: 0.08 X10(3) UL (ref 0–1)
IMM GRANULOCYTES NFR BLD: 0.6 %
INR BLD: 1.77 (ref 0.8–1.2)
KETONES UR-MCNC: NEGATIVE MG/DL
LYMPHOCYTES # BLD AUTO: 2.08 X10(3) UL (ref 1–4)
LYMPHOCYTES NFR BLD AUTO: 15.2 %
MCH RBC QN AUTO: 31.3 PG (ref 26–34)
MCHC RBC AUTO-ENTMCNC: 33.6 G/DL (ref 31–37)
MCV RBC AUTO: 93.2 FL
MONOCYTES # BLD AUTO: 1.03 X10(3) UL (ref 0.1–1)
MONOCYTES NFR BLD AUTO: 7.5 %
NEUTROPHILS # BLD AUTO: 10.13 X10 (3) UL (ref 1.5–7.7)
NEUTROPHILS # BLD AUTO: 10.13 X10(3) UL (ref 1.5–7.7)
NEUTROPHILS NFR BLD AUTO: 74.3 %
NITRITE UR QL STRIP.AUTO: NEGATIVE
OSMOLALITY SERPL CALC.SUM OF ELEC: 287 MOSM/KG (ref 275–295)
PH UR: 6 [PH] (ref 5–8)
PLATELET # BLD AUTO: 307 10(3)UL (ref 150–450)
POTASSIUM SERPL-SCNC: 4 MMOL/L (ref 3.5–5.1)
PROT UR-MCNC: 30 MG/DL
PROTHROMBIN TIME: 20.9 SECONDS (ref 11.6–14.8)
RBC # BLD AUTO: 4.44 X10(6)UL
RBC #/AREA URNS AUTO: >10 /HPF
SODIUM SERPL-SCNC: 136 MMOL/L (ref 136–145)
SP GR UR STRIP: 1.01 (ref 1–1.03)
UROBILINOGEN UR STRIP-ACNC: <2
VIT C UR-MCNC: NEGATIVE MG/DL
WBC # BLD AUTO: 13.7 X10(3) UL (ref 4–11)
WBC #/AREA URNS AUTO: >50 /HPF

## 2022-05-20 PROCEDURE — 85610 PROTHROMBIN TIME: CPT | Performed by: EMERGENCY MEDICINE

## 2022-05-20 PROCEDURE — 81001 URINALYSIS AUTO W/SCOPE: CPT | Performed by: EMERGENCY MEDICINE

## 2022-05-20 PROCEDURE — 80048 BASIC METABOLIC PNL TOTAL CA: CPT | Performed by: EMERGENCY MEDICINE

## 2022-05-20 PROCEDURE — 85025 COMPLETE CBC W/AUTO DIFF WBC: CPT | Performed by: EMERGENCY MEDICINE

## 2022-05-20 PROCEDURE — 87086 URINE CULTURE/COLONY COUNT: CPT | Performed by: EMERGENCY MEDICINE

## 2022-05-20 RX ORDER — CEPHALEXIN 500 MG/1
500 CAPSULE ORAL 2 TIMES DAILY
Qty: 14 CAPSULE | Refills: 0 | Status: SHIPPED | OUTPATIENT
Start: 2022-05-20 | End: 2022-05-27

## 2022-05-20 RX ORDER — LIDOCAINE HYDROCHLORIDE 20 MG/ML
10 JELLY TOPICAL ONCE
Status: COMPLETED | OUTPATIENT
Start: 2022-05-20 | End: 2022-05-20

## 2022-05-20 RX ORDER — ONDANSETRON 2 MG/ML
INJECTION INTRAMUSCULAR; INTRAVENOUS
Status: DISCONTINUED
Start: 2022-05-20 | End: 2022-05-20 | Stop reason: WASHOUT

## 2022-05-20 NOTE — TELEPHONE ENCOUNTER
Received fax from [facility] for home health order to be singed. Placed on MD's desk. Order # [id number]:   Description: Per Mary Vicente on call for Stas Moseley RN to collect UA with C&S tomorrow 5/20/22 due to patient report of burning, leaking, and pink tinged urine from valladares catheter. Order # [id number]  Description: Per POC RN to use 2nd PRN visit for month of may for valladares complications.

## 2022-05-20 NOTE — ED INITIAL ASSESSMENT (HPI)
Pt presents to ED for urinary retention. Pt had valladares replaced today by home health. Pt has had no output in 3 hours. Pt very uncomfortable.

## 2022-05-20 NOTE — ED QUICK NOTES
Pt noted to be markedly improved in appearance s/p new valladares catheter insertion with noted urine output. Pt noted to be resting in bed in no apparent distress. Valladares catheter output noted to be light red tinged.

## 2022-05-20 NOTE — TELEPHONE ENCOUNTER
-S/w Nataliia & confirmed the UA/ CS obtained in the [facility]/ ER last night was suffiicient; no add'l labs needed to be obtained.  -Encounter complete.

## 2022-05-20 NOTE — TELEPHONE ENCOUNTER
jem from residential home health called. Pt went to [facility] emergency room last night 5-19-22. ua was done. Pt was on the schedule this morning to be collected but since it was done last night should it still be collected.   Call

## 2022-05-23 ENCOUNTER — TELEPHONE (OUTPATIENT)
Dept: SURGERY | Facility: CLINIC | Age: 87
End: 2022-05-23

## 2022-05-23 NOTE — TELEPHONE ENCOUNTER
Nurse Promise Omalley,    I agree with your assessment and the urology recommendations described in your note--lets follow that.     Dr. Suellen Martinez

## 2022-05-23 NOTE — TELEPHONE ENCOUNTER
Per daughter pt was at [facility] ED on 5/19 due to not being able to urinate, pt was put on keflex, asking if this is ok with Dr. Baldev Dang. Please call thank you.

## 2022-05-23 NOTE — TELEPHONE ENCOUNTER
S/W PVK and informed him of pt's issues and ER visit and cath changed and ABX prescribed. He stated that if pt has no acute symptoms he does not need to continue taking the Cephalexin and if it was not started yet he does not need to start it. I called pt's dtr Lopez Fore back and told PVK's instructions to not have pt start or continue taking the Cephalexin and she stated that she forgot to tell me that there were a bunch of clots that came out when the ER doc removed his cath in the ER. I asked if there was bleeding in the urine also and she denied. She stated this must have been why the urine was not draining thru the valladares cath. I was surprised by this because it was not mentioned in the ER notes. She stated that this is why the [place] nurse is changing the cath every 2-3 weeks because pt always has a lot of sediment building up and it clogs the valladares cath and Dr. Betsy Canales told her to have the [place] nurse change the cath every couple weeks. I explained that if there is sediment build up changing the cath more frequently isn't the best plan and that pt needs to have regular bladder irrigation to keep the sediment from building up and clogging the lumens at the end of the valladares cath. She asked if the [place] nurse can do this and I told her that she could do it when she visits but that typically they would teach the pt to perform it or teach a family member or friend. She stated that her sister had done this for pt a couple times. I told her to have the nurse call for orders s I'm sure PVK would agree to this plan. She verbalized understanding and compliance. Tasked to [facility] for his info.

## 2022-05-24 NOTE — TELEPHONE ENCOUNTER
CLEMENTE returned the signed [facility] orders # [id number] & [id number] and I faxed them back to [facility] and received a fax conf. For each And I sent both of the originals to scanning.

## 2022-06-15 ENCOUNTER — TELEPHONE (OUTPATIENT)
Dept: SURGERY | Facility: CLINIC | Age: 87
End: 2022-06-15

## 2022-06-15 NOTE — TELEPHONE ENCOUNTER
- S/w daughter, Marek Landa. Pt with indwelling valladares catheter since 08/2021. Residential [facility] RN changes catheter monthly. - When pt has had catheter changed in the ED, they use a Bard 16 FR valladares catheter. Nypedro pablo Landa is requesting an order for Bard 16 FR valladares catheters. She states that pt tolerates this catheter better than the one that the [facility] RN is required to use. - The catheter [facility] RN uses is less flexible, shorter, causes irritation.   - Evangelina s/w .  stated that she can pay out of pocket for catheter supplies if they had an order from Dr. Cindi Taveras. Nypedro pablo Syeda provided [employer] number [phone] and stated I can call to give a verbal order. Per [place] [facility] has pt's exact catheter on file. - Marek Landa is also asking if Dr. Cindi Taveras could write a prescription for urojets so the [facility] RN can use them during monthly changes. She has a call in to [phone] to see if they would be able to fill urojet prescription.  - Routed to Dr. Cindi Taveras. Can I call and place a verbal order for Bard 16 Fr valladares catheter with supplies, pt to use indefinitely? Is a urojet prescription something we can place? Please advise. Thank you.

## 2022-06-15 NOTE — TELEPHONE ENCOUNTER
Urology nurses,    It is fine to place order for Bard 16 Croatian Mcelroy catheter with supplies to use for the next 12 months. Please find out from pharmacy if it is allowed for me to write prescription for Urojet 10 mL syringe as for nurse to use when changing Mcelroy catheters monthly. Let me know. If it is allowed, if you could pend the order form and I will sign it.   Thank you, Dr. Arellano

## 2022-06-16 NOTE — TELEPHONE ENCOUNTER
Tried to reach pt's dtr Michelle Martinez and had to [place] and I explained that we cannot place orders for the Urojet gel that we use in the office as it is only meant to be used in the office for procedures. The type that we can order is external mucosal gel that could only be placed on the tip of the penis and will not go up into the urethra as the urojet does so it would not help with a catheter placement.

## 2022-06-23 ENCOUNTER — HOSPITAL ENCOUNTER (EMERGENCY)
Facility: HOSPITAL | Age: 87
Discharge: HOME | End: 2022-06-24
Attending: EMERGENCY MEDICINE
Payer: MEDICARE

## 2022-06-23 ENCOUNTER — APPOINTMENT (OUTPATIENT)
Dept: CT IMAGING | Facility: HOSPITAL | Age: 87
End: 2022-06-23
Attending: EMERGENCY MEDICINE
Payer: MEDICARE

## 2022-06-23 DIAGNOSIS — K59.00 CONSTIPATION, UNSPECIFIED CONSTIPATION TYPE: Primary | ICD-10-CM

## 2022-06-23 DIAGNOSIS — S39.012A STRAIN OF LUMBAR REGION, INITIAL ENCOUNTER: ICD-10-CM

## 2022-06-23 LAB
ANION GAP SERPL CALC-SCNC: 8 MMOL/L (ref 0–18)
APTT PPP: 34.7 SECONDS (ref 23.3–35.6)
BASOPHILS # BLD AUTO: 0.06 X10(3) UL (ref 0–0.2)
BASOPHILS NFR BLD AUTO: 0.5 %
BUN BLD-MCNC: 16 MG/DL (ref 7–18)
BUN/CREAT SERPL: 17.8 (ref 10–20)
CALCIUM BLD-MCNC: 9.5 MG/DL (ref 8.5–10.1)
CHLORIDE SERPL-SCNC: 95 MMOL/L (ref 98–112)
CO2 SERPL-SCNC: 28 MMOL/L (ref 21–32)
CREAT BLD-MCNC: 0.9 MG/DL
DEPRECATED RDW RBC AUTO: 43.2 FL (ref 35.1–46.3)
EOSINOPHIL # BLD AUTO: 0.26 X10(3) UL (ref 0–0.7)
EOSINOPHIL NFR BLD AUTO: 2 %
ERYTHROCYTE [DISTWIDTH] IN BLOOD BY AUTOMATED COUNT: 12.5 % (ref 11–15)
GLUCOSE BLD-MCNC: 136 MG/DL (ref 70–99)
HCT VFR BLD AUTO: 41.4 %
HGB BLD-MCNC: 13.7 G/DL
IMM GRANULOCYTES # BLD AUTO: 0.09 X10(3) UL (ref 0–1)
IMM GRANULOCYTES NFR BLD: 0.7 %
INR BLD: 1.44 (ref 0.8–1.2)
LYMPHOCYTES # BLD AUTO: 3.21 X10(3) UL (ref 1–4)
LYMPHOCYTES NFR BLD AUTO: 24.8 %
MCH RBC QN AUTO: 31.1 PG (ref 26–34)
MCHC RBC AUTO-ENTMCNC: 33.1 G/DL (ref 31–37)
MCV RBC AUTO: 93.9 FL
MONOCYTES # BLD AUTO: 1.24 X10(3) UL (ref 0.1–1)
MONOCYTES NFR BLD AUTO: 9.6 %
NEUTROPHILS # BLD AUTO: 8.1 X10 (3) UL (ref 1.5–7.7)
NEUTROPHILS # BLD AUTO: 8.1 X10(3) UL (ref 1.5–7.7)
NEUTROPHILS NFR BLD AUTO: 62.4 %
OSMOLALITY SERPL CALC.SUM OF ELEC: 275 MOSM/KG (ref 275–295)
PLATELET # BLD AUTO: 313 10(3)UL (ref 150–450)
POTASSIUM SERPL-SCNC: 4.4 MMOL/L (ref 3.5–5.1)
PROTHROMBIN TIME: 17.7 SECONDS (ref 11.6–14.8)
RBC # BLD AUTO: 4.41 X10(6)UL
SODIUM SERPL-SCNC: 131 MMOL/L (ref 136–145)
WBC # BLD AUTO: 13 X10(3) UL (ref 4–11)

## 2022-06-23 PROCEDURE — 70450 CT HEAD/BRAIN W/O DYE: CPT | Performed by: EMERGENCY MEDICINE

## 2022-06-23 PROCEDURE — 85610 PROTHROMBIN TIME: CPT | Performed by: EMERGENCY MEDICINE

## 2022-06-23 PROCEDURE — 99284 EMERGENCY DEPT VISIT MOD MDM: CPT

## 2022-06-23 PROCEDURE — 80048 BASIC METABOLIC PNL TOTAL CA: CPT | Performed by: EMERGENCY MEDICINE

## 2022-06-23 PROCEDURE — 36415 COLL VENOUS BLD VENIPUNCTURE: CPT

## 2022-06-23 PROCEDURE — 85730 THROMBOPLASTIN TIME PARTIAL: CPT | Performed by: EMERGENCY MEDICINE

## 2022-06-23 PROCEDURE — 85025 COMPLETE CBC W/AUTO DIFF WBC: CPT | Performed by: EMERGENCY MEDICINE

## 2022-06-24 ENCOUNTER — APPOINTMENT (OUTPATIENT)
Dept: CT IMAGING | Facility: HOSPITAL | Age: 87
End: 2022-06-24
Attending: EMERGENCY MEDICINE
Payer: MEDICARE

## 2022-06-24 VITALS
TEMPERATURE: 98 F | OXYGEN SATURATION: 91 % | BODY MASS INDEX: 31 KG/M2 | HEART RATE: 67 BPM | DIASTOLIC BLOOD PRESSURE: 81 MMHG | SYSTOLIC BLOOD PRESSURE: 167 MMHG | RESPIRATION RATE: 18 BRPM | WEIGHT: 224.88 LBS

## 2022-06-24 PROCEDURE — 74176 CT ABD & PELVIS W/O CONTRAST: CPT | Performed by: EMERGENCY MEDICINE

## 2022-06-24 NOTE — ED INITIAL ASSESSMENT (HPI)
Pt presents to ED for constipation. Last bm was 8 days ago. Pt was given a suppository by his home health nurse but had no relief after. Pt also states he fell on Sunday. Pt c/o back pain. Denies LOC. Pt is on coumadin. States he did hit his head on Sunday. Denies neck pain.

## 2022-07-06 ENCOUNTER — TELEPHONE (OUTPATIENT)
Dept: SURGERY | Facility: CLINIC | Age: 87
End: 2022-07-06

## 2022-07-06 NOTE — TELEPHONE ENCOUNTER
Per daughter medical supply company faxed over a prescription to be filled out for [employer]. Per daughter asking to call her to verify it has been done.    [employer] prescription phone line: [phone]

## 2022-07-11 NOTE — TELEPHONE ENCOUNTER
[facility] medical supplies would like verbal order for pts catheters. Please call thank you.

## 2022-07-11 NOTE — TELEPHONE ENCOUNTER
Per daughter asking for script since middle Rima for the following reason  Bard 12 Congolese Mcelroy catheter with supplies to use for the next 12 months please advise

## 2022-07-11 NOTE — TELEPHONE ENCOUNTER
Received fax from today from [facility] for catheter equipment. Had PVK sign order sheet and orders faxed back to [phone]. Fax confirmed. Also called to confirm and Tiffany Dowell from [facility] states she will confirm fax and if not received within an hour will call office back. Called daughter Guevara Looney and notified as well.

## 2022-08-14 ENCOUNTER — PATIENT MESSAGE (OUTPATIENT)
Dept: OTOLARYNGOLOGY | Facility: CLINIC | Age: 87
End: 2022-08-14

## 2022-08-15 NOTE — TELEPHONE ENCOUNTER
Certainly do not mind double booking him if this would be helpful. Please try to find a place with a new patient.

## 2022-08-15 NOTE — TELEPHONE ENCOUNTER
From: Caroline Manzano  To: Lucio Denis MD  Sent: 8/14/2022 11:09 AM CDT  Subject: On wait list for cauterization    Forgot to mention also on blood thinners. Please let us know when sooner appt becomes available. Thank you.

## 2022-08-15 NOTE — TELEPHONE ENCOUNTER
Per patient request, spoke with patient's daughterJaye Danger: [phone]. Rescheduled appt for 8/17/22.

## 2022-08-15 NOTE — TELEPHONE ENCOUNTER
Patient has appt with you scheduled 8/24/22 for nose cauterization. Also mentions he is on Coumadin.

## 2022-08-17 ENCOUNTER — OFFICE VISIT (OUTPATIENT)
Dept: OTOLARYNGOLOGY | Facility: CLINIC | Age: 87
End: 2022-08-17
Payer: MEDICARE

## 2022-08-17 VITALS — BODY MASS INDEX: 30.34 KG/M2 | WEIGHT: 224 LBS | TEMPERATURE: 96 F | HEIGHT: 72 IN

## 2022-08-17 DIAGNOSIS — R04.0 EPISTAXIS: Primary | ICD-10-CM

## 2022-08-17 PROCEDURE — 99213 OFFICE O/P EST LOW 20 MIN: CPT | Performed by: SPECIALIST

## 2022-08-17 PROCEDURE — 30901 CONTROL OF NOSEBLEED: CPT | Performed by: SPECIALIST

## 2022-08-17 RX ORDER — FLUTICASONE PROPIONATE 50 MCG
SPRAY, SUSPENSION (ML) NASAL
COMMUNITY

## 2022-08-17 NOTE — PATIENT INSTRUCTIONS
You had a left anterior vessel which was fully cauterized. No no swelling for 1 week's time. Okay to use nasal saline and Vaseline in the nose. Follow-up with any additional problems.

## 2022-09-06 ENCOUNTER — OFFICE VISIT (OUTPATIENT)
Dept: OTOLARYNGOLOGY | Facility: CLINIC | Age: 87
End: 2022-09-06
Payer: MEDICARE

## 2022-09-06 VITALS — BODY MASS INDEX: 30.34 KG/M2 | WEIGHT: 224 LBS | HEIGHT: 72 IN | TEMPERATURE: 97 F

## 2022-09-06 DIAGNOSIS — H90.A22 SENSORINEURAL HEARING LOSS (SNHL) OF LEFT EAR WITH RESTRICTED HEARING OF RIGHT EAR: ICD-10-CM

## 2022-09-06 DIAGNOSIS — J34.2 NASAL SEPTAL DEVIATION: ICD-10-CM

## 2022-09-06 DIAGNOSIS — R04.0 EPISTAXIS: Primary | ICD-10-CM

## 2022-09-06 PROCEDURE — 99213 OFFICE O/P EST LOW 20 MIN: CPT | Performed by: SPECIALIST

## 2022-09-06 NOTE — PATIENT INSTRUCTIONS
There were no very enlarged vessels seen on the day to your visit. 1 very small area was cauterized with silver nitrate. Please avoid any trauma to the nose. This includes wiping and there and/or placing cotton in there. No infection or significant cerumen seen on the day to your visit. Get your audiogram and get the left hearing aid adjusted as needed.

## 2022-09-12 ENCOUNTER — LAB REQUISITION (OUTPATIENT)
Dept: LAB | Age: 87
End: 2022-09-12
Payer: MEDICARE

## 2022-09-12 DIAGNOSIS — I25.10 ATHEROSCLEROTIC HEART DISEASE OF NATIVE CORONARY ARTERY WITHOUT ANGINA PECTORIS: ICD-10-CM

## 2022-09-12 DIAGNOSIS — I50.9 HEART FAILURE, UNSPECIFIED (HCC): ICD-10-CM

## 2022-09-12 LAB
ALBUMIN SERPL-MCNC: 3 G/DL (ref 3.4–5)
ALBUMIN/GLOB SERPL: 0.9 {RATIO} (ref 1–2)
ALP LIVER SERPL-CCNC: 102 U/L
ALT SERPL-CCNC: 21 U/L
ANION GAP SERPL CALC-SCNC: 6 MMOL/L (ref 0–18)
AST SERPL-CCNC: 39 U/L (ref 15–37)
BASOPHILS # BLD AUTO: 0.07 X10(3) UL (ref 0–0.2)
BASOPHILS NFR BLD AUTO: 0.7 %
BILIRUB SERPL-MCNC: 0.6 MG/DL (ref 0.1–2)
BUN BLD-MCNC: 15 MG/DL (ref 7–18)
CALCIUM BLD-MCNC: 8.9 MG/DL (ref 8.5–10.1)
CHLORIDE SERPL-SCNC: 102 MMOL/L (ref 98–112)
CHOLEST SERPL-MCNC: 107 MG/DL (ref ?–200)
CO2 SERPL-SCNC: 26 MMOL/L (ref 21–32)
CREAT BLD-MCNC: 0.69 MG/DL
CREAT UR-SCNC: 56 MG/DL
EOSINOPHIL # BLD AUTO: 0.32 X10(3) UL (ref 0–0.7)
EOSINOPHIL NFR BLD AUTO: 3.3 %
ERYTHROCYTE [DISTWIDTH] IN BLOOD BY AUTOMATED COUNT: 12.9 %
GFR SERPLBLD BASED ON 1.73 SQ M-ARVRAT: 91 ML/MIN/1.73M2 (ref 60–?)
GLOBULIN PLAS-MCNC: 3.4 G/DL (ref 2.8–4.4)
GLUCOSE BLD-MCNC: 114 MG/DL (ref 70–99)
HCT VFR BLD AUTO: 38.6 %
HDLC SERPL-MCNC: 43 MG/DL (ref 40–59)
HGB BLD-MCNC: 12.8 G/DL
IMM GRANULOCYTES # BLD AUTO: 0.07 X10(3) UL (ref 0–1)
IMM GRANULOCYTES NFR BLD: 0.7 %
LDLC SERPL CALC-MCNC: 48 MG/DL (ref ?–100)
LYMPHOCYTES # BLD AUTO: 3.22 X10(3) UL (ref 1–4)
LYMPHOCYTES NFR BLD AUTO: 33.2 %
MCH RBC QN AUTO: 31.5 PG (ref 26–34)
MCHC RBC AUTO-ENTMCNC: 33.2 G/DL (ref 31–37)
MCV RBC AUTO: 95.1 FL
MICROALBUMIN UR-MCNC: <0.5 MG/DL
MONOCYTES # BLD AUTO: 1 X10(3) UL (ref 0.1–1)
MONOCYTES NFR BLD AUTO: 10.3 %
NEUTROPHILS # BLD AUTO: 5.02 X10 (3) UL (ref 1.5–7.7)
NEUTROPHILS # BLD AUTO: 5.02 X10(3) UL (ref 1.5–7.7)
NEUTROPHILS NFR BLD AUTO: 51.8 %
NONHDLC SERPL-MCNC: 64 MG/DL (ref ?–130)
OSMOLALITY SERPL CALC.SUM OF ELEC: 280 MOSM/KG (ref 275–295)
PLATELET # BLD AUTO: 305 10(3)UL (ref 150–450)
POTASSIUM SERPL-SCNC: 5.8 MMOL/L (ref 3.5–5.1)
PROT SERPL-MCNC: 6.4 G/DL (ref 6.4–8.2)
RBC # BLD AUTO: 4.06 X10(6)UL
SODIUM SERPL-SCNC: 134 MMOL/L (ref 136–145)
TRIGL SERPL-MCNC: 80 MG/DL (ref 30–149)
VLDLC SERPL CALC-MCNC: 11 MG/DL (ref 0–30)
WBC # BLD AUTO: 9.7 X10(3) UL (ref 4–11)

## 2022-09-12 PROCEDURE — 80061 LIPID PANEL: CPT | Performed by: INTERNAL MEDICINE

## 2022-09-12 PROCEDURE — 85025 COMPLETE CBC W/AUTO DIFF WBC: CPT | Performed by: INTERNAL MEDICINE

## 2022-09-12 PROCEDURE — 82043 UR ALBUMIN QUANTITATIVE: CPT | Performed by: INTERNAL MEDICINE

## 2022-09-12 PROCEDURE — 82570 ASSAY OF URINE CREATININE: CPT | Performed by: INTERNAL MEDICINE

## 2022-09-12 PROCEDURE — 80053 COMPREHEN METABOLIC PANEL: CPT | Performed by: INTERNAL MEDICINE

## 2022-10-08 ENCOUNTER — LAB REQUISITION (OUTPATIENT)
Dept: LAB | Facility: HOSPITAL | Age: 87
End: 2022-10-08
Payer: MEDICARE

## 2022-10-08 DIAGNOSIS — N39.0 URINARY TRACT INFECTION, SITE NOT SPECIFIED: ICD-10-CM

## 2022-10-08 LAB
BILIRUB UR QL CFM: NEGATIVE
GLUCOSE UR-MCNC: NEGATIVE MG/DL
NITRITE UR QL STRIP.AUTO: POSITIVE
PH UR: 6 [PH] (ref 5–8)
RBC #/AREA URNS AUTO: >10 /HPF
RBC #/AREA URNS AUTO: >10 /HPF
SP GR UR STRIP: 1.01 (ref 1–1.03)
UROBILINOGEN UR STRIP-ACNC: 1

## 2022-10-08 PROCEDURE — 81015 MICROSCOPIC EXAM OF URINE: CPT | Performed by: INTERNAL MEDICINE

## 2022-10-08 PROCEDURE — 87086 URINE CULTURE/COLONY COUNT: CPT | Performed by: INTERNAL MEDICINE

## 2022-10-08 PROCEDURE — 87077 CULTURE AEROBIC IDENTIFY: CPT | Performed by: INTERNAL MEDICINE

## 2022-10-08 PROCEDURE — 87186 SC STD MICRODIL/AGAR DIL: CPT | Performed by: INTERNAL MEDICINE

## 2022-10-11 ENCOUNTER — NURSE ONLY (OUTPATIENT)
Dept: LAB | Age: 87
End: 2022-10-11
Attending: EMERGENCY MEDICINE
Payer: MEDICARE

## 2022-10-11 ENCOUNTER — IMMUNIZATION (OUTPATIENT)
Dept: LAB | Age: 87
End: 2022-10-11
Attending: EMERGENCY MEDICINE
Payer: MEDICARE

## 2022-10-11 DIAGNOSIS — Z23 NEED FOR VACCINATION: Primary | ICD-10-CM

## 2022-10-11 PROCEDURE — 90662 IIV NO PRSV INCREASED AG IM: CPT

## 2022-10-11 PROCEDURE — 90471 IMMUNIZATION ADMIN: CPT

## 2022-10-11 PROCEDURE — 0124A SARSCOV2 VAC BVL 30MCG/0.3ML: CPT

## 2022-11-15 ENCOUNTER — TELEPHONE (OUTPATIENT)
Dept: SURGERY | Facility: CLINIC | Age: 87
End: 2022-11-15

## 2022-11-29 ENCOUNTER — OFFICE VISIT (OUTPATIENT)
Dept: SURGERY | Facility: CLINIC | Age: 87
End: 2022-11-29
Payer: MEDICARE

## 2022-11-29 DIAGNOSIS — N39.0 RECURRENT UTI: ICD-10-CM

## 2022-11-29 DIAGNOSIS — N40.1 BENIGN PROSTATIC HYPERPLASIA WITH URINARY RETENTION: Primary | ICD-10-CM

## 2022-11-29 DIAGNOSIS — R33.8 BENIGN PROSTATIC HYPERPLASIA WITH URINARY RETENTION: Primary | ICD-10-CM

## 2022-11-29 DIAGNOSIS — Z97.8 FOLEY CATHETER IN PLACE: ICD-10-CM

## 2022-11-29 DIAGNOSIS — R33.9 URINARY RETENTION: ICD-10-CM

## 2022-11-29 PROCEDURE — 99213 OFFICE O/P EST LOW 20 MIN: CPT | Performed by: NURSE PRACTITIONER

## 2023-02-13 ENCOUNTER — APPOINTMENT (OUTPATIENT)
Dept: URBAN - METROPOLITAN AREA CLINIC 244 | Age: 88
Setting detail: DERMATOLOGY
End: 2023-02-14

## 2023-02-13 DIAGNOSIS — L81.4 OTHER MELANIN HYPERPIGMENTATION: ICD-10-CM

## 2023-02-13 DIAGNOSIS — D22 MELANOCYTIC NEVI: ICD-10-CM

## 2023-02-13 DIAGNOSIS — Z85.828 PERSONAL HISTORY OF OTHER MALIGNANT NEOPLASM OF SKIN: ICD-10-CM

## 2023-02-13 DIAGNOSIS — L57.0 ACTINIC KERATOSIS: ICD-10-CM

## 2023-02-13 DIAGNOSIS — L82.1 OTHER SEBORRHEIC KERATOSIS: ICD-10-CM

## 2023-02-13 PROBLEM — D22.5 MELANOCYTIC NEVI OF TRUNK: Status: ACTIVE | Noted: 2023-02-13

## 2023-02-13 PROCEDURE — OTHER COUNSELING: OTHER

## 2023-02-13 PROCEDURE — OTHER LIQUID NITROGEN: OTHER

## 2023-02-13 PROCEDURE — 99203 OFFICE O/P NEW LOW 30 MIN: CPT | Mod: 25

## 2023-02-13 PROCEDURE — 17004 DESTROY PREMAL LESIONS 15/>: CPT

## 2023-02-13 ASSESSMENT — LOCATION SIMPLE DESCRIPTION DERM
LOCATION SIMPLE: LEFT UPPER BACK
LOCATION SIMPLE: RIGHT UPPER BACK
LOCATION SIMPLE: ANTERIOR SCALP
LOCATION SIMPLE: LEFT FOREHEAD
LOCATION SIMPLE: LEFT CHEEK
LOCATION SIMPLE: CHEST
LOCATION SIMPLE: RIGHT SCALP
LOCATION SIMPLE: LEFT NOSE

## 2023-02-13 ASSESSMENT — LOCATION DETAILED DESCRIPTION DERM
LOCATION DETAILED: LEFT MEDIAL UPPER BACK
LOCATION DETAILED: LEFT FOREHEAD
LOCATION DETAILED: LEFT INFERIOR CENTRAL MALAR CHEEK
LOCATION DETAILED: UPPER STERNUM
LOCATION DETAILED: LEFT NASAL SIDEWALL
LOCATION DETAILED: RIGHT CENTRAL FRONTAL SCALP
LOCATION DETAILED: LEFT MEDIAL INFERIOR CHEST
LOCATION DETAILED: LEFT NASAL ALA
LOCATION DETAILED: RIGHT SUPERIOR MEDIAL UPPER BACK
LOCATION DETAILED: MID-FRONTAL SCALP

## 2023-02-13 ASSESSMENT — LOCATION ZONE DERM
LOCATION ZONE: NOSE
LOCATION ZONE: FACE
LOCATION ZONE: SCALP
LOCATION ZONE: TRUNK

## 2023-02-13 NOTE — PROCEDURE: LIQUID NITROGEN
Duration Of Freeze Thaw-Cycle (Seconds): 0
Render Post-Care Instructions In Note?: no
Detail Level: Zone
Consent: The patient's consent was obtained including but not limited to risks of crusting, scabbing, blistering, scarring, darker or lighter pigmentary change, recurrence, incomplete removal and infection.
Post-Care Instructions: I reviewed with the patient in detail post-care instructions. Patient is to wear sunprotection, and avoid picking at any of the treated lesions. Pt may apply Vaseline to crusted or scabbing areas.
Total Number Of Aks Treated: 27

## 2023-02-28 ENCOUNTER — HOSPITAL ENCOUNTER (OUTPATIENT)
Dept: ULTRASOUND IMAGING | Facility: HOSPITAL | Age: 88
Discharge: HOME | End: 2023-02-28
Attending: INTERNAL MEDICINE
Payer: MEDICARE

## 2023-02-28 DIAGNOSIS — R07.89 CHEST DISCOMFORT: ICD-10-CM

## 2023-02-28 DIAGNOSIS — R26.2 IMPAIRED AMBULATION: ICD-10-CM

## 2023-02-28 DIAGNOSIS — R26.89 BALANCE PROBLEM: ICD-10-CM

## 2023-02-28 PROCEDURE — 93880 EXTRACRANIAL BILAT STUDY: CPT | Performed by: INTERNAL MEDICINE

## 2023-03-17 ENCOUNTER — HOSPITAL ENCOUNTER (INPATIENT)
Facility: HOSPITAL | Age: 88
LOS: 1 days | Discharge: HOME | End: 2023-03-19
Attending: EMERGENCY MEDICINE | Admitting: INTERNAL MEDICINE
Payer: MEDICARE

## 2023-03-17 DIAGNOSIS — R31.0 GROSS HEMATURIA: Primary | ICD-10-CM

## 2023-03-17 LAB
ANION GAP SERPL CALC-SCNC: 7 MMOL/L (ref 0–18)
BASOPHILS # BLD AUTO: 0.06 X10(3) UL (ref 0–0.2)
BASOPHILS NFR BLD AUTO: 0.6 %
BILIRUB UR QL: NEGATIVE
BUN BLD-MCNC: 12 MG/DL (ref 7–18)
BUN/CREAT SERPL: 14.5 (ref 10–20)
CALCIUM BLD-MCNC: 9.2 MG/DL (ref 8.5–10.1)
CHLORIDE SERPL-SCNC: 98 MMOL/L (ref 98–112)
CO2 SERPL-SCNC: 28 MMOL/L (ref 21–32)
CREAT BLD-MCNC: 0.83 MG/DL
DEPRECATED RDW RBC AUTO: 41.4 FL (ref 35.1–46.3)
EOSINOPHIL # BLD AUTO: 0.25 X10(3) UL (ref 0–0.7)
EOSINOPHIL NFR BLD AUTO: 2.5 %
ERYTHROCYTE [DISTWIDTH] IN BLOOD BY AUTOMATED COUNT: 12.4 % (ref 11–15)
GFR SERPLBLD BASED ON 1.73 SQ M-ARVRAT: 86 ML/MIN/1.73M2 (ref 60–?)
GLUCOSE BLD-MCNC: 109 MG/DL (ref 70–99)
GLUCOSE UR-MCNC: NEGATIVE MG/DL
HCT VFR BLD AUTO: 39.6 %
HGB BLD-MCNC: 13.5 G/DL
IMM GRANULOCYTES # BLD AUTO: 0.06 X10(3) UL (ref 0–1)
IMM GRANULOCYTES NFR BLD: 0.6 %
INR BLD: 1.84 (ref 0.85–1.16)
KETONES UR-MCNC: NEGATIVE MG/DL
LYMPHOCYTES # BLD AUTO: 1.71 X10(3) UL (ref 1–4)
LYMPHOCYTES NFR BLD AUTO: 17.4 %
MCH RBC QN AUTO: 30.7 PG (ref 26–34)
MCHC RBC AUTO-ENTMCNC: 34.1 G/DL (ref 31–37)
MCV RBC AUTO: 90 FL
MONOCYTES # BLD AUTO: 1.14 X10(3) UL (ref 0.1–1)
MONOCYTES NFR BLD AUTO: 11.6 %
NEUTROPHILS # BLD AUTO: 6.6 X10 (3) UL (ref 1.5–7.7)
NEUTROPHILS # BLD AUTO: 6.6 X10(3) UL (ref 1.5–7.7)
NEUTROPHILS NFR BLD AUTO: 67.3 %
NITRITE UR QL STRIP.AUTO: NEGATIVE
OSMOLALITY SERPL CALC.SUM OF ELEC: 276 MOSM/KG (ref 275–295)
PH UR: 6 [PH] (ref 5–8)
PLATELET # BLD AUTO: 265 10(3)UL (ref 150–450)
POTASSIUM SERPL-SCNC: 3.9 MMOL/L (ref 3.5–5.1)
PROT UR-MCNC: 100 MG/DL
PROTHROMBIN TIME: 21 SECONDS (ref 11.6–14.8)
RBC # BLD AUTO: 4.4 X10(6)UL
RBC #/AREA URNS AUTO: >10 /HPF
SARS-COV-2 RNA RESP QL NAA+PROBE: NOT DETECTED
SODIUM SERPL-SCNC: 133 MMOL/L (ref 136–145)
SP GR UR STRIP: <1.005 (ref 1–1.03)
UROBILINOGEN UR STRIP-ACNC: <2
VIT C UR-MCNC: NEGATIVE MG/DL
WBC # BLD AUTO: 9.8 X10(3) UL (ref 4–11)

## 2023-03-17 PROCEDURE — 99222 1ST HOSP IP/OBS MODERATE 55: CPT | Performed by: INTERNAL MEDICINE

## 2023-03-17 RX ORDER — MAGNESIUM HYDROXIDE 1200 MG/15ML
3000 LIQUID ORAL CONTINUOUS
Status: DISCONTINUED | OUTPATIENT
Start: 2023-03-17 | End: 2023-03-19

## 2023-03-17 NOTE — ED INITIAL ASSESSMENT (HPI)
Pt to ED from home with son in law, [facility] RN checkup found clots in urinary catheter after the catheter was accidentally yanked and started to bleed since Thursday, pt is in discomfort and said when the RN flushed it, it was unsuccessful. Catheter has been clogged with no output since 1PM. AXOX4.

## 2023-03-18 LAB
ANION GAP SERPL CALC-SCNC: 6 MMOL/L (ref 0–18)
BASOPHILS # BLD AUTO: 0.09 X10(3) UL (ref 0–0.2)
BASOPHILS NFR BLD AUTO: 0.8 %
BUN BLD-MCNC: 14 MG/DL (ref 7–18)
BUN/CREAT SERPL: 14.7 (ref 10–20)
CALCIUM BLD-MCNC: 9 MG/DL (ref 8.5–10.1)
CHLORIDE SERPL-SCNC: 98 MMOL/L (ref 98–112)
CO2 SERPL-SCNC: 30 MMOL/L (ref 21–32)
CREAT BLD-MCNC: 0.95 MG/DL
DEPRECATED RDW RBC AUTO: 42.9 FL (ref 35.1–46.3)
EOSINOPHIL # BLD AUTO: 0.27 X10(3) UL (ref 0–0.7)
EOSINOPHIL NFR BLD AUTO: 2.4 %
ERYTHROCYTE [DISTWIDTH] IN BLOOD BY AUTOMATED COUNT: 12.6 % (ref 11–15)
EST. AVERAGE GLUCOSE BLD GHB EST-MCNC: 140 MG/DL (ref 68–126)
GFR SERPLBLD BASED ON 1.73 SQ M-ARVRAT: 78 ML/MIN/1.73M2 (ref 60–?)
GLUCOSE BLD-MCNC: 131 MG/DL (ref 70–99)
GLUCOSE BLDC GLUCOMTR-MCNC: 107 MG/DL (ref 70–99)
GLUCOSE BLDC GLUCOMTR-MCNC: 118 MG/DL (ref 70–99)
GLUCOSE BLDC GLUCOMTR-MCNC: 131 MG/DL (ref 70–99)
GLUCOSE BLDC GLUCOMTR-MCNC: 138 MG/DL (ref 70–99)
GLUCOSE BLDC GLUCOMTR-MCNC: 157 MG/DL (ref 70–99)
HBA1C MFR BLD: 6.5 % (ref ?–5.7)
HCT VFR BLD AUTO: 41 %
HGB BLD-MCNC: 13.8 G/DL
IMM GRANULOCYTES # BLD AUTO: 0.06 X10(3) UL (ref 0–1)
IMM GRANULOCYTES NFR BLD: 0.5 %
LYMPHOCYTES # BLD AUTO: 2.86 X10(3) UL (ref 1–4)
LYMPHOCYTES NFR BLD AUTO: 25.8 %
MAGNESIUM SERPL-MCNC: 1.7 MG/DL (ref 1.6–2.6)
MCH RBC QN AUTO: 31.2 PG (ref 26–34)
MCHC RBC AUTO-ENTMCNC: 33.7 G/DL (ref 31–37)
MCV RBC AUTO: 92.6 FL
MONOCYTES # BLD AUTO: 1.54 X10(3) UL (ref 0.1–1)
MONOCYTES NFR BLD AUTO: 13.9 %
NEUTROPHILS # BLD AUTO: 6.25 X10 (3) UL (ref 1.5–7.7)
NEUTROPHILS # BLD AUTO: 6.25 X10(3) UL (ref 1.5–7.7)
NEUTROPHILS NFR BLD AUTO: 56.6 %
OSMOLALITY SERPL CALC.SUM OF ELEC: 280 MOSM/KG (ref 275–295)
PLATELET # BLD AUTO: 269 10(3)UL (ref 150–450)
POTASSIUM SERPL-SCNC: 4.3 MMOL/L (ref 3.5–5.1)
RBC # BLD AUTO: 4.43 X10(6)UL
SODIUM SERPL-SCNC: 134 MMOL/L (ref 136–145)
WBC # BLD AUTO: 11.1 X10(3) UL (ref 4–11)

## 2023-03-18 PROCEDURE — 99222 1ST HOSP IP/OBS MODERATE 55: CPT | Performed by: UROLOGY

## 2023-03-18 PROCEDURE — 99233 SBSQ HOSP IP/OBS HIGH 50: CPT | Performed by: HOSPITALIST

## 2023-03-18 RX ORDER — FINASTERIDE 5 MG/1
5 TABLET, FILM COATED ORAL DAILY
Status: DISCONTINUED | OUTPATIENT
Start: 2023-03-19 | End: 2023-03-19

## 2023-03-18 RX ORDER — ATORVASTATIN CALCIUM 40 MG/1
40 TABLET, FILM COATED ORAL NIGHTLY
Status: DISCONTINUED | OUTPATIENT
Start: 2023-03-18 | End: 2023-03-19

## 2023-03-18 RX ORDER — ACETAMINOPHEN 500 MG
500 TABLET ORAL EVERY 4 HOURS PRN
Status: DISCONTINUED | OUTPATIENT
Start: 2023-03-18 | End: 2023-03-19

## 2023-03-18 RX ORDER — ONDANSETRON 2 MG/ML
4 INJECTION INTRAMUSCULAR; INTRAVENOUS EVERY 6 HOURS PRN
Status: DISCONTINUED | OUTPATIENT
Start: 2023-03-18 | End: 2023-03-19

## 2023-03-18 RX ORDER — MELATONIN
3 NIGHTLY PRN
Status: DISCONTINUED | OUTPATIENT
Start: 2023-03-18 | End: 2023-03-19

## 2023-03-18 RX ORDER — MORPHINE SULFATE 4 MG/ML
4 INJECTION, SOLUTION INTRAMUSCULAR; INTRAVENOUS EVERY 2 HOUR PRN
Status: DISCONTINUED | OUTPATIENT
Start: 2023-03-18 | End: 2023-03-19

## 2023-03-18 RX ORDER — METOCLOPRAMIDE HYDROCHLORIDE 5 MG/ML
5 INJECTION INTRAMUSCULAR; INTRAVENOUS EVERY 8 HOURS PRN
Status: DISCONTINUED | OUTPATIENT
Start: 2023-03-18 | End: 2023-03-19

## 2023-03-18 RX ORDER — TAMSULOSIN HYDROCHLORIDE 0.4 MG/1
0.4 CAPSULE ORAL
Status: DISCONTINUED | OUTPATIENT
Start: 2023-03-18 | End: 2023-03-19

## 2023-03-18 RX ORDER — DEXTROSE MONOHYDRATE 25 G/50ML
50 INJECTION, SOLUTION INTRAVENOUS
Status: DISCONTINUED | OUTPATIENT
Start: 2023-03-18 | End: 2023-03-19

## 2023-03-18 RX ORDER — WARFARIN SODIUM 10 MG/1
8.5 TABLET ORAL NIGHTLY
COMMUNITY

## 2023-03-18 RX ORDER — ESCITALOPRAM OXALATE 10 MG/1
20 TABLET ORAL DAILY
Status: DISCONTINUED | OUTPATIENT
Start: 2023-03-19 | End: 2023-03-19

## 2023-03-18 RX ORDER — MORPHINE SULFATE 2 MG/ML
2 INJECTION, SOLUTION INTRAMUSCULAR; INTRAVENOUS EVERY 2 HOUR PRN
Status: DISCONTINUED | OUTPATIENT
Start: 2023-03-18 | End: 2023-03-19

## 2023-03-18 RX ORDER — PANTOPRAZOLE SODIUM 40 MG/1
40 TABLET, DELAYED RELEASE ORAL
Status: DISCONTINUED | OUTPATIENT
Start: 2023-03-19 | End: 2023-03-19

## 2023-03-18 RX ORDER — SODIUM CHLORIDE 9 MG/ML
INJECTION, SOLUTION INTRAVENOUS CONTINUOUS
Status: DISCONTINUED | OUTPATIENT
Start: 2023-03-18 | End: 2023-03-18

## 2023-03-18 RX ORDER — SENNA AND DOCUSATE SODIUM 50; 8.6 MG/1; MG/1
1 TABLET, FILM COATED ORAL 2 TIMES DAILY PRN
Status: DISCONTINUED | OUTPATIENT
Start: 2023-03-18 | End: 2023-03-19

## 2023-03-18 RX ORDER — MAGNESIUM OXIDE 400 MG/1
400 TABLET ORAL ONCE
Status: COMPLETED | OUTPATIENT
Start: 2023-03-18 | End: 2023-03-18

## 2023-03-18 RX ORDER — METOPROLOL SUCCINATE 25 MG/1
25 TABLET, EXTENDED RELEASE ORAL
Status: DISCONTINUED | OUTPATIENT
Start: 2023-03-19 | End: 2023-03-19

## 2023-03-18 RX ORDER — MORPHINE SULFATE 2 MG/ML
1 INJECTION, SOLUTION INTRAMUSCULAR; INTRAVENOUS EVERY 2 HOUR PRN
Status: DISCONTINUED | OUTPATIENT
Start: 2023-03-18 | End: 2023-03-19

## 2023-03-18 RX ORDER — MONTELUKAST SODIUM 10 MG/1
10 TABLET ORAL DAILY
Status: DISCONTINUED | OUTPATIENT
Start: 2023-03-19 | End: 2023-03-19

## 2023-03-18 RX ORDER — NICOTINE POLACRILEX 4 MG
15 LOZENGE BUCCAL
Status: DISCONTINUED | OUTPATIENT
Start: 2023-03-18 | End: 2023-03-19

## 2023-03-18 RX ORDER — NICOTINE POLACRILEX 4 MG
30 LOZENGE BUCCAL
Status: DISCONTINUED | OUTPATIENT
Start: 2023-03-18 | End: 2023-03-19

## 2023-03-18 NOTE — DISCHARGE INSTRUCTIONS
Follow-up with a urologist as soon as possible. Return to the ER for any further issues related to your Mcelroy catheter.

## 2023-03-18 NOTE — ED QUICK NOTES
Pt's urine became champaign colored and pt requested to leave. Dr Betito Bailey discharged pt. When I want to discharge the Pt, The pt's urine became more bloody and passing clots.     Dr Hortencia Flores notified and will call Urology

## 2023-03-18 NOTE — PLAN OF CARE
Problem: Patient Centered Care  Goal: Patient preferences are identified and integrated in the patient's plan of care  Description: Interventions:  - What would you like us to know as we care for you? Patient from home with wife  - Provide timely, complete, and accurate information to patient/family  - Incorporate patient and family knowledge, values, beliefs, and cultural backgrounds into the planning and delivery of care  - Encourage patient/family to participate in care and decision-making at the level they choose  - Honor patient and family perspectives and choices  Outcome: Progressing     Problem: Patient/Family Goals  Goal: Patient/Family Long Term Goal  Description: Patient's Long Term Goal: return home     Interventions:  - urology on consult  - CBI   - IV antibiotics   - See additional Care Plan goals for specific interventions  Outcome: Progressing     Problem: Patient/Family Goals  Goal: Patient/Family Short Term Goal  Description: Patient's Short Term Goal: stop hematuria     Interventions:   - CBI   - urology on consult   - iv Rocephin   - monitoring vital signs and labs   - See additional Care Plan goals for specific interventions  Outcome: Progressing     Problem: Diabetes/Glucose Control  Goal: Glucose maintained within prescribed range  Description: INTERVENTIONS:  - Monitor Blood Glucose as ordered  - Assess for signs and symptoms of hyperglycemia and hypoglycemia  - Administer ordered medications to maintain glucose within target range  - Assess barriers to adequate nutritional intake and initiate nutrition consult as needed  - Instruct patient on self management of diabetes  Outcome: Progressing     Problem: SAFETY ADULT - FALL  Goal: Free from fall injury  Description: INTERVENTIONS:  - Assess pt frequently for physical needs  - Identify cognitive and physical deficits and behaviors that affect risk of falls.   - [place] fall precautions as indicated by assessment.  - Educate pt/family on patient safety including physical limitations  - Instruct pt to call for assistance with activity based on assessment  - Modify environment to reduce risk of injury  - Provide assistive devices as appropriate  - Consider OT/PT consult to assist with strengthening/mobility  - Encourage toileting schedule  Outcome: Progressing     Problem: PAIN - ADULT  Goal: Verbalizes/displays adequate comfort level or patient's stated pain goal  Description: INTERVENTIONS:  - Encourage pt to monitor pain and request assistance  - Assess pain using appropriate pain scale  - Administer analgesics based on type and severity of pain and evaluate response  - Implement non-pharmacological measures as appropriate and evaluate response  - Consider cultural and social influences on pain and pain management  - Manage/alleviate anxiety  - Utilize distraction and/or relaxation techniques  - Monitor for opioid side effects  - Notify MD/LIP if interventions unsuccessful or patient reports new pain  - Anticipate increased pain with activity and pre-medicate as appropriate  Outcome: Progressing   Patient alert and oriented x 4 admitted for hematuria with blood clots , urology on consult, NPO for possible cystoscopy,  CBI , vital signs stable, denies pain at present , fall precautions in place , call light within reach,

## 2023-03-19 VITALS
HEART RATE: 67 BPM | TEMPERATURE: 98 F | RESPIRATION RATE: 18 BRPM | SYSTOLIC BLOOD PRESSURE: 94 MMHG | OXYGEN SATURATION: 93 % | WEIGHT: 218.38 LBS | BODY MASS INDEX: 29.58 KG/M2 | HEIGHT: 72 IN | DIASTOLIC BLOOD PRESSURE: 44 MMHG

## 2023-03-19 LAB
ANION GAP SERPL CALC-SCNC: 6 MMOL/L (ref 0–18)
BASOPHILS # BLD AUTO: 0.06 X10(3) UL (ref 0–0.2)
BASOPHILS NFR BLD AUTO: 0.7 %
BUN BLD-MCNC: 15 MG/DL (ref 7–18)
BUN/CREAT SERPL: 17.6 (ref 10–20)
CALCIUM BLD-MCNC: 8.2 MG/DL (ref 8.5–10.1)
CHLORIDE SERPL-SCNC: 99 MMOL/L (ref 98–112)
CO2 SERPL-SCNC: 28 MMOL/L (ref 21–32)
CREAT BLD-MCNC: 0.85 MG/DL
DEPRECATED RDW RBC AUTO: 43.5 FL (ref 35.1–46.3)
EOSINOPHIL # BLD AUTO: 0.2 X10(3) UL (ref 0–0.7)
EOSINOPHIL NFR BLD AUTO: 2.3 %
ERYTHROCYTE [DISTWIDTH] IN BLOOD BY AUTOMATED COUNT: 12.9 % (ref 11–15)
GFR SERPLBLD BASED ON 1.73 SQ M-ARVRAT: 85 ML/MIN/1.73M2 (ref 60–?)
GLUCOSE BLD-MCNC: 136 MG/DL (ref 70–99)
GLUCOSE BLDC GLUCOMTR-MCNC: 136 MG/DL (ref 70–99)
GLUCOSE BLDC GLUCOMTR-MCNC: 202 MG/DL (ref 70–99)
HCT VFR BLD AUTO: 39.1 %
HGB BLD-MCNC: 13.2 G/DL
IMM GRANULOCYTES # BLD AUTO: 0.04 X10(3) UL (ref 0–1)
IMM GRANULOCYTES NFR BLD: 0.5 %
INR BLD: 1.28 (ref 0.85–1.16)
LYMPHOCYTES # BLD AUTO: 2.61 X10(3) UL (ref 1–4)
LYMPHOCYTES NFR BLD AUTO: 30 %
MAGNESIUM SERPL-MCNC: 1.8 MG/DL (ref 1.6–2.6)
MCH RBC QN AUTO: 31.1 PG (ref 26–34)
MCHC RBC AUTO-ENTMCNC: 33.8 G/DL (ref 31–37)
MCV RBC AUTO: 92.2 FL
MONOCYTES # BLD AUTO: 1.39 X10(3) UL (ref 0.1–1)
MONOCYTES NFR BLD AUTO: 16 %
NEUTROPHILS # BLD AUTO: 4.39 X10 (3) UL (ref 1.5–7.7)
NEUTROPHILS # BLD AUTO: 4.39 X10(3) UL (ref 1.5–7.7)
NEUTROPHILS NFR BLD AUTO: 50.5 %
OSMOLALITY SERPL CALC.SUM OF ELEC: 279 MOSM/KG (ref 275–295)
PLATELET # BLD AUTO: 231 10(3)UL (ref 150–450)
POTASSIUM SERPL-SCNC: 3.8 MMOL/L (ref 3.5–5.1)
PROTHROMBIN TIME: 15.9 SECONDS (ref 11.6–14.8)
RBC # BLD AUTO: 4.24 X10(6)UL
SODIUM SERPL-SCNC: 133 MMOL/L (ref 136–145)
WBC # BLD AUTO: 8.7 X10(3) UL (ref 4–11)

## 2023-03-19 PROCEDURE — 99239 HOSP IP/OBS DSCHRG MGMT >30: CPT | Performed by: HOSPITALIST

## 2023-03-19 RX ORDER — WARFARIN SODIUM 7.5 MG/1
7.5 TABLET ORAL NIGHTLY
Status: DISCONTINUED | OUTPATIENT
Start: 2023-03-19 | End: 2023-03-19

## 2023-03-19 RX ORDER — CEFADROXIL 500 MG/1
500 CAPSULE ORAL 2 TIMES DAILY
Qty: 10 CAPSULE | Refills: 0 | Status: SHIPPED | OUTPATIENT
Start: 2023-03-19 | End: 2023-03-24

## 2023-03-19 RX ORDER — MAGNESIUM OXIDE 400 MG/1
400 TABLET ORAL ONCE
Status: COMPLETED | OUTPATIENT
Start: 2023-03-19 | End: 2023-03-19

## 2023-03-19 RX ORDER — IPRATROPIUM BROMIDE AND ALBUTEROL SULFATE 2.5; .5 MG/3ML; MG/3ML
3 SOLUTION RESPIRATORY (INHALATION) 3 TIMES DAILY
Status: DISCONTINUED | OUTPATIENT
Start: 2023-03-19 | End: 2023-03-19

## 2023-03-19 NOTE — PLAN OF CARE
CBI running at slow rate all night with urine being clear, straw color. CBI clamped at 4am. No other acute changes. All safety measures in place. Will monitor  Problem: Patient Centered Care  Goal: Patient preferences are identified and integrated in the patient's plan of care  Description: Interventions:  - What would you like us to know as we care for you?   - Provide timely, complete, and accurate information to patient/family  - Incorporate patient and family knowledge, values, beliefs, and cultural backgrounds into the planning and delivery of care  - Encourage patient/family to participate in care and decision-making at the level they choose  - Honor patient and family perspectives and choices  Outcome: Progressing        Problem: Diabetes/Glucose Control  Goal: Glucose maintained within prescribed range  Description: INTERVENTIONS:  - Monitor Blood Glucose as ordered  - Assess for signs and symptoms of hyperglycemia and hypoglycemia  - Administer ordered medications to maintain glucose within target range  - Assess barriers to adequate nutritional intake and initiate nutrition consult as needed  - Instruct patient on self management of diabetes  Outcome: Progressing     Problem: SAFETY ADULT - FALL  Goal: Free from fall injury  Description: INTERVENTIONS:  - Assess pt frequently for physical needs  - Identify cognitive and physical deficits and behaviors that affect risk of falls.   - [place] fall precautions as indicated by assessment.  - Educate pt/family on patient safety including physical limitations  - Instruct pt to call for assistance with activity based on assessment  - Modify environment to reduce risk of injury  - Provide assistive devices as appropriate  - Consider OT/PT consult to assist with strengthening/mobility  - Encourage toileting schedule  Outcome: Progressing

## 2023-03-19 NOTE — PLAN OF CARE
Problem: Patient Centered Care  Goal: Patient preferences are identified and integrated in the patient's plan of care  Description: Interventions:  - What would you like us to know as we care for you? I live with my wife  - Provide timely, complete, and accurate information to patient/family  - Incorporate patient and family knowledge, values, beliefs, and cultural backgrounds into the planning and delivery of care  - Encourage patient/family to participate in care and decision-making at the level they choose  - Honor patient and family perspectives and choices  Outcome: Progressing     Problem: SAFETY ADULT - FALL  Goal: Free from fall injury  Description: INTERVENTIONS:  - Assess pt frequently for physical needs  - Identify cognitive and physical deficits and behaviors that affect risk of falls. - [place] fall precautions as indicated by assessment.  - Educate pt/family on patient safety including physical limitations  - Instruct pt to call for assistance with activity based on assessment  - Modify environment to reduce risk of injury  - Provide assistive devices as appropriate  - Consider OT/PT consult to assist with strengthening/mobility  - Encourage toileting schedule  Outcome: Progressing   Patient is a/o X4, VSS, denies pain. 3way valladares CBI patent and running at slow rate, output clear without any clots during my shift. Patient aware of POC. Fall/safety precautions maintained, call light within reach.

## 2023-03-19 NOTE — PLAN OF CARE
Problem: Patient Centered Care  Goal: Patient preferences are identified and integrated in the patient's plan of care  Description: Interventions:  - What would you like us to know as we care for you?   - Provide timely, complete, and accurate information to patient/family  - Incorporate patient and family knowledge, values, beliefs, and cultural backgrounds into the planning and delivery of care  - Encourage patient/family to participate in care and decision-making at the level they choose  - Honor patient and family perspectives and choices  Outcome: Adequate for Discharge      Problem: Diabetes/Glucose Control  Goal: Glucose maintained within prescribed range  Description: INTERVENTIONS:  - Monitor Blood Glucose as ordered  - Assess for signs and symptoms of hyperglycemia and hypoglycemia  - Administer ordered medications to maintain glucose within target range  - Assess barriers to adequate nutritional intake and initiate nutrition consult as needed  - Instruct patient on self management of diabetes  Outcome: Adequate for Discharge     Problem: SAFETY ADULT - FALL  Goal: Free from fall injury  Description: INTERVENTIONS:  - Assess pt frequently for physical needs  - Identify cognitive and physical deficits and behaviors that affect risk of falls.   - [place] fall precautions as indicated by assessment.  - Educate pt/family on patient safety including physical limitations  - Instruct pt to call for assistance with activity based on assessment  - Modify environment to reduce risk of injury  - Provide assistive devices as appropriate  - Consider OT/PT consult to assist with strengthening/mobility  - Encourage toileting schedule  Outcome: Adequate for Discharge     Problem: PAIN - ADULT  Goal: Verbalizes/displays adequate comfort level or patient's stated pain goal  Description: INTERVENTIONS:  - Encourage pt to monitor pain and request assistance  - Assess pain using appropriate pain scale  - Administer analgesics based on type and severity of pain and evaluate response  - Implement non-pharmacological measures as appropriate and evaluate response  - Consider cultural and social influences on pain and pain management  - Manage/alleviate anxiety  - Utilize distraction and/or relaxation techniques  - Monitor for opioid side effects  - Notify MD/LIP if interventions unsuccessful or patient reports new pain  - Anticipate increased pain with activity and pre-medicate as appropriate  Outcome: Adequate for Discharge   Pt discharged to home with [facility]. RN discussed meds and follow-up with pt and pt's daughter. Pt to restart coumadin tonight. They verbalized understanding. Prescription sent to pt's preferred pharmacy. IV removed. Pt's daughter will provide transportation home. Pt assisted safely off unit.

## 2023-05-13 ENCOUNTER — LAB REQUISITION (OUTPATIENT)
Dept: LAB | Facility: HOSPITAL | Age: 88
End: 2023-05-13
Payer: MEDICARE

## 2023-05-13 DIAGNOSIS — N39.0 URINARY TRACT INFECTION, SITE NOT SPECIFIED: ICD-10-CM

## 2023-05-13 DIAGNOSIS — Z01.89 ENCOUNTER FOR OTHER SPECIFIED SPECIAL EXAMINATIONS: ICD-10-CM

## 2023-05-13 LAB
BILIRUB UR QL: NEGATIVE
CLARITY UR: CLEAR
GLUCOSE UR-MCNC: NORMAL MG/DL
KETONES UR-MCNC: NEGATIVE MG/DL
LEUKOCYTE ESTERASE UR QL STRIP.AUTO: 500
NITRITE UR QL STRIP.AUTO: NEGATIVE
PH UR: 8 [PH] (ref 5–8)
RBC #/AREA URNS AUTO: >10 /HPF
SP GR UR STRIP: 1.01 (ref 1–1.03)
UROBILINOGEN UR STRIP-ACNC: NORMAL

## 2023-05-13 PROCEDURE — 87086 URINE CULTURE/COLONY COUNT: CPT | Performed by: INTERNAL MEDICINE

## 2023-05-13 PROCEDURE — 87077 CULTURE AEROBIC IDENTIFY: CPT | Performed by: INTERNAL MEDICINE

## 2023-05-13 PROCEDURE — 81001 URINALYSIS AUTO W/SCOPE: CPT | Performed by: INTERNAL MEDICINE

## 2023-06-16 NOTE — ANESTHESIA PROCEDURE NOTES
Abdomen soft, non-tender, no guarding. Peripheral IV  Date/Time: 8/18/2021 4:33 PM  Inserted by: Gilbert Mendoza CRNA    Placement  Needle size: 18 G  Laterality: right  Location: wrist  Local anesthetic: none  Site prep: alcohol  Technique: anatomical landmarks  Attempts: 1

## 2023-08-30 ENCOUNTER — LAB REQUISITION (OUTPATIENT)
Dept: LAB | Facility: HOSPITAL | Age: 88
End: 2023-08-30
Payer: MEDICARE

## 2023-08-30 DIAGNOSIS — E11.42 TYPE 2 DIABETES MELLITUS WITH DIABETIC POLYNEUROPATHY (HCC): ICD-10-CM

## 2023-08-30 LAB
ALBUMIN SERPL-MCNC: 3.4 G/DL (ref 3.4–5)
ALBUMIN/GLOB SERPL: 1 {RATIO} (ref 1–2)
ALP LIVER SERPL-CCNC: 93 U/L
ALT SERPL-CCNC: 21 U/L
ANION GAP SERPL CALC-SCNC: 10 MMOL/L (ref 0–18)
ANION GAP SERPL CALC-SCNC: 6 MMOL/L (ref 0–18)
AST SERPL-CCNC: 15 U/L (ref 15–37)
BASOPHILS # BLD AUTO: 0.09 X10(3) UL (ref 0–0.2)
BASOPHILS NFR BLD AUTO: 0.9 %
BILIRUB SERPL-MCNC: 0.6 MG/DL (ref 0.1–2)
BILIRUB UR QL: NEGATIVE
BUN BLD-MCNC: 13 MG/DL (ref 7–18)
BUN BLD-MCNC: 13 MG/DL (ref 7–18)
BUN/CREAT SERPL: 17.8 (ref 10–20)
BUN/CREAT SERPL: 19.1 (ref 10–20)
CALCIUM BLD-MCNC: 9.1 MG/DL (ref 8.5–10.1)
CALCIUM BLD-MCNC: 9.1 MG/DL (ref 8.5–10.1)
CHLORIDE SERPL-SCNC: 101 MMOL/L (ref 98–112)
CHLORIDE SERPL-SCNC: 101 MMOL/L (ref 98–112)
CHOLEST SERPL-MCNC: 117 MG/DL (ref ?–200)
CLARITY UR: CLEAR
CO2 SERPL-SCNC: 25 MMOL/L (ref 21–32)
CO2 SERPL-SCNC: 28 MMOL/L (ref 21–32)
COLOR UR: COLORLESS
CREAT BLD-MCNC: 0.68 MG/DL
CREAT BLD-MCNC: 0.73 MG/DL
CREAT UR-SCNC: 18.5 MG/DL
DEPRECATED RDW RBC AUTO: 42.4 FL (ref 35.1–46.3)
EGFRCR SERPLBLD CKD-EPI 2021: 89 ML/MIN/1.73M2 (ref 60–?)
EGFRCR SERPLBLD CKD-EPI 2021: 91 ML/MIN/1.73M2 (ref 60–?)
EOSINOPHIL # BLD AUTO: 0.33 X10(3) UL (ref 0–0.7)
EOSINOPHIL NFR BLD AUTO: 3.3 %
ERYTHROCYTE [DISTWIDTH] IN BLOOD BY AUTOMATED COUNT: 12.6 % (ref 11–15)
EST. AVERAGE GLUCOSE BLD GHB EST-MCNC: 143 MG/DL (ref 68–126)
GLOBULIN PLAS-MCNC: 3.5 G/DL (ref 2.8–4.4)
GLUCOSE BLD-MCNC: 124 MG/DL (ref 70–99)
GLUCOSE BLD-MCNC: 125 MG/DL (ref 70–99)
GLUCOSE UR-MCNC: NORMAL MG/DL
HBA1C MFR BLD: 6.6 % (ref ?–5.7)
HCT VFR BLD AUTO: 41.5 %
HDLC SERPL-MCNC: 42 MG/DL (ref 40–59)
HGB BLD-MCNC: 14.1 G/DL
HGB UR QL STRIP.AUTO: NEGATIVE
IMM GRANULOCYTES # BLD AUTO: 0.06 X10(3) UL (ref 0–1)
IMM GRANULOCYTES NFR BLD: 0.6 %
KETONES UR-MCNC: NEGATIVE MG/DL
LDLC SERPL CALC-MCNC: 54 MG/DL (ref ?–100)
LEUKOCYTE ESTERASE UR QL STRIP.AUTO: 75
LYMPHOCYTES # BLD AUTO: 3.25 X10(3) UL (ref 1–4)
LYMPHOCYTES NFR BLD AUTO: 32.6 %
MCH RBC QN AUTO: 31.2 PG (ref 26–34)
MCHC RBC AUTO-ENTMCNC: 34 G/DL (ref 31–37)
MCV RBC AUTO: 91.8 FL
MICROALBUMIN UR-MCNC: 1.1 MG/DL
MICROALBUMIN/CREAT 24H UR-RTO: 59.5 UG/MG (ref ?–30)
MONOCYTES # BLD AUTO: 0.84 X10(3) UL (ref 0.1–1)
MONOCYTES NFR BLD AUTO: 8.4 %
NEUTROPHILS # BLD AUTO: 5.39 X10 (3) UL (ref 1.5–7.7)
NEUTROPHILS # BLD AUTO: 5.39 X10(3) UL (ref 1.5–7.7)
NEUTROPHILS NFR BLD AUTO: 54.2 %
NONHDLC SERPL-MCNC: 75 MG/DL (ref ?–130)
OSMOLALITY SERPL CALC.SUM OF ELEC: 282 MOSM/KG (ref 275–295)
OSMOLALITY SERPL CALC.SUM OF ELEC: 284 MOSM/KG (ref 275–295)
PH UR: 7.5 [PH] (ref 5–8)
PLATELET # BLD AUTO: 289 10(3)UL (ref 150–450)
POTASSIUM SERPL-SCNC: 4.4 MMOL/L (ref 3.5–5.1)
POTASSIUM SERPL-SCNC: 4.4 MMOL/L (ref 3.5–5.1)
PROT SERPL-MCNC: 6.9 G/DL (ref 6.4–8.2)
PROT UR-MCNC: NEGATIVE MG/DL
RBC # BLD AUTO: 4.52 X10(6)UL
SODIUM SERPL-SCNC: 135 MMOL/L (ref 136–145)
SODIUM SERPL-SCNC: 136 MMOL/L (ref 136–145)
SP GR UR STRIP: 1 (ref 1–1.03)
TRIGL SERPL-MCNC: 112 MG/DL (ref 30–149)
TSI SER-ACNC: 1.47 MIU/ML (ref 0.36–3.74)
UROBILINOGEN UR STRIP-ACNC: NORMAL
VLDLC SERPL CALC-MCNC: 16 MG/DL (ref 0–30)
WBC # BLD AUTO: 10 X10(3) UL (ref 4–11)

## 2023-08-30 PROCEDURE — 80053 COMPREHEN METABOLIC PANEL: CPT | Performed by: INTERNAL MEDICINE

## 2023-08-30 PROCEDURE — 82570 ASSAY OF URINE CREATININE: CPT | Performed by: INTERNAL MEDICINE

## 2023-08-30 PROCEDURE — 84443 ASSAY THYROID STIM HORMONE: CPT | Performed by: INTERNAL MEDICINE

## 2023-08-30 PROCEDURE — 80061 LIPID PANEL: CPT | Performed by: INTERNAL MEDICINE

## 2023-08-30 PROCEDURE — 85025 COMPLETE CBC W/AUTO DIFF WBC: CPT | Performed by: INTERNAL MEDICINE

## 2023-08-30 PROCEDURE — 82043 UR ALBUMIN QUANTITATIVE: CPT | Performed by: INTERNAL MEDICINE

## 2023-08-30 PROCEDURE — 81001 URINALYSIS AUTO W/SCOPE: CPT | Performed by: INTERNAL MEDICINE

## 2023-08-30 PROCEDURE — 83036 HEMOGLOBIN GLYCOSYLATED A1C: CPT | Performed by: INTERNAL MEDICINE

## 2023-10-03 ENCOUNTER — APPOINTMENT (OUTPATIENT)
Dept: URBAN - METROPOLITAN AREA CLINIC 244 | Age: 88
Setting detail: DERMATOLOGY
End: 2023-10-04

## 2023-10-03 DIAGNOSIS — L82.1 OTHER SEBORRHEIC KERATOSIS: ICD-10-CM

## 2023-10-03 DIAGNOSIS — Z85.828 PERSONAL HISTORY OF OTHER MALIGNANT NEOPLASM OF SKIN: ICD-10-CM

## 2023-10-03 DIAGNOSIS — L57.0 ACTINIC KERATOSIS: ICD-10-CM

## 2023-10-03 DIAGNOSIS — L81.4 OTHER MELANIN HYPERPIGMENTATION: ICD-10-CM

## 2023-10-03 DIAGNOSIS — D22 MELANOCYTIC NEVI: ICD-10-CM

## 2023-10-03 PROBLEM — D22.5 MELANOCYTIC NEVI OF TRUNK: Status: ACTIVE | Noted: 2023-10-03

## 2023-10-03 PROCEDURE — 17003 DESTRUCT PREMALG LES 2-14: CPT

## 2023-10-03 PROCEDURE — 99213 OFFICE O/P EST LOW 20 MIN: CPT | Mod: 25

## 2023-10-03 PROCEDURE — OTHER LIQUID NITROGEN: OTHER

## 2023-10-03 PROCEDURE — OTHER MIPS QUALITY: OTHER

## 2023-10-03 PROCEDURE — OTHER COUNSELING: OTHER

## 2023-10-03 PROCEDURE — 17000 DESTRUCT PREMALG LESION: CPT

## 2023-10-03 ASSESSMENT — LOCATION DETAILED DESCRIPTION DERM
LOCATION DETAILED: LEFT MEDIAL MALAR CHEEK
LOCATION DETAILED: UPPER STERNUM
LOCATION DETAILED: MID-FRONTAL SCALP
LOCATION DETAILED: LEFT MEDIAL INFERIOR CHEST
LOCATION DETAILED: LEFT SUPERIOR FOREHEAD
LOCATION DETAILED: LEFT NASAL SIDEWALL
LOCATION DETAILED: LEFT PROXIMAL DORSAL FOREARM
LOCATION DETAILED: LEFT LATERAL EYEBROW
LOCATION DETAILED: LEFT FOREHEAD
LOCATION DETAILED: LEFT INFERIOR FOREHEAD
LOCATION DETAILED: RIGHT CENTRAL FRONTAL SCALP
LOCATION DETAILED: LEFT NASAL ALA
LOCATION DETAILED: RIGHT SUPERIOR MEDIAL UPPER BACK
LOCATION DETAILED: RIGHT UPPER CUTANEOUS LIP
LOCATION DETAILED: LEFT MEDIAL UPPER BACK

## 2023-10-03 ASSESSMENT — LOCATION SIMPLE DESCRIPTION DERM
LOCATION SIMPLE: LEFT UPPER BACK
LOCATION SIMPLE: LEFT FOREARM
LOCATION SIMPLE: LEFT NOSE
LOCATION SIMPLE: RIGHT LIP
LOCATION SIMPLE: LEFT CHEEK
LOCATION SIMPLE: RIGHT SCALP
LOCATION SIMPLE: LEFT FOREHEAD
LOCATION SIMPLE: CHEST
LOCATION SIMPLE: ANTERIOR SCALP
LOCATION SIMPLE: LEFT EYEBROW
LOCATION SIMPLE: RIGHT UPPER BACK

## 2023-10-03 ASSESSMENT — LOCATION ZONE DERM
LOCATION ZONE: TRUNK
LOCATION ZONE: LIP
LOCATION ZONE: ARM
LOCATION ZONE: NOSE
LOCATION ZONE: SCALP
LOCATION ZONE: FACE

## 2023-10-03 NOTE — PROCEDURE: LIQUID NITROGEN
Duration Of Freeze Thaw-Cycle (Seconds): 0
Render Post-Care Instructions In Note?: no
Detail Level: Zone
Consent: The patient's consent was obtained including but not limited to risks of crusting, scabbing, blistering, scarring, darker or lighter pigmentary change, recurrence, incomplete removal and infection.
Post-Care Instructions: I reviewed with the patient in detail post-care instructions. Patient is to wear sunprotection, and avoid picking at any of the treated lesions. Pt may apply Vaseline to crusted or scabbing areas.
Total Number Of Aks Treated: 14

## 2023-10-05 ENCOUNTER — APPOINTMENT (OUTPATIENT)
Dept: URBAN - METROPOLITAN AREA CLINIC 244 | Age: 88
Setting detail: DERMATOLOGY
End: 2023-10-05

## 2023-10-11 ENCOUNTER — IMMUNIZATION (OUTPATIENT)
Dept: LAB | Age: 88
End: 2023-10-11
Attending: EMERGENCY MEDICINE
Payer: MEDICARE

## 2023-10-11 DIAGNOSIS — Z23 NEED FOR VACCINATION: Primary | ICD-10-CM

## 2023-10-11 PROCEDURE — 90471 IMMUNIZATION ADMIN: CPT

## 2023-10-11 PROCEDURE — 90480 ADMN SARSCOV2 VAC 1/ONLY CMP: CPT

## 2023-10-11 PROCEDURE — 90662 IIV NO PRSV INCREASED AG IM: CPT

## 2024-01-05 ENCOUNTER — LAB REQUISITION (OUTPATIENT)
Dept: LAB | Facility: HOSPITAL | Age: 89
End: 2024-01-05
Payer: MEDICARE

## 2024-01-05 DIAGNOSIS — I50.9 HEART FAILURE, UNSPECIFIED (HCC): ICD-10-CM

## 2024-01-05 DIAGNOSIS — E11.42 TYPE 2 DIABETES MELLITUS WITH DIABETIC POLYNEUROPATHY (HCC): ICD-10-CM

## 2024-01-05 LAB
EST. AVERAGE GLUCOSE BLD GHB EST-MCNC: 143 MG/DL (ref 68–126)
HBA1C MFR BLD: 6.6 % (ref ?–5.7)

## 2024-01-05 PROCEDURE — 83036 HEMOGLOBIN GLYCOSYLATED A1C: CPT | Performed by: INTERNAL MEDICINE

## 2024-03-22 ENCOUNTER — LAB REQUISITION (OUTPATIENT)
Dept: LAB | Facility: HOSPITAL | Age: 89
End: 2024-03-22
Payer: MEDICARE

## 2024-03-22 DIAGNOSIS — E11.9 TYPE 2 DIABETES MELLITUS WITHOUT COMPLICATIONS (HCC): ICD-10-CM

## 2024-03-22 DIAGNOSIS — E78.00 PURE HYPERCHOLESTEROLEMIA, UNSPECIFIED: ICD-10-CM

## 2024-03-22 LAB
ALBUMIN SERPL-MCNC: 4.2 G/DL (ref 3.2–4.8)
ALBUMIN/GLOB SERPL: 1.2 {RATIO} (ref 1–2)
ALP LIVER SERPL-CCNC: 100 U/L
ALT SERPL-CCNC: 18 U/L
ANION GAP SERPL CALC-SCNC: 2 MMOL/L (ref 0–18)
AST SERPL-CCNC: 15 U/L (ref ?–34)
BASOPHILS # BLD AUTO: 0.07 X10(3) UL (ref 0–0.2)
BASOPHILS NFR BLD AUTO: 0.7 %
BILIRUB SERPL-MCNC: 0.6 MG/DL (ref 0.2–1.1)
BUN BLD-MCNC: 10 MG/DL (ref 9–23)
BUN/CREAT SERPL: 12.5 (ref 10–20)
CALCIUM BLD-MCNC: 9.6 MG/DL (ref 8.7–10.4)
CHLORIDE SERPL-SCNC: 100 MMOL/L (ref 98–112)
CHOLEST SERPL-MCNC: 136 MG/DL (ref ?–200)
CO2 SERPL-SCNC: 30 MMOL/L (ref 21–32)
CREAT BLD-MCNC: 0.8 MG/DL
DEPRECATED RDW RBC AUTO: 44.6 FL (ref 35.1–46.3)
EGFRCR SERPLBLD CKD-EPI 2021: 86 ML/MIN/1.73M2 (ref 60–?)
EOSINOPHIL # BLD AUTO: 0.51 X10(3) UL (ref 0–0.7)
EOSINOPHIL NFR BLD AUTO: 5.4 %
ERYTHROCYTE [DISTWIDTH] IN BLOOD BY AUTOMATED COUNT: 13 % (ref 11–15)
EST. AVERAGE GLUCOSE BLD GHB EST-MCNC: 137 MG/DL (ref 68–126)
GLOBULIN PLAS-MCNC: 3.4 G/DL (ref 2.8–4.4)
GLUCOSE BLD-MCNC: 127 MG/DL (ref 70–99)
HBA1C MFR BLD: 6.4 % (ref ?–5.7)
HCT VFR BLD AUTO: 43.7 %
HDLC SERPL-MCNC: 40 MG/DL (ref 40–59)
HGB BLD-MCNC: 14.2 G/DL
IMM GRANULOCYTES # BLD AUTO: 0.08 X10(3) UL (ref 0–1)
IMM GRANULOCYTES NFR BLD: 0.9 %
LDLC SERPL CALC-MCNC: 80 MG/DL (ref ?–100)
LYMPHOCYTES # BLD AUTO: 3.33 X10(3) UL (ref 1–4)
LYMPHOCYTES NFR BLD AUTO: 35.4 %
MCH RBC QN AUTO: 30.5 PG (ref 26–34)
MCHC RBC AUTO-ENTMCNC: 32.5 G/DL (ref 31–37)
MCV RBC AUTO: 94 FL
MONOCYTES # BLD AUTO: 0.89 X10(3) UL (ref 0.1–1)
MONOCYTES NFR BLD AUTO: 9.5 %
NEUTROPHILS # BLD AUTO: 4.52 X10 (3) UL (ref 1.5–7.7)
NEUTROPHILS # BLD AUTO: 4.52 X10(3) UL (ref 1.5–7.7)
NEUTROPHILS NFR BLD AUTO: 48.1 %
NONHDLC SERPL-MCNC: 96 MG/DL (ref ?–130)
OSMOLALITY SERPL CALC.SUM OF ELEC: 275 MOSM/KG (ref 275–295)
PLATELET # BLD AUTO: 308 10(3)UL (ref 150–450)
POTASSIUM SERPL-SCNC: 4.3 MMOL/L (ref 3.5–5.1)
PROT SERPL-MCNC: 7.6 G/DL (ref 5.7–8.2)
RBC # BLD AUTO: 4.65 X10(6)UL
SODIUM SERPL-SCNC: 132 MMOL/L (ref 136–145)
TRIGL SERPL-MCNC: 84 MG/DL (ref 30–149)
VLDLC SERPL CALC-MCNC: 13 MG/DL (ref 0–30)
WBC # BLD AUTO: 9.4 X10(3) UL (ref 4–11)

## 2024-03-22 PROCEDURE — 83036 HEMOGLOBIN GLYCOSYLATED A1C: CPT | Performed by: INTERNAL MEDICINE

## 2024-03-22 PROCEDURE — 80061 LIPID PANEL: CPT | Performed by: INTERNAL MEDICINE

## 2024-03-22 PROCEDURE — 85025 COMPLETE CBC W/AUTO DIFF WBC: CPT | Performed by: INTERNAL MEDICINE

## 2024-03-22 PROCEDURE — 80053 COMPREHEN METABOLIC PANEL: CPT | Performed by: INTERNAL MEDICINE

## 2024-04-14 ENCOUNTER — APPOINTMENT (OUTPATIENT)
Dept: GENERAL RADIOLOGY | Facility: HOSPITAL | Age: 89
DRG: 500 | End: 2024-04-14
Attending: EMERGENCY MEDICINE
Payer: MEDICARE

## 2024-04-14 ENCOUNTER — HOSPITAL ENCOUNTER (INPATIENT)
Facility: HOSPITAL | Age: 89
LOS: 16 days | Discharge: NURSING, REHAB OR LONG-TERM CARE | End: 2024-05-01
Attending: EMERGENCY MEDICINE | Admitting: INTERNAL MEDICINE
Payer: MEDICARE

## 2024-04-14 ENCOUNTER — APPOINTMENT (OUTPATIENT)
Dept: GENERAL RADIOLOGY | Facility: HOSPITAL | Age: 89
End: 2024-04-14
Attending: EMERGENCY MEDICINE
Payer: MEDICARE

## 2024-04-14 ENCOUNTER — HOSPITAL ENCOUNTER (INPATIENT)
Facility: HOSPITAL | Age: 89
LOS: 16 days | Discharge: NURSING, REHAB OR LONG-TERM CARE | DRG: 500 | End: 2024-05-01
Attending: EMERGENCY MEDICINE | Admitting: INTERNAL MEDICINE
Payer: MEDICARE

## 2024-04-14 DIAGNOSIS — M25.511 ACUTE PAIN OF RIGHT SHOULDER: ICD-10-CM

## 2024-04-14 DIAGNOSIS — E87.1 HYPONATREMIA: Primary | ICD-10-CM

## 2024-04-14 DIAGNOSIS — D72.829 LEUKOCYTOSIS, UNSPECIFIED TYPE: ICD-10-CM

## 2024-04-14 DIAGNOSIS — R09.02 HYPOXIA: ICD-10-CM

## 2024-04-14 LAB
ALBUMIN SERPL-MCNC: 4.1 G/DL (ref 3.2–4.8)
ALP LIVER SERPL-CCNC: 79 U/L
ALT SERPL-CCNC: 24 U/L
ANION GAP SERPL CALC-SCNC: 4 MMOL/L (ref 0–18)
APTT PPP: 41 SECONDS (ref 23.3–35.6)
AST SERPL-CCNC: 21 U/L (ref ?–34)
BASOPHILS # BLD AUTO: 0.04 X10(3) UL (ref 0–0.2)
BASOPHILS NFR BLD AUTO: 0.2 %
BILIRUB DIRECT SERPL-MCNC: 0.5 MG/DL (ref ?–0.3)
BILIRUB SERPL-MCNC: 1.1 MG/DL (ref 0.2–1.1)
BUN BLD-MCNC: 15 MG/DL (ref 9–23)
BUN/CREAT SERPL: 17.9 (ref 10–20)
CALCIUM BLD-MCNC: 9.5 MG/DL (ref 8.7–10.4)
CHLORIDE SERPL-SCNC: 97 MMOL/L (ref 98–112)
CO2 SERPL-SCNC: 27 MMOL/L (ref 21–32)
CREAT BLD-MCNC: 0.84 MG/DL
DEPRECATED RDW RBC AUTO: 42.2 FL (ref 35.1–46.3)
EGFRCR SERPLBLD CKD-EPI 2021: 85 ML/MIN/1.73M2 (ref 60–?)
EOSINOPHIL # BLD AUTO: 0 X10(3) UL (ref 0–0.7)
EOSINOPHIL NFR BLD AUTO: 0 %
ERYTHROCYTE [DISTWIDTH] IN BLOOD BY AUTOMATED COUNT: 12.6 % (ref 11–15)
GLUCOSE BLD-MCNC: 197 MG/DL (ref 70–99)
HCT VFR BLD AUTO: 39.6 %
HGB BLD-MCNC: 14 G/DL
IMM GRANULOCYTES # BLD AUTO: 0.13 X10(3) UL (ref 0–1)
IMM GRANULOCYTES NFR BLD: 0.7 %
INR BLD: 3 (ref 0.8–1.2)
LACTATE SERPL-SCNC: 1 MMOL/L (ref 0.5–2)
LIPASE SERPL-CCNC: 26 U/L (ref 13–75)
LYMPHOCYTES # BLD AUTO: 1.02 X10(3) UL (ref 1–4)
LYMPHOCYTES NFR BLD AUTO: 5.7 %
MCH RBC QN AUTO: 31.9 PG (ref 26–34)
MCHC RBC AUTO-ENTMCNC: 35.4 G/DL (ref 31–37)
MCV RBC AUTO: 90.2 FL
MONOCYTES # BLD AUTO: 1.66 X10(3) UL (ref 0.1–1)
MONOCYTES NFR BLD AUTO: 9.2 %
NEUTROPHILS # BLD AUTO: 15.1 X10 (3) UL (ref 1.5–7.7)
NEUTROPHILS # BLD AUTO: 15.1 X10(3) UL (ref 1.5–7.7)
NEUTROPHILS NFR BLD AUTO: 84.2 %
OSMOLALITY SERPL CALC.SUM OF ELEC: 272 MOSM/KG (ref 275–295)
PLATELET # BLD AUTO: 332 10(3)UL (ref 150–450)
POTASSIUM SERPL-SCNC: 4.3 MMOL/L (ref 3.5–5.1)
PROT SERPL-MCNC: 7.4 G/DL (ref 5.7–8.2)
PROTHROMBIN TIME: 33 SECONDS (ref 11.6–14.8)
RBC # BLD AUTO: 4.39 X10(6)UL
SODIUM SERPL-SCNC: 128 MMOL/L (ref 136–145)
TROPONIN I SERPL HS-MCNC: 11 NG/L
WBC # BLD AUTO: 18 X10(3) UL (ref 4–11)

## 2024-04-14 PROCEDURE — 71045 X-RAY EXAM CHEST 1 VIEW: CPT | Performed by: EMERGENCY MEDICINE

## 2024-04-14 PROCEDURE — 73030 X-RAY EXAM OF SHOULDER: CPT | Performed by: EMERGENCY MEDICINE

## 2024-04-14 RX ORDER — ONDANSETRON 2 MG/ML
4 INJECTION INTRAMUSCULAR; INTRAVENOUS ONCE
Status: DISCONTINUED | OUTPATIENT
Start: 2024-04-14 | End: 2024-05-01

## 2024-04-14 RX ORDER — MORPHINE SULFATE 4 MG/ML
4 INJECTION, SOLUTION INTRAMUSCULAR; INTRAVENOUS ONCE
Status: COMPLETED | OUTPATIENT
Start: 2024-04-14 | End: 2024-04-14

## 2024-04-14 RX ORDER — ONDANSETRON 2 MG/ML
4 INJECTION INTRAMUSCULAR; INTRAVENOUS ONCE
Status: COMPLETED | OUTPATIENT
Start: 2024-04-14 | End: 2024-04-14

## 2024-04-15 ENCOUNTER — APPOINTMENT (OUTPATIENT)
Dept: CT IMAGING | Facility: HOSPITAL | Age: 89
DRG: 500 | End: 2024-04-15
Attending: EMERGENCY MEDICINE
Payer: MEDICARE

## 2024-04-15 ENCOUNTER — APPOINTMENT (OUTPATIENT)
Dept: CT IMAGING | Facility: HOSPITAL | Age: 89
End: 2024-04-15
Attending: EMERGENCY MEDICINE
Payer: MEDICARE

## 2024-04-15 PROBLEM — M25.511 ACUTE PAIN OF RIGHT SHOULDER: Status: ACTIVE | Noted: 2024-04-15

## 2024-04-15 PROBLEM — R09.02 HYPOXIA: Status: ACTIVE | Noted: 2024-04-15

## 2024-04-15 PROBLEM — D72.829 LEUKOCYTOSIS, UNSPECIFIED TYPE: Status: ACTIVE | Noted: 2024-04-15

## 2024-04-15 PROBLEM — E87.1 HYPONATREMIA: Status: ACTIVE | Noted: 2024-04-15

## 2024-04-15 LAB
ADENOVIRUS PCR:: NOT DETECTED
B PARAPERT DNA SPEC QL NAA+PROBE: NOT DETECTED
B PERT DNA SPEC QL NAA+PROBE: NOT DETECTED
BASOPHILS NFR SNV: 0 %
BILIRUB UR QL: NEGATIVE
C PNEUM DNA SPEC QL NAA+PROBE: NOT DETECTED
CLARITY UR: CLEAR
COLOR UR: YELLOW
CORONAVIRUS 229E PCR:: NOT DETECTED
CORONAVIRUS HKU1 PCR:: NOT DETECTED
CORONAVIRUS NL63 PCR:: NOT DETECTED
CORONAVIRUS OC43 PCR:: NOT DETECTED
CRYSTALS FLD MICRO: POSITIVE
EOSINOPHIL NFR SNV: 0 %
ERYTHROCYTE [SEDIMENTATION RATE] IN BLOOD: 111 MM/HR
FLUAV + FLUBV RNA SPEC NAA+PROBE: NEGATIVE
FLUAV + FLUBV RNA SPEC NAA+PROBE: NEGATIVE
FLUAV RNA SPEC QL NAA+PROBE: NOT DETECTED
FLUBV RNA SPEC QL NAA+PROBE: NOT DETECTED
GLUCOSE BLDC GLUCOMTR-MCNC: 110 MG/DL (ref 70–99)
GLUCOSE BLDC GLUCOMTR-MCNC: 116 MG/DL (ref 70–99)
GLUCOSE BLDC GLUCOMTR-MCNC: 119 MG/DL (ref 70–99)
GLUCOSE BLDC GLUCOMTR-MCNC: 149 MG/DL (ref 70–99)
GLUCOSE BLDC GLUCOMTR-MCNC: 151 MG/DL (ref 70–99)
GLUCOSE UR-MCNC: NORMAL MG/DL
KETONES UR-MCNC: NEGATIVE MG/DL
LEUKOCYTE ESTERASE UR QL STRIP.AUTO: 25
LYMPHOCYTES NFR SNV: 1 %
METAPNEUMOVIRUS PCR:: NOT DETECTED
MONOS+MACROS NFR SNV: 5 %
MYCOPLASMA PNEUMONIA PCR:: NOT DETECTED
NEUTROPHILS NFR FLD: 94 %
NITRITE UR QL STRIP.AUTO: NEGATIVE
OSMOLALITY UR: 681 MOSM/KG (ref 300–1100)
PARAINFLUENZA 1 PCR:: NOT DETECTED
PARAINFLUENZA 2 PCR:: NOT DETECTED
PARAINFLUENZA 3 PCR:: NOT DETECTED
PARAINFLUENZA 4 PCR:: NOT DETECTED
PH UR: 6 [PH] (ref 5–8)
PROT UR-MCNC: 20 MG/DL
RBC # FLD AUTO: ABNORMAL /CUMM (ref ?–1)
RBC #/AREA URNS AUTO: >10 /HPF
RHINOVIRUS/ENTERO PCR:: NOT DETECTED
RSV RNA SPEC NAA+PROBE: NEGATIVE
RSV RNA SPEC QL NAA+PROBE: NOT DETECTED
SARS-COV-2 RNA NPH QL NAA+NON-PROBE: NOT DETECTED
SARS-COV-2 RNA RESP QL NAA+PROBE: NOT DETECTED
SODIUM SERPL-SCNC: 54 MMOL/L
SP GR UR STRIP: >1.03 (ref 1–1.03)
TOTAL CELLS COUNTED FLD: 100
TOTAL CELLS COUNTED SNV: ABNORMAL /CUMM (ref 0–200)
UROBILINOGEN UR STRIP-ACNC: NORMAL
WBC # SNV: NORMAL /CUMM

## 2024-04-15 PROCEDURE — 99223 1ST HOSP IP/OBS HIGH 75: CPT | Performed by: HOSPITALIST

## 2024-04-15 PROCEDURE — 20610 DRAIN/INJ JOINT/BURSA W/O US: CPT | Performed by: STUDENT IN AN ORGANIZED HEALTH CARE EDUCATION/TRAINING PROGRAM

## 2024-04-15 PROCEDURE — 74177 CT ABD & PELVIS W/CONTRAST: CPT | Performed by: EMERGENCY MEDICINE

## 2024-04-15 PROCEDURE — 71260 CT THORAX DX C+: CPT | Performed by: EMERGENCY MEDICINE

## 2024-04-15 PROCEDURE — 99221 1ST HOSP IP/OBS SF/LOW 40: CPT | Performed by: STUDENT IN AN ORGANIZED HEALTH CARE EDUCATION/TRAINING PROGRAM

## 2024-04-15 PROCEDURE — 0R9J4ZX DRAINAGE OF RIGHT SHOULDER JOINT, PERCUTANEOUS ENDOSCOPIC APPROACH, DIAGNOSTIC: ICD-10-PCS | Performed by: STUDENT IN AN ORGANIZED HEALTH CARE EDUCATION/TRAINING PROGRAM

## 2024-04-15 RX ORDER — FINASTERIDE 5 MG/1
5 TABLET, FILM COATED ORAL DAILY
Status: DISCONTINUED | OUTPATIENT
Start: 2024-04-15 | End: 2024-05-01

## 2024-04-15 RX ORDER — METOPROLOL SUCCINATE 25 MG/1
25 TABLET, EXTENDED RELEASE ORAL
Status: DISCONTINUED | OUTPATIENT
Start: 2024-04-15 | End: 2024-04-19

## 2024-04-15 RX ORDER — MORPHINE SULFATE 4 MG/ML
4 INJECTION, SOLUTION INTRAMUSCULAR; INTRAVENOUS EVERY 2 HOUR PRN
Status: DISCONTINUED | OUTPATIENT
Start: 2024-04-15 | End: 2024-04-16

## 2024-04-15 RX ORDER — ONDANSETRON 2 MG/ML
4 INJECTION INTRAMUSCULAR; INTRAVENOUS EVERY 6 HOURS PRN
Status: DISCONTINUED | OUTPATIENT
Start: 2024-04-15 | End: 2024-05-01

## 2024-04-15 RX ORDER — PANTOPRAZOLE SODIUM 40 MG/1
40 TABLET, DELAYED RELEASE ORAL
Status: DISCONTINUED | OUTPATIENT
Start: 2024-04-15 | End: 2024-05-01

## 2024-04-15 RX ORDER — SODIUM CHLORIDE 9 MG/ML
INJECTION, SOLUTION INTRAVENOUS CONTINUOUS
Status: DISCONTINUED | OUTPATIENT
Start: 2024-04-15 | End: 2024-04-16

## 2024-04-15 RX ORDER — NICOTINE POLACRILEX 4 MG
30 LOZENGE BUCCAL
Status: DISCONTINUED | OUTPATIENT
Start: 2024-04-15 | End: 2024-05-01

## 2024-04-15 RX ORDER — TAMSULOSIN HYDROCHLORIDE 0.4 MG/1
0.4 CAPSULE ORAL
Status: DISCONTINUED | OUTPATIENT
Start: 2024-04-15 | End: 2024-05-01

## 2024-04-15 RX ORDER — VANCOMYCIN 2 GRAM/500 ML IN 0.9 % SODIUM CHLORIDE INTRAVENOUS
20 ONCE
Qty: 500 ML | Refills: 0 | Status: DISCONTINUED | OUTPATIENT
Start: 2024-04-15 | End: 2024-04-15

## 2024-04-15 RX ORDER — IPRATROPIUM BROMIDE AND ALBUTEROL SULFATE 2.5; .5 MG/3ML; MG/3ML
3 SOLUTION RESPIRATORY (INHALATION)
Status: DISCONTINUED | OUTPATIENT
Start: 2024-04-15 | End: 2024-04-17

## 2024-04-15 RX ORDER — ACETAMINOPHEN 325 MG/1
650 TABLET ORAL EVERY 6 HOURS PRN
Status: DISCONTINUED | OUTPATIENT
Start: 2024-04-15 | End: 2024-05-01

## 2024-04-15 RX ORDER — ATORVASTATIN CALCIUM 40 MG/1
40 TABLET, FILM COATED ORAL NIGHTLY
Status: DISCONTINUED | OUTPATIENT
Start: 2024-04-15 | End: 2024-05-01

## 2024-04-15 RX ORDER — ZOLPIDEM TARTRATE 5 MG/1
5 TABLET ORAL NIGHTLY PRN
Status: DISCONTINUED | OUTPATIENT
Start: 2024-04-15 | End: 2024-04-16

## 2024-04-15 RX ORDER — POLYETHYLENE GLYCOL 3350 17 G/17G
17 POWDER, FOR SOLUTION ORAL DAILY
COMMUNITY

## 2024-04-15 RX ORDER — ESCITALOPRAM OXALATE 20 MG/1
20 TABLET ORAL DAILY
Status: DISCONTINUED | OUTPATIENT
Start: 2024-04-15 | End: 2024-04-18

## 2024-04-15 RX ORDER — MORPHINE SULFATE 4 MG/ML
4 INJECTION, SOLUTION INTRAMUSCULAR; INTRAVENOUS ONCE
Status: COMPLETED | OUTPATIENT
Start: 2024-04-15 | End: 2024-04-15

## 2024-04-15 RX ORDER — ASPIRIN 81 MG/1
81 TABLET, CHEWABLE ORAL DAILY
Status: DISCONTINUED | OUTPATIENT
Start: 2024-04-15 | End: 2024-05-01

## 2024-04-15 RX ORDER — MORPHINE SULFATE 2 MG/ML
2 INJECTION, SOLUTION INTRAMUSCULAR; INTRAVENOUS EVERY 2 HOUR PRN
Status: DISCONTINUED | OUTPATIENT
Start: 2024-04-15 | End: 2024-04-16

## 2024-04-15 RX ORDER — SENNA AND DOCUSATE SODIUM 50; 8.6 MG/1; MG/1
1 TABLET, FILM COATED ORAL 2 TIMES DAILY
Status: DISCONTINUED | OUTPATIENT
Start: 2024-04-15 | End: 2024-05-01

## 2024-04-15 RX ORDER — NICOTINE POLACRILEX 4 MG
15 LOZENGE BUCCAL
Status: DISCONTINUED | OUTPATIENT
Start: 2024-04-15 | End: 2024-05-01

## 2024-04-15 RX ORDER — VANCOMYCIN HYDROCHLORIDE
12.5 EVERY 24 HOURS
Status: DISCONTINUED | OUTPATIENT
Start: 2024-04-15 | End: 2024-04-19

## 2024-04-15 RX ORDER — HYDRALAZINE HYDROCHLORIDE 20 MG/ML
10 INJECTION INTRAMUSCULAR; INTRAVENOUS EVERY 4 HOURS PRN
Status: DISCONTINUED | OUTPATIENT
Start: 2024-04-15 | End: 2024-05-01

## 2024-04-15 RX ORDER — DEXTROSE MONOHYDRATE 25 G/50ML
50 INJECTION, SOLUTION INTRAVENOUS
Status: DISCONTINUED | OUTPATIENT
Start: 2024-04-15 | End: 2024-05-01

## 2024-04-15 NOTE — HOME CARE LIAISON
Pt is current with [facility] for RN. Will need F2F prior to discharge if plan is to return home due to new HH episode starting.

## 2024-04-15 NOTE — PROGRESS NOTES
This Rn endorsed  the sampled taken by the provider to the DAY SHIFT RN. Labels and specimen are at the bedside.

## 2024-04-15 NOTE — ED INITIAL ASSESSMENT (HPI)
Pt here w/ family who sts pt had GI virus since easter, was improving but yesterday began having n/v/d and abd pain again. Also having JESSICA, and shoulder pain now. Recently had borderline sodium and is concerned about that too

## 2024-04-15 NOTE — PROGRESS NOTES
[facility] Pharmacy Dosing Service      Initial Pharmacokinetic Consult for Vancomycin Dosing     Dennys Manzano is a 88 year old male who is being initiated on vancomycin therapy for cellulitis.  Pharmacy has been asked to dose vancomycin by Dr. Lester Whitney.  The initial treatment and monitoring approach will be steady state AUC strategy.        Weight and Temperature:    Wt Readings from Last 1 Encounters:   04/15/24 104.1 kg (229 lb 6.4 oz)        Temp Readings from Last 1 Encounters:   04/15/24 97.6 °F (36.4 °C) (Oral)      Labs:   Recent Labs   Lab 24  2322   CREATSERUM 0.84      Estimated Creatinine Clearance: 69.3 mL/min (based on SCr of 0.84 mg/dL).     Recent Labs   Lab 24  2323   WBC 18.0*          The Pharmacokinetic Target is:     to 600 mg-h/L and trough <=15 mg/L    Renal Dosing Considerations:    None     Assessment/Plan:   Initial/Loading dose: Will receive 1250 mg IV (12.5 mg/kg, capped at 2250 mg) x 1 initial dose.      Maintenance dose: Pharmacy will dose vancomycin at 1250 mg IV every 24 hours    Monitorin) Plan for vancomycin peak and trough to be obtained at steady state    2) Pharmacy will order SCr as clinically indicated to assess renal function.    3) Pharmacy will monitor for toxicity and efficacy, adjust vancomycin dose and/or frequency, and order vancomycin levels as appropriate per the Pharmacy and Therapeutics Committee approved protocol until discontinuation of the medication.       We appreciate the opportunity to assist in the care of this patient.     Julianna Bose, TeofiloD  4/15/2024  8:08 AM  [facility] Extension: [phone]

## 2024-04-15 NOTE — OCCUPATIONAL THERAPY NOTE
OCCUPATIONAL THERAPY EVALUATION - INPATIENT     Room Number: 429/429-A  Evaluation Date: 4/15/2024  Type of Evaluation: Initial  Presenting Problem: right shoulder pain (NWB RUE, ROM as tolerated)    Physician Order: IP Consult to Occupational Therapy  Reason for Therapy: ADL/IADL Dysfunction and Discharge Planning    OCCUPATIONAL THERAPY ASSESSMENT   Patient is a 88 year old male admitted 4/14/2024 for hyponatremia, recent GI virus, nausea, vomiting, diarrhea, R shoulder pain s/p bedside aspiration.     Prior to admission, patient's baseline is Mod I with ADLs, functional mobility -- frequent falls recently.  Patient is currently functioning below baseline with ADLs and functional mobility. Patient is requiring moderate assist and maximum assist as a result of the following impairments: decreased functional strength, decreased functional reach, impaired standing balance, and limited right ue ROM due to pain. Occupational Therapy will continue to follow for duration of hospitalization.    Patient will benefit from continued skilled OT Services to promote return to prior level of function and safety with continuous assistance and gradual rehabilitative therapy     PLAN  OT Treatment Plan: Balance activities;Energy conservation/work simplification techniques;ADL training;Functional transfer training;Endurance training;Equipment eval/education;Compensatory technique education  OT Device Recommendations: TBD    OCCUPATIONAL THERAPY MEDICAL/SOCIAL HISTORY   Problem List  Principal Problem:    Hyponatremia  Active Problems:    Leukocytosis, unspecified type    Acute pain of right shoulder    Hypoxia    HOME SITUATION  Type of Home: House  Home Layout: Two level; Able to live on main level  Drives: No  Patient Regularly Uses: Reading glasses    Prior Level of [place]: Mod I with ADLs, functional mobility, IADLs, \"sometimes driving\"     SUBJECTIVE  Agreeable to activity   \"I'm totally right handed! I can't do anything  with this left hand\"     OCCUPATIONAL THERAPY EXAMINATION    OBJECTIVE  Precautions: Limb alert - right; Bed/chair alarm; Hard of hearing (RUE RANGE-OF-MOTION LIMITATIONS: As tolerated)  Fall Risk: High fall risk    PAIN ASSESSMENT  Rating: Unable to rate    ACTIVITY TOLERANCE  Pulse: 63        BP: 133/71 (139/76 mmHg sitting EOB)             O2 SATURATIONS  Oxygen Therapy  SPO2% on Oxygen at Rest: 96  At rest oxygen flow (liters per minute): 3    COGNITION  One step cues with additional time    RANGE OF MOTION STRENGTH ASSESSMENT  Impaired RUE    COORDINATION  Gross Motor: WFL   Fine Motor: WFL     ACTIVITIES OF DAILY LIVING ASSESSMENT  AM-PAC ‘6-Clicks’ Inpatient Daily Activity Short Form  How much help from another person does the patient currently need…  -   Putting on and taking off regular lower body clothing?: A Lot  -   Bathing (including washing, rinsing, drying)?: A Lot  -   Toileting, which includes using toilet, bedpan or urinal? : A Lot  -   Putting on and taking off regular upper body clothing?: A Little  -   Taking care of personal grooming such as brushing teeth?: A Little  -   Eating meals?: None    AM-PAC Score:  Score: 16  Approx Degree of Impairment: 53.32%  Standardized Score (AM-PAC Scale): 35.96  CMS Modifier (G-Code): CK    FUNCTIONAL TRANSFER ASSESSMENT  Sit to Stand: Edge of Bed  Edge of Bed: Minimal Assist (x2; moderate assist x 2 to chair -- impaired foot coordination)    BED MOBILITY  Supine to Sit : Minimal Assist    FUNCTIONAL ADL ASSESSMENT  Eating: Independent  Grooming Seated: Stand-by Assist  LB Dressing Seated: Maximum Assist    Skilled Therapy Provided: RN cleared pt for participation in occupational therapy session, which was completed in collaboration with physical therapy. Upon arrival, pt was supine in bed and agreeable to activity. No family was present during session. Pt benefited from additional time, verbal cues to maximize participation.  Pt benefited from one-two person  assist to complete dynamic tasks due to loss of balance w/ static standing , lower extremities with poor coordination when taking steps to commode.   Pt aware of NWB - assist to manage throughout. Pt aware of ROM as tolerated for RUE --pt guarding extremity during most ADL tasks.     EDUCATION PROVIDED  Patient: Role of Occupational Therapy; Plan of Care; Functional Transfer Techniques; Compensatory ADL Techniques  Patient's Response to Education: Verbalized Understanding; Returned Demonstration    The patient's Approx Degree of Impairment: 53.32% has been calculated based on documentation in the [facility] '6 clicks' Inpatient Daily Activity Short Form.  Research supports that patients with this level of impairment may benefit from rehab.  Final disposition will be made by interdisciplinary medical team.     Patient End of Session: Up in chair;Needs met;Call light within reach;RN aware of session/findings    OT Goals  Patients self stated goal is:home     Patient will complete functional transfer with SBA  Comment:     Patient will complete toileting with Sba  Comment:     Patient will tolerate standing for 3 minutes in prep for adls with sba   Comment:    Patient will complete item retrieval with sba  Comment:          Goals  on: 24  Frequency: 3x/w    Patient Evaluation Complexity Level:   Occupational Profile/Medical History MODERATE - Expanded review of history including review of medical or therapy record   Specific performance deficits impacting engagement in ADL/IADL MODERATE  3 - 5 performance deficits   Client Assessment/Performance Deficits MODERATE - Comorbidities and min to mod modifications of tasks    Clinical Decision Making MODERATE - Analysis of occupational profile, detailed assessments, several treatment options    Overall Complexity MODERATE     OT Session Time: 25 minutes  Self-Care Home Management: 15 minutes  Therapeutic Activity: 10 minutes

## 2024-04-15 NOTE — CM/SW NOTE
Department  notified of request for HHC, aidin referrals started. Assigned CM/SW to follow up with pt/family on further discharge planning.     Amy Solano, DSC

## 2024-04-15 NOTE — CONSULTS
Orthopaedic Surgery Inpatient Consult Note  _______________________________________________________________________________________________________________  _______________________________________________________________________________________________________________    Dennys Manzano Patient Status:  Inpatient    1935 MRN [id number]   [facility] 4W//SE Attending Abeba Davis MD   Hosp Day # 0 PCP CALOS FITZGERALD MD     DATE OF CONSULT: 4/15/2024   DATE OF ADMISSION: 2024      REASON FOR CONSULT: Right shoulder pain  CONSULTING PROVIDER: Dr. Davis    HISTORY OF PRESENT ILLNESS: Dennys Manzano is a 88 year old male who presents through consultation to the Orthopaedic surgery service for right shoulder pain and effusion for the past 2 days.  He reports that in the past week, he had a GI illness and diarrhea and this continued to cause him to feel fatigued and dehydrated into this last weekend.  On Saturday, he appreciated onset of worsening right shoulder pain, becoming severe enough on  to present to the emergency department.  He is no longer able to move his shoulder due to pain.  He has intermittent diaphoresis, but denies fevers at home.  He has baseline neurologic symptoms secondary to reflex sympathetic dystrophy in the right upper extremity.  He denies any recent trauma    SOCIAL HISTORY  Social History     Socioeconomic History    Marital status:      Spouse name: Not on file    Number of children: Not on file    Years of education: Not on file    Highest education level: Not on file   Occupational History    Not on file   Tobacco Use    Smoking status: Former     Current packs/day: 0.00     Types: Cigarettes     Quit date: 1965     Years since quittin.3    Smokeless tobacco: Never   Vaping Use    Vaping status: Never Used   Substance and Sexual Activity    Alcohol use: Not Currently     Comment: rarely    Drug use: No    Sexual activity: Not on file    Other Topics Concern    Not on file   Social History Narrative    Not on file     Social Determinants of Health     Financial Resource Strain: Not on file   Food Insecurity: No Food Insecurity (4/15/2024)    Food Insecurity     Food Insecurity: Never true   Transportation Needs: No Transportation Needs (4/15/2024)    Transportation Needs     Lack of Transportation: No   Physical Activity: Not on file   Stress: Not on file   Social Connections: Not on file   Housing Stability: Low Risk  (4/15/2024)    Housing Stability     Housing Instability: No     Housing Instability Emergency: Not on file        PAST MEDICAL HISTORY  Past Medical History:    Anxiety state    Arrhythmia    Arthritis    Asbestos exposure    lung    Atherosclerosis of coronary artery    Bleeding tendency (HCC)    Blood disorder    BPH (benign prostatic hyperplasia)    Colitis    Congestive heart disease (HCC)    Coronary atherosclerosis    Deep vein thrombosis (HCC)    Depression    Diabetes (HCC)    Diverticulosis of large intestine    Esophageal reflux    Fibromyalgia    Ganglion, finger joint of right hand    Right middle finger excision of painful ganglion, rotator flap closure    Gout    Hearing impairment    Heart attack (HCC)    Heart disease    Heart valve disease    High blood pressure    High cholesterol    History of blood clots    legs & lungs    Hyperlipidemia    IBS (irritable bowel syndrome)    Incontinence    Malignant hyperthermia    Muscle weakness    Neuropathy    Osteoarthritis    PE (pulmonary embolism)    Peripheral vascular disease (HCC)    Pulmonary embolism (HCC)    RSD (reflex sympathetic dystrophy)    right arm, little in the left    Screening PSA (prostate specific antigen)    Shortness of breath    Thrombophlebitis    Visual impairment    glasses for reading        PAST SURGICAL HISTORY  Past Surgical History:   Procedure Laterality Date    Cabg      Cardiac pacemaker placement      Cath percutaneous  transluminal  coronary angioplasty      Foot surgery      right     Inguinal herniorrhaphy      Ir ivc filter placement      for blood clots    Other surgical history  2013    heart bypass and stent    Other surgical history      basal cell carcinoma    Shoulder arthroscopy  1994(?)    Right rotator cuff        MEDICATIONS  * Reviewed  Medications Prior to Admission   Medication Sig    polyethylene glycol, PEG 3350, 17 g Oral Powd Pack Take 17 g by mouth daily.    warfarin (JANTOVEN) 10 MG Oral Tab Take 8.5 mg by mouth nightly.    Calcium-Magnesium-Vitamin D (CALCIUM 1200+D3 OR) Take 1 capsule by mouth daily.    fluticasone propionate 50 MCG/ACT Nasal Suspension 1 spray(s)    metFORMIN 500 MG Oral Tab Take 1 tablet (500 mg total) by mouth 2 (two) times daily.    metoprolol succinate 25 MG Oral Tablet 24 Hr     montelukast 10 MG Oral Tab     Senna-Docusate Sodium 8.6-50 MG Oral Tab Take 1 tablet by mouth 2 (two) times daily.    Pantoprazole Sodium 40 MG Oral Tab EC Take 1 tablet (40 mg total) by mouth every morning before breakfast.    Escitalopram Oxalate (LEXAPRO) 20 MG Oral Tab Take 1 tablet (20 mg total) by mouth daily.    tamsulosin HCl (FLOMAX) 0.4 MG Oral Cap Take 1 capsule (0.4 mg total) by mouth daily with dinner.    aspirin (BABY ASPIRIN) 81 MG Oral Chew Tab Chew 1 tablet (81 mg total) by mouth daily.    atorvastatin 40 MG Oral Tab Take 1 tablet (40 mg total) by mouth nightly.    Ascorbic Acid (VITAMIN C OR) Take by mouth.    Ergocalciferol (VITAMIN D OR) Take by mouth.    Multiple Vitamins-Minerals (CENTRUM SILVER OR) Take  by mouth.    finasteride (PROSCAR) 5 MG Oral Tab Take 1 tablet (5 mg total) by mouth daily.    STIOLTO RESPIMAT 2.5-2.5 MCG/ACT Inhalation Aero Soln  (Patient not taking: Reported on 4/15/2024)        ALLERGIES  Allergies   Allergen Reactions    Heparin SWELLING     Arms swelling    Nitrofurantoin NAUSEA AND VOMITING    Heparin OTHER (SEE COMMENTS)    Tetanus Immune Globulin     Tetanus Toxoids  UNKNOWN    Amoxicillin-Pot Clavulanate DIARRHEA    Azithromycin DIARRHEA        FAMILY HISTORY  Denies family hx anesthesia rxns  Family History   Problem Relation Age of Onset    Cancer Mother     Stroke Mother     Diabetes Other         Granddaughter has DM        REVIEW OF SYSTEMS  A 14 point review of systems was performed. Pertinent positives and negatives noted in the HPI.    PHYSICAL EXAM  /62   Pulse 76   Temp 97.1 °F (36.2 °C)   Resp 20   Wt 220 lb (99.8 kg)   SpO2 92%   BMI 29.84 kg/m²      Constitutional: The patient is well-developed, well-nourished, in no acute distress.  Neurological: Alert and oriented to person, place, and time.  Psychiatric: Mood and affect normal.  Head: Normocephalic and atraumatic.  Cardiovascular: regular rate by palpation  Pulmonary/Chest: Effort normal. No respiratory distress. Breathing non-labored  Abdominal: Abdomen exhibits no distension.   Right  Shoulder  Inspection/Palpation  No readily apparent visual abnormalities of shoulder.   No ecchymosis or erythema  Moderate effusion   Diffusely tender to palpation   ROM  Forward Flexion: 0-30 degrees  Abduction: 0-20 degrees  Internal Rotation: 50 degrees  External Rotation: 10 degrees  Strength  Not able to participate  Motor intact AIN/PIN/U/Ax/MSC  SILT but altered R/U/M/MSC/axillary  Radial pulse 2+, distally warm and well perfused, brisk capillary refill          LAB RESULTS  Lab Results   Component Value Date    WBC 18.0 (H) 04/14/2024    HGB 14.0 04/14/2024    HCT 39.6 04/14/2024    .0 04/14/2024    CREATSERUM 0.84 04/14/2024    BUN 15 04/14/2024     (L) 04/14/2024    K 4.3 04/14/2024    CL 97 (L) 04/14/2024    CO2 27.0 04/14/2024     (H) 04/14/2024    CA 9.5 04/14/2024    ALB 4.1 04/14/2024    ALKPHO 79 04/14/2024    BILT 1.1 04/14/2024    TP 7.4 04/14/2024    AST 21 04/14/2024    ALT 24 04/14/2024    PTT 41.0 (H) 04/14/2024    INR 3.00 (H) 04/14/2024    TSH 1.470 08/30/2023    LIP 26  04/14/2024    ESRML 111 (H) 04/14/2024    CRP 13.9 (H) 02/10/2018    MG 1.8 03/19/2023    PHOS 3.1 08/23/2021    TROP <0.045 08/18/2021     03/23/2021       CRP 13.9    IMAGING  I independently viewed and interpreted the imaging. Radiologist interpretation is available in the imaging report.  X-ray: Plain films of the right  shoulder including AP, Grashey, Axillary, and Scapular Y were reviewed. These films demonstrate no acute osseous abnormalities. The humerus is centered on the glenoid and there are mild degenerative changes in the glenohumeral joint and severe degenerative changes in the acromioclavicular joint space.       No results found.  EKG    Result Date: 4/14/2024  AV dual-paced rhythm Abnormal ECG No previous ECGs found in [facility]     IMPRESSION: Dennys Manzano is a 88 year old male who presents through consultation to the Orthopaedic surgery service for right shoulder pain and effusion with elevated inflammatory markers concerning for septic arthritis versus crystal arthropathy in the setting of recent dehydration.  At bedside today, we performed an aspiration of the right shoulder joint and sent these for cultures, cell count, and Gram stain    Discussed the history, physical exam, treatment to date, and reviewed relevant imaging an studies with the patient.    RECOMMENDATIONS: Bedside aspiration performed.  Recommend close follow-up on these results with plan for possible operative management depending on the results  SURGICAL PLANS: Pending aspiration results.  If consistent with septic arthritis, we will plan to perform shoulder arthroscopy today versus tomorrow.  If consistent with crystalline arthropathy, it would still be reasonable to consider washout for pain management, but not necessary  ABX: Per primary team  PAIN: Orals, icing, activity limitation  DIET: NPO  DVT PPX: Per primary team, okay to continue  DISPO: Pending culture results  MOBILITY: OOBTC  WEIGHT BEARING STATUS:  Non-weight bearing  RANGE-OF-MOTION LIMITATIONS: As tolerated  IMAGING: No additional imaging needed at this time    I have personally seen Dennys Manzano and discussed in detail their plan of care. Thank you for allowing me to participate in the care of your patient.    Gregorio Calle MD  Orthopaedic Surgery  4/15/2024

## 2024-04-15 NOTE — ED QUICK NOTES
Rounding Completed    Plan of Care reviewed. Waiting for CT.  Elimination needs assessed.  Provided plan of care update.    Bed is locked and in lowest position. Call light within reach.

## 2024-04-15 NOTE — ED QUICK NOTES
Orders for admission, patient is aware of plan and ready to go upstairs. Any questions, please call ED KAMARI Herbert at extension [phone].     Patient Covid vaccination status: Fully vaccinated     COVID Test Ordered in ED: $$$$Respiratory Flu Expanded Panel + Covid-19$$$$    COVID Suspicion at Admission: N/A    Running Infusions:  None    Mental Status/LOC at time of transport: A&Ox3-4    Other pertinent information:   CIWA score: N/A   NIH score:  N/A

## 2024-04-15 NOTE — ED PROVIDER NOTES
Patient Seen in: [facility] Emergency Department      History     Chief Complaint   Patient presents with    Abdomen/Flank Pain    Nausea/Vomiting/Diarrhea     Stated Complaint: N/V/D x2days    Subjective:   HPI    Patient is an 88-year-old male who presents with diarrhea that started yesterday.  Positive nausea with vomiting today.  He also started having severe right shoulder pain that was worse with movement.  He does ambulate with a walker but no recent falls.  He also feels short of breath and had a pulse ox of 85% at home.  Positive subjective fevers.  No cough.  No abdominal pain.  No chest pain.    Objective:   No pertinent past medical history.            No pertinent past surgical history.              No pertinent social history.            Review of Systems    Positive for stated complaint: N/V/D x2days  Other systems are as noted in HPI.  Constitutional and vital signs reviewed.      All other systems reviewed and negative except as noted above.    Physical Exam     ED Triage Vitals [04/14/24 2156]   /79   Pulse 72   Resp 20   Temp 97.1 °F (36.2 °C)   Temp src    SpO2 93 %   O2 Device Nasal cannula       Current:/80   Pulse 81   Temp 97.1 °F (36.2 °C)   Resp 23   SpO2 93%         Physical Exam  GENERAL: painful distress, awake and alert  HEENT: EOMI, PERRL  Neck: supple  CV: RRR, no murmurs  Resp: CTAB, no wheezes or retractions  Ab: soft, nontender, no distension, negative murphys  Extremities: TTP diffusely over right shoulder, minimal ROM. No effusion or deformity  Neuro: CN intact, normal speech, no focal deficits  SKIN: warm, dry, no rashes      ED Course     Labs Reviewed   BASIC METABOLIC PANEL (8) - Abnormal; Notable for the following components:       Result Value    Glucose 197 (*)     Sodium 128 (*)     Chloride 97 (*)     Calculated Osmolality 272 (*)     All other components within normal limits   HEPATIC FUNCTION PANEL (7) - Abnormal; Notable for the following  components:    Bilirubin, Direct 0.5 (*)     All other components within normal limits   PROTHROMBIN TIME (PT) - Abnormal; Notable for the following components:    PT 33.0 (*)     INR 3.00 (*)     All other components within normal limits   PTT, ACTIVATED - Abnormal; Notable for the following components:    PTT 41.0 (*)     All other components within normal limits   SED RATE, WESTERGREN (AUTOMATED) - Abnormal; Notable for the following components:    Sed Rate 111 (*)     All other components within normal limits   CBC W/ DIFFERENTIAL - Abnormal; Notable for the following components:    WBC 18.0 (*)     Neutrophil Absolute Prelim 15.10 (*)     Neutrophil Absolute 15.10 (*)     Monocyte Absolute 1.66 (*)     All other components within normal limits   LIPASE - Normal   LACTIC ACID, PLASMA - Normal   TROPONIN I HIGH SENSITIVITY - Normal   SARS-COV-2/FLU A AND B/RSV BY PCR (GENEXPERT) - Normal    Narrative:     This test is intended for the qualitative detection and differentiation of SARS-CoV-2, influenza A, influenza B, and respiratory syncytial virus (RSV) viral RNA in nasopharyngeal or nares swabs from individuals suspected of respiratory viral infection consistent with COVID-19 by their healthcare provider. Signs and symptoms of respiratory viral infection due to SARS-CoV-2, influenza, and RSV can be similar.    Test performed using the Xpert Xpress SARS-CoV-2/FLU/RSV (real time RT-PCR)  assay on the GeneXpert instrument, [employer], [place] [address].   This test is being used under the Food and Drug Administration's Emergency Use Authorization.    The authorized Fact Sheet for Healthcare Providers for this assay is available upon request from the laboratory.   CBC WITH DIFFERENTIAL WITH PLATELET    Narrative:     The following orders were created for panel order CBC With Differential With Platelet.  Procedure                               Abnormality         Status                     ---------                                -----------         ------                     CBC W/ DIFFERENTIAL[[id number]]          Abnormal            Final result                 Please view results for these tests on the individual orders.   URINALYSIS WITH CULTURE REFLEX   BLOOD CULTURE   BLOOD CULTURE   RESPIRATORY FLU EXPAND PANEL + COVID-19     EKG    Rate, intervals and axes as noted on EKG Report.  Rate: 81  Rhythm: paced             MDM      Medical Decision Making  Ddx: viral syndrome, pneumonia, sepsis, cholecystitis    Patient remained stable on nasal cannula oxygen.  Unclear etiology for patient's hypoxia other than possible pulmonary hypertension.  Respiratory panel pending.  Labs remarkable for hyponatremia and patient started on IV fluids.  Patient also has significant leukocytosis.  Blood cultures and urine culture pending.  Patient's primary symptom is significant right shoulder pain.  He does not have any erythema, swelling or effusion but has significant pain with movement or palpation that is uncontrolled with narcotic pain medication.  Discussed with daughter possibility of septic joint and will start broad-spectrum antibiotics at this time pending orthopedic evaluation.  Plan for admission at this time.    Amount and/or Complexity of Data Reviewed  Independent Historian:      Details: Daughter provides history  External Data Reviewed: labs.     Details: Labs 3/2024 reviewed  Labs: ordered.  Radiology: ordered.     Details: XR right shoulder 3 views  XR portable chest 2view(s)    IMPRESSION:    Right shoulder:  No acute fracture or malalignment.  Mild degenerative changes at the acromioclavicular joint.    Chest:  Small lung volumes bilaterally.    Streaky opacities in the perihilar and lower left lung, unchanged, compatible with atelectasis or scarring.  Blunting of the costophrenic sulci bilaterally compatible with trace pleural effusions.  This appears unchanged.  No change in cardiomediastinal contours.  Postop changes of  CABG noted .  Suspected fractured sternal wires.    Report sent at 02:20 AM ET    Triston Colbert MD    CT chest with contrast  CT abdomen and pelvis with contrast      IMPRESSION:    Chest:  No pulmonary embolism.  Mildly enlarged pulmonary trunk measuring up to 35 mm in diameter compatible with pulmonary hypertension.  Diffuse calcification in the thoracic aorta without aneurysm or dissection.  Postop changes of CABG.  Heart size within normal limits.  No pericardial effusion.  No mediastinal lymphadenopathy.  Lung volumes are small bilaterally.  There is patchy atelectasis in the lung bases bilaterally.  There is calcific pleural thickening noted bilaterally.  No pleural effusion.  Mild thoracic kyphosis.      Abdomen/pelvis:  The liver, spleen, gallbladder, pancreas, adrenals, and kidneys appear normal.  Diffuse calcification throughout the abdominal aorta without aneurysm or dissection.  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  IVC filter in place.  The stomach, small bowel, and appendix appear normal.  The colon appears normal.  No ascites.  The prostate is mildly enlarged.    Mcelroy catheter in the urinary bladder.  The bladder is decompressed.  Moderate height loss of the L1 vertebral body without evidence of acute fracture.  Mild retropulsion of the posterior endplate at L1 resulting in mild to moderate central canal stenosis.  Advanced degenerative changes in the lumbar spine.      Discussion of management or test interpretation with external provider(s): D/w dr Davis, requests orthopedic consultation  D/w dr Calle    Risk  Parenteral controlled substances.  Decision regarding hospitalization.    Critical Care  Total time providing critical care: 60 minutes        Disposition and Plan     Clinical Impression:  1. Hyponatremia    2. Leukocytosis, unspecified type    3. Acute pain of right shoulder    4. Hypoxia         Disposition:  Admit  4/15/2024  4:10 am    Follow-up:  No  follow-up provider specified.  We recommend that you schedule follow up care with a primary care provider within the next three months to obtain basic health screening including reassessment of your blood pressure.      Medications Prescribed:  Current Discharge Medication List                            Hospital Problems       Present on Admission  Date Reviewed: 11/29/2022            ICD-10-CM Noted POA    * (Principal) Hyponatremia E87.1 4/15/2024 Unknown

## 2024-04-15 NOTE — CM/SW NOTE
CM requested department  ([facility]) to initiate [facility] referral for HHC. SW/CM will continue to follow.     PT/OT eval pending     Susan Jones RN, BSN  Nurse   [phone]

## 2024-04-15 NOTE — PROCEDURES
Procedure Note Joint Aspiration  _______________________________________________________________________________________________________________  Patient Name: Dennys Manzano   Date: 4/15/2024     Based on history, physical exam, and available diagnostic testing, the patient diagnosis appears consistent with intraarticular pathology. We discussed the use of an aspiration for diagnostic purposes. The risks, benefits, and potential complications of aspiration were discussed in detail. The risks include, but are not limited to: pain, infection, bleeding/hematoma, and possible need for further aspirations or subsequent surgical intervention. The patient verbalized an understanding and agrees to the injection(s).     Under sterile prep, approximately:  27 mL purulent fluid     was aspirated from: Right  shoulder joint     This procedure was performed without ultrasound guidance    This procedure was well tolerated. Fluid was labelled and sent in a specimen cup for analysis    The patient was not sedated and fully conscious for the injection.  Prior to the injection, verification followed the Universal Protocol in the following manner:  [X] A \"Time Out\" was performed prior to the procedure to confirm patient identification, injection site, and preparation of equipment.  [X] The patient was identified using two patient identifiers.  [X] The procedure site was appropriately prepped    Gregorio Calle MD  Orthopaedic Surgery  4/15/2024

## 2024-04-15 NOTE — PLAN OF CARE
Problem: Patient Centered Care  Goal: Patient preferences are identified and integrated in the patient's plan of care  Description: Interventions:  - What would you like us to know as we care for you? Patient is from home with family and he has a daughter that works here who is his support system.  Patient is hard of hearing.  - Provide timely, complete, and accurate information to patient/family  - Incorporate patient and family knowledge, values, beliefs, and cultural backgrounds into the planning and delivery of care  - Encourage patient/family to participate in care and decision-making at the level they choose  - Honor patient and family perspectives and choices  Outcome: Progressing     Problem: Patient/Family Goals  Goal: Patient/Family Long Term Goal  Description: Patient's Long Term Goal: Pain on right shoulder will be resolved and will be able to walk well with walker.    Interventions:  - No weight bearing on right arm till surgeon says so.  - Home health PT/OT as ordered.  - Pain management with ,oral medication.  - Monitor right shoulder for swelling or increasing pain or weakness.  - Follow up with surgery as recommended.  - See additional Care Plan goals for specific interventions  Outcome: Progressing  Goal: Patient/Family Short Term Goal  Description: Patient's Short Term Goal: Home with [facility] when pain is resolved.    Interventions:   - NWB to right arm.  - PT/OT as ordered.  - Administer IV antibiotics as ordered.  - Administer oral antibiotics as ordered.  - Maintain enteric/contact isolation as ordered.  - Out of bed as much as tolerated.  - Administer oral anticoagulation as ordered, SCD's to both legs as well.  - See additional Care Plan goals for specific interventions  Outcome: Progressing     Problem: PAIN - ADULT  Goal: Verbalizes/displays adequate comfort level or patient's stated pain goal  Description: INTERVENTIONS:  - Encourage pt to monitor pain and request assistance  - Assess pain  using appropriate pain scale  - Administer analgesics based on type and severity of pain and evaluate response  - Implement non-pharmacological measures as appropriate and evaluate response  - Consider cultural and social influences on pain and pain management  - Manage/alleviate anxiety  - Utilize distraction and/or relaxation techniques  - Monitor for opioid side effects  - Notify MD/LIP if interventions unsuccessful or patient reports new pain  - Anticipate increased pain with activity and pre-medicate as appropriate  Outcome: Progressing     Problem: RISK FOR INFECTION - ADULT  Goal: Absence of fever/infection during anticipated neutropenic period  Description: INTERVENTIONS  - Monitor WBC  - Administer growth factors as ordered  - Implement neutropenic guidelines  Outcome: Progressing     Problem: SAFETY ADULT - FALL  Goal: Free from fall injury  Description: INTERVENTIONS:  - Assess pt frequently for physical needs  - Identify cognitive and physical deficits and behaviors that affect risk of falls.  - [place] fall precautions as indicated by assessment.  - Educate pt/family on patient safety including physical limitations  - Instruct pt to call for assistance with activity based on assessment  - Modify environment to reduce risk of injury  - Provide assistive devices as appropriate  - Consider OT/PT consult to assist with strengthening/mobility  - Encourage toileting schedule  Outcome: Progressing     Problem: DISCHARGE PLANNING  Goal: Discharge to home or other facility with appropriate resources  Description: INTERVENTIONS:  - Identify barriers to discharge w/pt and caregiver  - Include patient/family/discharge partner in discharge planning  - Arrange for needed discharge resources and transportation as appropriate  - Identify discharge learning needs (meds, wound care, etc)  - Arrange for interpreters to assist at discharge as needed  - Consider post-discharge preferences of patient/family/discharge  partner  - Complete POLST form as appropriate  - Assess patient's ability to be responsible for managing their own health  - Refer to Case Management Department for coordinating discharge planning if the patient needs post-hospital services based on physician/LIP order or complex needs related to functional status, cognitive ability or social support system  Outcome: Progressing     Problem: CARDIOVASCULAR - ADULT  Goal: Maintains optimal cardiac output and hemodynamic stability  Description: INTERVENTIONS:  - Monitor vital signs, rhythm, and trends  - Monitor for bleeding, hypotension and signs of decreased cardiac output  - Evaluate effectiveness of vasoactive medications to optimize hemodynamic stability  - Monitor arterial and/or venous puncture sites for bleeding and/or hematoma  - Assess quality of pulses, skin color and temperature  - Assess for signs of decreased coronary artery perfusion - ex. Angina  - Evaluate fluid balance, assess for edema, trend weights  Outcome: Progressing  Goal: Absence of cardiac arrhythmias or at baseline  Description: INTERVENTIONS:  - Continuous cardiac monitoring, monitor vital signs, obtain 12 lead EKG if indicated  - Evaluate effectiveness of antiarrhythmic and heart rate control medications as ordered  - Initiate emergency measures for life threatening arrhythmias  - Monitor electrolytes and administer replacement therapy as ordered  Outcome: Progressing     Problem: RESPIRATORY - ADULT  Goal: Achieves optimal ventilation and oxygenation  Description: INTERVENTIONS:  - Assess for changes in respiratory status  - Assess for changes in mentation and behavior  - Position to facilitate oxygenation and minimize respiratory effort  - Oxygen supplementation based on oxygen saturation or ABGs  - Provide Smoking Cessation handout, if applicable  - Encourage broncho-pulmonary hygiene including cough, deep breathe, Incentive Spirometry  - Assess the need for suctioning and perform as  needed  - Assess and instruct to report SOB or any respiratory difficulty  - Respiratory Therapy support as indicated  - Manage/alleviate anxiety  - Monitor for signs/symptoms of CO2 retention  Outcome: Progressing     Problem: GASTROINTESTINAL - ADULT  Goal: Minimal or absence of nausea and vomiting  Description: INTERVENTIONS:  - Maintain adequate hydration with IV or PO as ordered and tolerated  - Nasogastric tube to low intermittent suction as ordered  - Evaluate effectiveness of ordered antiemetic medications  - Provide nonpharmacologic comfort measures as appropriate  - Advance diet as tolerated, if ordered  - Obtain nutritional consult as needed  - Evaluate fluid balance  Outcome: Progressing  Goal: Maintains or returns to baseline bowel function  Description: INTERVENTIONS:  - Assess bowel function  - Maintain adequate hydration with IV or PO as ordered and tolerated  - Evaluate effectiveness of GI medications  - Encourage mobilization and activity  - Obtain nutritional consult as needed  - Establish a toileting routine/schedule  - Consider collaborating with pharmacy to review patient's medication profile  Outcome: Progressing     Problem: GENITOURINARY - ADULT  Goal: Absence of urinary retention  Description: INTERVENTIONS:  - Assess patient’s ability to void and empty bladder  - Monitor intake/output and perform bladder scan as needed  - Follow urinary retention protocol/standard of care  - Consider collaborating with pharmacy to review patient's medication profile  - Implement strategies to promote bladder emptying  Outcome: Progressing     Problem: METABOLIC/FLUID AND ELECTROLYTES - ADULT  Goal: Electrolytes maintained within normal limits  Description: INTERVENTIONS:  - Monitor labs and rhythm and assess patient for signs and symptoms of electrolyte imbalances  - Administer electrolyte replacement as ordered  - Monitor response to electrolyte replacements, including rhythm and repeat lab results as  appropriate  - Fluid restriction as ordered  - Instruct patient on fluid and nutrition restrictions as appropriate  Outcome: Progressing     Problem: MUSCULOSKELETAL - ADULT  Goal: Return mobility to safest level of function  Description: INTERVENTIONS:  - Assess patient stability and activity tolerance for standing, transferring and ambulating w/ or w/o assistive devices  - Assist with transfers and ambulation using safe patient handling equipment as needed  - Ensure adequate protection for wounds/incisions during mobilization  - Obtain PT/OT consults as needed  - Advance activity as appropriate  - Communicate ordered activity level and limitations with patient/family  Outcome: Progressing  Goal: Maintain proper alignment of affected body part  Description: INTERVENTIONS:  - Support and protect limb and body alignment per provider's orders  - Instruct and reinforce with patient and family use of appropriate assistive device and precautions (e.g. spinal or hip dislocation precautions)  Outcome: Progressing     Problem: Impaired Activities of Daily Living  Goal: Achieve highest/safest level of independence in self care  Description: Interventions:  - Assess ability and encourage patient to participate in ADLs to maximize function  - Promote sitting position while performing ADLs such as feeding, grooming, and bathing  - Educate and encourage patient/family in tolerated functional activity level and precautions during self-care  - Encourage patient to incorporate impaired side during daily activities to promote function  Outcome: Progressing    Patient is alert and oriented, hard pf hearing but aware to call for help as needed.  Patient is currently on 3LPM of O2 via NC, denies shortness of breathing nor chest pain.  Patient is tolerating pain on his right shoulder at this time.  Patient has a valladares catheter draining to yellow colored urine.  Patient did not have any bowel movement today.  Patient is currently on  enteric/contact isolation r/t c.diff for stool pending.  Patient will be going home with [facility] when stable.

## 2024-04-15 NOTE — H&P
[facility]  part of [facility]    History & Physical    Dennys Manzano Patient Status:  Emergency    1935 MRN [id number]   Location [facility] EMERGENCY DEPARTMENT Attending Ana Freeman MD   Hosp Day # 0 PCP CALOS FITZGERALD MD     Date:  4/15/2024  Date of Admission:  2024    Chief Complaint:  Chief Complaint   Patient presents with    Abdomen/Flank Pain    Nausea/Vomiting/Diarrhea       History of Present Illness:  Dennys Manzano is a(n) 88 year old male, with a past medical history significant for coronary artery disease status post CABG, CHF, COPD, PE diabetes along with right-sided repair for complete rotator cuff tear complicated by RSD presents with a complaint of acute onset right shoulder pain, claims he is unable to move his shoulder at all secondary to pain also experiencing weakness at this time.  Rates his pain as a 10 out of 10 in severity nonradiating in nature denies any associated trauma to the affected area.   He has felt diaphoretic at times however no documented fevers, also has been experiencing increasing shortness of breath particular with exertion.  Recently had viral gastroenteritis that has since improved.  Patient extremely somnolent this time after having received multiple doses of morphine for pain control    History:  Past Medical History:    Anxiety state    Arrhythmia    Arthritis    Asbestos exposure    lung    Atherosclerosis of coronary artery    Bleeding tendency (HCC)    Blood disorder    BPH (benign prostatic hyperplasia)    Colitis    Congestive heart disease (HCC)    Coronary atherosclerosis    Deep vein thrombosis (HCC)    Depression    Diabetes (HCC)    Diverticulosis of large intestine    Esophageal reflux    Fibromyalgia    Ganglion, finger joint of right hand    Right middle finger excision of painful ganglion, rotator flap closure    Gout    Hearing impairment    Heart attack (HCC)    Heart disease    Heart valve disease     High blood pressure    High cholesterol    History of blood clots    legs & lungs    Hyperlipidemia    IBS (irritable bowel syndrome)    Incontinence    Malignant hyperthermia    Muscle weakness    Neuropathy    Osteoarthritis    PE (pulmonary embolism)    Peripheral vascular disease (HCC)    Pulmonary embolism (HCC)    RSD (reflex sympathetic dystrophy)    right arm, little in the left    Screening PSA (prostate specific antigen)    Shortness of breath    Thrombophlebitis    Visual impairment    glasses for reading     Past Surgical History:   Procedure Laterality Date    Cabg      Cardiac pacemaker placement      Cath percutaneous  transluminal coronary angioplasty      Foot surgery      right     Inguinal herniorrhaphy      Ir ivc filter placement      for blood clots    Other surgical history  2013    heart bypass and stent    Other surgical history      basal cell carcinoma    Shoulder arthroscopy  1994(?)    Right rotator cuff     Family History   Problem Relation Age of Onset    Cancer Mother     Stroke Mother     Diabetes Other         Granddaughter has DM      reports that he quit smoking about 59 years ago. His smoking use included cigarettes. He has never used smokeless tobacco. He reports that he does not currently use alcohol. He reports that he does not use drugs.    Allergies:  Allergies   Allergen Reactions    Heparin SWELLING     Arms swelling    Nitrofurantoin NAUSEA AND VOMITING    Heparin OTHER (SEE COMMENTS)    Tetanus Immune Globulin     Tetanus Toxoids UNKNOWN    Amoxicillin-Pot Clavulanate DIARRHEA    Azithromycin DIARRHEA       Home Medications:  Prior to Admission Medications   Prescriptions Last Dose Informant Patient Reported? Taking?   Ascorbic Acid (VITAMIN C OR)   Yes No   Sig: Take  by mouth.   Patient not taking: Reported on 3/18/2023   Calcium-Magnesium-Vitamin D (CALCIUM 1200+D3 OR)   Yes No   Sig: Take 1 capsule by mouth daily.   Ergocalciferol (VITAMIN D OR)   Yes No   Sig: Take   by mouth.   Patient not taking: Reported on 3/18/2023   Escitalopram Oxalate (LEXAPRO) 20 MG Oral Tab   Yes No   Sig: Take 1 tablet (20 mg total) by mouth daily.   Multiple Vitamins-Minerals (CENTRUM SILVER OR)   Yes No   Sig: Take  by mouth.   Pantoprazole Sodium 40 MG Oral Tab EC   No No   Sig: Take 1 tablet (40 mg total) by mouth every morning before breakfast.   STIOLTO RESPIMAT 2.5-2.5 MCG/ACT Inhalation Aero Soln   Yes No   Senna-Docusate Sodium 8.6-50 MG Oral Tab   Yes No   Sig: Take 1 tablet by mouth 2 (two) times daily.   aspirin (BABY ASPIRIN) 81 MG Oral Chew Tab   Yes No   Sig: Chew 1 tablet (81 mg total) by mouth daily.   atorvastatin 40 MG Oral Tab   Yes No   Sig: Take 1 tablet (40 mg total) by mouth nightly.   finasteride (PROSCAR) 5 MG Oral Tab   Yes No   Sig: Take 1 tablet (5 mg total) by mouth daily.   fluticasone propionate 50 MCG/ACT Nasal Suspension   Yes No   Si spray(s)   metFORMIN 500 MG Oral Tab   Yes No   Sig: Take 1 tablet (500 mg total) by mouth 2 (two) times daily.   metoprolol succinate 25 MG Oral Tablet 24 Hr   Yes No   montelukast 10 MG Oral Tab   Yes No   tamsulosin HCl (FLOMAX) 0.4 MG Oral Cap   Yes No   Sig: Take 1 capsule (0.4 mg total) by mouth daily with dinner.   warfarin (JANTOVEN) 10 MG Oral Tab   Yes No   Sig: Take 8.5 mg by mouth nightly.      Facility-Administered Medications: None       Review of Systems:  Constitutional:  Weakness, Fatigue.  Eye:  Negative.  Ear/Nose/Mouth/Throat: Hard of hearing  Respiratory: Shortness of breath  Cardiovascular: Negative  Gastrointestinal: Diarrhea  Genitourinary:  Negative  Endocrine:  Negative.  Immunologic:  Negative.  Musculoskeletal: Right shoulder pain  Integumentary:  Negative.  Neurologic:  Negative.  Psychiatric:  Negative.  ROS reviewed as documented in chart    Physical Exam:  Temp:  [97.1 °F (36.2 °C)] 97.1 °F (36.2 °C)  Pulse:  [72-86] 76  Resp:  [19-23] 20  BP: (134-168)/(62-83) 134/62  SpO2:  [89 %-94 %] 92  %    General: Somnolent but arousable  Diffuse skin problem:  None.  Eye:  Pupils are equal, round and reactive to light, extraocular movements are intact, Normal conjunctiva.  HENT:  Normocephalic, oral mucosa is moist.  Head:  Normocephalic, atraumatic.  Neck:  Supple, non-tender, no carotid bruit, no jugular venous distention, no lymphadenopathy, no thyromegaly.  Respiratory: Poor air entry, occasional wheeze respirations are mildly-labored, breath sounds are equal, symmetrical chest wall expansion.  Cardiovascular:  Normal rate, regular rhythm, no murmur, no edema.  Gastrointestinal:  Soft, non-tender, non-distended, normal bowel sounds, no organomegaly.  Lymphatics:  No lymphadenopathy neck, axilla, groin.  Musculoskeletal: Decreased range of motion right arm secondary to pain, extremely limited range of motion  Feet:  Normal pulses.  Neurologic: Somnolent but arousable, no focal deficits, cranial nerves II-XII are grossly intact.  Cognition and Speech: Limited secondary to somnolence  Psychiatric:  Cooperative, appropriate mood & affect.      Laboratory Data:   Lab Results   Component Value Date    WBC 18.0 04/14/2024    HGB 14.0 04/14/2024    HCT 39.6 04/14/2024    .0 04/14/2024    CREATSERUM 0.84 04/14/2024    BUN 15 04/14/2024     04/14/2024    K 4.3 04/14/2024    CL 97 04/14/2024    CO2 27.0 04/14/2024     04/14/2024    CA 9.5 04/14/2024    ALB 4.1 04/14/2024    ALKPHO 79 04/14/2024    BILT 1.1 04/14/2024    TP 7.4 04/14/2024    AST 21 04/14/2024    ALT 24 04/14/2024    PTT 41.0 04/14/2024    INR 3.00 04/14/2024    LIP 26 04/14/2024    ESRML 111 04/14/2024       Imaging:  No results found.     Assessment and Plan:    Intractable right shoulder pain  Orthopedics consulted will need to rule out septic arthritis, antibiotics initiated, will continue vancomycin and Zosyn for now.  Use morphine as needed for pain.    Acute respiratory failure with hypoxia  Likely COPD exacerbation in  combination with atelectasis, will use DuoNebs every 6 hours and as needed, hold off on Solu-Medrol for now till septic arthritis ruled out.  O2 protocol initiated.  Use incentive spirometry    DVT/pulmonary embolism  INR elevated, will hold for now in view of possible arthrocentesis and surgery as well.  Repeat INR in AM.    Hyponatremia  Likely dehydration related, check urine for sodium and osmolality.  Will initiate IV fluids for now.    Uncontrolled diabetes  Hold oral hypoglycemics for now, will use sliding scale coverage as needed, check A1c.    BPH  Continue Flomax and finasteride    Prophylaxis  Anticoagulated on Coumadin    CODE STATUS  Full    Primary care physician  CALOS FITZGERALD MD    MDM: High, acute and severe exacerbation of chronic illness posing threat to life.  IV medications requiring close inpatient monitoring  75 minutes spent on this admission - examining patient, obtaining history, reviewing previous medical records, going over test results/imaging and discussing plan of care. All questions answered.     Disposition  Clinical course will dictate outcome      SEB HUSAIN MD  4/15/2024  5:28 AM

## 2024-04-15 NOTE — PHYSICAL THERAPY NOTE
PHYSICAL THERAPY EVALUATION - INPATIENT     Room Number: 429/429-A  Evaluation Date: 4/15/2024  Type of Evaluation: Initial   Physician Order: PT Eval and Treat    Presenting Problem: hyponatremia, recent GI virus, nausea, vomiting, diarrhea, R shoulder pain s/p bedside aspiration     Reason for Therapy: Mobility Dysfunction and Discharge Planning    PHYSICAL THERAPY ASSESSMENT   Patient is a 88 year old male admitted 4/14/2024 for hyponatremia, recent GI virus, nausea, vomiting, diarrhea, R shoulder pain s/p bedside aspiration. Prior to admission, patient's baseline is Mod I for ADLs/functional mobility with use of RW. Patient reports that his children assist as needed with household tasks, grocery shopping. Patient denies any recent falls, however, reports that mobility has been increasingly difficult recently. Patient is currently functioning below baseline with bed mobility, transfers, gait, maintaining seated position, standing prolonged periods, and performing household tasks.  Patient is requiring minimal assist, moderate assist, and maximum assist as a result of the following impairments: decreased functional strength, decreased endurance/aerobic capacity, pain, impaired sitting and standing balance, impaired coordination, decreased muscular endurance, medical status, limited RUE ROM, and increased O2 needs from baseline.  Physical Therapy will continue to follow for duration of hospitalization.    Patient will benefit from continued skilled PT Services to promote return to prior level of function and safety with continuous assistance and gradual rehabilitative therapy .    PLAN  PT Treatment Plan: Bed mobility;Body mechanics;Coordination;Endurance;Energy conservation;Patient education;Gait training;Strengthening;Range of motion;Transfer training;Balance training  Rehab Potential : Good  Frequency (Obs): 3-5x/week    PHYSICAL THERAPY MEDICAL/SOCIAL HISTORY     Problem List  Principal Problem:     Hyponatremia  Active Problems:    Leukocytosis, unspecified type    Acute pain of right shoulder    Hypoxia      HOME SITUATION  Home Situation  Type of Home: House  Home Layout: Two level;Able to live on main level  Stairs to Enter : 0  Drives: No  Patient Owned Equipment: Rolling walker  Patient Regularly Uses: Reading glasses     SUBJECTIVE  Agreeable     PHYSICAL THERAPY EXAMINATION   OBJECTIVE  Precautions: Limb alert - right;Bed/chair alarm;Hard of hearing (RUE RANGE-OF-MOTION LIMITATIONS: As tolerated)  Fall Risk: High fall risk    WEIGHT BEARING RESTRICTION  Weight Bearing Restriction: R upper extremity  R Upper Extremity: Non-Weight Bearing    PAIN ASSESSMENT  Rating: Unable to rate  Location: right shoulder  Management Techniques: Activity promotion;Body mechanics;Repositioning    COGNITION  Following Commands:  follows one step commands with increased time and follows one step commands with repetition    RANGE OF MOTION AND STRENGTH ASSESSMENT  Upper extremity ROM and strength are within functional limits - RUE limited due to pain  Lower extremity ROM is within functional limits   Lower extremity strength is within functional limits - generalized weakness, approx 3+/5    BALANCE  Static Sitting: Fair  Dynamic Sitting: Fair -  Static Standing: Poor +  Dynamic Standing: Poor    NEUROLOGICAL FINDINGS  Sensation: bilateral feet - neuropathy    ACTIVITY TOLERANCE  Pulse: 63  Heart Rate Source: Monitor     BP: 133/71 (139/76 mmHg sitting EOB)  BP Location: Left arm  BP Method: Automatic  Patient Position: Lying    O2 WALK  Oxygen Therapy  SPO2% on Oxygen at Rest: 96  At rest oxygen flow (liters per minute): 3    AM-PAC '6-Clicks' INPATIENT SHORT FORM - BASIC MOBILITY  How much difficulty does the patient currently have...  Patient Difficulty: Turning over in bed (including adjusting bedclothes, sheets and blankets)?: A Little   Patient Difficulty: Sitting down on and standing up from a chair with arms (e.g.,  wheelchair, bedside commode, etc.): A Lot   Patient Difficulty: Moving from lying on back to sitting on the side of the bed?: A Little   How much help from another person does the patient currently need...   Help from Another: Moving to and from a bed to a chair (including a wheelchair)?: A Lot   Help from Another: Need to walk in hospital room?: Total   Help from Another: Climbing 3-5 steps with a railing?: Total     AM-PAC Score:  Raw Score: 12   Approx Degree of Impairment: 68.66%   Standardized Score (AM-PAC Scale): 35.33   CMS Modifier (G-Code): CL    FUNCTIONAL ABILITY STATUS  Functional Mobility/Gait Assessment  Gait Assistance: Not tested  Supine to Sit: minimal assist  Sit to Stand: minimal assist x 2   SPT bed to chair: moderate assist x 2 with gait belt    Exercise/Education Provided:  Bed mobility  Body mechanics  Energy conservation  Functional activity tolerated  Gait training  Posture  Transfer training    Educated patient on RUE NWB status and ROM as tolerated. Patient able to perform bed mobility without attempting to utilize RUE for assistance. Following approx 8 minues sitting EOB, patient agreeable to attempt sit<>stand transfer. Utilized RW for initial trial for LUE support - patient attempted alternated marching in place with Min A x 2 for balance and safety. Patient with posterior LOB, Mod A to correct. After seated rest break, Mod A x 2 SPT bed>chair with gait belt. Patient unsteady, slightly ataxic while moving BLEs towards EOB. Re-positioned in bedside chair with RUE elevated.    The patient's Approx Degree of Impairment: 68.66% has been calculated based on documentation in the [facility] '6 clicks' Inpatient Basic Mobility Short Form.  Research supports that patients with this level of impairment may benefit from rehab.  Final disposition will be made by interdisciplinary medical team.    Patient in bed upon arrival. RN approved activity. Educated patient on POC and benefits of mobilization.  Agreeable to participate. Patient reporting R shoulder pain, not quantified per the pain scale. Reports that pain has improved since yesterday.   Patient End of Session: Up in chair;Needs met;Call light within reach;RN aware of session/findings;All patient questions and concerns addressed;Alarm set    CURRENT GOALS  Goals to be met by: 4/29/24  Patient Goal Patient's self-stated goal is: none stated   Goal #1 Patient is able to demonstrate supine - sit EOB @ level: SBA     Goal #1   Current Status    Goal #2 Patient is able to demonstrate transfers Sit to/from Stand at assistance level: SBA with cane or les-walker     Goal #2  Current Status    Goal #3 Patient is able to ambulate 25 feet with assist device: cane or les-walker at assistance level: minimum assistance   Goal #3   Current Status    Goal #4 Patient to demonstrate independence with home activity/exercise instructions provided to patient in preparation for discharge.   Goal #4   Current Status      Patient Evaluation Complexity Level:  History Moderate - 1 or 2 personal factors and/or co-morbidities   Examination of body systems Moderate - addressing a total of 3 or more elements   Clinical Presentation  Moderate - Evolving   Clinical Decision Making  Moderate Complexity     Therapeutic Activity:  25 minutes

## 2024-04-16 ENCOUNTER — APPOINTMENT (OUTPATIENT)
Dept: CV DIAGNOSTICS | Facility: HOSPITAL | Age: 89
End: 2024-04-16
Attending: HOSPITALIST
Payer: MEDICARE

## 2024-04-16 ENCOUNTER — APPOINTMENT (OUTPATIENT)
Dept: GENERAL RADIOLOGY | Facility: HOSPITAL | Age: 89
DRG: 500 | End: 2024-04-16
Attending: HOSPITALIST
Payer: MEDICARE

## 2024-04-16 ENCOUNTER — APPOINTMENT (OUTPATIENT)
Dept: CV DIAGNOSTICS | Facility: HOSPITAL | Age: 89
DRG: 500 | End: 2024-04-16
Attending: HOSPITALIST
Payer: MEDICARE

## 2024-04-16 ENCOUNTER — APPOINTMENT (OUTPATIENT)
Dept: GENERAL RADIOLOGY | Facility: HOSPITAL | Age: 89
End: 2024-04-16
Attending: HOSPITALIST
Payer: MEDICARE

## 2024-04-16 LAB
ANION GAP SERPL CALC-SCNC: 3 MMOL/L (ref 0–18)
ATRIAL RATE: 81 BPM
BASOPHILS # BLD AUTO: 0.06 X10(3) UL (ref 0–0.2)
BASOPHILS NFR BLD AUTO: 0.4 %
BUN BLD-MCNC: 14 MG/DL (ref 9–23)
BUN/CREAT SERPL: 18.9 (ref 10–20)
CALCIUM BLD-MCNC: 9 MG/DL (ref 8.7–10.4)
CHLORIDE SERPL-SCNC: 101 MMOL/L (ref 98–112)
CO2 SERPL-SCNC: 26 MMOL/L (ref 21–32)
CREAT BLD-MCNC: 0.74 MG/DL
DEPRECATED RDW RBC AUTO: 47.1 FL (ref 35.1–46.3)
EGFRCR SERPLBLD CKD-EPI 2021: 88 ML/MIN/1.73M2 (ref 60–?)
EOSINOPHIL # BLD AUTO: 0.1 X10(3) UL (ref 0–0.7)
EOSINOPHIL NFR BLD AUTO: 0.6 %
ERYTHROCYTE [DISTWIDTH] IN BLOOD BY AUTOMATED COUNT: 13.1 % (ref 11–15)
GLUCOSE BLD-MCNC: 128 MG/DL (ref 70–99)
GLUCOSE BLDC GLUCOMTR-MCNC: 146 MG/DL (ref 70–99)
GLUCOSE BLDC GLUCOMTR-MCNC: 149 MG/DL (ref 70–99)
GLUCOSE BLDC GLUCOMTR-MCNC: 173 MG/DL (ref 70–99)
GLUCOSE BLDC GLUCOMTR-MCNC: 199 MG/DL (ref 70–99)
HCT VFR BLD AUTO: 39.7 %
HGB BLD-MCNC: 12.6 G/DL
IMM GRANULOCYTES # BLD AUTO: 0.12 X10(3) UL (ref 0–1)
IMM GRANULOCYTES NFR BLD: 0.8 %
INR BLD: 4.66 (ref 0.8–1.2)
LYMPHOCYTES # BLD AUTO: 1.95 X10(3) UL (ref 1–4)
LYMPHOCYTES NFR BLD AUTO: 12.6 %
MAGNESIUM SERPL-MCNC: 1.8 MG/DL (ref 1.6–2.6)
MCH RBC QN AUTO: 31.2 PG (ref 26–34)
MCHC RBC AUTO-ENTMCNC: 31.7 G/DL (ref 31–37)
MCV RBC AUTO: 98.3 FL
MONOCYTES # BLD AUTO: 1.67 X10(3) UL (ref 0.1–1)
MONOCYTES NFR BLD AUTO: 10.8 %
NEUTROPHILS # BLD AUTO: 11.56 X10 (3) UL (ref 1.5–7.7)
NEUTROPHILS # BLD AUTO: 11.56 X10(3) UL (ref 1.5–7.7)
NEUTROPHILS NFR BLD AUTO: 74.8 %
OSMOLALITY SERPL CALC.SUM OF ELEC: 272 MOSM/KG (ref 275–295)
P-R INTERVAL: 208 MS
PLATELET # BLD AUTO: 340 10(3)UL (ref 150–450)
POTASSIUM SERPL-SCNC: 4.7 MMOL/L (ref 3.5–5.1)
PROTHROMBIN TIME: 46.8 SECONDS (ref 11.6–14.8)
Q-T INTERVAL: 404 MS
QRS DURATION: 132 MS
QTC CALCULATION (BEZET): 469 MS
R AXIS: -71 DEGREES
RBC # BLD AUTO: 4.04 X10(6)UL
SODIUM SERPL-SCNC: 130 MMOL/L (ref 136–145)
T AXIS: 111 DEGREES
URATE SERPL-MCNC: 3.1 MG/DL
VENTRICULAR RATE: 81 BPM
WBC # BLD AUTO: 15.5 X10(3) UL (ref 4–11)

## 2024-04-16 PROCEDURE — 93306 TTE W/DOPPLER COMPLETE: CPT | Performed by: HOSPITALIST

## 2024-04-16 PROCEDURE — 99233 SBSQ HOSP IP/OBS HIGH 50: CPT | Performed by: HOSPITALIST

## 2024-04-16 PROCEDURE — 71045 X-RAY EXAM CHEST 1 VIEW: CPT | Performed by: HOSPITALIST

## 2024-04-16 RX ORDER — MAGNESIUM OXIDE 400 MG/1
400 TABLET ORAL ONCE
Status: COMPLETED | OUTPATIENT
Start: 2024-04-16 | End: 2024-04-16

## 2024-04-16 RX ORDER — HYDROCODONE BITARTRATE AND ACETAMINOPHEN 5; 325 MG/1; MG/1
2 TABLET ORAL EVERY 4 HOURS PRN
Status: DISCONTINUED | OUTPATIENT
Start: 2024-04-16 | End: 2024-04-18

## 2024-04-16 RX ORDER — BENZONATATE 100 MG/1
100 CAPSULE ORAL 3 TIMES DAILY PRN
Status: DISCONTINUED | OUTPATIENT
Start: 2024-04-16 | End: 2024-05-01

## 2024-04-16 RX ORDER — SODIUM CHLORIDE 9 MG/ML
INJECTION, SOLUTION INTRAVENOUS CONTINUOUS
Status: DISCONTINUED | OUTPATIENT
Start: 2024-04-16 | End: 2024-04-16

## 2024-04-16 RX ORDER — MONTELUKAST SODIUM 10 MG/1
10 TABLET ORAL NIGHTLY
Status: DISCONTINUED | OUTPATIENT
Start: 2024-04-16 | End: 2024-05-01

## 2024-04-16 RX ORDER — HYDROCODONE BITARTRATE AND ACETAMINOPHEN 5; 325 MG/1; MG/1
1 TABLET ORAL EVERY 4 HOURS PRN
Status: DISCONTINUED | OUTPATIENT
Start: 2024-04-16 | End: 2024-04-18

## 2024-04-16 RX ORDER — CODEINE PHOSPHATE AND GUAIFENESIN 10; 100 MG/5ML; MG/5ML
5 SOLUTION ORAL EVERY 4 HOURS PRN
Status: DISCONTINUED | OUTPATIENT
Start: 2024-04-16 | End: 2024-05-01

## 2024-04-16 NOTE — PLAN OF CARE
Patient has safety precautions in place bed in the lowest position, bed alarm on, and call light within reach. Plan of care ongoing. No further concerns as of present.    Problem: Patient Centered Care  Goal: Patient preferences are identified and integrated in the patient's plan of care  Description: Interventions:  - What would you like us to know as we care for you? Patient is from home with family and he has a daughter that works here who is his support system.  Patient is hard of hearing.  - Provide timely, complete, and accurate information to patient/family  - Incorporate patient and family knowledge, values, beliefs, and cultural backgrounds into the planning and delivery of care  - Encourage patient/family to participate in care and decision-making at the level they choose  - Honor patient and family perspectives and choices  Outcome: Progressing     Problem: Patient/Family Goals  Goal: Patient/Family Long Term Goal  Description: Patient's Long Term Goal: Pain on right shoulder will be resolved and will be able to walk well with walker.    Interventions:  - No weight bearing on right arm till surgeon says so.  - Home health PT/OT as ordered.  - Pain management with ,oral medication.  - Monitor right shoulder for swelling or increasing pain or weakness.  - Follow up with surgery as recommended.  - See additional Care Plan goals for specific interventions  Outcome: Progressing  Goal: Patient/Family Short Term Goal  Description: Patient's Short Term Goal: Home with [facility] when pain is resolved.    Interventions:   - NWB to right arm.  - PT/OT as ordered.  - Administer IV antibiotics as ordered.  - Administer oral antibiotics as ordered.  - Maintain enteric/contact isolation as ordered.  - Out of bed as much as tolerated.  - Administer oral anticoagulation as ordered, SCD's to both legs as well.  - See additional Care Plan goals for specific interventions  Outcome: Progressing     Problem: PAIN - ADULT  Goal:  Verbalizes/displays adequate comfort level or patient's stated pain goal  Description: INTERVENTIONS:  - Encourage pt to monitor pain and request assistance  - Assess pain using appropriate pain scale  - Administer analgesics based on type and severity of pain and evaluate response  - Implement non-pharmacological measures as appropriate and evaluate response  - Consider cultural and social influences on pain and pain management  - Manage/alleviate anxiety  - Utilize distraction and/or relaxation techniques  - Monitor for opioid side effects  - Notify MD/LIP if interventions unsuccessful or patient reports new pain  - Anticipate increased pain with activity and pre-medicate as appropriate  Outcome: Progressing     Problem: RISK FOR INFECTION - ADULT  Goal: Absence of fever/infection during anticipated neutropenic period  Description: INTERVENTIONS  - Monitor WBC  - Administer growth factors as ordered  - Implement neutropenic guidelines  Outcome: Progressing     Problem: SAFETY ADULT - FALL  Goal: Free from fall injury  Description: INTERVENTIONS:  - Assess pt frequently for physical needs  - Identify cognitive and physical deficits and behaviors that affect risk of falls.  - Burchard fall precautions as indicated by assessment.  - Educate pt/family on patient safety including physical limitations  - Instruct pt to call for assistance with activity based on assessment  - Modify environment to reduce risk of injury  - Provide assistive devices as appropriate  - Consider OT/PT consult to assist with strengthening/mobility  - Encourage toileting schedule  Outcome: Progressing     Problem: DISCHARGE PLANNING  Goal: Discharge to home or other facility with appropriate resources  Description: INTERVENTIONS:  - Identify barriers to discharge w/pt and caregiver  - Include patient/family/discharge partner in discharge planning  - Arrange for needed discharge resources and transportation as appropriate  - Identify discharge  learning needs (meds, wound care, etc)  - Arrange for interpreters to assist at discharge as needed  - Consider post-discharge preferences of patient/family/discharge partner  - Complete POLST form as appropriate  - Assess patient's ability to be responsible for managing their own health  - Refer to Case Management Department for coordinating discharge planning if the patient needs post-hospital services based on physician/LIP order or complex needs related to functional status, cognitive ability or social support system  Outcome: Progressing     Problem: CARDIOVASCULAR - ADULT  Goal: Maintains optimal cardiac output and hemodynamic stability  Description: INTERVENTIONS:  - Monitor vital signs, rhythm, and trends  - Monitor for bleeding, hypotension and signs of decreased cardiac output  - Evaluate effectiveness of vasoactive medications to optimize hemodynamic stability  - Monitor arterial and/or venous puncture sites for bleeding and/or hematoma  - Assess quality of pulses, skin color and temperature  - Assess for signs of decreased coronary artery perfusion - ex. Angina  - Evaluate fluid balance, assess for edema, trend weights  Outcome: Progressing  Goal: Absence of cardiac arrhythmias or at baseline  Description: INTERVENTIONS:  - Continuous cardiac monitoring, monitor vital signs, obtain 12 lead EKG if indicated  - Evaluate effectiveness of antiarrhythmic and heart rate control medications as ordered  - Initiate emergency measures for life threatening arrhythmias  - Monitor electrolytes and administer replacement therapy as ordered  Outcome: Progressing     Problem: RESPIRATORY - ADULT  Goal: Achieves optimal ventilation and oxygenation  Description: INTERVENTIONS:  - Assess for changes in respiratory status  - Assess for changes in mentation and behavior  - Position to facilitate oxygenation and minimize respiratory effort  - Oxygen supplementation based on oxygen saturation or ABGs  - Provide Smoking  Cessation handout, if applicable  - Encourage broncho-pulmonary hygiene including cough, deep breathe, Incentive Spirometry  - Assess the need for suctioning and perform as needed  - Assess and instruct to report SOB or any respiratory difficulty  - Respiratory Therapy support as indicated  - Manage/alleviate anxiety  - Monitor for signs/symptoms of CO2 retention  Outcome: Progressing     Problem: GASTROINTESTINAL - ADULT  Goal: Minimal or absence of nausea and vomiting  Description: INTERVENTIONS:  - Maintain adequate hydration with IV or PO as ordered and tolerated  - Nasogastric tube to low intermittent suction as ordered  - Evaluate effectiveness of ordered antiemetic medications  - Provide nonpharmacologic comfort measures as appropriate  - Advance diet as tolerated, if ordered  - Obtain nutritional consult as needed  - Evaluate fluid balance  Outcome: Progressing  Goal: Maintains or returns to baseline bowel function  Description: INTERVENTIONS:  - Assess bowel function  - Maintain adequate hydration with IV or PO as ordered and tolerated  - Evaluate effectiveness of GI medications  - Encourage mobilization and activity  - Obtain nutritional consult as needed  - Establish a toileting routine/schedule  - Consider collaborating with pharmacy to review patient's medication profile  Outcome: Progressing     Problem: GENITOURINARY - ADULT  Goal: Absence of urinary retention  Description: INTERVENTIONS:  - Assess patient’s ability to void and empty bladder  - Monitor intake/output and perform bladder scan as needed  - Follow urinary retention protocol/standard of care  - Consider collaborating with pharmacy to review patient's medication profile  - Implement strategies to promote bladder emptying  Outcome: Progressing     Problem: METABOLIC/FLUID AND ELECTROLYTES - ADULT  Goal: Electrolytes maintained within normal limits  Description: INTERVENTIONS:  - Monitor labs and rhythm and assess patient for signs and  symptoms of electrolyte imbalances  - Administer electrolyte replacement as ordered  - Monitor response to electrolyte replacements, including rhythm and repeat lab results as appropriate  - Fluid restriction as ordered  - Instruct patient on fluid and nutrition restrictions as appropriate  Outcome: Progressing     Problem: MUSCULOSKELETAL - ADULT  Goal: Return mobility to safest level of function  Description: INTERVENTIONS:  - Assess patient stability and activity tolerance for standing, transferring and ambulating w/ or w/o assistive devices  - Assist with transfers and ambulation using safe patient handling equipment as needed  - Ensure adequate protection for wounds/incisions during mobilization  - Obtain PT/OT consults as needed  - Advance activity as appropriate  - Communicate ordered activity level and limitations with patient/family  Outcome: Progressing  Goal: Maintain proper alignment of affected body part  Description: INTERVENTIONS:  - Support and protect limb and body alignment per provider's orders  - Instruct and reinforce with patient and family use of appropriate assistive device and precautions (e.g. spinal or hip dislocation precautions)  Outcome: Progressing     Problem: Impaired Activities of Daily Living  Goal: Achieve highest/safest level of independence in self care  Description: Interventions:  - Assess ability and encourage patient to participate in ADLs to maximize function  - Promote sitting position while performing ADLs such as feeding, grooming, and bathing  - Educate and encourage patient/family in tolerated functional activity level and precautions during self-care  - Encourage patient to incorporate impaired side during daily activities to promote function  Outcome: Progressing

## 2024-04-16 NOTE — PLAN OF CARE
Patient AOX3-4. 2.5 nasal cannula. Carb control diet, tolerated well. Remote tele. Tele reported nazario morrissey md notified. See orders. Persistent cough, see orders. Pt encouraged and educated on the importance of usage of incentive spirometer. Chest xray done this afternoon. Swallow evaluation done this afternoon. Echo done this afternoon. Mcelroy in place. Morphine given for pain management. IV abx given. Family updated on the plan of care. Plan for surgery tomorrow. Call light within reach.    Problem: Patient Centered Care  Goal: Patient preferences are identified and integrated in the patient's plan of care  Description: Interventions:  - What would you like us to know as we care for you? Patient is from home with family and he has a daughter that works here who is his support system.  Patient is hard of hearing.  - Provide timely, complete, and accurate information to patient/family  - Incorporate patient and family knowledge, values, beliefs, and cultural backgrounds into the planning and delivery of care  - Encourage patient/family to participate in care and decision-making at the level they choose  - Honor patient and family perspectives and choices  Outcome: Progressing     Problem: Patient/Family Goals  Goal: Patient/Family Long Term Goal  Description: Patient's Long Term Goal: Pain on right shoulder will be resolved and will be able to walk well with walker.    Interventions:  - No weight bearing on right arm till surgeon says so.  - Home health PT/OT as ordered.  - Pain management with ,oral medication.  - Monitor right shoulder for swelling or increasing pain or weakness.  - Follow up with surgery as recommended.  - See additional Care Plan goals for specific interventions  Outcome: Progressing  Goal: Patient/Family Short Term Goal  Description: Patient's Short Term Goal: Home with [facility] when pain is resolved.    Interventions:   - NWB to right arm.  - PT/OT as ordered.  - Administer IV antibiotics as  ordered.  - Administer oral antibiotics as ordered.  - Maintain enteric/contact isolation as ordered.  - Out of bed as much as tolerated.  - Administer oral anticoagulation as ordered, SCD's to both legs as well.  - See additional Care Plan goals for specific interventions  Outcome: Progressing     Problem: PAIN - ADULT  Goal: Verbalizes/displays adequate comfort level or patient's stated pain goal  Description: INTERVENTIONS:  - Encourage pt to monitor pain and request assistance  - Assess pain using appropriate pain scale  - Administer analgesics based on type and severity of pain and evaluate response  - Implement non-pharmacological measures as appropriate and evaluate response  - Consider cultural and social influences on pain and pain management  - Manage/alleviate anxiety  - Utilize distraction and/or relaxation techniques  - Monitor for opioid side effects  - Notify MD/LIP if interventions unsuccessful or patient reports new pain  - Anticipate increased pain with activity and pre-medicate as appropriate  Outcome: Progressing     Problem: RISK FOR INFECTION - ADULT  Goal: Absence of fever/infection during anticipated neutropenic period  Description: INTERVENTIONS  - Monitor WBC  - Administer growth factors as ordered  - Implement neutropenic guidelines  Outcome: Progressing     Problem: SAFETY ADULT - FALL  Goal: Free from fall injury  Description: INTERVENTIONS:  - Assess pt frequently for physical needs  - Identify cognitive and physical deficits and behaviors that affect risk of falls.  - [place] fall precautions as indicated by assessment.  - Educate pt/family on patient safety including physical limitations  - Instruct pt to call for assistance with activity based on assessment  - Modify environment to reduce risk of injury  - Provide assistive devices as appropriate  - Consider OT/PT consult to assist with strengthening/mobility  - Encourage toileting schedule  Outcome: Progressing     Problem:  DISCHARGE PLANNING  Goal: Discharge to home or other facility with appropriate resources  Description: INTERVENTIONS:  - Identify barriers to discharge w/pt and caregiver  - Include patient/family/discharge partner in discharge planning  - Arrange for needed discharge resources and transportation as appropriate  - Identify discharge learning needs (meds, wound care, etc)  - Arrange for interpreters to assist at discharge as needed  - Consider post-discharge preferences of patient/family/discharge partner  - Complete POLST form as appropriate  - Assess patient's ability to be responsible for managing their own health  - Refer to Case Management Department for coordinating discharge planning if the patient needs post-hospital services based on physician/LIP order or complex needs related to functional status, cognitive ability or social support system  Outcome: Progressing     Problem: CARDIOVASCULAR - ADULT  Goal: Maintains optimal cardiac output and hemodynamic stability  Description: INTERVENTIONS:  - Monitor vital signs, rhythm, and trends  - Monitor for bleeding, hypotension and signs of decreased cardiac output  - Evaluate effectiveness of vasoactive medications to optimize hemodynamic stability  - Monitor arterial and/or venous puncture sites for bleeding and/or hematoma  - Assess quality of pulses, skin color and temperature  - Assess for signs of decreased coronary artery perfusion - ex. Angina  - Evaluate fluid balance, assess for edema, trend weights  Outcome: Progressing  Goal: Absence of cardiac arrhythmias or at baseline  Description: INTERVENTIONS:  - Continuous cardiac monitoring, monitor vital signs, obtain 12 lead EKG if indicated  - Evaluate effectiveness of antiarrhythmic and heart rate control medications as ordered  - Initiate emergency measures for life threatening arrhythmias  - Monitor electrolytes and administer replacement therapy as ordered  Outcome: Progressing     Problem: RESPIRATORY -  ADULT  Goal: Achieves optimal ventilation and oxygenation  Description: INTERVENTIONS:  - Assess for changes in respiratory status  - Assess for changes in mentation and behavior  - Position to facilitate oxygenation and minimize respiratory effort  - Oxygen supplementation based on oxygen saturation or ABGs  - Provide Smoking Cessation handout, if applicable  - Encourage broncho-pulmonary hygiene including cough, deep breathe, Incentive Spirometry  - Assess the need for suctioning and perform as needed  - Assess and instruct to report SOB or any respiratory difficulty  - Respiratory Therapy support as indicated  - Manage/alleviate anxiety  - Monitor for signs/symptoms of CO2 retention  Outcome: Progressing     Problem: GASTROINTESTINAL - ADULT  Goal: Minimal or absence of nausea and vomiting  Description: INTERVENTIONS:  - Maintain adequate hydration with IV or PO as ordered and tolerated  - Nasogastric tube to low intermittent suction as ordered  - Evaluate effectiveness of ordered antiemetic medications  - Provide nonpharmacologic comfort measures as appropriate  - Advance diet as tolerated, if ordered  - Obtain nutritional consult as needed  - Evaluate fluid balance  Outcome: Progressing  Goal: Maintains or returns to baseline bowel function  Description: INTERVENTIONS:  - Assess bowel function  - Maintain adequate hydration with IV or PO as ordered and tolerated  - Evaluate effectiveness of GI medications  - Encourage mobilization and activity  - Obtain nutritional consult as needed  - Establish a toileting routine/schedule  - Consider collaborating with pharmacy to review patient's medication profile  Outcome: Progressing     Problem: GENITOURINARY - ADULT  Goal: Absence of urinary retention  Description: INTERVENTIONS:  - Assess patient’s ability to void and empty bladder  - Monitor intake/output and perform bladder scan as needed  - Follow urinary retention protocol/standard of care  - Consider collaborating  with pharmacy to review patient's medication profile  - Implement strategies to promote bladder emptying  Outcome: Progressing     Problem: METABOLIC/FLUID AND ELECTROLYTES - ADULT  Goal: Electrolytes maintained within normal limits  Description: INTERVENTIONS:  - Monitor labs and rhythm and assess patient for signs and symptoms of electrolyte imbalances  - Administer electrolyte replacement as ordered  - Monitor response to electrolyte replacements, including rhythm and repeat lab results as appropriate  - Fluid restriction as ordered  - Instruct patient on fluid and nutrition restrictions as appropriate  Outcome: Progressing     Problem: MUSCULOSKELETAL - ADULT  Goal: Return mobility to safest level of function  Description: INTERVENTIONS:  - Assess patient stability and activity tolerance for standing, transferring and ambulating w/ or w/o assistive devices  - Assist with transfers and ambulation using safe patient handling equipment as needed  - Ensure adequate protection for wounds/incisions during mobilization  - Obtain PT/OT consults as needed  - Advance activity as appropriate  - Communicate ordered activity level and limitations with patient/family  Outcome: Progressing  Goal: Maintain proper alignment of affected body part  Description: INTERVENTIONS:  - Support and protect limb and body alignment per provider's orders  - Instruct and reinforce with patient and family use of appropriate assistive device and precautions (e.g. spinal or hip dislocation precautions)  Outcome: Progressing     Problem: Impaired Activities of Daily Living  Goal: Achieve highest/safest level of independence in self care  Description: Interventions:  - Assess ability and encourage patient to participate in ADLs to maximize function  - Promote sitting position while performing ADLs such as feeding, grooming, and bathing  - Educate and encourage patient/family in tolerated functional activity level and precautions during  self-care  - Encourage patient to incorporate impaired side during daily activities to promote function  Outcome: Progressing

## 2024-04-16 NOTE — CM/SW NOTE
04/16/24 1200   CM/ Referral Data   Referral Source Physician;   Reason for Referral Discharge planning   Informant Daughter  (Evangelina)   Medical Hx   Does patient have an established PCP? Yes  ([facility])   Significant Past Medical/Mental Health Hx septic shouldder   Patient Info   Patient's Current Mental Status at Time of Assessment Confused or unable to complete assessment   Patient's Home Environment House   Patient lives with Spouse/Significant other  (family present daily)   Patient Status Prior to Admission   Independent with ADLs and Mobility No   Pt. requires assistance with Housework;Driving   Services in place prior to admission Home Health Care   Home Health Provider Info Residential HH   Discharge Needs   Anticipated D/C needs Home health care;Subacute rehab     CM met with pt at bedside to discuss dc plan. Pt not answering questions appropriately. Differed assessment to pts dtr Evangelina.   Called and spoke to Evangelina via telephone. Per Evangelina pt lives at home with his spouse. Pts spouse has some memory impairments but physically is mobile. Pt does ambulate with the use of a walker but rely's a lot on his arms. Pt is having difficulty at this time ambulating with not begin able to use his arm.     Per Evangelina family tries to be present for both of them daily.    Pt is current with [facility].    Per Evangelina the ideal dc plan is for pt to return home and resume care with [facility].   If pt needs IV  abx she may consider a short rehab stay.     [facility] ID consult and cx pending.    Plan: return home and resume care with [facility].    / to remain available for support and/or discharge planning.   Susan Jones RN, BSN  Nurse   [phone]

## 2024-04-16 NOTE — PROGRESS NOTES
[facility]  part of formerly [facility]    Progress Note    Dennys Manzano Patient Status:  Inpatient    1935 MRN [id number]   Location [facility] 4W/SW/SE Attending Jeimy Driscoll MD   Hosp Day # 1 PCP CALOS FITZGERALD MD     Chief Complaint:   Chief Complaint   Patient presents with    Abdomen/Flank Pain    Nausea/Vomiting/Diarrhea       Subjective:   Dennys Manzano is up to the chair. He is coughing more. He is drowsy but does respond. No fevers. Still has pain in R shoulder. No SOB. + cough productive. No further diarrhea or vomiting     Had several beats of NSVT asymptomatic.   Objective:   Objective:    Blood pressure 134/66, pulse 89, temperature 98.4 °F (36.9 °C), temperature source Oral, resp. rate 18, height 6' (1.829 m), weight 229 lb 6.4 oz (104.1 kg), SpO2 92%.    Physical Exam:    General: No acute distress.   Respiratory: Diminished bases B/L   Cardiovascular: S1, S2. Regular rate and rhythm. No murmurs, rubs or gallops.   Abdomen: Soft, nontender, nondistended.  Positive bowel sounds. No rebound or guarding.  Neurologic: No focal neurological deficits.   Musculoskeletal: Moves all extremities.  Extremities: No edema.      Results:   Results:    Labs:  Recent Labs   Lab 24  0620   WBC  --  18.0* 15.5*   HGB  --  14.0 12.6*   MCV  --  90.2 98.3   PLT  --  332.0 340.0   INR 3.00*  --  4.66*       Recent Labs   Lab 24  0620   * 128*   BUN 15 14   CREATSERUM 0.84 0.74   CA 9.5 9.0   ALB 4.1  --    * 130*   K 4.3 4.7   CL 97* 101   CO2 27.0 26.0   ALKPHO 79  --    AST 21  --    ALT 24  --    BILT 1.1  --    TP 7.4  --        Estimated Creatinine Clearance: 78.6 mL/min (based on SCr of 0.74 mg/dL).    Recent Labs   Lab 24  0620   PTP 33.0* 46.8*   INR 3.00* 4.66*            Culture:  Hospital Encounter on 24   1. Body Fluid Cult Aerobic and Anaerobic     Status: None (Preliminary  result)    Collection Time: 04/15/24  7:26 AM    Specimen: Synovial fluid,shoulder; Body fluid, unspecified   Result Value Ref Range    Body Fluid Smear 3+ WBCs seen N/A    Body Fluid Smear No organisms seen N/A    Body Fluid Smear This is a cytocentrifuged smear. N/A   2. Urine Culture, Routine     Status: Abnormal (Preliminary result)    Collection Time: 04/15/24  5:43 AM    Specimen: Urine, clean catch   Result Value Ref Range    Urine Culture >100,000 CFU/ML Gram positive cocci (A) N/A   3. Blood Culture     Status: None (Preliminary result)    Collection Time: 04/15/24  2:27 AM    Specimen: Blood,peripheral   Result Value Ref Range    Blood Culture Result No Growth 1 Day N/A       Cardiac  No results for input(s): \"TROP\", \"PBNP\" in the last 168 hours.      Imaging: Imaging data reviewed in [facility].  CT CHEST+ABDOMEN+PELVIS(ALL CNTRST ONLY)(CPT=71260/14012)    Result Date: 4/15/2024  CONCLUSION:   1. Dense atelectasis in the lung bases.  Superimposed consolidation is not excluded.  2. Moderate colonic stool burden which may indicate constipation.  No small bowel obstruction.  Diverticulosis without evidence of acute diverticulitis.  3. Prostatomegaly.  The urinary bladder is decompressed with Mcelroy catheter in place.  4. L1 vertebral body compression deformity is nonacute appearing, but new from the prior exam dated 06/23/2022.  5. Narrowing at the origins of the SMA and celiac trunk.  6.  Additional chronic or incidental findings are described in the body of this report.    Preliminary report was given by [facility].  There are no clinically significant discrepancies.    Dictated by (CST): Tyler Eng MD on 4/15/2024 at 1:14 PM     Finalized by (CST): Tyler Eng MD on 4/15/2024 at 1:24 PM          XR CHEST AP PORTABLE  (CPT=71045)    Result Date: 4/15/2024  CONCLUSION: Low lung volumes and bibasilar atelectasis.  Question trace bilateral pleural effusions.  Preliminary report was given by [facility].  There are no clinically significant discrepancies.    Dictated by (CST): Tyler Eng MD on 4/15/2024 at 11:38 AM     Finalized by (CST): Tyler Eng MD on 4/15/2024 at 11:45 AM          XR SHOULDER, COMPLETE (MIN 2 VIEWS), RIGHT (CPT=73030)    Result Date: 4/15/2024   No acute fracture or dislocation.  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  Osteopenia.  Soft tissues are unremarkable.  See report of chest x-ray for discussion of the lungs.    Preliminary report was given by [facility].  There are no clinically significant discrepancies.      Dictated by (CST): Tyler Eng MD on 4/15/2024 at 11:36 AM     Finalized by (CST): Tyler Eng MD on 4/15/2024 at 11:38 AM           Medications:    aspirin  81 mg Oral Daily    atorvastatin  40 mg Oral Nightly    escitalopram  20 mg Oral Daily    finasteride  5 mg Oral Daily    metoprolol succinate ER  25 mg Oral Daily Beta Blocker    pantoprazole  40 mg Oral QAM AC    senna-docusate  1 tablet Oral BID    umeclidinium-vilanterol  1 puff Inhalation Daily    tamsulosin  0.4 mg Oral Daily with dinner    piperacillin-tazobactam  3.375 g Intravenous Q8H    ipratropium-albuterol  3 mL Nebulization Q6H WA    vancomycin  12.5 mg/kg Intravenous Q24H    insulin aspart  1-5 Units Subcutaneous TID CC    ondansetron  4 mg Intravenous Once    sodium chloride  1,000 mL Intravenous Once         Assessment and Plan:   Assessment & Plan:      Intractable right shoulder pain  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   - s/p R shoulder joint aspiration 4/15   - 59,000 WBC's with predominately neutrophils. + calcium pyrophosphate crystals --> pseudogout. Culture 3+ WBCs no organisms.   - Empiric vancomycin and Zosyn for now.   - Pain control.   - ID consult.   - PT/OT - LISA eventually   - WBC trending down. Afebrile.   - blood cx x 2 NGTD.   - surgical washout tomorrow morning per surgery      Acute  respiratory failure with hypoxia  Likely COPD exacerbation in combination with atelectasis  - was on 5L NC wean o2.   - duonebs q6hrs WA  - SLP eval.   - hold on steroids.   - IS/flutter valve.   - CXR today   - Antitussives PRN   - CT chest on admit showed dense atelectasis no PE.     DVT/pulmonary embolism  - INR elevated  - hold home warfarin      Hyponatremia  - appears euvolemic now   - Cont IVF. Sodium slightly better.   - Urine osm high, urine sdoium high.   - free water restriction     NSVT  - ECHO  - check mg.   - cont metoprolol     DM II  - A1c 6.4  - ISS     BPH  - Continue Flomax and finasteride    >55min spent, >50% spent counseling and coordinating care in the form of educating pt/family and d/w consultants and staff. Most of the time spent discussing the above plan.        Plan of care discussed with patient or family at bedside.    Jeimy Driscoll MD  Hospitalist          Supplementary Documentation:     Quality:  DVT Prophylaxis: SCD  CODE status: Full  Dispo: per clinical course           Estimated date of discharge: TBD  Discharge is dependent on: clinical stability  At this point Mr. Manzano is expected to be discharge to: TBD        **Certification      PHYSICIAN Certification of Need for Inpatient Hospitalization - Initial Certification    Patient will require inpatient services that will reasonably be expected to span two midnight's based on the clinical documentation in H+P.   Based on patients current state of illness, I anticipate that, after discharge, patient will require TBD.

## 2024-04-16 NOTE — SLP NOTE
ADULT SWALLOWING EVALUATION    ASSESSMENT    ASSESSMENT/OVERALL IMPRESSION:  PPE REQUIRED. THIS THERAPIST WORE GLOVES, GOWN AND MASK FOR DURATION OF EVALUATION. HANDS WASHED UPON ENTRANCE/EXIT.    Per MD H&P, \"Dennys Manzano is a(n) 88 year old male, with a past medical history significant for coronary artery disease status post CABG, CHF, COPD, PE diabetes along with right-sided repair for complete rotator cuff tear complicated by RSD presents with a complaint of acute onset right shoulder pain, claims he is unable to move his shoulder at all secondary to pain also experiencing weakness at this time.  Rates his pain as a 10 out of 10 in severity nonradiating in nature denies any associated trauma to the affected area. He has felt diaphoretic at times however no documented fevers, also has been experiencing increasing shortness of breath particular with exertion. Recently had viral gastroenteritis that has since improved. Patient extremely somnolent this time after having received multiple doses of morphine for pain control\"    SLP BSSE orders received and acknowledged. A swallow evaluation warranted to r/o aspiration. Pt afebrile with clear vocal quality, on 2L/Min, with oxygen saturation at 92%. Pt with hx of dysphagia at [facility], BSE 8/19/21 with recommendations for soft easy to chew/thin liquids.   Pt positioned 90 degrees in bedside chair, fluctuating alertness/cooperative. Pt with no complaints of pain. Oral motor examination revealed reduced strength, ROM, and rate of motion. Pt presented with trials of hard solids and thin liquids via straw.  Pt with adequate oral acceptance and bilabial seal across all trials. Pt with intact bite, prolonged mastication of solids, and delayed A-P transit. Pt's swallow response appears delayed with reduced hyolaryngeal elevation/excursion. No clinical signs of aspiration (e.g., immediate/delayed throat clear, immediate/delayed cough, wet vocal quality, increased O2 effort)  observed across all trials. 4/15 CXR indicates \"Low lung volumes and bibasilar atelectasis.  Question trace bilateral pleural effusions\". Oxygen status remained stable t/o the entire evaluation.     At this time, pt presents with mild oral dysphagia and probable pharyngeal dysfunction. Recommend a regular diet and thin liquids with strict adherence to safe swallowing compensatory strategies. Results and recommendations reviewed with RN, pt. Pt v/u to all results/recommendations. Recommendations remain written on whiteboard.     PLAN: SLP to f/u x1-2 meal assessments, monitor imaging, and VFSS if clinically indicated         RECOMMENDATIONS   Diet Recommendations - Solids: Regular  Diet Recommendations - Liquids: Thin Liquids                        Compensatory Strategies Recommended: Slow rate;Small bites and sips  Aspiration Precautions: Upright position;Slow rate;Small bites and sips  Medication Administration Recommendations:  (as tolerated)  Treatment Plan/Recommendations: Aspiration precautions    HISTORY   MEDICAL HISTORY  Reason for Referral: R/O aspiration    Problem List  Principal Problem:    Hyponatremia  Active Problems:    Leukocytosis, unspecified type    Acute pain of right shoulder    Hypoxia      Past Medical History  Past Medical History:    Anxiety state    Arrhythmia    Arthritis    Asbestos exposure    lung    Atherosclerosis of coronary artery    Bleeding tendency (HCC)    Blood disorder    BPH (benign prostatic hyperplasia)    Colitis    Congestive heart disease (HCC)    Coronary atherosclerosis    Deep vein thrombosis (HCC)    Depression    Diabetes (HCC)    Diverticulosis of large intestine    Esophageal reflux    Fibromyalgia    Ganglion, finger joint of right hand    Right middle finger excision of painful ganglion, rotator flap closure    Gout    Hearing impairment    Heart attack (HCC)    Heart disease    Heart valve disease    High blood pressure    High cholesterol    History of blood  clots    legs & lungs    Hyperlipidemia    IBS (irritable bowel syndrome)    Incontinence    Malignant hyperthermia    Muscle weakness    Neuropathy    Osteoarthritis    PE (pulmonary embolism)    Peripheral vascular disease (HCC)    Pulmonary embolism (HCC)    RSD (reflex sympathetic dystrophy)    right arm, little in the left    Screening PSA (prostate specific antigen)    Shortness of breath    Thrombophlebitis    Visual impairment    glasses for reading       Prior Living Situation: Home with spouse  Diet Prior to Admission: Regular;Thin liquids  Precautions: Aspiration    Patient/Family Goals: did not state    SWALLOWING HISTORY  Current Diet Consistency: Regular;Thin liquids  Dysphagia History: see above  Imaging Results: 4/15 CT CHEST ABDOMEN PELVIS  CONCLUSION:   1. Dense atelectasis in the lung bases.  Superimposed consolidation is not excluded.   2. Moderate colonic stool burden which may indicate constipation.  No small bowel obstruction.  Diverticulosis without evidence of acute diverticulitis.   3. Prostatomegaly.  The urinary bladder is decompressed with Mcelroy catheter in place.   4. L1 vertebral body compression deformity is nonacute appearing, but new from the prior exam dated 06/23/2022.   5. Narrowing at the origins of the SMA and celiac trunk.   6.  Additional chronic or incidental findings are described in the body of this report.     SUBJECTIVE       OBJECTIVE   ORAL MOTOR EXAMINATION  Dentition: Functional  Symmetry: Within Functional Limits  Strength:  (reduced)     Range of Motion:  (reduced)  Rate of Motion: Reduced    Voice Quality: Clear  Respiratory Status: Supplemental O2;Nasal cannula  Consistencies Trialed: Thin liquids;Hard solid  Method of Presentation: Staff/Clinician assistance;Straw  Patient Positioning: Upright;Midline;Standard chair    Oral Phase of Swallow: Impaired  Bolus Retrieval: Intact  Bilabial Seal: Intact  Bolus Formation: Impaired  Bolus Propulsion:  Impaired  Mastication: Impaired  Retention: Intact    Pharyngeal Phase of Swallow: Impaired  Laryngeal Elevation Timing: Appears impaired  Laryngeal Elevation Strength: Appears impaired  Laryngeal Elevation Coordination: Appears intact  (Please note: Silent aspiration cannot be evaluated clinically. Videofluoroscopic Swallow Study is required to rule-out silent aspiration.)    Esophageal Phase of Swallow: No complaints consistent with possible esophageal involvement  Comments: NA              GOALS  Goal #1 The patient will tolerate regular consistency and thin liquids without overt signs or symptoms of aspiration with 100 % accuracy over 1-2 session(s).  In Progress   Goal #2 The patient/family/caregiver will demonstrate understanding and implementation of aspiration precautions and swallow strategies independently over 1-2 session(s).    In Progress   Goal #3 The patient will utilize compensatory strategies as outlined by  BSSE (clinical evaluation) including Slow rate, Small bites, Small sips, Upright 90 degrees, Eliminate distractions, Supervision with meals with PRN assistance 100 % of the time across 1-2 sessions.  In Progress     FOLLOW UP  Treatment Plan/Recommendations: Aspiration precautions  Number of Visits to Meet Established Goals:  (1-2)  Follow Up Needed (Documentation Required): Yes  SLP Follow-up Date: 04/17/24    Thank you for your referral.   If you have any questions, please contact MONIQUE Shah M.S. CCC-SLP  Speech Language Pathologist  Phone Number [phone]

## 2024-04-16 NOTE — CONSULTS
[facility]  part of 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 Patient is a 88-year-old male with a history of CAD post CABG and pacemaker, BPH, CHF, DM, depression, HLD, PAD, CVA, previous right rotator cuff surgery who now presents to the hospital for right shoulder pain with effusion, difficulty with movement started about 2 days prior.  Afebrile here.  WBC initially 18.  X-ray of the shoulder with DJD without fracture.  CT chest, abdomen, pelvis nausea and vomiting without clear infectious process.  Seen by orthopedic surgery underwent aspiration 4/15 noted WBC around 60,000 with 94% segs and positive crystals.  Cultures so far no growth.  On IV vancomycin and Zosyn.  Plan for surgery tomorrow for arthroscopy and I&D.    History:  Past Medical History:    Anxiety state    Arrhythmia    Arthritis    Asbestos exposure    lung    Atherosclerosis of coronary artery    Bleeding tendency (HCC)    Blood disorder    BPH (benign prostatic hyperplasia)    Colitis    Congestive heart disease (HCC)    Coronary atherosclerosis    Deep vein thrombosis (HCC)    Depression     Diabetes (HCC)    Diverticulosis of large intestine    Esophageal reflux    Fibromyalgia    Ganglion, finger joint of right hand    Right middle finger excision of painful ganglion, rotator flap closure    Gout    Hearing impairment    Heart attack (HCC)    Heart disease    Heart valve disease    High blood pressure    High cholesterol    History of blood clots    legs & lungs    Hyperlipidemia    IBS (irritable bowel syndrome)    Incontinence    Malignant hyperthermia    Muscle weakness    Neuropathy    Osteoarthritis    PE (pulmonary embolism)    Peripheral vascular disease (HCC)    Pulmonary embolism (HCC)    RSD (reflex sympathetic dystrophy)    right arm, little in the left    Screening PSA (prostate specific antigen)    Shortness of breath    Thrombophlebitis    Visual impairment    glasses for reading     Past Surgical History:   Procedure Laterality Date    Cabg      Cardiac pacemaker placement      Cath percutaneous  transluminal coronary angioplasty      Foot surgery      right     Inguinal herniorrhaphy      Ir ivc filter placement      for blood clots    Other surgical history  2013    heart bypass and stent    Other surgical history      basal cell carcinoma    Shoulder arthroscopy  1994(?)    Right rotator cuff     Family History   Problem Relation Age of Onset    Cancer Mother     Stroke Mother     Diabetes Other         Granddaughter has DM      reports that he quit smoking about 59 years ago. His smoking use included cigarettes. He has never used smokeless tobacco. He reports that he does not currently use alcohol. He reports that he does not use drugs.    Allergies:  Allergies   Allergen Reactions    Heparin SWELLING     Arms swelling    Nitrofurantoin NAUSEA AND VOMITING    Heparin OTHER (SEE COMMENTS)    Tetanus Immune Globulin     Tetanus Toxoids UNKNOWN    Amoxicillin-Pot Clavulanate DIARRHEA    Azithromycin DIARRHEA       Medications:    Current Facility-Administered Medications:      benzonatate (Tessalon) cap 100 mg, 100 mg, Oral, TID PRN    guaiFENesin-codeine (Robitussin AC) 100-10 MG/5ML oral solution 5 mL, 5 mL, Oral, Q4H PRN    sodium chloride 0.9% infusion, , Intravenous, Continuous    montelukast (Singulair) tab 10 mg, 10 mg, Oral, Nightly    magnesium oxide (Mag-Ox) tab 400 mg, 400 mg, Oral, Once    HYDROcodone-acetaminophen (Norco) 5-325 MG per tab 1 tablet, 1 tablet, Oral, Q4H PRN **OR** HYDROcodone-acetaminophen (Norco) 5-325 MG per tab 2 tablet, 2 tablet, Oral, Q4H PRN    aspirin chewable tab 81 mg, 81 mg, Oral, Daily    atorvastatin (Lipitor) tab 40 mg, 40 mg, Oral, Nightly    escitalopram (Lexapro) tab 20 mg, 20 mg, Oral, Daily    finasteride (Proscar) tab 5 mg, 5 mg, Oral, Daily    metoprolol succinate ER (Toprol XL) 24 hr tab 25 mg, 25 mg, Oral, Daily Beta Blocker    pantoprazole (Protonix) DR tab 40 mg, 40 mg, Oral, QAM AC    senna-docusate (Senokot-S) 8.6-50 MG per tab 1 tablet, 1 tablet, Oral, BID    umeclidinium-vilanterol (Anoro Ellipta) 62.5-25 MCG/ACT inhaler 1 puff, 1 puff, Inhalation, Daily    tamsulosin (Flomax) cap 0.4 mg, 0.4 mg, Oral, Daily with dinner    ondansetron (Zofran) 4 MG/2ML injection 4 mg, 4 mg, Intravenous, Q6H PRN    morphINE PF 2 MG/ML injection 2 mg, 2 mg, Intravenous, Q2H PRN **OR** morphINE PF 4 MG/ML injection 4 mg, 4 mg, Intravenous, Q2H PRN    acetaminophen (Tylenol) tab 650 mg, 650 mg, Oral, Q6H PRN    hydrALAzine (Apresoline) 20 mg/mL injection 10 mg, 10 mg, Intravenous, Q4H PRN    zolpidem (Ambien) tab 5 mg, 5 mg, Oral, Nightly PRN    piperacillin-tazobactam (Zosyn) 3.375 g in dextrose 5% 100 mL IVPB-ADDV, 3.375 g, Intravenous, Q8H    ipratropium-albuterol (Duoneb) 0.5-2.5 (3) MG/3ML inhalation solution 3 mL, 3 mL, Nebulization, Q6H WA    glucose (Dex4) 15 GM/59ML oral liquid 15 g, 15 g, Oral, Q15 Min PRN **OR** glucose (Glutose) 40% oral gel 15 g, 15 g, Oral, Q15 Min PRN **OR** glucose-vitamin C (Dex-4) chewable tab 4 tablet, 4 tablet, Oral,  Q15 Min PRN **OR** dextrose 50% injection 50 mL, 50 mL, Intravenous, Q15 Min PRN **OR** glucose (Dex4) 15 GM/59ML oral liquid 30 g, 30 g, Oral, Q15 Min PRN **OR** glucose (Glutose) 40% oral gel 30 g, 30 g, Oral, Q15 Min PRN **OR** glucose-vitamin C (Dex-4) chewable tab 8 tablet, 8 tablet, Oral, Q15 Min PRN    vancomycin (Vancocin) 1.25 g in sodium chloride 0.9% 250mL IVPB premix, 12.5 mg/kg, Intravenous, Q24H    insulin aspart (NovoLOG) 100 Units/mL FlexPen 1-5 Units, 1-5 Units, Subcutaneous, TID CC    ondansetron (Zofran) 4 MG/2ML injection 4 mg, 4 mg, Intravenous, Once    sodium chloride 0.9 % IV bolus 1,000 mL, 1,000 mL, Intravenous, Once    Review of Systems:  CONSTITUTIONAL:  No weight loss, weakness or fatigue.  HEENT:  Eyes:  No visual loss, blurred vision, double vision or yellow sclerae. Ears, Nose, Throat:  No hearing loss, sneezing, congestion, runny nose or sore throat.  SKIN:  No rash or itching.  CARDIOVASCULAR:  No chest pain, chest pressure or chest discomfort  RESPIRATORY:  No shortness of breath, cough or sputum.  GASTROINTESTINAL:  No anorexia, nausea, vomiting or diarrhea. No abdominal pain or blood.  GENITOURINARY:  No Burning on urination.   NEUROLOGICAL:  No headache, dizziness, syncope, paralysis, ataxia, numbness or tingling in the extremities.  MUSCULOSKELETAL:  No muscle, back pain, +joint pain or stiffness.    Physical Exam:  Vital signs: Blood pressure 134/66, pulse 89, temperature 98.4 °F (36.9 °C), temperature source Oral, resp. rate 18, height 182.9 cm (6'), weight 229 lb 6.4 oz (104.1 kg), SpO2 92%.    General: Alert, fatigued, NC  HEENT: Moist mucous membranes. EOMI  Neck: No lymphadenopathy.  Supple.  Cardiovascular: No chest wall tenderness  Respiratory: Symmetric expansion  Abdomen: Soft, nontender, nondistended.   Musculoskeletal: R shoulder effusion with pain with ROM  Integument: No lesions. No erythema.  Mcelroy    Laboratory Data: Reviewed in EMR    Microbiology: Reviewed in  EMR    Radiology: Reviewed    Thank you for allowing us to participate in the care of this patient. Please do not hesitate to call if you have any questions.     We will continue to follow with you and will make further recommendations based on his progress.    Howie Jalloh MD   [facility]  [phone]  4/16/2024

## 2024-04-17 ENCOUNTER — ANESTHESIA EVENT (OUTPATIENT)
Dept: SURGERY | Facility: HOSPITAL | Age: 89
End: 2024-04-17
Payer: MEDICARE

## 2024-04-17 ENCOUNTER — APPOINTMENT (OUTPATIENT)
Dept: GENERAL RADIOLOGY | Facility: HOSPITAL | Age: 89
End: 2024-04-17
Attending: HOSPITALIST
Payer: MEDICARE

## 2024-04-17 ENCOUNTER — APPOINTMENT (OUTPATIENT)
Dept: GENERAL RADIOLOGY | Facility: HOSPITAL | Age: 89
DRG: 500 | End: 2024-04-17
Attending: HOSPITALIST
Payer: MEDICARE

## 2024-04-17 ENCOUNTER — ANESTHESIA (OUTPATIENT)
Dept: SURGERY | Facility: HOSPITAL | Age: 89
End: 2024-04-17
Payer: MEDICARE

## 2024-04-17 LAB
ANION GAP SERPL CALC-SCNC: 4 MMOL/L (ref 0–18)
BASE EXCESS BLD CALC-SCNC: 1.8 MMOL/L (ref ?–2)
BNP SERPL-MCNC: 225 PG/ML
BUN BLD-MCNC: 14 MG/DL (ref 9–23)
BUN/CREAT SERPL: 20 (ref 10–20)
CA-I BLD-SCNC: 1.14 MMOL/L (ref 0.95–1.32)
CALCIUM BLD-MCNC: 9 MG/DL (ref 8.7–10.4)
CHLORIDE SERPL-SCNC: 98 MMOL/L (ref 98–112)
CO2 SERPL-SCNC: 28 MMOL/L (ref 21–32)
COHGB MFR BLD: 3.2 % (ref 0–3)
CREAT BLD-MCNC: 0.7 MG/DL
DEPRECATED RDW RBC AUTO: 46.7 FL (ref 35.1–46.3)
EGFRCR SERPLBLD CKD-EPI 2021: 90 ML/MIN/1.73M2 (ref 60–?)
ERYTHROCYTE [DISTWIDTH] IN BLOOD BY AUTOMATED COUNT: 13.1 % (ref 11–15)
GLUCOSE BLD-MCNC: 137 MG/DL (ref 70–99)
GLUCOSE BLDC GLUCOMTR-MCNC: 134 MG/DL (ref 70–99)
GLUCOSE BLDC GLUCOMTR-MCNC: 136 MG/DL (ref 70–99)
GLUCOSE BLDC GLUCOMTR-MCNC: 147 MG/DL (ref 70–99)
GLUCOSE BLDC GLUCOMTR-MCNC: 149 MG/DL (ref 70–99)
GLUCOSE BLDC GLUCOMTR-MCNC: 150 MG/DL (ref 70–99)
HCO3 BLDA-SCNC: 26.3 MEQ/L (ref 21–27)
HCT VFR BLD AUTO: 38.3 %
HGB BLD-MCNC: 12.2 G/DL
HGB BLD-MCNC: 13 G/DL
LACTATE BLD-SCNC: 0.7 MMOL/L (ref 0.5–2)
MAGNESIUM SERPL-MCNC: 1.8 MG/DL (ref 1.6–2.6)
MCH RBC QN AUTO: 30.7 PG (ref 26–34)
MCHC RBC AUTO-ENTMCNC: 31.9 G/DL (ref 31–37)
MCV RBC AUTO: 96.5 FL
METHGB MFR BLD: 1.2 % SAT (ref 0.4–1.5)
MODIFIED ALLEN TEST: POSITIVE
O2 CT BLD-SCNC: 17.6 VOL% (ref 15–23)
OSMOLALITY SERPL CALC.SUM OF ELEC: 273 MOSM/KG (ref 275–295)
OXYGEN LITERS/MINUTE: 15 L/MIN
PCO2 BLDA: 64 MM HG (ref 35–45)
PH BLDA: 7.28 [PH] (ref 7.35–7.45)
PLATELET # BLD AUTO: 337 10(3)UL (ref 150–450)
PO2 BLDA: 111 MM HG (ref 80–100)
POTASSIUM BLD-SCNC: 4.1 MMOL/L (ref 3.6–5.1)
POTASSIUM SERPL-SCNC: 4.5 MMOL/L (ref 3.5–5.1)
PUNCTURE CHARGE: YES
RBC # BLD AUTO: 3.97 X10(6)UL
SAO2 % BLDA: >99 % (ref 94–100)
SODIUM BLD-SCNC: 127 MMOL/L (ref 135–145)
SODIUM SERPL-SCNC: 130 MMOL/L (ref 136–145)
WBC # BLD AUTO: 14.6 X10(3) UL (ref 4–11)

## 2024-04-17 PROCEDURE — 99233 SBSQ HOSP IP/OBS HIGH 50: CPT | Performed by: STUDENT IN AN ORGANIZED HEALTH CARE EDUCATION/TRAINING PROGRAM

## 2024-04-17 PROCEDURE — 99233 SBSQ HOSP IP/OBS HIGH 50: CPT | Performed by: HOSPITALIST

## 2024-04-17 PROCEDURE — 0RBJ4ZZ EXCISION OF RIGHT SHOULDER JOINT, PERCUTANEOUS ENDOSCOPIC APPROACH: ICD-10-PCS | Performed by: STUDENT IN AN ORGANIZED HEALTH CARE EDUCATION/TRAINING PROGRAM

## 2024-04-17 PROCEDURE — 5A09557 ASSISTANCE WITH RESPIRATORY VENTILATION, GREATER THAN 96 CONSECUTIVE HOURS, CONTINUOUS POSITIVE AIRWAY PRESSURE: ICD-10-PCS | Performed by: HOSPITALIST

## 2024-04-17 PROCEDURE — 71045 X-RAY EXAM CHEST 1 VIEW: CPT | Performed by: HOSPITALIST

## 2024-04-17 PROCEDURE — 0L814ZZ DIVISION OF RIGHT SHOULDER TENDON, PERCUTANEOUS ENDOSCOPIC APPROACH: ICD-10-PCS | Performed by: STUDENT IN AN ORGANIZED HEALTH CARE EDUCATION/TRAINING PROGRAM

## 2024-04-17 PROCEDURE — 29823 SHO ARTHRS SRG XTNSV DBRDMT: CPT | Performed by: STUDENT IN AN ORGANIZED HEALTH CARE EDUCATION/TRAINING PROGRAM

## 2024-04-17 RX ORDER — ONDANSETRON 2 MG/ML
4 INJECTION INTRAMUSCULAR; INTRAVENOUS EVERY 6 HOURS PRN
Status: DISCONTINUED | OUTPATIENT
Start: 2024-04-17 | End: 2024-04-17 | Stop reason: HOSPADM

## 2024-04-17 RX ORDER — HYDROMORPHONE HYDROCHLORIDE 1 MG/ML
0.1 INJECTION, SOLUTION INTRAMUSCULAR; INTRAVENOUS; SUBCUTANEOUS EVERY 2 HOUR PRN
Status: DISCONTINUED | OUTPATIENT
Start: 2024-04-17 | End: 2024-04-18

## 2024-04-17 RX ORDER — ONDANSETRON 2 MG/ML
INJECTION INTRAMUSCULAR; INTRAVENOUS AS NEEDED
Status: DISCONTINUED | OUTPATIENT
Start: 2024-04-17 | End: 2024-04-17 | Stop reason: SURG

## 2024-04-17 RX ORDER — ROCURONIUM BROMIDE 10 MG/ML
INJECTION, SOLUTION INTRAVENOUS AS NEEDED
Status: DISCONTINUED | OUTPATIENT
Start: 2024-04-17 | End: 2024-04-17 | Stop reason: SURG

## 2024-04-17 RX ORDER — HYDROMORPHONE HYDROCHLORIDE 1 MG/ML
0.4 INJECTION, SOLUTION INTRAMUSCULAR; INTRAVENOUS; SUBCUTANEOUS EVERY 2 HOUR PRN
Status: DISCONTINUED | OUTPATIENT
Start: 2024-04-17 | End: 2024-04-18

## 2024-04-17 RX ORDER — FLUTICASONE PROPIONATE 50 MCG
1 SPRAY, SUSPENSION (ML) NASAL DAILY
Status: DISCONTINUED | OUTPATIENT
Start: 2024-04-17 | End: 2024-05-01

## 2024-04-17 RX ORDER — PHENYLEPHRINE HCL 10 MG/ML
VIAL (ML) INJECTION AS NEEDED
Status: DISCONTINUED | OUTPATIENT
Start: 2024-04-17 | End: 2024-04-17 | Stop reason: SURG

## 2024-04-17 RX ORDER — SODIUM CHLORIDE, SODIUM LACTATE, POTASSIUM CHLORIDE, CALCIUM CHLORIDE 600; 310; 30; 20 MG/100ML; MG/100ML; MG/100ML; MG/100ML
INJECTION, SOLUTION INTRAVENOUS CONTINUOUS
Status: DISCONTINUED | OUTPATIENT
Start: 2024-04-17 | End: 2024-04-17 | Stop reason: HOSPADM

## 2024-04-17 RX ORDER — ALBUTEROL SULFATE 2.5 MG/3ML
2.5 SOLUTION RESPIRATORY (INHALATION) AS NEEDED
Status: DISCONTINUED | OUTPATIENT
Start: 2024-04-17 | End: 2024-04-17 | Stop reason: HOSPADM

## 2024-04-17 RX ORDER — IPRATROPIUM BROMIDE AND ALBUTEROL SULFATE 2.5; .5 MG/3ML; MG/3ML
3 SOLUTION RESPIRATORY (INHALATION) EVERY 6 HOURS PRN
Status: DISCONTINUED | OUTPATIENT
Start: 2024-04-17 | End: 2024-05-01

## 2024-04-17 RX ORDER — LIDOCAINE HYDROCHLORIDE 10 MG/ML
INJECTION, SOLUTION EPIDURAL; INFILTRATION; INTRACAUDAL; PERINEURAL AS NEEDED
Status: DISCONTINUED | OUTPATIENT
Start: 2024-04-17 | End: 2024-04-17 | Stop reason: SURG

## 2024-04-17 RX ORDER — HYDROMORPHONE HYDROCHLORIDE 1 MG/ML
INJECTION, SOLUTION INTRAMUSCULAR; INTRAVENOUS; SUBCUTANEOUS
Status: COMPLETED
Start: 2024-04-17 | End: 2024-04-17

## 2024-04-17 RX ORDER — MORPHINE SULFATE 4 MG/ML
4 INJECTION, SOLUTION INTRAMUSCULAR; INTRAVENOUS EVERY 10 MIN PRN
Status: DISCONTINUED | OUTPATIENT
Start: 2024-04-17 | End: 2024-04-17 | Stop reason: HOSPADM

## 2024-04-17 RX ORDER — MAGNESIUM OXIDE 400 MG/1
400 TABLET ORAL ONCE
Status: DISCONTINUED | OUTPATIENT
Start: 2024-04-17 | End: 2024-05-01

## 2024-04-17 RX ORDER — HYDROMORPHONE HYDROCHLORIDE 1 MG/ML
0.4 INJECTION, SOLUTION INTRAMUSCULAR; INTRAVENOUS; SUBCUTANEOUS EVERY 5 MIN PRN
Status: DISCONTINUED | OUTPATIENT
Start: 2024-04-17 | End: 2024-04-17 | Stop reason: HOSPADM

## 2024-04-17 RX ORDER — SODIUM CHLORIDE 9 MG/ML
INJECTION, SOLUTION INTRAVENOUS CONTINUOUS
Status: DISCONTINUED | OUTPATIENT
Start: 2024-04-17 | End: 2024-04-17

## 2024-04-17 RX ORDER — HYDROMORPHONE HYDROCHLORIDE 1 MG/ML
0.2 INJECTION, SOLUTION INTRAMUSCULAR; INTRAVENOUS; SUBCUTANEOUS EVERY 5 MIN PRN
Status: DISCONTINUED | OUTPATIENT
Start: 2024-04-17 | End: 2024-04-17 | Stop reason: HOSPADM

## 2024-04-17 RX ORDER — NALOXONE HYDROCHLORIDE 0.4 MG/ML
0.08 INJECTION, SOLUTION INTRAMUSCULAR; INTRAVENOUS; SUBCUTANEOUS AS NEEDED
Status: DISCONTINUED | OUTPATIENT
Start: 2024-04-17 | End: 2024-04-17 | Stop reason: HOSPADM

## 2024-04-17 RX ORDER — METOCLOPRAMIDE HYDROCHLORIDE 5 MG/ML
10 INJECTION INTRAMUSCULAR; INTRAVENOUS EVERY 8 HOURS PRN
Status: DISCONTINUED | OUTPATIENT
Start: 2024-04-17 | End: 2024-04-17 | Stop reason: HOSPADM

## 2024-04-17 RX ORDER — HYDROMORPHONE HYDROCHLORIDE 1 MG/ML
0.2 INJECTION, SOLUTION INTRAMUSCULAR; INTRAVENOUS; SUBCUTANEOUS ONCE
Status: DISCONTINUED | OUTPATIENT
Start: 2024-04-17 | End: 2024-04-17

## 2024-04-17 RX ORDER — MORPHINE SULFATE 4 MG/ML
2 INJECTION, SOLUTION INTRAMUSCULAR; INTRAVENOUS EVERY 10 MIN PRN
Status: DISCONTINUED | OUTPATIENT
Start: 2024-04-17 | End: 2024-04-17 | Stop reason: HOSPADM

## 2024-04-17 RX ORDER — HYDRALAZINE HYDROCHLORIDE 20 MG/ML
5 INJECTION INTRAMUSCULAR; INTRAVENOUS EVERY 10 MIN PRN
Status: DISCONTINUED | OUTPATIENT
Start: 2024-04-17 | End: 2024-04-17 | Stop reason: HOSPADM

## 2024-04-17 RX ORDER — HYDROMORPHONE HYDROCHLORIDE 1 MG/ML
0.2 INJECTION, SOLUTION INTRAMUSCULAR; INTRAVENOUS; SUBCUTANEOUS ONCE
Status: DISCONTINUED | OUTPATIENT
Start: 2024-04-17 | End: 2024-04-18

## 2024-04-17 RX ORDER — BUPIVACAINE HYDROCHLORIDE AND EPINEPHRINE 5; 5 MG/ML; UG/ML
INJECTION, SOLUTION PERINEURAL AS NEEDED
Status: DISCONTINUED | OUTPATIENT
Start: 2024-04-17 | End: 2024-04-17 | Stop reason: HOSPADM

## 2024-04-17 RX ORDER — IPRATROPIUM BROMIDE AND ALBUTEROL SULFATE 2.5; .5 MG/3ML; MG/3ML
3 SOLUTION RESPIRATORY (INHALATION)
Status: DISCONTINUED | OUTPATIENT
Start: 2024-04-17 | End: 2024-04-18

## 2024-04-17 RX ORDER — HYDROMORPHONE HYDROCHLORIDE 1 MG/ML
0.2 INJECTION, SOLUTION INTRAMUSCULAR; INTRAVENOUS; SUBCUTANEOUS EVERY 2 HOUR PRN
Status: DISCONTINUED | OUTPATIENT
Start: 2024-04-17 | End: 2024-04-18

## 2024-04-17 RX ORDER — LABETALOL HYDROCHLORIDE 5 MG/ML
5 INJECTION, SOLUTION INTRAVENOUS EVERY 5 MIN PRN
Status: DISCONTINUED | OUTPATIENT
Start: 2024-04-17 | End: 2024-04-17 | Stop reason: HOSPADM

## 2024-04-17 RX ADMIN — PHENYLEPHRINE HCL 80 MCG: 10 MG/ML VIAL (ML) INJECTION at 13:53:00

## 2024-04-17 RX ADMIN — SODIUM CHLORIDE: 9 INJECTION, SOLUTION INTRAVENOUS at 15:17:00

## 2024-04-17 RX ADMIN — PHENYLEPHRINE HCL 80 MCG: 10 MG/ML VIAL (ML) INJECTION at 13:50:00

## 2024-04-17 RX ADMIN — ROCURONIUM BROMIDE 35 MG: 10 INJECTION, SOLUTION INTRAVENOUS at 13:39:00

## 2024-04-17 RX ADMIN — ROCURONIUM BROMIDE 15 MG: 10 INJECTION, SOLUTION INTRAVENOUS at 14:03:00

## 2024-04-17 RX ADMIN — ONDANSETRON 4 MG: 2 INJECTION INTRAMUSCULAR; INTRAVENOUS at 14:41:00

## 2024-04-17 RX ADMIN — PHENYLEPHRINE HCL 160 MCG: 10 MG/ML VIAL (ML) INJECTION at 13:56:00

## 2024-04-17 RX ADMIN — LIDOCAINE HYDROCHLORIDE 30 MG: 10 INJECTION, SOLUTION EPIDURAL; INFILTRATION; INTRACAUDAL; PERINEURAL at 13:38:00

## 2024-04-17 NOTE — BRIEF OP NOTE
Pre-Operative Diagnosis: Septic arthritis     Post-Operative Diagnosis: Septic arthritis right shoulder, right shoulder arthritis, right shoulder biceps and superior labral tear     Procedure Performed:   Right shoulder arthroscopy, extensive debridement of right shoulder, bicep tenotomy    Surgeons and Role:     * Gregorio Calle MD - Primary    Assistant(s):  Surgical Assistant.: Saleem Downey     Surgical Findings: severe degenerative changes, biceps tearing, superior labral degeneration with tearing and ossification, absent rotator cuff     Specimen: Mineralized tissue     Estimated Blood Loss: 10cc    Dictation Number:  N/A    Gregorio Calle MD  4/17/2024  2:47 PM

## 2024-04-17 NOTE — ANESTHESIA PREPROCEDURE EVALUATION
Anesthesia PreOp Note    HPI:     Dennys Manzano is a 88 year old male who presents for preoperative consultation requested by: Gregorio Calle MD    Date of Surgery: 4/17/2024    Procedure(s):  Right shoulder arthroscopy, irrigation and debridement  Indication: Septic arthritis    Relevant Problems   No relevant active problems       NPO:  Last Liquid Consumption Date: 04/17/24 (sip of water with metoprolol)  Last Liquid Consumption Time: 0850 (sip of water with med)  Last Solid Consumption Date: 04/16/24  Last Solid Consumption Time: 1800  Last Liquid Consumption Date: 04/17/24 (sip of water with metoprolol)          History Review:  Patient Active Problem List    Diagnosis Date Noted    Hyponatremia 04/15/2024    Leukocytosis, unspecified type 04/15/2024    Acute pain of right shoulder 04/15/2024    Hypoxia 04/15/2024    Chronic indwelling Mcelroy catheter     Gross hematuria 03/17/2023    BPH with obstruction/lower urinary tract symptoms 06/06/2016    Rotator cuff (capsule) sprain 06/12/2014       Past Medical History:    Anxiety state    Arrhythmia    Arthritis    Asbestos exposure    lung    Atherosclerosis of coronary artery    Bleeding tendency (HCC)    Blood disorder    BPH (benign prostatic hyperplasia)    Colitis    Congestive heart disease (HCC)    Coronary atherosclerosis    Deep vein thrombosis (HCC)    Depression    Diabetes (HCC)    Diverticulosis of large intestine    Esophageal reflux    Fibromyalgia    Ganglion, finger joint of right hand    Right middle finger excision of painful ganglion, rotator flap closure    Gout    Hearing impairment    Heart attack (HCC)    Heart disease    Heart valve disease    High blood pressure    High cholesterol    History of blood clots    legs & lungs    Hyperlipidemia    IBS (irritable bowel syndrome)    Incontinence    Malignant hyperthermia    Muscle weakness    Neuropathy    Osteoarthritis    PE (pulmonary embolism)    Peripheral vascular disease (HCC)     Pulmonary embolism (HCC)    RSD (reflex sympathetic dystrophy)    right arm, little in the left    Screening PSA (prostate specific antigen)    Shortness of breath    Thrombophlebitis    Visual impairment    glasses for reading       Past Surgical History:   Procedure Laterality Date    Cabg      Cardiac pacemaker placement      Cath percutaneous  transluminal coronary angioplasty      Foot surgery      right     Inguinal herniorrhaphy      Ir ivc filter placement      for blood clots    Other surgical history  2013    heart bypass and stent    Other surgical history      basal cell carcinoma    Shoulder arthroscopy  1994(?)    Right rotator cuff       Medications Prior to Admission   Medication Sig Dispense Refill Last Dose    polyethylene glycol, PEG 3350, 17 g Oral Powd Pack Take 17 g by mouth daily.   4/14/2024    warfarin (JANTOVEN) 10 MG Oral Tab Take 8.5 mg by mouth nightly.   4/14/2024    Calcium-Magnesium-Vitamin D (CALCIUM 1200+D3 OR) Take 1 capsule by mouth daily.   4/14/2024    fluticasone propionate 50 MCG/ACT Nasal Suspension 1 spray(s)   4/14/2024    metFORMIN 500 MG Oral Tab Take 1 tablet (500 mg total) by mouth 2 (two) times daily.   4/14/2024    metoprolol succinate 25 MG Oral Tablet 24 Hr    4/14/2024    montelukast 10 MG Oral Tab    4/14/2024    Senna-Docusate Sodium 8.6-50 MG Oral Tab Take 1 tablet by mouth 2 (two) times daily.   4/14/2024    Pantoprazole Sodium 40 MG Oral Tab EC Take 1 tablet (40 mg total) by mouth every morning before breakfast. 30 tablet 5 4/14/2024    Escitalopram Oxalate (LEXAPRO) 20 MG Oral Tab Take 1 tablet (20 mg total) by mouth daily.   4/14/2024    tamsulosin HCl (FLOMAX) 0.4 MG Oral Cap Take 1 capsule (0.4 mg total) by mouth daily with dinner.   4/14/2024    aspirin (BABY ASPIRIN) 81 MG Oral Chew Tab Chew 1 tablet (81 mg total) by mouth daily.   4/14/2024    atorvastatin 40 MG Oral Tab Take 1 tablet (40 mg total) by mouth nightly.   4/14/2024    Ascorbic Acid (VITAMIN  C OR) Take by mouth.   4/14/2024    Ergocalciferol (VITAMIN D OR) Take by mouth.   4/14/2024    Multiple Vitamins-Minerals (CENTRUM SILVER OR) Take  by mouth.   4/14/2024    finasteride (PROSCAR) 5 MG Oral Tab Take 1 tablet (5 mg total) by mouth daily.   4/14/2024    STIOLTO RESPIMAT 2.5-2.5 MCG/ACT Inhalation Aero Soln  (Patient not taking: Reported on 4/15/2024)   Not Taking     Current Facility-Administered Medications Ordered in Epic   Medication Dose Route Frequency Provider Last Rate Last Admin    magnesium oxide (Mag-Ox) tab 400 mg  400 mg Oral Once Jeimy Driscoll MD        ipratropium-albuterol (Duoneb) 0.5-2.5 (3) MG/3ML inhalation solution 3 mL  3 mL Nebulization Q6H PRN Chelo Batista MD        piperacillin-tazobactam (Zosyn) 4.5 g in dextrose 5% 100 mL IVPB-ADDV  4.5 g Intravenous Q8H Howie Jalloh MD        HYDROmorphone (Dilaudid) 1 MG/ML injection 0.2 mg  0.2 mg Intravenous Once Jeimy Driscoll MD        benzonatate (Tessalon) cap 100 mg  100 mg Oral TID PRN Jemiy Driscoll MD        guaiFENesin-codeine (Robitussin AC) 100-10 MG/5ML oral solution 5 mL  5 mL Oral Q4H PRN Jeimy Driscoll MD        montelukast (Singulair) tab 10 mg  10 mg Oral Nightly Jeimy Driscoll MD   10 mg at 04/16/24 2201    HYDROcodone-acetaminophen (Norco) 5-325 MG per tab 1 tablet  1 tablet Oral Q4H PRN Jeimy Driscoll MD        Or    HYDROcodone-acetaminophen (Norco) 5-325 MG per tab 2 tablet  2 tablet Oral Q4H PRN Jeimy Driscoll MD        aspirin chewable tab 81 mg  81 mg Oral Daily Chelo Batista MD   81 mg at 04/16/24 0913    atorvastatin (Lipitor) tab 40 mg  40 mg Oral Nightly Chelo Batista MD   40 mg at 04/16/24 2201    escitalopram (Lexapro) tab 20 mg  20 mg Oral Daily Chelo Batista MD   20 mg at 04/16/24 0913    finasteride (Proscar) tab 5 mg  5 mg Oral Daily Chelo Batista MD   5 mg at 04/16/24 0913    metoprolol succinate ER (Toprol XL) 24 hr tab 25 mg  25 mg Oral Daily Beta Blocker Lester  Chelo Alegre MD   25 mg at 04/17/24 0851    pantoprazole (Protonix) DR tab 40 mg  40 mg Oral QAM AC Chelo Batista MD   40 mg at 04/16/24 0522    senna-docusate (Senokot-S) 8.6-50 MG per tab 1 tablet  1 tablet Oral BID Chelo Batista MD   1 tablet at 04/16/24 2201    umeclidinium-vilanterol (Anoro Ellipta) 62.5-25 MCG/ACT inhaler 1 puff  1 puff Inhalation Daily Chelo Batista MD   1 puff at 04/17/24 0851    tamsulosin (Flomax) cap 0.4 mg  0.4 mg Oral Daily with dinner Chelo Batista MD   0.4 mg at 04/16/24 1825    ondansetron (Zofran) 4 MG/2ML injection 4 mg  4 mg Intravenous Q6H PRN Chelo Batista MD        acetaminophen (Tylenol) tab 650 mg  650 mg Oral Q6H PRN Chelo Batista MD        hydrALAzine (Apresoline) 20 mg/mL injection 10 mg  10 mg Intravenous Q4H PRN Chelo Batista MD        glucose (Dex4) 15 GM/59ML oral liquid 15 g  15 g Oral Q15 Min PRN Chelo Batista MD        Or    glucose (Glutose) 40% oral gel 15 g  15 g Oral Q15 Min PRN Chelo Batista MD        Or    glucose-vitamin C (Dex-4) chewable tab 4 tablet  4 tablet Oral Q15 Min PRN Chelo Batista MD        Or    dextrose 50% injection 50 mL  50 mL Intravenous Q15 Min PRN Chelo Batista MD        Or    glucose (Dex4) 15 GM/59ML oral liquid 30 g  30 g Oral Q15 Min PRN Chelo Batista MD        Or    glucose (Glutose) 40% oral gel 30 g  30 g Oral Q15 Min PRChelo Arechiga MD        Or    glucose-vitamin C (Dex-4) chewable tab 8 tablet  8 tablet Oral Q15 Min PRN Chelo Batista MD        vancomycin (Vancocin) 1.25 g in sodium chloride 0.9% 250mL IVPB premix  12.5 mg/kg Intravenous Q24H Charlie Alarcon .7 mL/hr at 04/17/24 0841 1,250 mg at 04/17/24 0841    insulin aspart (NovoLOG) 100 Units/mL FlexPen 1-5 Units  1-5 Units Subcutaneous TID CC Charlie Alarcon MD   1 Units at 04/16/24 1824    ondansetron (Zofran) 4 MG/2ML injection 4 mg  4 mg Intravenous Once Ana Freeman MD        sodium chloride 0.9 % IV bolus  1,000 mL  1,000 mL Intravenous Once Ana Freeman MD         No current [facility]-ordered outpatient medications on file.       Allergies   Allergen Reactions    Heparin SWELLING     Arms swelling    Nitrofurantoin NAUSEA AND VOMITING    Heparin OTHER (SEE COMMENTS)    Tetanus Immune Globulin     Tetanus Toxoids UNKNOWN    Amoxicillin-Pot Clavulanate DIARRHEA    Azithromycin DIARRHEA       Family History   Problem Relation Age of Onset    Cancer Mother     Stroke Mother     Diabetes Other         Granddaughter has DM     Social History     Socioeconomic History    Marital status:    Tobacco Use    Smoking status: Former     Current packs/day: 0.00     Types: Cigarettes     Quit date: 1965     Years since quittin.3    Smokeless tobacco: Never   Vaping Use    Vaping status: Never Used   Substance and Sexual Activity    Alcohol use: Not Currently     Comment: rarely    Drug use: No       Available pre-op labs reviewed.  Lab Results   Component Value Date    WBC 14.6 (H) 2024    RBC 3.97 2024    HGB 12.2 (L) 2024    HCT 38.3 (L) 2024    MCV 96.5 2024    MCH 30.7 2024    MCHC 31.9 2024    RDW 13.1 2024    .0 2024     Lab Results   Component Value Date     (L) 2024    K 4.5 2024    CL 98 2024    CO2 28.0 2024    BUN 14 2024    CREATSERUM 0.70 2024     (H) 2024    PGLU 136 (H) 2024    CA 9.0 2024     Lab Results   Component Value Date    INR 4.66 (H) 2024       Vital Signs:  Body mass index is 31.11 kg/m².   height is 1.829 m (6') and weight is 104.1 kg (229 lb 6.4 oz). His oral temperature is 98 °F (36.7 °C). His blood pressure is 155/78 and his pulse is 91. His respiration is 18 and oxygen saturation is 93%.   Vitals:    24 1904 24 2020 24 0420 24 0727   BP:  155/75 146/80 155/78   Pulse: 88 79 91    Resp:  18 18 18   Temp:  98.3 °F (36.8 °C) 98.5 °F  (36.9 °C) 98 °F (36.7 °C)   TempSrc:  Oral Oral Oral   SpO2:  93% 93% 93%   Weight:       Height:            Anesthesia Evaluation     Patient summary reviewed and Nursing notes reviewed    History of anesthetic complications (MH, personal rxn, tested strongly positive per family)   Airway   Mallampati: unable to assess  TM distance: >3 FB  Neck ROM: limited  Comment: Doesn't open mouth fully due to neck pain  Dental      Pulmonary    (+) COPD, shortness of breath, rhonchi    ROS comment: On oxygen 2L/NC  Cardiovascular   Exercise tolerance: poor  (+) pacemaker, hypertension, CAD, dysrhythmias, CHF, weak pulses, peripheral edema    Rhythm: irregular  Rate: normal    Neuro/Psych    (+)  neuromuscular disease, anxiety/panic attacks,  depression      GI/Hepatic/Renal    (+) GERD    Endo/Other    (+) diabetes mellitus  Abdominal                  Anesthesia Plan:   ASA:  4  Plan:   General  Airway:  ETT and Video laryngoscope  Informed Consent Plan and Risks Discussed With:  Patient, spouse and child/children  Consent Comment: TIVA due to strong h/o MH. MH precautions taken with equipment and meds  Discussed plan with:  Attending      I have informed Dennys Manzano and/or legal guardian or family member of the nature of the anesthetic plan, benefits, risks including possible dental damage if relevant, major complications, and any alternative forms of anesthetic management.   All of the patient's questions were answered to the best of my ability. The patient desires the anesthetic management as planned.  Geovany Rich MD  4/17/2024 11:33 AM  Present on Admission:  **None**

## 2024-04-17 NOTE — PHYSICAL THERAPY NOTE
PHYSICAL THERAPY TREATMENT NOTE - INPATIENT     Room Number: 429/429-A       Presenting Problem: hyponatremia, recent GI virus, nausea, vomiting, diarrhea, R shoulder pain s/p bedside aspiration       Problem List  Principal Problem:    Hyponatremia  Active Problems:    Leukocytosis, unspecified type    Acute pain of right shoulder    Hypoxia      PHYSICAL THERAPY ASSESSMENT   Patient demonstrates good  progress this session, goals  remain in progress.    Patient continues to function below baseline with bed mobility, transfers, and gait.  Contributing factors to remaining limitations include decreased functional strength, decreased endurance/aerobic capacity, and pain.  Next session anticipate patient to progress bed mobility, transfers, and gait.  Physical Therapy will continue to follow patient for duration of hospitalization.    Patient continues to benefit from continued skilled PT services: to promote return to prior level of function and safety with continuous assistance and gradual rehabilitative therapy .    PLAN  PT Treatment Plan: Bed mobility;Body mechanics;Endurance  Frequency (Obs): 3-5x/week    SUBJECTIVE  Limited participation    OBJECTIVE  Precautions: Limb alert - right    WEIGHT BEARING RESTRICTION  R Upper Extremity: Non-Weight Bearing             PAIN ASSESSMENT   Rating: Unable to rate  Location: right shoulder  Management Techniques: Activity promotion;Body mechanics;Repositioning    BALANCE  Static Sitting: Fair  Dynamic Sitting: Fair -  Static Standing: Poor +  Dynamic Standing: Poor    ACTIVITY TOLERANCE                          O2 WALK       AM-PAC '6-Clicks' INPATIENT SHORT FORM - BASIC MOBILITY  How much difficulty does the patient currently have...  Patient Difficulty: Turning over in bed (including adjusting bedclothes, sheets and blankets)?: A Little   Patient Difficulty: Sitting down on and standing up from a chair with arms (e.g., wheelchair, bedside commode, etc.): A Lot   Patient  Difficulty: Moving from lying on back to sitting on the side of the bed?: A Little   How much help from another person does the patient currently need...   Help from Another: Moving to and from a bed to a chair (including a wheelchair)?: A Lot   Help from Another: Need to walk in hospital room?: Total   Help from Another: Climbing 3-5 steps with a railing?: Total     AM-PAC Score:  Raw Score: 12   Approx Degree of Impairment: 68.66%   Standardized Score (AM-PAC Scale): 35.33   CMS Modifier (G-Code): CL    FUNCTIONAL ABILITY STATUS  Functional Mobility/Gait Assessment  Gait Assistance: Not tested  Rolling: maximum assist  Supine to Sit: maximum assist  Sit to Supine: maximum assist  Sit to Stand: maximum assist    Additional information: Pt seen daily.Per RN request,PT RX limited to bed mobility,positioning for pt comfort as well as there ex.Pt educated on deep breathing and relaxation technique.    The patient's Approx Degree of Impairment: 68.66% has been calculated based on documentation in the [facility] '6 clicks' Inpatient Daily Activity Short Form.  Research supports that patients with this level of impairment may benefit from Sub-acute Rehabilitation..  Final disposition will be made by interdisciplinary medical team.    THERAPEUTIC EXERCISES  Lower Extremity Ankle pumps  Glut sets  Quad sets     Position Supine       Patient End of Session: In bed;Call light within reach;RN aware of session/findings;All patient questions and concerns addressed    CURRENT GOALS     Patient Goal Patient's self-stated goal is: none stated   Goal #1 Patient is able to demonstrate supine - sit EOB @ level: SBA     Goal #1   Current Status Max a   Goal #2 Patient is able to demonstrate transfers Sit to/from Stand at assistance level: SBA with cane or les-walker     Goal #2  Current Status NT   Goal #3 Patient is able to ambulate 25 feet with assist device: cane or les-walker at assistance level: minimum assistance   Goal #3   Current  Status NT   Goal #4 Patient to demonstrate independence with home activity/exercise instructions provided to patient in preparation for discharge.   Goal #4   Current Status In progress     Gait Training:  minutes  Therapeutic Activity: 20 minutes  Neuromuscular Re-education:  minutes  Therapeutic Exercise: 10 minutes  Canalith Repositioning:  minutes  Manual Therapy:  minutes  Can add/delete any of these

## 2024-04-17 NOTE — ANESTHESIA PROCEDURE NOTES
Airway  Date/Time: 4/17/2024 1:41 PM  Urgency: elective    Difficult airway    General Information and Staff    Patient location during procedure: OR  Anesthesiologist: Geovany Rich MD  Performed: anesthesiologist   Performed by: Geovany Rich MD  Authorized by: Geovany Rich MD      Indications and Patient Condition  Indications for airway management: anesthesia  Sedation level: deep  Preoxygenated: yes  Patient position: sniffing  MILS maintained throughout  Mask difficulty assessment: 1 - vent by mask    Final Airway Details  Final airway type: endotracheal airway      Successful airway: ETT  Cuffed: yes   Successful intubation technique: Video laryngoscopy  Facilitating devices/methods: intubating stylet  Endotracheal tube insertion site: oral  Blade type: [employer] video laryngoscope.  Blade size: #4  ETT size (mm): 7.5    Cormack-Lehane Classification: grade I - full view of glottis  Placement verified by: capnometry   Measured from: lips  ETT to lips (cm): 23  Number of attempts at approach: 1

## 2024-04-17 NOTE — PROGRESS NOTES
04/17/24 0800   VISIT TYPE   SLP Inpatient Visit Type (Documentation Required) Attempted Treatment   FOLLOW UP/PLAN   Follow Up Needed (Documentation Required) Yes   SLP Follow-up Date 04/18/24     SLP attempt this AM. Pt to remain NPO for procedure this PM. SLP to follow up as pt cleared for PO and/or as schedule allows.    Thank you.    Nel Odonnell M.S. CCC-SLP  Speech Language Pathologist  Phone Number Ext. [phone]

## 2024-04-17 NOTE — PLAN OF CARE
Patient drowsy this morning, daughter states more alert than yesterday, oriented to person, place, year. Follows commands, hard of hearing. To OR approximately 1300 this afternoon for right shoulder irrigation and debridement. Received call from recovery room, patient lethargic, ABG's done, bipap placed for over one hour per recovery room nurse, patient more alert, assessed by physician and ok to transfer back to floor. Received patient eyes open, drowsy, follows commands, oriented to place person, some confusion, vitals stable, oxygen 91-92% on 3L. Rates pain 10/10, Dr. Driscoll notified, low dose dilaudid ordered, this worked well for patient this morning until able to take po. Chest x-ray ordered by Dr. Driscoll. Daughter at bedside.  Problem: Patient Centered Care  Goal: Patient preferences are identified and integrated in the patient's plan of care  Description: Interventions:  - What would you like us to know as we care for you? Patient is from home with family and he has a daughter that works here who is his support system.  Patient is hard of hearing.  - Provide timely, complete, and accurate information to patient/family  - Incorporate patient and family knowledge, values, beliefs, and cultural backgrounds into the planning and delivery of care  - Encourage patient/family to participate in care and decision-making at the level they choose  - Honor patient and family perspectives and choices  Outcome: Progressing     Problem: Patient/Family Goals  Goal: Patient/Family Long Term Goal  Description: Patient's Long Term Goal: Pain on right shoulder will be resolved and will be able to walk well with walker.    Interventions:  - No weight bearing on right arm till surgeon says so.  - Home health PT/OT as ordered.  - Pain management with ,oral medication.  - Monitor right shoulder for swelling or increasing pain or weakness.  - Follow up with surgery as recommended.  - See additional Care Plan goals for specific  interventions  Outcome: Progressing  Goal: Patient/Family Short Term Goal  Description: Patient's Short Term Goal: Home with [facility] when pain is resolved.    Interventions:   - NWB to right arm.  - PT/OT as ordered.  - Administer IV antibiotics as ordered.  - Administer oral antibiotics as ordered.  - Maintain enteric/contact isolation as ordered.  - Out of bed as much as tolerated.  - Administer oral anticoagulation as ordered, SCD's to both legs as well.  - See additional Care Plan goals for specific interventions  Outcome: Progressing     Problem: PAIN - ADULT  Goal: Verbalizes/displays adequate comfort level or patient's stated pain goal  Description: INTERVENTIONS:  - Encourage pt to monitor pain and request assistance  - Assess pain using appropriate pain scale  - Administer analgesics based on type and severity of pain and evaluate response  - Implement non-pharmacological measures as appropriate and evaluate response  - Consider cultural and social influences on pain and pain management  - Manage/alleviate anxiety  - Utilize distraction and/or relaxation techniques  - Monitor for opioid side effects  - Notify MD/LIP if interventions unsuccessful or patient reports new pain  - Anticipate increased pain with activity and pre-medicate as appropriate  Outcome: Progressing     Problem: RISK FOR INFECTION - ADULT  Goal: Absence of fever/infection during anticipated neutropenic period  Description: INTERVENTIONS  - Monitor WBC  - Administer growth factors as ordered  - Implement neutropenic guidelines  Outcome: Progressing     Problem: SAFETY ADULT - FALL  Goal: Free from fall injury  Description: INTERVENTIONS:  - Assess pt frequently for physical needs  - Identify cognitive and physical deficits and behaviors that affect risk of falls.  - [place] fall precautions as indicated by assessment.  - Educate pt/family on patient safety including physical limitations  - Instruct pt to call for assistance with activity  based on assessment  - Modify environment to reduce risk of injury  - Provide assistive devices as appropriate  - Consider OT/PT consult to assist with strengthening/mobility  - Encourage toileting schedule  Outcome: Progressing     Problem: DISCHARGE PLANNING  Goal: Discharge to home or other facility with appropriate resources  Description: INTERVENTIONS:  - Identify barriers to discharge w/pt and caregiver  - Include patient/family/discharge partner in discharge planning  - Arrange for needed discharge resources and transportation as appropriate  - Identify discharge learning needs (meds, wound care, etc)  - Arrange for interpreters to assist at discharge as needed  - Consider post-discharge preferences of patient/family/discharge partner  - Complete POLST form as appropriate  - Assess patient's ability to be responsible for managing their own health  - Refer to Case Management Department for coordinating discharge planning if the patient needs post-hospital services based on physician/LIP order or complex needs related to functional status, cognitive ability or social support system  Outcome: Progressing     Problem: CARDIOVASCULAR - ADULT  Goal: Maintains optimal cardiac output and hemodynamic stability  Description: INTERVENTIONS:  - Monitor vital signs, rhythm, and trends  - Monitor for bleeding, hypotension and signs of decreased cardiac output  - Evaluate effectiveness of vasoactive medications to optimize hemodynamic stability  - Monitor arterial and/or venous puncture sites for bleeding and/or hematoma  - Assess quality of pulses, skin color and temperature  - Assess for signs of decreased coronary artery perfusion - ex. Angina  - Evaluate fluid balance, assess for edema, trend weights  Outcome: Progressing  Goal: Absence of cardiac arrhythmias or at baseline  Description: INTERVENTIONS:  - Continuous cardiac monitoring, monitor vital signs, obtain 12 lead EKG if indicated  - Evaluate effectiveness of  antiarrhythmic and heart rate control medications as ordered  - Initiate emergency measures for life threatening arrhythmias  - Monitor electrolytes and administer replacement therapy as ordered  Outcome: Progressing     Problem: RESPIRATORY - ADULT  Goal: Achieves optimal ventilation and oxygenation  Description: INTERVENTIONS:  - Assess for changes in respiratory status  - Assess for changes in mentation and behavior  - Position to facilitate oxygenation and minimize respiratory effort  - Oxygen supplementation based on oxygen saturation or ABGs  - Provide Smoking Cessation handout, if applicable  - Encourage broncho-pulmonary hygiene including cough, deep breathe, Incentive Spirometry  - Assess the need for suctioning and perform as needed  - Assess and instruct to report SOB or any respiratory difficulty  - Respiratory Therapy support as indicated  - Manage/alleviate anxiety  - Monitor for signs/symptoms of CO2 retention  Outcome: Progressing     Problem: GASTROINTESTINAL - ADULT  Goal: Minimal or absence of nausea and vomiting  Description: INTERVENTIONS:  - Maintain adequate hydration with IV or PO as ordered and tolerated  - Nasogastric tube to low intermittent suction as ordered  - Evaluate effectiveness of ordered antiemetic medications  - Provide nonpharmacologic comfort measures as appropriate  - Advance diet as tolerated, if ordered  - Obtain nutritional consult as needed  - Evaluate fluid balance  Outcome: Progressing  Goal: Maintains or returns to baseline bowel function  Description: INTERVENTIONS:  - Assess bowel function  - Maintain adequate hydration with IV or PO as ordered and tolerated  - Evaluate effectiveness of GI medications  - Encourage mobilization and activity  - Obtain nutritional consult as needed  - Establish a toileting routine/schedule  - Consider collaborating with pharmacy to review patient's medication profile  Outcome: Progressing     Problem: GENITOURINARY - ADULT  Goal: Absence  of urinary retention  Description: INTERVENTIONS:  - Assess patient’s ability to void and empty bladder  - Monitor intake/output and perform bladder scan as needed  - Follow urinary retention protocol/standard of care  - Consider collaborating with pharmacy to review patient's medication profile  - Implement strategies to promote bladder emptying  Outcome: Progressing     Problem: METABOLIC/FLUID AND ELECTROLYTES - ADULT  Goal: Electrolytes maintained within normal limits  Description: INTERVENTIONS:  - Monitor labs and rhythm and assess patient for signs and symptoms of electrolyte imbalances  - Administer electrolyte replacement as ordered  - Monitor response to electrolyte replacements, including rhythm and repeat lab results as appropriate  - Fluid restriction as ordered  - Instruct patient on fluid and nutrition restrictions as appropriate  Outcome: Progressing     Problem: MUSCULOSKELETAL - ADULT  Goal: Return mobility to safest level of function  Description: INTERVENTIONS:  - Assess patient stability and activity tolerance for standing, transferring and ambulating w/ or w/o assistive devices  - Assist with transfers and ambulation using safe patient handling equipment as needed  - Ensure adequate protection for wounds/incisions during mobilization  - Obtain PT/OT consults as needed  - Advance activity as appropriate  - Communicate ordered activity level and limitations with patient/family  Outcome: Progressing  Goal: Maintain proper alignment of affected body part  Description: INTERVENTIONS:  - Support and protect limb and body alignment per provider's orders  - Instruct and reinforce with patient and family use of appropriate assistive device and precautions (e.g. spinal or hip dislocation precautions)  Outcome: Progressing     Problem: Impaired Activities of Daily Living  Goal: Achieve highest/safest level of independence in self care  Description: Interventions:  - Assess ability and encourage patient to  participate in ADLs to maximize function  - Promote sitting position while performing ADLs such as feeding, grooming, and bathing  - Educate and encourage patient/family in tolerated functional activity level and precautions during self-care  - Encourage patient to incorporate impaired side during daily activities to promote function  Outcome: Progressing

## 2024-04-17 NOTE — PROGRESS NOTES
INFECTIOUS DISEASE PROGRESS NOTE    Dennys Manzano Patient Status:  Inpatient    1935 MRN [id number]   Location [facility] 4W/SW/SE Attending Jeimy Driscoll MD   Hosp Day # 2 PCP CALOS FITZGERALD MD       SUBJECTIVE  Confused yesterday. Slightly better today. Tolerating abx. OR today.    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 Faecalis - colonization        PLAN:     -  continue IV vancomycin, zosyn for now  -  f/up cx  -  await OR today  -  may need IV abx on discharge  -  Follow fever curve, wbc  -  Reviewed labs, micro, imaging reports  -  d/w patient, family, RN, staff     History of Present Illness:  Dennys Manzano is a a(n) 88 year old male. Patient is a 88-year-old male with a history of CAD post CABG and pacemaker, BPH, CHF, DM, depression, HLD, PAD, CVA, previous right rotator cuff surgery who now presents to the hospital for right shoulder pain with effusion, difficulty with movement started about 2 days prior.  Afebrile here.  WBC initially 18.  X-ray of the shoulder with DJD without fracture.  CT chest, abdomen, pelvis nausea and vomiting without clear infectious process.  Seen by orthopedic surgery underwent aspiration 4/15 noted WBC around 60,000 with 94% segs and positive crystals.  Cultures so far no growth.  On IV vancomycin and Zosyn.  Plan for surgery tomorrow for arthroscopy and I&D.    OBJECTIVE  /78 (BP Location: Left arm)   Pulse 91   Temp 98 °F (36.7 °C) (Oral)   Resp 18   Ht 182.9 cm (6')   Wt 229 lb 6.4 oz (104.1 kg)   SpO2 93%   BMI 31.11 kg/m²     General: in bed, fatigued, nad  HEENT: EOMI   Abdomen: Soft, nontender, nondistended.   Musculoskeletal: R  shoulder effusion  Integument: No rashes        MD Barrett [facility]  [phone]

## 2024-04-17 NOTE — PLAN OF CARE
Patient Alert and Oriented x3. Drowsy.  Can be confused at times. MD notified. Patient NPO. Patient's family will sign consent in AM after talking with MD. Planned wound washout today. Mcelroy cath. On 2l o2  Problem: Patient Centered Care  Goal: Patient preferences are identified and integrated in the patient's plan of care  Description: Interventions:  - What would you like us to know as we care for you? Patient is from home with family and he has a daughter that works here who is his support system.  Patient is hard of hearing.  - Provide timely, complete, and accurate information to patient/family  - Incorporate patient and family knowledge, values, beliefs, and cultural backgrounds into the planning and delivery of care  - Encourage patient/family to participate in care and decision-making at the level they choose  - Honor patient and family perspectives and choices  Outcome: Progressing     Problem: PAIN - ADULT  Goal: Verbalizes/displays adequate comfort level or patient's stated pain goal  Description: INTERVENTIONS:  - Encourage pt to monitor pain and request assistance  - Assess pain using appropriate pain scale  - Administer analgesics based on type and severity of pain and evaluate response  - Implement non-pharmacological measures as appropriate and evaluate response  - Consider cultural and social influences on pain and pain management  - Manage/alleviate anxiety  - Utilize distraction and/or relaxation techniques  - Monitor for opioid side effects  - Notify MD/LIP if interventions unsuccessful or patient reports new pain  - Anticipate increased pain with activity and pre-medicate as appropriate  Outcome: Progressing

## 2024-04-17 NOTE — PROGRESS NOTES
Orthopaedic Surgery Inpatient Progress Note  _______________________________________________________________________________________________________________  _______________________________________________________________________________________________________________    Dennys Manzano Patient Status:  Inpatient    1935 MRN [id number]   Location [facility] PRE OP RECOVERY Attending Jeimy Driscoll MD   Hosp Day # 2 PCP CALOS FITZGERALD MD     DATE: 2024     HISTORY OF PRESENT ILLNESS: Dennys Manzano is a 88 year old male who presented to the Ortho service  for worsening right shoulder pain in the setting of recent GI illness.     S/24H EVENTS: Pain moderate to severe overnight.  Currently n.p.o. for the operating room.  Less alert today and yesterday    PHYSICAL EXAM  /78 (BP Location: Left arm)   Pulse 91   Temp 98 °F (36.7 °C) (Oral)   Resp 18   Ht 6' (1.829 m)   Wt 229 lb 6.4 oz (104.1 kg)   SpO2 93%   BMI 31.11 kg/m²      Constitutional: The patient is well-developed, well-nourished, in no acute distress.  Neurological: Not alert. Poorly oriented  Psychiatric: Lethargic  Head: Normocephalic and atraumatic.  Cardiovascular: regular rate by palpation  Pulmonary/Chest: Effort normal. No respiratory distress. Breathing non-labored  Abdominal: Abdomen exhibits no distension.   Right  Shoulder  Inspection/Palpation  No readily apparent visual abnormalities of shoulder.   Diffusely warm to touch  Moderate effusion   Focally tender to palpation to joint line  ROM  Deferred  Strength  Deferred  Motor intact to AIN/PIN/U/MSC/axillary distributions  SILT R/U/M/MSC/axillary  Radial pulse 2+, distally warm and well perfused, brisk capillary refill      LAB RESULTS  Lab Results   Component Value Date    WBC 14.6 (H) 2024    HGB 12.2 (L) 2024    HCT 38.3 (L) 2024    .0 2024    CREATSERUM 0.70 2024    BUN 14 2024     (L) 2024    K 4.5  04/17/2024    CL 98 04/17/2024    CO2 28.0 04/17/2024     (H) 04/17/2024    CA 9.0 04/17/2024    ALB 4.1 04/14/2024    ALKPHO 79 04/14/2024    BILT 1.1 04/14/2024    TP 7.4 04/14/2024    AST 21 04/14/2024    ALT 24 04/14/2024    PTT 41.0 (H) 04/14/2024    INR 4.66 (H) 04/16/2024    TSH 1.470 08/30/2023    LIP 26 04/14/2024    ESRML 111 (H) 04/14/2024    CRP 13.9 (H) 02/10/2018    MG 1.8 04/17/2024    PHOS 3.1 08/23/2021    TROP <0.045 08/18/2021     03/23/2021     LAB  Body fluid notable for  White blood cell count 59,798  94% neutrophils  Crystals positive  Gram stain negative    XR CHEST AP PORTABLE  (CPT=71045)    Result Date: 4/16/2024  CONCLUSION:   Low lung volumes with unchanged bibasilar opacities and unchanged trace bilateral pleural effusions.    Dictated by (CST): Rose Marie Fan MD on 4/16/2024 at 4:12 PM     Finalized by (CST): Rose Marie Fan MD on 4/16/2024 at 4:14 PM               ASSESSMENT/PLAN: Dennys Manzano is a 88 year old male who presents as a patient to the Orthopaedic surgery service for right shoulder severe pain which was concerning for septic joint versus crystalline arthropathy.  Based on his aspiration results, he is cell count is actually concerning for superimposed crystalline arthropathy and infection. Dennys Manzano  meets appropriate indications to undergo surgical intervention for the above diagnosis and this option was discussed in detail again today. After discussion of risks, benefits, and alternatives, they elected to proceed with surgery as planned and a witnessed consent form was signed. The operative site was marked with an indelible marker. They were offered the opportunity to ask all additional questions, which were answered to their satisfaction.    NPO for OR  Surgery planned: Right shoulder arthroscopic debridement  POST-OP WEIGHT BEARING PLAN: As tolerated  We will plan to send intraoperative cultures    I have personally seen Dennys MCCARTY JosefSagrario ,  performed an examination, reviewed all pertinent imaging/laboratory findings, and discussed in detail their plan of care. Please note that this note was written in combination with voice recognition/dictation software and there is a possibility of transcription errors which were not identified at the time of note submission. If clarification is necessary, please contact the author or clinic staff.    Gregorio Calle MD  Orthopaedic Surgery  4/17/2024

## 2024-04-17 NOTE — RESPIRATORY THERAPY NOTE
Exp. ABG drawn on NRB, results are below,      ABG pH 7.28 Low      ABG pCO2 64 High Panic  mm Hg    ABG PO2 111 High  mm Hg    ABG HCO3 26.3 mEq/L    Blood Gas Base Excess 1.8 mmol/L    Potassium Blood Gas 4.1 mmol/L    Sodium Blood Gas 127 Low  mmol/L    Lactic Acid (Blood Gas) 0.7 mmol/L    Ionized Calcium 1.14 mmol/L    Total Hemoglobin 13.0 g/dL    ABG O2 Saturation >99.0 %    Carboxyhemoglobin 3.2 High  %    Methemoglobin 1.2 % SAT    Oxygen Content 17.6 Vol%    Oxygen Delivery Device NR MASK    L/M 15.00 L/min     Results inform to the DrCara    Post ABG start BIPAP 10/5/60%

## 2024-04-17 NOTE — CM/SW NOTE
Call received from Kayla at [facility].   Dr. Jalloh anticipates IV abx on dc.   Infusion ref sent via [facility] to [facility].     CM informed Daisha that pts dtr Evangelina may want pt to go to [facility] as it may be difficult for her to go there everyday to assist with abx.     Pt is currently in the OR not appropriate to address IV abx at this time.   Will need to follow up on 4/18.    Susan Jones RN, BSN  Nurse   [phone],

## 2024-04-17 NOTE — PROGRESS NOTES
[facility]  part of [facility]    Progress Note    Dennys Manzano Patient Status:  Inpatient    1935 MRN [id number]   Location [facility] 4W/SW/SE Attending Jeimy Driscoll MD   Hosp Day # 2 PCP CALOS FITZGERALD MD     Chief Complaint:   Chief Complaint   Patient presents with    Abdomen/Flank Pain    Nausea/Vomiting/Diarrhea       Subjective:   Dennys Manzano is going for surgery. Was drowsy yesterday but better today per dtr who is at bedside. Still on 2L NC o2. Still has R shoulder pain and neck pain - dilaudid low dose was given.     No events overnight per RN.     Objective:   Objective:    Blood pressure 155/78, pulse 91, temperature 98 °F (36.7 °C), temperature source Oral, resp. rate 18, height 6' (1.829 m), weight 229 lb 6.4 oz (104.1 kg), SpO2 93%.    Physical Exam:    General: No acute distress.   Respiratory: Diminished bases B/L   Cardiovascular: S1, S2. Regular rate and rhythm. No murmurs, rubs or gallops.   Abdomen: Soft, nontender, nondistended.  Positive bowel sounds. No rebound or guarding.  Neurologic: No focal neurological deficits.   Musculoskeletal: Moves all extremities.  Extremities: No edema.      Results:   Results:    Labs:  Recent Labs   Lab 24  0529   WBC  --  18.0* 15.5* 14.6*   HGB  --  14.0 12.6* 12.2*   MCV  --  90.2 98.3 96.5   PLT  --  332.0 340.0 337.0   INR 3.00*  --  4.66*  --        Recent Labs   Lab 24  0620 24  0529   * 128* 137*   BUN 15 14 14   CREATSERUM 0.84 0.74 0.70   CA 9.5 9.0 9.0   ALB 4.1  --   --    * 130* 130*   K 4.3 4.7 4.5   CL 97* 101 98   CO2 27.0 26.0 28.0   ALKPHO 79  --   --    AST 21  --   --    ALT 24  --   --    BILT 1.1  --   --    TP 7.4  --   --        Estimated Creatinine Clearance: 83.1 mL/min (based on SCr of 0.7 mg/dL).    Recent Labs   Lab 242 24  0620   PTP 33.0* 46.8*   INR 3.00* 4.66*             Culture:  Hospital Encounter on 04/14/24   1. Body Fluid Cult Aerobic and Anaerobic     Status: None (Preliminary result)    Collection Time: 04/15/24  7:26 AM    Specimen: Synovial fluid,shoulder; Body fluid, unspecified   Result Value Ref Range    Body Fluid Smear 3+ WBCs seen N/A    Body Fluid Smear No organisms seen N/A    Body Fluid Smear This is a cytocentrifuged smear. N/A   2. Urine Culture, Routine     Status: Abnormal    Collection Time: 04/15/24  5:43 AM    Specimen: Urine, clean catch   Result Value Ref Range    Urine Culture >100,000 CFU/ML Enterococcus faecalis NOT VRE (A) N/A   3. Blood Culture     Status: None (Preliminary result)    Collection Time: 04/15/24  2:27 AM    Specimen: Blood,peripheral   Result Value Ref Range    Blood Culture Result No Growth 2 Days N/A       Cardiac  No results for input(s): \"TROP\", \"PBNP\" in the last 168 hours.      Imaging: Imaging data reviewed in [facility].  XR CHEST AP PORTABLE  (CPT=71045)    Result Date: 4/16/2024  CONCLUSION:   Low lung volumes with unchanged bibasilar opacities and unchanged trace bilateral pleural effusions.    Dictated by (CST): Rose Marie Fan MD on 4/16/2024 at 4:12 PM     Finalized by (CST): Rose Marie Fan MD on 4/16/2024 at 4:14 PM           Medications:    magnesium oxide  400 mg Oral Once    piperacillin-tazobactam  4.5 g Intravenous Q8H    HYDROmorphone  0.2 mg Intravenous Once    montelukast  10 mg Oral Nightly    aspirin  81 mg Oral Daily    atorvastatin  40 mg Oral Nightly    escitalopram  20 mg Oral Daily    finasteride  5 mg Oral Daily    metoprolol succinate ER  25 mg Oral Daily Beta Blocker    pantoprazole  40 mg Oral QAM AC    senna-docusate  1 tablet Oral BID    umeclidinium-vilanterol  1 puff Inhalation Daily    tamsulosin  0.4 mg Oral Daily with dinner    vancomycin  12.5 mg/kg Intravenous Q24H    insulin aspart  1-5 Units Subcutaneous TID CC    ondansetron  4 mg Intravenous Once    sodium chloride  1,000 mL Intravenous  Once         Assessment and Plan:   Assessment & Plan:      Intractable right shoulder pain  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   - s/p R shoulder joint aspiration 4/15   - 59,000 WBC's with predominately neutrophils. + calcium pyrophosphate crystals --> pseudogout. Culture 3+ WBCs no organisms.   - Empiric vancomycin and Zosyn for now.   - Pain control. Stop morphine. Trial low dose dilaudid.   - ID on consult.   - PT/OT - LISA eventually   - WBC trending down. Afebrile.   - blood cx x 2 NGTD.   - surgical washout today      Acute respiratory failure with hypoxia  Likely COPD exacerbation in combination with atelectasis  - was on 5L NC wean o2.   - duonebs q6hrs WA  - SLP eval regular thin liquids.   - hold on steroids.   - IS/flutter valve.   - CXR atelectasis   - Antitussives PRN   - CT chest on admit showed dense atelectasis no PE.     DVT/pulmonary embolism  - INR elevated  - hold home warfarin      Hyponatremia  - appears euvolemic now   - Cont IVF.   - Urine osm high, urine sdoium high.   - free water restriction     NSVT  - ECHO LVEF 55-60%. No WMA.   - keep Mg 2.0 and K 4.0   - cont metoprolol     DM II  - A1c 6.4  - ISS     BPH  Chronic urinary retention   - Continue Flomax and finasteride  - cont valladares     >55min spent, >50% spent counseling and coordinating care in the form of educating pt/family and d/w consultants and staff. Most of the time spent discussing the above plan.        Plan of care discussed with patient or family at bedside.    Jeimy Driscoll MD  Hospitalist          Supplementary Documentation:     Quality:  DVT Prophylaxis: SCD  CODE status: Full  Dispo: per clinical course           Estimated date of discharge: TBD  Discharge is dependent on: clinical stability  At this point Mr. Manzano is expected to be discharge to: TBD

## 2024-04-17 NOTE — PROGRESS NOTES
Received from PACU, right shoulder irrigation and debridement, dressing clean dry and intact, sling in place, site edematous +1. Patient drowsy, arousable, oriented to person, place. Daughter at bedside. Vitals stable. IV fluids infusing, on 3L of oxygen per nasal cannula, oxygen saturation 91-92%. Patient kept in recovery for bipap due to lethargy. Will continue to monitor.

## 2024-04-18 ENCOUNTER — APPOINTMENT (OUTPATIENT)
Dept: CT IMAGING | Facility: HOSPITAL | Age: 89
End: 2024-04-18
Attending: HOSPITALIST
Payer: MEDICARE

## 2024-04-18 ENCOUNTER — APPOINTMENT (OUTPATIENT)
Dept: CT IMAGING | Facility: HOSPITAL | Age: 89
DRG: 500 | End: 2024-04-18
Attending: HOSPITALIST
Payer: MEDICARE

## 2024-04-18 PROBLEM — G93.41 ACUTE METABOLIC ENCEPHALOPATHY: Status: ACTIVE | Noted: 2024-04-18

## 2024-04-18 PROBLEM — G47.19 TRANSIENT DISORDER OF INITIATING OR MAINTAINING WAKEFULNESS: Status: ACTIVE | Noted: 2024-04-18

## 2024-04-18 LAB
ALBUMIN SERPL-MCNC: 3.5 G/DL (ref 3.2–4.8)
ALP LIVER SERPL-CCNC: 91 U/L
ALT SERPL-CCNC: 16 U/L
AMMONIA PLAS-MCNC: 26 UMOL/L (ref 11–32)
ANION GAP SERPL CALC-SCNC: 4 MMOL/L (ref 0–18)
AST SERPL-CCNC: <8 U/L (ref ?–34)
BASE EXCESS BLD CALC-SCNC: 6.6 MMOL/L (ref ?–2)
BILIRUB DIRECT SERPL-MCNC: 0.6 MG/DL (ref ?–0.3)
BILIRUB SERPL-MCNC: 1.1 MG/DL (ref 0.2–1.1)
BUN BLD-MCNC: 15 MG/DL (ref 9–23)
BUN/CREAT SERPL: 23.1 (ref 10–20)
CALCIUM BLD-MCNC: 9 MG/DL (ref 8.7–10.4)
CHLORIDE SERPL-SCNC: 100 MMOL/L (ref 98–112)
CO2 SERPL-SCNC: 31 MMOL/L (ref 21–32)
CREAT BLD-MCNC: 0.65 MG/DL
DEPRECATED RDW RBC AUTO: 45.1 FL (ref 35.1–46.3)
EGFRCR SERPLBLD CKD-EPI 2021: 92 ML/MIN/1.73M2 (ref 60–?)
ERYTHROCYTE [DISTWIDTH] IN BLOOD BY AUTOMATED COUNT: 12.9 % (ref 11–15)
GLUCOSE BLD-MCNC: 144 MG/DL (ref 70–99)
GLUCOSE BLDC GLUCOMTR-MCNC: 116 MG/DL (ref 70–99)
GLUCOSE BLDC GLUCOMTR-MCNC: 125 MG/DL (ref 70–99)
GLUCOSE BLDC GLUCOMTR-MCNC: 130 MG/DL (ref 70–99)
GLUCOSE BLDC GLUCOMTR-MCNC: 174 MG/DL (ref 70–99)
HCO3 BLDA-SCNC: 29.9 MEQ/L (ref 21–27)
HCT VFR BLD AUTO: 36.2 %
HGB BLD-MCNC: 11.7 G/DL
INR BLD: 3.12 (ref 0.8–1.2)
MAGNESIUM SERPL-MCNC: 1.9 MG/DL (ref 1.6–2.6)
MCH RBC QN AUTO: 30.8 PG (ref 26–34)
MCHC RBC AUTO-ENTMCNC: 32.3 G/DL (ref 31–37)
MCV RBC AUTO: 95.3 FL
MODIFIED ALLEN TEST: POSITIVE
O2 CT BLD-SCNC: 14.9 VOL% (ref 15–23)
OSMOLALITY SERPL CALC.SUM OF ELEC: 283 MOSM/KG (ref 275–295)
OXYGEN LITERS/MINUTE: 1 L/MIN
PCO2 BLDA: 58 MM HG (ref 35–45)
PH BLDA: 7.37 [PH] (ref 7.35–7.45)
PLATELET # BLD AUTO: 348 10(3)UL (ref 150–450)
PO2 BLDA: 56 MM HG (ref 80–100)
POTASSIUM SERPL-SCNC: 4.2 MMOL/L (ref 3.5–5.1)
PROCALCITONIN SERPL-MCNC: 0.25 NG/ML (ref ?–0.05)
PROT SERPL-MCNC: 6.8 G/DL (ref 5.7–8.2)
PROTHROMBIN TIME: 34.1 SECONDS (ref 11.6–14.8)
PUNCTURE CHARGE: YES
RBC # BLD AUTO: 3.8 X10(6)UL
SAO2 % BLDA: 93.9 % (ref 94–100)
SODIUM SERPL-SCNC: 135 MMOL/L (ref 136–145)
T3FREE SERPL-MCNC: 1.69 PG/ML (ref 2.4–4.2)
T4 FREE SERPL-MCNC: 1 NG/DL (ref 0.8–1.7)
TSI SER-ACNC: 0.28 MIU/ML (ref 0.55–4.78)
VANCOMYCIN PEAK SERPL-MCNC: 16.1 UG/ML (ref 30–50)
WBC # BLD AUTO: 18.1 X10(3) UL (ref 4–11)

## 2024-04-18 PROCEDURE — 99291 CRITICAL CARE FIRST HOUR: CPT | Performed by: OTHER

## 2024-04-18 PROCEDURE — 70450 CT HEAD/BRAIN W/O DYE: CPT | Performed by: HOSPITALIST

## 2024-04-18 PROCEDURE — 99223 1ST HOSP IP/OBS HIGH 75: CPT | Performed by: INTERNAL MEDICINE

## 2024-04-18 PROCEDURE — 99291 CRITICAL CARE FIRST HOUR: CPT | Performed by: HOSPITALIST

## 2024-04-18 PROCEDURE — 72125 CT NECK SPINE W/O DYE: CPT | Performed by: HOSPITALIST

## 2024-04-18 RX ORDER — NALOXONE HYDROCHLORIDE 0.4 MG/ML
0.4 INJECTION, SOLUTION INTRAMUSCULAR; INTRAVENOUS; SUBCUTANEOUS ONCE
Status: COMPLETED | OUTPATIENT
Start: 2024-04-18 | End: 2024-04-18

## 2024-04-18 RX ORDER — IPRATROPIUM BROMIDE AND ALBUTEROL SULFATE 2.5; .5 MG/3ML; MG/3ML
3 SOLUTION RESPIRATORY (INHALATION)
Status: DISCONTINUED | OUTPATIENT
Start: 2024-04-18 | End: 2024-04-24

## 2024-04-18 RX ORDER — NALOXONE HYDROCHLORIDE 0.4 MG/ML
INJECTION, SOLUTION INTRAMUSCULAR; INTRAVENOUS; SUBCUTANEOUS
Status: COMPLETED
Start: 2024-04-18 | End: 2024-04-18

## 2024-04-18 RX ORDER — NALOXONE HYDROCHLORIDE 1 MG/ML
INJECTION INTRAMUSCULAR; INTRAVENOUS; SUBCUTANEOUS
Status: DISCONTINUED
Start: 2024-04-18 | End: 2024-04-18 | Stop reason: WASHOUT

## 2024-04-18 NOTE — PLAN OF CARE
Problem: Patient Centered Care  Goal: Patient preferences are identified and integrated in the patient's plan of care  Description: Interventions:  - What would you like us to know as we care for you? Patient is from home with family and he has a daughter that works here who is his support system.  Patient is hard of hearing.  - Provide timely, complete, and accurate information to patient/family  - Incorporate patient and family knowledge, values, beliefs, and cultural backgrounds into the planning and delivery of care  - Encourage patient/family to participate in care and decision-making at the level they choose  - Honor patient and family perspectives and choices  Outcome: Progressing     Problem: Patient/Family Goals  Goal: Patient/Family Long Term Goal  Description: Patient's Long Term Goal: Pain on right shoulder will be resolved and will be able to walk well with walker.    Interventions:  - No weight bearing on right arm till surgeon says so.  - Home health PT/OT as ordered.  - Pain management with ,oral medication.  - Monitor right shoulder for swelling or increasing pain or weakness.  - Follow up with surgery as recommended.  - See additional Care Plan goals for specific interventions  Outcome: Progressing  Goal: Patient/Family Short Term Goal  Description: Patient's Short Term Goal: Home with [facility] when pain is resolved.    Interventions:   - NWB to right arm.  - PT/OT as ordered.  - Administer IV antibiotics as ordered.  - Administer oral antibiotics as ordered.  - Maintain enteric/contact isolation as ordered.  - Out of bed as much as tolerated.  - Administer oral anticoagulation as ordered, SCD's to both legs as well.  - See additional Care Plan goals for specific interventions  Outcome: Progressing     Problem: PAIN - ADULT  Goal: Verbalizes/displays adequate comfort level or patient's stated pain goal  Description: INTERVENTIONS:  - Encourage pt to monitor pain and request assistance  - Assess pain  using appropriate pain scale  - Administer analgesics based on type and severity of pain and evaluate response  - Implement non-pharmacological measures as appropriate and evaluate response  - Consider cultural and social influences on pain and pain management  - Manage/alleviate anxiety  - Utilize distraction and/or relaxation techniques  - Monitor for opioid side effects  - Notify MD/LIP if interventions unsuccessful or patient reports new pain  - Anticipate increased pain with activity and pre-medicate as appropriate  Outcome: Progressing     Problem: RISK FOR INFECTION - ADULT  Goal: Absence of fever/infection during anticipated neutropenic period  Description: INTERVENTIONS  - Monitor WBC  - Administer growth factors as ordered  - Implement neutropenic guidelines  Outcome: Progressing     Problem: SAFETY ADULT - FALL  Goal: Free from fall injury  Description: INTERVENTIONS:  - Assess pt frequently for physical needs  - Identify cognitive and physical deficits and behaviors that affect risk of falls.  - [place] fall precautions as indicated by assessment.  - Educate pt/family on patient safety including physical limitations  - Instruct pt to call for assistance with activity based on assessment  - Modify environment to reduce risk of injury  - Provide assistive devices as appropriate  - Consider OT/PT consult to assist with strengthening/mobility  - Encourage toileting schedule  Outcome: Progressing     Problem: DISCHARGE PLANNING  Goal: Discharge to home or other facility with appropriate resources  Description: INTERVENTIONS:  - Identify barriers to discharge w/pt and caregiver  - Include patient/family/discharge partner in discharge planning  - Arrange for needed discharge resources and transportation as appropriate  - Identify discharge learning needs (meds, wound care, etc)  - Arrange for interpreters to assist at discharge as needed  - Consider post-discharge preferences of patient/family/discharge  partner  - Complete POLST form as appropriate  - Assess patient's ability to be responsible for managing their own health  - Refer to Case Management Department for coordinating discharge planning if the patient needs post-hospital services based on physician/LIP order or complex needs related to functional status, cognitive ability or social support system  Outcome: Progressing     Problem: CARDIOVASCULAR - ADULT  Goal: Maintains optimal cardiac output and hemodynamic stability  Description: INTERVENTIONS:  - Monitor vital signs, rhythm, and trends  - Monitor for bleeding, hypotension and signs of decreased cardiac output  - Evaluate effectiveness of vasoactive medications to optimize hemodynamic stability  - Monitor arterial and/or venous puncture sites for bleeding and/or hematoma  - Assess quality of pulses, skin color and temperature  - Assess for signs of decreased coronary artery perfusion - ex. Angina  - Evaluate fluid balance, assess for edema, trend weights  Outcome: Progressing  Goal: Absence of cardiac arrhythmias or at baseline  Description: INTERVENTIONS:  - Continuous cardiac monitoring, monitor vital signs, obtain 12 lead EKG if indicated  - Evaluate effectiveness of antiarrhythmic and heart rate control medications as ordered  - Initiate emergency measures for life threatening arrhythmias  - Monitor electrolytes and administer replacement therapy as ordered  Outcome: Progressing     Problem: RESPIRATORY - ADULT  Goal: Achieves optimal ventilation and oxygenation  Description: INTERVENTIONS:  - Assess for changes in respiratory status  - Assess for changes in mentation and behavior  - Position to facilitate oxygenation and minimize respiratory effort  - Oxygen supplementation based on oxygen saturation or ABGs  - Provide Smoking Cessation handout, if applicable  - Encourage broncho-pulmonary hygiene including cough, deep breathe, Incentive Spirometry  - Assess the need for suctioning and perform as  needed  - Assess and instruct to report SOB or any respiratory difficulty  - Respiratory Therapy support as indicated  - Manage/alleviate anxiety  - Monitor for signs/symptoms of CO2 retention  Outcome: Progressing     Problem: GASTROINTESTINAL - ADULT  Goal: Minimal or absence of nausea and vomiting  Description: INTERVENTIONS:  - Maintain adequate hydration with IV or PO as ordered and tolerated  - Nasogastric tube to low intermittent suction as ordered  - Evaluate effectiveness of ordered antiemetic medications  - Provide nonpharmacologic comfort measures as appropriate  - Advance diet as tolerated, if ordered  - Obtain nutritional consult as needed  - Evaluate fluid balance  Outcome: Progressing  Goal: Maintains or returns to baseline bowel function  Description: INTERVENTIONS:  - Assess bowel function  - Maintain adequate hydration with IV or PO as ordered and tolerated  - Evaluate effectiveness of GI medications  - Encourage mobilization and activity  - Obtain nutritional consult as needed  - Establish a toileting routine/schedule  - Consider collaborating with pharmacy to review patient's medication profile  Outcome: Progressing     Problem: GENITOURINARY - ADULT  Goal: Absence of urinary retention  Description: INTERVENTIONS:  - Assess patient’s ability to void and empty bladder  - Monitor intake/output and perform bladder scan as needed  - Follow urinary retention protocol/standard of care  - Consider collaborating with pharmacy to review patient's medication profile  - Implement strategies to promote bladder emptying  Outcome: Progressing     Problem: METABOLIC/FLUID AND ELECTROLYTES - ADULT  Goal: Electrolytes maintained within normal limits  Description: INTERVENTIONS:  - Monitor labs and rhythm and assess patient for signs and symptoms of electrolyte imbalances  - Administer electrolyte replacement as ordered  - Monitor response to electrolyte replacements, including rhythm and repeat lab results as  appropriate  - Fluid restriction as ordered  - Instruct patient on fluid and nutrition restrictions as appropriate  Outcome: Progressing     Problem: MUSCULOSKELETAL - ADULT  Goal: Return mobility to safest level of function  Description: INTERVENTIONS:  - Assess patient stability and activity tolerance for standing, transferring and ambulating w/ or w/o assistive devices  - Assist with transfers and ambulation using safe patient handling equipment as needed  - Ensure adequate protection for wounds/incisions during mobilization  - Obtain PT/OT consults as needed  - Advance activity as appropriate  - Communicate ordered activity level and limitations with patient/family  Outcome: Progressing  Goal: Maintain proper alignment of affected body part  Description: INTERVENTIONS:  - Support and protect limb and body alignment per provider's orders  - Instruct and reinforce with patient and family use of appropriate assistive device and precautions (e.g. spinal or hip dislocation precautions)  Outcome: Progressing     Problem: Impaired Activities of Daily Living  Goal: Achieve highest/safest level of independence in self care  Description: Interventions:  - Assess ability and encourage patient to participate in ADLs to maximize function  - Promote sitting position while performing ADLs such as feeding, grooming, and bathing  - Educate and encourage patient/family in tolerated functional activity level and precautions during self-care  - Encourage patient to incorporate impaired side during daily activities to promote function  Outcome: Progressing

## 2024-04-18 NOTE — ANESTHESIA POSTPROCEDURE EVALUATION
Patient: Dennys Manzano    Procedure Summary       Date: 04/17/24 Room / Location: [facility] MAIN OR  / [facility] MAIN OR    Anesthesia Start: 1329 Anesthesia Stop: 1517    Procedure: Right shoulder arthroscopy, extensive debridement of right shoulder, bicep tenotomy (Right: Shoulder) Diagnosis: (Septic arthritis)    Surgeons: Gregorio Calle MD Anesthesiologist: Geovany Rich MD    Anesthesia Type: general ASA Status: 4            Anesthesia Type: general    Vitals Value Taken Time   /78 04/18/24 0450   Temp 99.1 °F (37.3 °C) 04/18/24 0450   Pulse 85 04/18/24 0804   Resp 18 04/18/24 0450   SpO2 95 % 04/18/24 0450   Vitals shown include unfiled device data.    [facility] AN Post Evaluation:   Patient Evaluated in floor  Patient Participation: complete - patient participated  Level of Consciousness: awake  Pain Score: 3  Pain Management: adequate  Airway Patency:patent  Dental exam unchanged from preop  Yes    Nausea/Vomiting: none  Cardiovascular Status: acceptable  Respiratory Status: acceptable and nasal cannula  Postoperative Hydration acceptable  Comments: Patient required BiPAP in PACU, able to wean to 3L/min, doing better this am, 94-95% sat on #L/min via NC, no recall of surgery.  CPM      Geovany Rich MD  4/18/2024 8:05 AM

## 2024-04-18 NOTE — PROGRESS NOTES
[facility]  part of [facility]    Progress Note    Dennys Manzano Patient Status:  Inpatient    1935 MRN [id number]   Location [facility] 4W/SW/SE Attending Jeimy Driscoll MD   Hosp Day # 3 PCP CALOS FITZGERALD MD     Chief Complaint:   Chief Complaint   Patient presents with    Abdomen/Flank Pain    Nausea/Vomiting/Diarrhea       Subjective:   Dennys Manzano is lethargic today. He opens his eyes answers questions but is confused. He falls asleep - He is able to smile open his eyes and wiggle his tongue. His squeeze is weak but he falls asleep. Doesn't wiggle toes much as too lethargic. He is on 1L NC o2 spo2 92%. He wakes up to c/o pain in his neck and pain in his R shoulder. Received norco 5am.   Objective:   Objective:    Blood pressure (P) 131/71, pulse 81, temperature (P) 98.1 °F (36.7 °C), temperature source (P) Temporal, resp. rate (P) 18, height 6' (1.829 m), weight 229 lb 6.4 oz (104.1 kg), SpO2 92%.    Physical Exam:    General: lethargic, c/o neck pain leaning towards L   Respiratory: Diminished bases B/L   Cardiovascular: S1, S2. Regular rate and rhythm. No murmurs, rubs or gallops.   Abdomen: Soft, nontender, nondistended.  Positive bowel sounds. No rebound or guarding.  Neurologic: lethargic, Pinpoint pupils, EOMI, CN II-XII GI, motor unable to assess as pt falls asleep lethargic.   Extremities: No edema.      Results:   Results:    Labs:  Recent Labs   Lab 24  2322 24  2323 24  0620 24  0529 24  0656   WBC  --  18.0* 15.5* 14.6* 18.1*   HGB  --  14.0 12.6* 12.2* 11.7*   MCV  --  90.2 98.3 96.5 95.3   PLT  --  332.0 340.0 337.0 348.0   INR 3.00*  --  4.66*  --  3.12*       Recent Labs   Lab 24  2322 24  0620 24  0529 24  0656   * 128* 137* 144*   BUN 15 14 14 15   CREATSERUM 0.84 0.74 0.70 0.65*   CA 9.5 9.0 9.0 9.0   ALB 4.1  --   --   --    * 130* 130* 135*   K 4.3 4.7 4.5 4.2   CL 97* 101 98 100    CO2 27.0 26.0 28.0 31.0   ALKPHO 79  --   --   --    AST 21  --   --   --    ALT 24  --   --   --    BILT 1.1  --   --   --    TP 7.4  --   --   --        Estimated Creatinine Clearance: 89.5 mL/min (A) (based on SCr of 0.65 mg/dL (L)).    Recent Labs   Lab 04/14/24  2322 04/16/24  0620 04/18/24  0656   PTP 33.0* 46.8* 34.1*   INR 3.00* 4.66* 3.12*            Culture:  Hospital Encounter on 04/14/24   1. Tissue Aerobic Culture     Status: None (Preliminary result)    Collection Time: 04/17/24  2:35 PM    Specimen: Tissue   Result Value Ref Range    Tissue Culture Result No Growth at <18 hours N/A    Tissue Smear 1+ WBCs seen N/A    Tissue Smear No organisms seen N/A   2. Body Fluid Cult Aerobic and Anaerobic     Status: None (Preliminary result)    Collection Time: 04/15/24  7:26 AM    Specimen: Synovial fluid,shoulder; Body fluid, unspecified   Result Value Ref Range    Body Fluid Smear 3+ WBCs seen N/A    Body Fluid Smear No organisms seen N/A    Body Fluid Smear This is a cytocentrifuged smear. N/A   3. Urine Culture, Routine     Status: Abnormal    Collection Time: 04/15/24  5:43 AM    Specimen: Urine, clean catch   Result Value Ref Range    Urine Culture >100,000 CFU/ML Enterococcus faecalis NOT VRE (A) N/A   4. Blood Culture     Status: None (Preliminary result)    Collection Time: 04/15/24  2:27 AM    Specimen: Blood,peripheral   Result Value Ref Range    Blood Culture Result No Growth 3 Days N/A       Cardiac  No results for input(s): \"TROP\", \"PBNP\" in the last 168 hours.      Imaging: Imaging data reviewed in [employer].  XR CHEST AP PORTABLE  (CPT=71045)    Result Date: 4/17/2024  PROCEDURE: XR CHEST AP PORTABLE  (CPT=71045) TIME: 1841  COMPARISON: [facility], XR CHEST AP PORTABLE (CPT=71045), 4/16/2024, 12:16 PM.  INDICATIONS: Hypoxia post right shoulder arthroscopy, extensive debridement of right shoulder, bicep tenotomy.  TECHNIQUE:   Single view.   Findings and impression:  Stable heart  with persistent vascular congestion but no edema  Lung volumes remain moderately low with extensive basilar opacity, most likely atelectasis  No pneumothorax    Dictated by (CST): Bib Horn MD on 4/17/2024 at 6:59 PM     Finalized by (CST): Bib Horn MD on 4/17/2024 at 7:00 PM          XR CHEST AP PORTABLE  (CPT=71045)    Result Date: 4/16/2024  CONCLUSION:   Low lung volumes with unchanged bibasilar opacities and unchanged trace bilateral pleural effusions.    Dictated by (CST): Rose Marie aFn MD on 4/16/2024 at 4:12 PM     Finalized by (CST): Rose Marie Fan MD on 4/16/2024 at 4:14 PM           Medications:    naloxone  0.4 mg Intravenous Once    magnesium oxide  400 mg Oral Once    piperacillin-tazobactam  4.5 g Intravenous Q8H    HYDROmorphone  0.2 mg Intravenous Once    ipratropium-albuterol  3 mL Nebulization Q6H WA    fluticasone propionate  1 spray Each Nare Daily    montelukast  10 mg Oral Nightly    aspirin  81 mg Oral Daily    atorvastatin  40 mg Oral Nightly    escitalopram  20 mg Oral Daily    finasteride  5 mg Oral Daily    metoprolol succinate ER  25 mg Oral Daily Beta Blocker    pantoprazole  40 mg Oral QAM AC    senna-docusate  1 tablet Oral BID    umeclidinium-vilanterol  1 puff Inhalation Daily    tamsulosin  0.4 mg Oral Daily with dinner    vancomycin  12.5 mg/kg Intravenous Q24H    insulin aspart  1-5 Units Subcutaneous TID CC    ondansetron  4 mg Intravenous Once    sodium chloride  1,000 mL Intravenous Once         Assessment and Plan:   Assessment & Plan:      Intractable right shoulder pain  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   - s/p R shoulder joint aspiration 4/15   - 59,000 WBC's with predominately neutrophils. + calcium pyrophosphate crystals --> pseudogout. Culture 3+ WBCs no organisms.   - Empiric vancomycin and Zosyn   - stop Pain meds due to lethargy   - ID on consult.   - PT/OT - LISA eventually   - afebrile. WBC  elevated.   - blood cx x 2 NGTD.   - R shoulder arthroscopy extensive debridement of R shoulder, bicep tenotomy 4/17     Acute encephalopathy   - ddx: CO2 narcosis, CVA, pain medication related vs anesthesia effects vs other.   - stat CT head   - stat ABG   - trial of narcan 0.4mg IV given --> no benefit.   - neuro consult.   - may need MRI brain   - INR therapeutic.   - neuro checks.   -       Acute respiratory failure with hypoxia  Likely COPD exacerbation in combination with atelectasis  - was on 5L NC wean o2 now down to 1L.   - duonebs q6hrs WA  - SLP eval regular thin liquids.   - NPO while lethargic.   - hold on steroids.   - IS/flutter valve.   - CXR atelectasis   - Antitussives PRN   - CT chest on admit showed dense atelectasis no PE.     DVT/pulmonary embolism  - INR elevated  - hold home warfarin INR 3.18      Hyponatremia  - appears euvolemic now   - sodium better with IVF.   - off IVF.   - Urine osm high, urine sdoium high.   - free water restriction     NSVT  - ECHO LVEF 55-60%. No WMA.   - keep Mg 2.0 and K 4.0   - cont metoprolol     DM II  - A1c 6.4  - ISS     BPH  Chronic urinary retention   - Continue Flomax and finasteride  - cont valladares last changed 04/01/24    >55min spent, >50% spent counseling and coordinating care in the form of educating pt/family and d/w consultants and staff. Most of the time spent discussing the above plan. D/w Evangelina dtr on phone looks worse today. Stat ABG stat CT brain, neuro checks, neuro consult.    35 min critical care time spent        Plan of care discussed with patient or family at bedside.    Jeimy Driscoll MD  Hospitalist          Supplementary Documentation:     Quality:  DVT Prophylaxis: SCD  CODE status: Full  Dispo: per clinical course           Estimated date of discharge: TBD  Discharge is dependent on: clinical stability  At this point Mr. Manzano is expected to be discharge to: TBD

## 2024-04-18 NOTE — SLP NOTE
SPEECH DAILY NOTE - INPATIENT    ASSESSMENT & PLAN   ASSESSMENT    Proper PPE worn. Hands sanitized upon entrance/exit Pt room.       Pt alert, afebrile and on 3L/min 02 via NC. Pt seen for swallow analysis per BSE recommendations (after consulting with RN). Pt agreed to participate. Pt's preferred method of learning verbal. Pt upright in bed; observed with current diet of solid/thin liquids for monitoring diet tolerance. Swallowing precautions/strategies discussed; minimal cues needed for execution. Functional bite reflex/mastication/lingual skills on current diet. Minimal  oral retention cleared with cued liquid wash. Pharyngeal response appeared to trigger within 1-2 sec per hyolaryngeal elevation to completion (functional rise/strength per palpation). No overt CSA on solid nor thin liquids (no coughing, no throat clearing, clear vocal quality and no SOB). Overall limited number of oral trials 2/2 Pt's fatigue. Collaborated with RN regarding Pt's swallowing plan of care. Per RN, Pt with tolerance of current diet. No report of difficulty taking meds.  Sp02 ~95% during this session. Call light within Pt's reach upon SLP discharge from room.      CXR 4/17/24:  Findings and impression:    Stable heart with persistent vascular congestion but no edema    Lung volumes remain moderately low with extensive basilar opacity, most likely atelectasis    No pneumothorax       PLAN:    To f/u x1 sessions to ensure safe intake of diet and reinforce swallowing/aspiration precautions. Family education t as available.        Diet Recommendations - Solids: Regular  Diet Recommendations - Liquids: Thin Liquids    Compensatory Strategies Recommended: Slow rate;Small bites and sips  Aspiration Precautions: Upright position;Slow rate;Small bites and sips  Medication Administration Recommendations:  (as tolerated)    Patient Experiencing Pain: No  Treatment Plan  Treatment Plan/Recommendations: Aspiration precautions  Interdisciplinary  Communication: Discussed with RN    GOALS  Goal #1 The patient will tolerate regular consistency and thin liquids without overt signs or symptoms of aspiration with 100 % accuracy over 1-2 session(s).    No CSA current diet; overall limited trials d/t Pt fatigue.         In Progress   Goal #2 The patient/family/caregiver will demonstrate understanding and implementation of aspiration precautions and swallow strategies independently over 1-2 session(s).    Swallowing precautions posted in room.     In Progress   Goal #3 The patient will utilize compensatory strategies as outlined by  BSSE (clinical evaluation) including Slow rate, Small bites, Small sips, Upright 90 degrees, Eliminate distractions, alternating consistencies, Supervision with meals with PRN assistance 100 % of the time across 1-2 sessions.    Minimal cues controlled amts, alternating consistencies. l      In Progress   FOLLOW UP  Follow Up Needed (Documentation Required): Yes  SLP Follow-up Date: 04/19/24  Number of Visits to Meet Established Goals:  (1-2)    Session: 1    If you have any questions, please contact   Babita Galaviz M.S. CCC/SLP  Speech-Language Pathologist  [facility]  #[phone]

## 2024-04-18 NOTE — SIGNIFICANT EVENT
Around 1300, pt lethargic, RN and MD in room. RN administered naloxone IVP per physician verbal order. Not proven effective.

## 2024-04-18 NOTE — PLAN OF CARE
Patient Alert and Oriented x3. Drowsy. On 3l o2. IV abx infusing as ordered. Chronic valladares. Sling on. Gauze and tegaderm R shoulder. Fall precautions in place, call light and personal belongings within arms reach.   Problem: Patient Centered Care  Goal: Patient preferences are identified and integrated in the patient's plan of care  Description: Interventions:  - What would you like us to know as we care for you? Patient is from home with family and he has a daughter that works here who is his support system.  Patient is hard of hearing.  - Provide timely, complete, and accurate information to patient/family  - Incorporate patient and family knowledge, values, beliefs, and cultural backgrounds into the planning and delivery of care  - Encourage patient/family to participate in care and decision-making at the level they choose  - Honor patient and family perspectives and choices  Outcome: Progressing     Problem: PAIN - ADULT  Goal: Verbalizes/displays adequate comfort level or patient's stated pain goal  Description: INTERVENTIONS:  - Encourage pt to monitor pain and request assistance  - Assess pain using appropriate pain scale  - Administer analgesics based on type and severity of pain and evaluate response  - Implement non-pharmacological measures as appropriate and evaluate response  - Consider cultural and social influences on pain and pain management  - Manage/alleviate anxiety  - Utilize distraction and/or relaxation techniques  - Monitor for opioid side effects  - Notify MD/LIP if interventions unsuccessful or patient reports new pain  - Anticipate increased pain with activity and pre-medicate as appropriate  Outcome: Progressing

## 2024-04-18 NOTE — OPERATIVE REPORT
PRE-OP DX: Right  shoulder septic arthritis   POST-OP DX: Right  shoulder septic arthritis and biceps/superior labral tearing/erthema and massive rotator cuff tear     SURGEON: Gregorio Calle MD  ASSISTANT(S): Saleem Downey CSA    PROCEDURE(S):   Right shoulder arthroscopy with extensive debridement including Glenoid labrum, Glenoid cartilage, Rotator cuff tissue, Intracapsular synovitis, Subacromial bursa, and Loose bodies  Right  shoulder arthroscopic assisted biceps tenotomy    ANESTHESIA:  General    EBL: 10cc  MATERIALS:  None  BLOOD:  None  SPECIMEN(S):  Shoulder joint samples for cultures  DRAINS:  None    DISPOSITION/CONDITIONS: Stable to PACU    IMPLANTS:* No implants in log *   COMPLICATIONS:  None    INDICATIONS FOR PROCEDURE:  Dennys Manzano is a 88 year old male who presented with the above diagnosis. The patient underwent aspiration of the shoulder which retrieved fluid consistent with probable septic joint. Patient reports shoulder pain and loss of function. We discussed the risks and benefits of operative versus nonoperative management. The risks of nonoperative management specifically the risk of persistent instability and pain were discussed and the patient elected to undergo operative treatment.  We discussed benefits of the surgery including resolution of infection and pain management. We discussed alternative options including nonoperative management and open options. We additionally discussed the risks of surgery, which include, but are not limited to bleeding, pain, recurrent infection, damage to nerves/vessels, incomplete relief of pain, incomplete return of function, need for additional surgery, blood clots, and death. The patient understands the above risks and elected to proceed.     DESCRIPTION OF PROCEDURE:   On the day of the procedure, the patient was identified in the preoperative holding area using three identifiers and the correct operative site was verified with the patient and  marked by myself.  The patient was then transferred to the operating room.  Once in the operating room, they underwent anesthesia without complications. We then placed the patient in the supine position on the operating table with all bony prominences well padded. Preoperative antibiotics were administered within one hour of skin incision.  The patient was repositioned into the lateral decubitus position on a bean bag with all bony prominences appropriately padded. The neck was in neutral alignment.  The right  upper extremity was prepped and draped in standard sterile fashion and the arm was positioned in an extremity gerardo with appropriate traction.  A surgical time-out was performed identifying the correct patient, procedure, and site.    Exam under anesthesia:  FE: 130    ABD: 100    ER at 90: 70    IR at 90: 70  25-50% anterior and 50% posterior translation on operative shoulder (compared with 25-50% anterior and 25% posterior translation on contralateral shoulder    Diagnostic arthroscopy: We began the procedure with the standard posterolateral portal, entered the glenohumeral joint, and an anterior portal was made within the rotator cuff interval under direct visualization with the assistance of a spinal needle. A probe was used to assist with diagnostic arthroscopy and we visualized from both posteriorly and anteriorly.    Diagnostic Findings    Synovitis   Severe anterior and superior  SLAP    type II tear, labral fraying, detached biceps anchor  Anterior/Inferior Labrum ossified  Posterior/Inferior Labrum ossified  Biceps tendon   Torn, erythematous  Rotator Interval  Synovitic   Axillary recess   Substantial synovitis    Rotator Cuff  Subscapularis   intact  Supraspinatus   full thickness complete tear, retracted with substantial degeneration  Infraspinatus   full thickness complete tear, retracted with substantial degeneration  Teres Minor   full thickness complete tear, retracted    Articular  Surface  Glenoid               Denuded. Loose body present  Humeral Head   Denuded    Subacromial Space  Substantial adhesions and bursitis, small subacromial spur    Arthroscopic extensive debridement of the glenohumeral joint:   We used a combination of the arthroscopic motorized shaver and radiofrequency device to perform an extensive debridement inside the glenohumeral joint. Debridement included the Glenoid labrum, Glenoid cartilage, Humeral cartilage, Rotator cuff tissue, Intracapsular synovitis, Subacromial bursa, and Loose bodies. The hyperemia, erythema, and synovitis of the joint and joint capsule was focally debrided. Synovitic fronds were thermally ablated. Chondral degeneration was debrided to a stable edge. Loose bodies were excised. Labral fraying and degeneration was also resected and debrided to a stable edge. The remnant rotator cuff was debrided to a healthy appearing base of tissue.    Arthroscopic-assisted biceps tenotomy:    We used a radiofrequency device to perform a biceps tenotomy at the anterior border of the superior labrum. Scar tissue was ablated to liberate tendon adhesions to the capsule.     Wound closure: The wounds were closed with 3-0 nylon sutures followed by xeroform. The shoulder was sterilely dressed. The arm was placed in a shoulder immobilizer.    The patient was awoken from anesthesia and transferred to the PACU in stable condition.     Post-operative Details:  The patient was awoken from anesthesia and transferred to the PACU in stable condition. I spoke with the family regarding the operation after surgery.    Surgical Assistant Necessity:  For this procedure, a surgical assistant was present for, and assisted with, the entirety of the case. The surgical assistant was involved with patient positioning, prepping and draping, directly assisting with key portions of the case including retracting and/or managing instrumentation, and closing. The surgical assistant was  necessaryto ensure greater likelihood of a safe and efficient operation, and no Orthopaedic surgery resident was available to operate as an assistant.

## 2024-04-18 NOTE — CONSULTS
[facility]  [address], [address]  [facility] [address]  [phone]          STROKE  CONSULTATION NOTE  [facility]  part of [facility]    Report of Consultation    Dennys Manzano Patient Status:  Inpatient     1935 MRN [id number]    Location [facility] 4W/SW/SE Attending Jeimy Driscoll MD    Hosp Day # 3 PCP CALOS FITZGERALD MD      Date of Admission:  2024  Date of Consult:  2024  Reason for Admission/Consultation:  lethargic, difficulty maintaining wakefulness  Requested by: Jeimy Driscoll MD  Name of Outside Hospital if Transferred: N/A  Time of patient arrival:  9:42 PM   on 24          _________________________________________________________________________________________    Chief Complaint:   Chief Complaint   Patient presents with    Abdomen/Flank Pain    Nausea/Vomiting/Diarrhea       HPI:  Dennys Manzano is a 88 year old  male w/ a pmhx sig. for  HTN, DM, and HLD,AFIB,depression ,and history of malignant hyperthermia,  Prior history of stroke,CAD post CABG and pacemaker, BPH, CHF, DM, depression, HLD, PAD, CVA, previous right rotator cuff surgery  who  presented for R pseuodogout and is being evaluated for difficulty maintaining wakefulness/lethargy.     He presented the hospital for right shoulder pain with effusion, difficulty with movement started about 2 days prior.  Afebrile here.  WBC initially 18.  X-ray of the shoulder with DJD without fracture.  CT chest, abdomen, pelvis nausea and vomiting without clear infectious process.  Seen by orthopedic surgery underwent aspiration 4/15 noted WBC around 60,000 with 94% segs and positive crystals.  Cultures so far no growth.  On IV vancomycin and Zosyn.  He had surgical debridement.  Hospital course also complicated by supratherapeutic INR's.    His hospitalist is concerned because he has difficulty maintaining wakefulness.  He will open his eyes to loud voice and  briefly attends/regard.  He can interact meaningfully but slowly drifts back to sleep.  He had a stat head CT that was unremarkable.  He had a CT of his C-spine that was also negative for any acute process but demonstrated mild to moderate cervical canal stenosis.    Per his primary attending:  He reports that he has had some neck stiffness, but he also states that he had neck stiffness at home.  PT OT reportedly stated the patient had neck stiffness and was difficult to reposition.    I spoke with his wife.  She reports that he has only been somnolent like this since \"he was sick recently.\"  Most likely referring to the illness that brought him to the hospital.  At baseline he walks with a walker.  It is unclear if he is walking slowly or stiffly at baseline.  She denies that he has anosmia, hypomimia, hypophonia, or symptoms concerning for REM sleep behavior disorder.    Given his encephalopathy without any clear etiology he is transferred to the ICU.    Review and summation of prior records        ROS:  Pertinent positive and negatives per HPI.  All others were reviewed and negative.    Past Medical History:    Anxiety state    Arrhythmia    Arthritis    Asbestos exposure    lung    Atherosclerosis of coronary artery    Bleeding tendency (HCC)    Blood disorder    BPH (benign prostatic hyperplasia)    Colitis    Congestive heart disease (HCC)    Coronary atherosclerosis    Deep vein thrombosis (HCC)    Depression    Diabetes (HCC)    Diverticulosis of large intestine    Esophageal reflux    Fibromyalgia    Ganglion, finger joint of right hand    Right middle finger excision of painful ganglion, rotator flap closure    Gout    Hearing impairment    Heart attack (HCC)    Heart disease    Heart valve disease    High blood pressure    High cholesterol    History of blood clots    legs & lungs    Hyperlipidemia    IBS (irritable bowel syndrome)    Incontinence    Malignant hyperthermia    Muscle weakness    Neuropathy     Osteoarthritis    PE (pulmonary embolism)    Peripheral vascular disease (HCC)    Pulmonary embolism (HCC)    RSD (reflex sympathetic dystrophy)    right arm, little in the left    Screening PSA (prostate specific antigen)    Shortness of breath    Thrombophlebitis    Visual impairment    glasses for reading       Past Surgical History:   Procedure Laterality Date    Cabg      Cardiac pacemaker placement      Cath percutaneous  transluminal coronary angioplasty      Foot surgery      right     Inguinal herniorrhaphy      Ir ivc filter placement      for blood clots    Other surgical history      heart bypass and stent    Other surgical history      basal cell carcinoma    Shoulder arthroscopy  (?)    Right rotator cuff       Family History   Problem Relation Age of Onset    Cancer Mother     Stroke Mother     Diabetes Other         Granddaughter has DM       Social History     Socioeconomic History    Marital status:      Spouse name: Not on file    Number of children: Not on file    Years of education: Not on file    Highest education level: Not on file   Occupational History    Not on file   Tobacco Use    Smoking status: Former     Current packs/day: 0.00     Types: Cigarettes     Quit date: 1965     Years since quittin.3    Smokeless tobacco: Never   Vaping Use    Vaping status: Never Used   Substance and Sexual Activity    Alcohol use: Not Currently     Comment: rarely    Drug use: No    Sexual activity: Not on file   Other Topics Concern    Not on file   Social History Narrative    Not on file     Social Determinants of Health     Financial Resource Strain: Not on file   Food Insecurity: No Food Insecurity (4/15/2024)    Food Insecurity     Food Insecurity: Never true   Transportation Needs: No Transportation Needs (4/15/2024)    Transportation Needs     Lack of Transportation: No   Physical Activity: Not on file   Stress: Not on file   Social Connections: Not on file   Housing  Stability: Low Risk  (4/15/2024)    Housing Stability     Housing Instability: No     Housing Instability Emergency: Not on file       Outpatient Medications  Current Outpatient Medications   Medication Instructions    Ascorbic Acid (VITAMIN C OR) Oral    atorvastatin (LIPITOR) 40 mg, Oral, Nightly    Baby Aspirin 81 mg, Oral, Daily    Calcium-Magnesium-Vitamin D (CALCIUM 1200+D3 OR) 1 capsule, Oral, Daily    Ergocalciferol (VITAMIN D OR) Oral    escitalopram (LEXAPRO) 20 mg, Oral, Daily    finasteride (PROSCAR) 5 mg, Oral, Daily    fluticasone propionate 50 MCG/ACT Nasal Suspension 1 spray(s)    metFORMIN (GLUCOPHAGE) 500 mg, Oral, 2 times daily    metoprolol succinate 25 MG Oral Tablet 24 Hr No dose, route, or frequency recorded.    montelukast 10 MG Oral Tab No dose, route, or frequency recorded.    Multiple Vitamins-Minerals (CENTRUM SILVER OR) Take  by mouth.    pantoprazole (PROTONIX) 40 mg, Oral, Every morning before breakfast    polyethylene glycol (PEG 3350) (MIRALAX) 17 g, Oral, Daily    Senna-Docusate Sodium 8.6-50 MG Oral Tab 1 tablet, Oral, 2 times daily    STIOLTO RESPIMAT 2.5-2.5 MCG/ACT Inhalation Aero Soln No dose, route, or frequency recorded.    tamsulosin (FLOMAX) 0.4 mg, Oral, Daily with dinner    warfarin (JANTOVEN) 8.5 mg, Oral, Nightly        Inpatient Medications  No current outpatient medications on file.        naloxone  0.4 mg Intravenous Once    magnesium oxide  400 mg Oral Once    piperacillin-tazobactam  4.5 g Intravenous Q8H    HYDROmorphone  0.2 mg Intravenous Once    ipratropium-albuterol  3 mL Nebulization Q6H WA    fluticasone propionate  1 spray Each Nare Daily    montelukast  10 mg Oral Nightly    aspirin  81 mg Oral Daily    atorvastatin  40 mg Oral Nightly    escitalopram  20 mg Oral Daily    finasteride  5 mg Oral Daily    metoprolol succinate ER  25 mg Oral Daily Beta Blocker    pantoprazole  40 mg Oral QAM AC    senna-docusate  1 tablet Oral BID    umeclidinium-vilanterol   1 puff Inhalation Daily    tamsulosin  0.4 mg Oral Daily with dinner    vancomycin  12.5 mg/kg Intravenous Q24H    insulin aspart  1-5 Units Subcutaneous TID CC    ondansetron  4 mg Intravenous Once    sodium chloride  1,000 mL Intravenous Once         ipratropium-albuterol    HYDROmorphone **OR** HYDROmorphone **OR** HYDROmorphone    benzonatate    guaiFENesin-codeine    HYDROcodone-acetaminophen **OR** HYDROcodone-acetaminophen    ondansetron    acetaminophen    hydrALAzine    glucose **OR** glucose **OR** glucose-vitamin C **OR** dextrose **OR** glucose **OR** glucose **OR** glucose-vitamin C    Objective:  Last vitals and weight :  Vitals:    04/18/24 1148   BP: (P) 131/71   Pulse:    Resp: (P) 18   Temp: (P) 98.1 °F (36.7 °C)       Exam:  - General: appears stated age and no distress  - CV: symmetric pulses   Carotids:   - Pulmonary: no signs of respiratory distress. Normal excursion of the chest.   Neurologic Exam  - Mental Status:     does regard.  does open eyes to gentle stimuli, shouting in the ear, loud voice, and sternal rub  Requires repeated stimulation to maintain wakefulness and attend.   does grimace to noxious stimuli.    Can sustain wakefulness briefly but then require repeated stimulation to attend..No gaze preference.  While he is awake he will answer the examiner's questions.  He asked for a drink of water.  Speech is spontaneous, fluent, and prosodic.  + Paraphasic errors.  + Perseverations.  Initially said he was 59 and then said he was 89.  Phrase length and rate are normal.  + Paraphasic errors and neologisms.. No neglect.   - Cranial Nerves: No gaze preference. Visual fields: Blink to threat intact.  Pupils are 3mm briskly constricting to 2mm and equally round and reactive to light  in a well lit room. EOMI. No nystagmus. No ptosis. V1-V3 intact B/L to light touch.No pathological facial asymmetry. No flattening of the nasolabial fold. .  Hearing grossly intact.  Tongue midline. No atrophy  or fasiculations of the tongue noted. Palate and uvula elevate symmetrically.      - Motor:  normal tone, normal bulk.  No obvious cogwheel rigidity in his left upper extremity.  Cannot assess his right arm because it is in a sling.  No interosseous wasting. No flattening of hypothenar eminences.  EHL FHL intact.  He is able to wiggle his toes.  He does not lift his legs off the bed.  He cannot lift his legs antigravity.         Asterixis: + asterixis noted.   Tremor:     Reflexes:    C5 C6 C7  L4 S1   R    0 0   L 0 0  0 0   Adductor Spread: No adductor spread noted.    Frontal release signs:Not assessed.    Catalina's sign:absent   Nonsustained clonus: Absent   Sustained clonus: Absent   - Sensory:   Light touch: normal     - Cerebellum: No truncal ataxia. No titubations. No dysmetria, no dysdiadochokinesis. No overshoot.      - Plantar response: mute    NIH Stroke Scale  Person Administering Scale: Tad Mcmahon DO  1a  Level of consciousness: 1 = Not Alert but arousable by minor stimulation to obey, answer, respond    1b. LOC questions:  2 = Answers neither questions correctly   1c. LOC commands: 0 = Performs both tasks correctly    2.  Best Gaze: 0 = Normal    3.  Visual: 0 = No visual loss     4. Facial Palsy: 0 = Normal symmetrical movement     5a.  Motor left arm: 2 = Some effort against gravity, limb cannot get to or maintain (if cued 90ºor 45º) drifts down to bed, but has some effort against gravity. 3 = No effort against gravity, limb falls 4 = No movement   5b.  Motor right arm: 4 = No movement   6a. motor left leg: 3 = No effort against   6b  Motor right leg:  3 = No effort against   7. Limb Ataxia: 0 = Absent     8.  Sensory: 0 = Normal, no sensory loss     9. Best Language:  1 = Mild / moderate aphasia; some loss of fluency / comprehension, without limitation of expression of ideas. (can identify what is happening in picture)     10. Dysarthria: 0 = Normal articulation   11. Extinction and  Inattention: 0 = No abnormality     Total:   16     Modified Salineno Score: 3 - Moderate disability. Requires some help, but able to walk unassisted.                                Data reviewed    ECG findings by monitor or 12-lead:  Ecg:   Results for orders placed or performed during the hospital encounter of 04/14/24   EKG   Result Value Ref Range    Ventricular rate 81 BPM    Atrial rate 81 BPM    P-R Interval 208 ms    QRS Duration 132 ms    Q-T Interval 404 ms    QTC Calculation (Bezet) 469 ms    P Axis  degrees    R Axis -71 degrees    T Axis 111 degrees       Test results/Imaging:   Lab Results   Component Value Date/Time    TRIG 84 03/22/2024 08:45 AM    HDL 40 03/22/2024 08:45 AM    LDL 80 03/22/2024 08:45 AM     Recent Labs   Lab 04/16/24  0620 04/17/24  0529 04/18/24  0656   WBC 15.5* 14.6* 18.1*   HGB 12.6* 12.2* 11.7*   HCT 39.7 38.3* 36.2*   .0 337.0 348.0     Recent Labs   Lab 04/14/24 2322 04/16/24 0620 04/17/24  0529 04/18/24  0656   * 130* 130* 135*   K 4.3 4.7 4.5 4.2   CL 97* 101 98 100   CO2 27.0 26.0 28.0 31.0   BUN 15 14 14 15   ALT 24  --   --   --    AST 21  --   --   --      Lab Results   Component Value Date    A1C 6.4 (H) 03/22/2024    A1C 6.6 (H) 01/05/2024    A1C 6.6 (H) 08/30/2023     Last A1c value was 6.4% done 3/22/2024.           No results found.    Performed an independent visualization of: CT brain WO   Imaging revealed: Agree with radiology read.         Dennys Manzano is a 88 year old  male w/ a pmhx sig. for  HTN, DM, and HLD,AFIB,depression ,and history of malignant hyperthermia,  Prior history of stroke,CAD post CABG and pacemaker, BPH, CHF, DM, depression, HLD, PAD, CVA, previous right rotator cuff surgery  who  presented for R pseuodogout and is being evaluated for difficulty maintaining wakefulness/lethargy.     On exam he will maintain wakefulness but then drifts back to sleep and requires repeated stimuli to attend.  He has asterixis.  His INR is 3.8.   Very unlikely the patient's had an acute ischemic stroke leading to his symptoms.  What is possible if he had an artery to artery embolism and atherosclerotic plaque, it is less likely/unlikely.  The clinical picture is inconsistent with acute ischemic stroke/intracranial hemorrhage.    Suspect metabolic etiology.  Unclear if his prior history of malignant hyperthermia has any significance.  Not obviously parkinsonian.  Recommend continued metabolic workup including evaluating LFTs and possible occult of infection.    Difficulty maintaining level of wakefulness  Differential Diagnosis:  Metabolic derangement   doubt acute ischemic stroke  Not intracranial hemorrhage  Seizures doubt but will order routine eeg  ?underlying neurodegenerative d/o now being unmasked + toxic/metabolic encepalopathy         Reperfusion therapy eligibility: patient is not eligible because: out of the  time window  not an acute ischemic stroke  not a candidate for thrombectomy because this is not an acute ischemic stroke.  Current use of anticoagulant with INR greater than 1.7 or PT greater than 15 seconds   Agent and time of first antithrombotic administration (or contraindication):   Aspirin 325 once or rectal aspirin 300 once. Starting tomorrow Aspirin 81 mg daily or rectal aspirin 300 mg daily if still n.p.o.  BP goal:   Normotension   Additional brain and vascular imaging ordered:   MRI brain WO   Cardiac imaging: Telemetry   Additional labs ordered:   Labs: Infectious workup per ID.  Appreciate their input.  Consider checking ammonia, LFTs etc.  eeg  Delirium precautions       Education/Instructions given to: {Persons; family members:patient   Barriers to Learning:None  Content: Refer to note above.  Also provided education on recurrent stroke signs and symptoms, including but not limited to a facial droop, dysarthria, difficulty talking, word finding difficulties, weakness, falls, change in sensation. Pt should call 911 or be taken to  the nearest emergency department if sx occur.  Evaluation/Outcome: Verbalized understanding    This document is not intended to support charting by exception.  Sections left blank in a completed note should be presumed not to have been done.    Level of complexity was based on - interviewing, examining the patient, establishing a plan of care, as well as review of all neuroimaging and cardiovascular studies.     Total critical care time spent exceeds 35 minutes.       Thank you.  Tad Mcmahon DO   Staff Vascular [facility] General Neurology

## 2024-04-18 NOTE — PROGRESS NOTES
Pt. Lethargic this AM, more alert during breakfast, then became lethargic again throughout afternoon. POD 1 R shoulder I & D, sling in place. SCDs and asa for DVT prophylaxis. Tele - no calls other than a lead fix. Chronic valladares. Last changed 4/1/24. Vanco and Zosyn. 1L NC. Opioid analgesics not given this shift due to lethargy and increased shallow breathing. RN requested MD at bedside to lay eyes on pt. Narcan given but not proven effective. ABGs, CT head, Cspine completed. MD aware. Stat MRI ordered to rule out CVA. Pt unable to follow commands for Neuro checks, GCS 10, grimaces to prolonged and firm sternal rub. MD aware, plan to transfer to CCU. Spouse updated via telephone.     Report given to KAMARI Boogie. Going to room 212. MRI screening complete, info for PM included.

## 2024-04-18 NOTE — CONSULTS
[facility]  part of [facility]    Report of Consultation    Dennys Manzano Patient Status:  Inpatient    1935 MRN [id number]   Location [facility] 4W/SW/SE Attending Jeimy Driscoll MD   Hosp Day # 3 PCP CALOS FITZGERALD MD     Date of Admission:  2024    Reason for Consultation:   Altered mental status    History of Present Illness:   It is a 88-year-old male with past medical history significant for coronary artery disease with prior CABG, CHF, COPD, previous pulmonary embolism who presented with complaint of right shoulder pain.  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  Increasing lethargy noted over the last 24 hours. Somnolent,  lethargic but somewhat arousable.  Occasionally responds to verbal stimuli.  No significant respiratory distress.  Currently on 2 L nasal cannula oxygen.  Unable to obtain 12 point review of system secondary patient's current clinical condition    Past Medical History  Past Medical History:    Anxiety state    Arrhythmia    Arthritis    Asbestos exposure    lung    Atherosclerosis of coronary artery    Bleeding tendency (HCC)    Blood disorder    BPH (benign prostatic hyperplasia)    Colitis    Congestive heart disease (HCC)    Coronary atherosclerosis    Deep vein thrombosis (HCC)    Depression    Diabetes (HCC)    Diverticulosis of large intestine    Esophageal reflux    Fibromyalgia    Ganglion, finger joint of right hand    Right middle finger excision of painful ganglion, rotator flap closure    Gout    Hearing impairment    Heart attack (HCC)    Heart disease    Heart valve disease    High blood pressure    High cholesterol    History of blood clots    legs & lungs    Hyperlipidemia    IBS (irritable bowel syndrome)    Incontinence    Malignant hyperthermia    Muscle weakness    Neuropathy     Osteoarthritis    PE (pulmonary embolism)    Peripheral vascular disease (HCC)    Pulmonary embolism (HCC)    RSD (reflex sympathetic dystrophy)    right arm, little in the left    Screening PSA (prostate specific antigen)    Shortness of breath    Thrombophlebitis    Visual impairment    glasses for reading       Past Surgical History  Past Surgical History:   Procedure Laterality Date    Cabg      Cardiac pacemaker placement      Cath percutaneous  transluminal coronary angioplasty      Foot surgery      right     Inguinal herniorrhaphy      Ir ivc filter placement      for blood clots    Other surgical history      heart bypass and stent    Other surgical history      basal cell carcinoma    Shoulder arthroscopy  (?)    Right rotator cuff       Family History  Family History   Problem Relation Age of Onset    Cancer Mother     Stroke Mother     Diabetes Other         Granddaughter has DM       Social History  Social History     Socioeconomic History    Marital status:    Tobacco Use    Smoking status: Former     Current packs/day: 0.00     Types: Cigarettes     Quit date: 1965     Years since quittin.3    Smokeless tobacco: Never   Vaping Use    Vaping status: Never Used   Substance and Sexual Activity    Alcohol use: Not Currently     Comment: rarely    Drug use: No           Current Medications:  Current Facility-Administered Medications   Medication Dose Route Frequency    ipratropium-albuterol (Duoneb) 0.5-2.5 (3) MG/3ML inhalation solution 3 mL  3 mL Nebulization Q6H WA    magnesium oxide (Mag-Ox) tab 400 mg  400 mg Oral Once    ipratropium-albuterol (Duoneb) 0.5-2.5 (3) MG/3ML inhalation solution 3 mL  3 mL Nebulization Q6H PRN    piperacillin-tazobactam (Zosyn) 4.5 g in dextrose 5% 100 mL IVPB-ADDV  4.5 g Intravenous Q8H    fluticasone propionate (Flonase) 50 MCG/ACT nasal suspension 1 spray  1 spray Each Nare Daily    benzonatate (Tessalon) cap 100 mg  100 mg Oral TID PRN     guaiFENesin-codeine (Robitussin AC) 100-10 MG/5ML oral solution 5 mL  5 mL Oral Q4H PRN    montelukast (Singulair) tab 10 mg  10 mg Oral Nightly    aspirin chewable tab 81 mg  81 mg Oral Daily    atorvastatin (Lipitor) tab 40 mg  40 mg Oral Nightly    escitalopram (Lexapro) tab 20 mg  20 mg Oral Daily    finasteride (Proscar) tab 5 mg  5 mg Oral Daily    metoprolol succinate ER (Toprol XL) 24 hr tab 25 mg  25 mg Oral Daily Beta Blocker    pantoprazole (Protonix) DR tab 40 mg  40 mg Oral QAM AC    senna-docusate (Senokot-S) 8.6-50 MG per tab 1 tablet  1 tablet Oral BID    umeclidinium-vilanterol (Anoro Ellipta) 62.5-25 MCG/ACT inhaler 1 puff  1 puff Inhalation Daily    tamsulosin (Flomax) cap 0.4 mg  0.4 mg Oral Daily with dinner    ondansetron (Zofran) 4 MG/2ML injection 4 mg  4 mg Intravenous Q6H PRN    acetaminophen (Tylenol) tab 650 mg  650 mg Oral Q6H PRN    hydrALAzine (Apresoline) 20 mg/mL injection 10 mg  10 mg Intravenous Q4H PRN    glucose (Dex4) 15 GM/59ML oral liquid 15 g  15 g Oral Q15 Min PRN    Or    glucose (Glutose) 40% oral gel 15 g  15 g Oral Q15 Min PRN    Or    glucose-vitamin C (Dex-4) chewable tab 4 tablet  4 tablet Oral Q15 Min PRN    Or    dextrose 50% injection 50 mL  50 mL Intravenous Q15 Min PRN    Or    glucose (Dex4) 15 GM/59ML oral liquid 30 g  30 g Oral Q15 Min PRN    Or    glucose (Glutose) 40% oral gel 30 g  30 g Oral Q15 Min PRN    Or    glucose-vitamin C (Dex-4) chewable tab 8 tablet  8 tablet Oral Q15 Min PRN    vancomycin (Vancocin) 1.25 g in sodium chloride 0.9% 250mL IVPB premix  12.5 mg/kg Intravenous Q24H    insulin aspart (NovoLOG) 100 Units/mL FlexPen 1-5 Units  1-5 Units Subcutaneous TID CC    ondansetron (Zofran) 4 MG/2ML injection 4 mg  4 mg Intravenous Once    sodium chloride 0.9 % IV bolus 1,000 mL  1,000 mL Intravenous Once     Medications Prior to Admission   Medication Sig    polyethylene glycol, PEG 3350, 17 g Oral Powd Pack Take 17 g by mouth daily.    warfarin  (JANTOVEN) 10 MG Oral Tab Take 8.5 mg by mouth nightly.    Calcium-Magnesium-Vitamin D (CALCIUM 1200+D3 OR) Take 1 capsule by mouth daily.    fluticasone propionate 50 MCG/ACT Nasal Suspension 1 spray(s)    metFORMIN 500 MG Oral Tab Take 1 tablet (500 mg total) by mouth 2 (two) times daily.    metoprolol succinate 25 MG Oral Tablet 24 Hr     montelukast 10 MG Oral Tab     Senna-Docusate Sodium 8.6-50 MG Oral Tab Take 1 tablet by mouth 2 (two) times daily.    Pantoprazole Sodium 40 MG Oral Tab EC Take 1 tablet (40 mg total) by mouth every morning before breakfast.    Escitalopram Oxalate (LEXAPRO) 20 MG Oral Tab Take 1 tablet (20 mg total) by mouth daily.    tamsulosin HCl (FLOMAX) 0.4 MG Oral Cap Take 1 capsule (0.4 mg total) by mouth daily with dinner.    aspirin (BABY ASPIRIN) 81 MG Oral Chew Tab Chew 1 tablet (81 mg total) by mouth daily.    atorvastatin 40 MG Oral Tab Take 1 tablet (40 mg total) by mouth nightly.    Ascorbic Acid (VITAMIN C OR) Take by mouth.    Ergocalciferol (VITAMIN D OR) Take by mouth.    Multiple Vitamins-Minerals (CENTRUM SILVER OR) Take  by mouth.    finasteride (PROSCAR) 5 MG Oral Tab Take 1 tablet (5 mg total) by mouth daily.    STIOLTO RESPIMAT 2.5-2.5 MCG/ACT Inhalation Aero Soln  (Patient not taking: Reported on 4/15/2024)       Allergies  Allergies   Allergen Reactions    Heparin SWELLING     Low platelets    Nitrofurantoin NAUSEA AND VOMITING    Heparin OTHER (SEE COMMENTS)     Low platelets    Tetanus Immune Globulin     Tetanus Toxoids UNKNOWN     As a child    Amoxicillin-Pot Clavulanate DIARRHEA    Azithromycin DIARRHEA       Review of Systems:   Unable to obtain secondary to patient's current clinical condition        Physical Exam:   Blood pressure 153/75, pulse 94, temperature 98.3 °F (36.8 °C), temperature source Temporal, resp. rate 18, height 6' (1.829 m), weight 229 lb 6.4 oz (104.1 kg), SpO2 94%.    Constitutional: Somnolent, lethargic  Eyes: PERRL  ENT: nares  patent  Neck: neck supple, no JVD  Cardio: RRR, S1 S2  Respiratory: Basilar crackles  GI: abdomen soft, non tender, active bowel souds, no organomegaly  Extremities: no clubbing, cyanosis, edema  Neurologic: no gross motor deficits  Skin: warm, dry    Results:   Laboratory Data  Lab Results   Component Value Date    WBC 18.1 (H) 04/18/2024    HGB 11.7 (L) 04/18/2024    HCT 36.2 (L) 04/18/2024    .0 04/18/2024    CREATSERUM 0.65 (L) 04/18/2024    BUN 15 04/18/2024     (L) 04/18/2024    K 4.2 04/18/2024     04/18/2024    CO2 31.0 04/18/2024     (H) 04/18/2024    CA 9.0 04/18/2024    ALB 4.1 04/14/2024    ALKPHO 79 04/14/2024    TP 7.4 04/14/2024    AST 21 04/14/2024    ALT 24 04/14/2024    PTT 41.0 (H) 04/14/2024    INR 3.12 (H) 04/18/2024    PTP 34.1 (H) 04/18/2024    TSH 1.470 08/30/2023    LIP 26 04/14/2024    ESRML 111 (H) 04/14/2024    CRP 13.9 (H) 02/10/2018    MG 1.9 04/18/2024    PHOS 3.1 08/23/2021    TROP <0.045 08/18/2021     03/23/2021         Imaging  CT SPINE CERVICAL (CPT=72125)    Result Date: 4/18/2024  CONCLUSION:  1. No acute fracture or dislocation involving the cervical spine. 2. Mild vertebral body compression fracture at T2 without posterior osseous retropulsion, which is unchanged from the recent CT performed 04/15/2024 but was not present in 2013. This fracture is of uncertain chronicity; correlate clinically for overlying  point tenderness.  3. Mild-to-moderate multilevel spondylosis throughout the cervical spine.  There is resulting mild central vertebral canal stenosis at C3-C4, C4-C5 and C5-C6.  Multilevel neural foraminal narrowing/stenoses are also seen bilaterally.   [facility]  Dictated by (CST): Naren Palomares MD on 4/18/2024 at 1:42 PM     Finalized by (CST): Naren Palomares MD on 4/18/2024 at 1:50 PM          CT BRAIN OR HEAD (43897)    Result Date: 4/18/2024  CONCLUSION:  1. No acute intracranial process. 2. Stable mild generalized atrophy and  mild chronic microangiopathic ischemic changes with large vessel calcific atherosclerosis. 3. Lesser incidental findings as above.   [facility]  Dictated by (CST): Naren Palomares MD on 4/18/2024 at 1:39 PM     Finalized by (CST): Naren Palomares MD on 4/18/2024 at 1:42 PM          XR CHEST AP PORTABLE  (CPT=71045)    Result Date: 4/17/2024  PROCEDURE: XR CHEST AP PORTABLE  (CPT=71045) TIME: 1841  COMPARISON: [facility], XR CHEST AP PORTABLE (CPT=71045), 4/16/2024, 12:16 PM.  INDICATIONS: Hypoxia post right shoulder arthroscopy, extensive debridement of right shoulder, bicep tenotomy.  TECHNIQUE:   Single view.   Findings and impression:  Stable heart with persistent vascular congestion but no edema  Lung volumes remain moderately low with extensive basilar opacity, most likely atelectasis  No pneumothorax    Dictated by (CST): Bib Horn MD on 4/17/2024 at 6:59 PM     Finalized by (CST): Bib Horn MD on 4/17/2024 at 7:00 PM           Assessment   1.  Acute encephalopathy  2.  Right shoulder pain status post right shoulder arthroscopy POD #1  3.  Acute hypoxemic hypercapnic respiratory failure  4.  COPD   5.  Prior DVT PE  6.  Hyponatremia  7.  Diabetes mellitus    Plan   -Patient initially presented with evidence of intractable right shoulder pain.  Underwent joint aspiration with subsequent right shoulder arthroscopy and extensive debridement on 4/17/2024.  -Worsening lethargy altered mental status noted.  ABG reviewed from 4/17/2024 with hypercapnic respiratory failure with respiratory acidosis noted.  Repeat ABG improved today.  Less likely contributing to patient's mental status  -Hold off pain medications and narcotics.  No significant improvement after trial of Narcan  -CT head with no acute intracranial findings  -MRI brain pending  -Further recommendations per neurology  -Hold off further BiPAP at this time  -Wean oxygen as tolerated currently on 2 L  -Nebulizer treatments  -CT  chest with some mild atelectatic changes seen  -Incentive spirometry when patient more alert  -Discussed with hospitalist  -Reviewed vitals, labs and imaging    Harini Zuniga DO  Pulmonary Critical Care Medicine  [facility]  4/18/2024  4:24 PM

## 2024-04-18 NOTE — PROGRESS NOTES
Orthopaedic Surgery Inpatient Consult Progress Note  _______________________________________________________________________________________________________________  _______________________________________________________________________________________________________________    Dennys Manzano Patient Status:  Inpatient    1935 MRN [id number]   Location [facility] 4W/SW/SE Attending Jeimy Driscoll MD   Hosp Day # 3 PCP CALOS FITZGERALD MD     DATE: 2024     HISTORY OF PRESENT ILLNESS: Dennys Manzano is a 88 year old male who presented as a consult to the Orthopaedic Surgery service for R shoulder effusion and pain. 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 He is now postoperative after R shoulder arthroscopic debridement.    S/24H EVENTS: Pain very well controlled overnight, reports sore but better than in previous days. Denies any overnight issues. Tolerating po solids this morning. Denies fevers, chills, chest pain, dyspnea. More alert today.     PHYSICAL EXAM  /75 (BP Location: Right arm)   Pulse 81   Temp 98.2 °F (36.8 °C) (Temporal)   Resp 18   Ht 6' (1.829 m)   Wt 229 lb 6.4 oz (104.1 kg)   SpO2 93%   BMI 31.11 kg/m²      Constitutional: The patient is well-developed, well-nourished, in no acute distress.  Neurological: Alert and oriented to person and place today but not time  Psychiatric: Mood and affect normal.  Head: Normocephalic and atraumatic.  Cardiovascular: regular rate by palpation  Pulmonary/Chest: Effort normal. No respiratory distress. Breathing non-labored  Abdominal: Abdomen exhibits no distension.   Right  shoulder  INSPECTION/PALPATION  Dressings well appearing with no substantial strikethrough  Moderate  swelling about shoulder  ROM  deferred   MOTOR  Intact to AIN/PIN/U/MSC/Ax motor distributions   Forward Flexion: Deferred  Abduction: Deferred  Internal Rotation: Deferred  External Rotation: Deferred  SILT  SILT M/U/R/Ax distributions  2+ radial pulse, brisk capillary refill     LAB RESULTS  Lab Results   Component Value Date    WBC 18.1 (H) 04/18/2024    HGB 11.7 (L) 04/18/2024    HCT 36.2 (L) 04/18/2024    .0 04/18/2024    CREATSERUM 0.65 (L) 04/18/2024    BUN 15 04/18/2024     (L) 04/18/2024    K 4.2 04/18/2024     04/18/2024    CO2 31.0 04/18/2024     (H) 04/18/2024    CA 9.0 04/18/2024    ALB 4.1 04/14/2024    ALKPHO 79 04/14/2024    BILT 1.1 04/14/2024    TP 7.4 04/14/2024    AST 21 04/14/2024    ALT 24 04/14/2024    PTT 41.0 (H) 04/14/2024    INR 3.12 (H) 04/18/2024    TSH 1.470 08/30/2023    LIP 26 04/14/2024    ESRML 111 (H) 04/14/2024    CRP 13.9 (H) 02/10/2018    MG 1.9 04/18/2024    PHOS 3.1 08/23/2021    TROP <0.045 08/18/2021     03/23/2021       XR CHEST AP PORTABLE  (CPT=71045)    Result Date: 4/17/2024  PROCEDURE: XR CHEST AP PORTABLE  (CPT=71045) TIME: 1841  COMPARISON: [facility], XR CHEST AP PORTABLE (CPT=71045), 4/16/2024, 12:16 PM.  INDICATIONS: Hypoxia post right shoulder arthroscopy, extensive debridement of right shoulder, bicep tenotomy.  TECHNIQUE:   Single view.   Findings and impression:  Stable heart with persistent vascular congestion but no edema  Lung volumes remain moderately low with extensive basilar opacity, most likely atelectasis  No pneumothorax    Dictated by (CST): Bib Horn MD on 4/17/2024 at 6:59 PM     Finalized by (CST): Bib Horn MD on 4/17/2024 at 7:00 PM          XR CHEST AP PORTABLE  (CPT=71045)    Result Date: 4/16/2024  CONCLUSION:   Low lung volumes with unchanged bibasilar opacities and unchanged trace bilateral pleural effusions.    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 He is now postop s/p R shoulder extensive debridement, doing well    Discussed the history, physical exam, treatment to date, and reviewed relevant imaging an studies with the patient.  SURGICAL PLANS: No further plans unless pain worsens  ABX: Per ID, pending cx  PAIN: Wean/titrate to appropriate oral regimen  DIET: Regular  DVT PPX: Continue current regimen  DISPO: Pending cx  MOBILITY: No ortho restrictions  WEIGHT BEARING STATUS: Weight bearing to tolerance  RANGE-OF-MOTION LIMITATIONS: As tolerated. Discontinue sling  IMAGING: None needed    I have personally seen Dennys Manzano and discussed in detail their plan of care. Thank you for allowing me to participate in the care of your patient. Please note that this note was written in combination with voice recognition/dictation software and there is a possibility of transcription errors which were not identified at the time of note submission. If clarification is necessary, please contact the author or clinic staff.    Gregorio Calle MD  Orthopaedic Surgery  4/18/2024

## 2024-04-19 LAB
ANION GAP SERPL CALC-SCNC: 5 MMOL/L (ref 0–18)
BUN BLD-MCNC: 15 MG/DL (ref 9–23)
BUN/CREAT SERPL: 24.6 (ref 10–20)
CALCIUM BLD-MCNC: 9 MG/DL (ref 8.7–10.4)
CHLORIDE SERPL-SCNC: 98 MMOL/L (ref 98–112)
CO2 SERPL-SCNC: 31 MMOL/L (ref 21–32)
CREAT BLD-MCNC: 0.61 MG/DL
DEPRECATED RDW RBC AUTO: 43.9 FL (ref 35.1–46.3)
EGFRCR SERPLBLD CKD-EPI 2021: 94 ML/MIN/1.73M2 (ref 60–?)
ERYTHROCYTE [DISTWIDTH] IN BLOOD BY AUTOMATED COUNT: 12.9 % (ref 11–15)
GLUCOSE BLD-MCNC: 130 MG/DL (ref 70–99)
GLUCOSE BLDC GLUCOMTR-MCNC: 135 MG/DL (ref 70–99)
GLUCOSE BLDC GLUCOMTR-MCNC: 135 MG/DL (ref 70–99)
GLUCOSE BLDC GLUCOMTR-MCNC: 142 MG/DL (ref 70–99)
HCT VFR BLD AUTO: 32.8 %
HGB BLD-MCNC: 11.4 G/DL
INR BLD: 3.2 (ref 0.8–1.2)
MAGNESIUM SERPL-MCNC: 1.8 MG/DL (ref 1.6–2.6)
MCH RBC QN AUTO: 32.7 PG (ref 26–34)
MCHC RBC AUTO-ENTMCNC: 34.8 G/DL (ref 31–37)
MCV RBC AUTO: 94 FL
OSMOLALITY SERPL CALC.SUM OF ELEC: 281 MOSM/KG (ref 275–295)
PLATELET # BLD AUTO: 314 10(3)UL (ref 150–450)
POTASSIUM SERPL-SCNC: 4.2 MMOL/L (ref 3.5–5.1)
PROTHROMBIN TIME: 34.8 SECONDS (ref 11.6–14.8)
RBC # BLD AUTO: 3.49 X10(6)UL
SODIUM SERPL-SCNC: 134 MMOL/L (ref 136–145)
VANCOMYCIN TROUGH SERPL-MCNC: 4.3 UG/ML (ref 10–20)
WBC # BLD AUTO: 15.7 X10(3) UL (ref 4–11)

## 2024-04-19 PROCEDURE — 99232 SBSQ HOSP IP/OBS MODERATE 35: CPT | Performed by: OTHER

## 2024-04-19 PROCEDURE — 99232 SBSQ HOSP IP/OBS MODERATE 35: CPT | Performed by: INTERNAL MEDICINE

## 2024-04-19 PROCEDURE — 95816 EEG AWAKE AND DROWSY: CPT | Performed by: OTHER

## 2024-04-19 PROCEDURE — 99233 SBSQ HOSP IP/OBS HIGH 50: CPT | Performed by: HOSPITALIST

## 2024-04-19 RX ORDER — MAGNESIUM SULFATE HEPTAHYDRATE 40 MG/ML
2 INJECTION, SOLUTION INTRAVENOUS ONCE
Status: COMPLETED | OUTPATIENT
Start: 2024-04-19 | End: 2024-04-19

## 2024-04-19 RX ORDER — METOPROLOL SUCCINATE 50 MG/1
50 TABLET, EXTENDED RELEASE ORAL
Status: DISCONTINUED | OUTPATIENT
Start: 2024-04-19 | End: 2024-05-01

## 2024-04-19 NOTE — PLAN OF CARE
Pt A&OX2 very lethargic on 3L NC. Pt weak but following commands and able to move all extremities. Neuro q4. Plan for MRI, (pending clearance of pacemaker). Daughter reports spouse has memory issues and would like to be contacted first/ in addition to the spouse if needed. Hypertensive overnight, hydralazine given x1. Reporting pain in both arms, treated with ice packs and repositioning. Bed locked in lowest position, call light within reach. Safety maintained.   Problem: Patient Centered Care  Goal: Patient preferences are identified and integrated in the patient's plan of care  Description: Interventions:  - What would you like us to know as we care for you? Patient is from home with family and he has a daughter that works here who is his support system.  Patient is hard of hearing.  - Provide timely, complete, and accurate information to patient/family  - Incorporate patient and family knowledge, values, beliefs, and cultural backgrounds into the planning and delivery of care  - Encourage patient/family to participate in care and decision-making at the level they choose  - Honor patient and family perspectives and choices  Outcome: Progressing     Problem: Patient/Family Goals  Goal: Patient/Family Long Term Goal  Description: Patient's Long Term Goal: Pain on right shoulder will be resolved and will be able to walk well with walker.    Interventions:  - No weight bearing on right arm till surgeon says so.  - Home health PT/OT as ordered.  - Pain management with ,oral medication.  - Monitor right shoulder for swelling or increasing pain or weakness.  - Follow up with surgery as recommended.  - See additional Care Plan goals for specific interventions  Outcome: Progressing  Goal: Patient/Family Short Term Goal  Description: Patient's Short Term Goal: Home with [facility] when pain is resolved.    Interventions:   - NWB to right arm.  - PT/OT as ordered.  - Administer IV antibiotics as ordered.  - Administer oral  antibiotics as ordered.  - Maintain enteric/contact isolation as ordered.  - Out of bed as much as tolerated.  - Administer oral anticoagulation as ordered, SCD's to both legs as well.  - See additional Care Plan goals for specific interventions  Outcome: Progressing     Problem: PAIN - ADULT  Goal: Verbalizes/displays adequate comfort level or patient's stated pain goal  Description: INTERVENTIONS:  - Encourage pt to monitor pain and request assistance  - Assess pain using appropriate pain scale  - Administer analgesics based on type and severity of pain and evaluate response  - Implement non-pharmacological measures as appropriate and evaluate response  - Consider cultural and social influences on pain and pain management  - Manage/alleviate anxiety  - Utilize distraction and/or relaxation techniques  - Monitor for opioid side effects  - Notify MD/LIP if interventions unsuccessful or patient reports new pain  - Anticipate increased pain with activity and pre-medicate as appropriate  Outcome: Progressing     Problem: SAFETY ADULT - FALL  Goal: Free from fall injury  Description: INTERVENTIONS:  - Assess pt frequently for physical needs  - Identify cognitive and physical deficits and behaviors that affect risk of falls.  - [place] fall precautions as indicated by assessment.  - Educate pt/family on patient safety including physical limitations  - Instruct pt to call for assistance with activity based on assessment  - Modify environment to reduce risk of injury  - Provide assistive devices as appropriate  - Consider OT/PT consult to assist with strengthening/mobility  - Encourage toileting schedule  Outcome: Progressing     Problem: DISCHARGE PLANNING  Goal: Discharge to home or other facility with appropriate resources  Description: INTERVENTIONS:  - Identify barriers to discharge w/pt and caregiver  - Include patient/family/discharge partner in discharge planning  - Arrange for needed discharge resources and  transportation as appropriate  - Identify discharge learning needs (meds, wound care, etc)  - Arrange for interpreters to assist at discharge as needed  - Consider post-discharge preferences of patient/family/discharge partner  - Complete POLST form as appropriate  - Assess patient's ability to be responsible for managing their own health  - Refer to Case Management Department for coordinating discharge planning if the patient needs post-hospital services based on physician/LIP order or complex needs related to functional status, cognitive ability or social support system  Outcome: Progressing     Problem: CARDIOVASCULAR - ADULT  Goal: Maintains optimal cardiac output and hemodynamic stability  Description: INTERVENTIONS:  - Monitor vital signs, rhythm, and trends  - Monitor for bleeding, hypotension and signs of decreased cardiac output  - Evaluate effectiveness of vasoactive medications to optimize hemodynamic stability  - Monitor arterial and/or venous puncture sites for bleeding and/or hematoma  - Assess quality of pulses, skin color and temperature  - Assess for signs of decreased coronary artery perfusion - ex. Angina  - Evaluate fluid balance, assess for edema, trend weights  Outcome: Progressing  Goal: Absence of cardiac arrhythmias or at baseline  Description: INTERVENTIONS:  - Continuous cardiac monitoring, monitor vital signs, obtain 12 lead EKG if indicated  - Evaluate effectiveness of antiarrhythmic and heart rate control medications as ordered  - Initiate emergency measures for life threatening arrhythmias  - Monitor electrolytes and administer replacement therapy as ordered  Outcome: Progressing     Problem: RESPIRATORY - ADULT  Goal: Achieves optimal ventilation and oxygenation  Description: INTERVENTIONS:  - Assess for changes in respiratory status  - Assess for changes in mentation and behavior  - Position to facilitate oxygenation and minimize respiratory effort  - Oxygen supplementation based on  oxygen saturation or ABGs  - Provide Smoking Cessation handout, if applicable  - Encourage broncho-pulmonary hygiene including cough, deep breathe, Incentive Spirometry  - Assess the need for suctioning and perform as needed  - Assess and instruct to report SOB or any respiratory difficulty  - Respiratory Therapy support as indicated  - Manage/alleviate anxiety  - Monitor for signs/symptoms of CO2 retention  Outcome: Progressing     Problem: GASTROINTESTINAL - ADULT  Goal: Minimal or absence of nausea and vomiting  Description: INTERVENTIONS:  - Maintain adequate hydration with IV or PO as ordered and tolerated  - Nasogastric tube to low intermittent suction as ordered  - Evaluate effectiveness of ordered antiemetic medications  - Provide nonpharmacologic comfort measures as appropriate  - Advance diet as tolerated, if ordered  - Obtain nutritional consult as needed  - Evaluate fluid balance  Outcome: Progressing  Goal: Maintains or returns to baseline bowel function  Description: INTERVENTIONS:  - Assess bowel function  - Maintain adequate hydration with IV or PO as ordered and tolerated  - Evaluate effectiveness of GI medications  - Encourage mobilization and activity  - Obtain nutritional consult as needed  - Establish a toileting routine/schedule  - Consider collaborating with pharmacy to review patient's medication profile  Outcome: Progressing     Problem: METABOLIC/FLUID AND ELECTROLYTES - ADULT  Goal: Electrolytes maintained within normal limits  Description: INTERVENTIONS:  - Monitor labs and rhythm and assess patient for signs and symptoms of electrolyte imbalances  - Administer electrolyte replacement as ordered  - Monitor response to electrolyte replacements, including rhythm and repeat lab results as appropriate  - Fluid restriction as ordered  - Instruct patient on fluid and nutrition restrictions as appropriate  Outcome: Progressing     Problem: RISK FOR INFECTION - ADULT  Goal: Absence of  fever/infection during anticipated neutropenic period  Description: INTERVENTIONS  - Monitor WBC  - Administer growth factors as ordered  - Implement neutropenic guidelines  Outcome: Not Progressing     Problem: GENITOURINARY - ADULT  Goal: Absence of urinary retention  Description: INTERVENTIONS:  - Assess patient’s ability to void and empty bladder  - Monitor intake/output and perform bladder scan as needed  - Follow urinary retention protocol/standard of care  - Consider collaborating with pharmacy to review patient's medication profile  - Implement strategies to promote bladder emptying  Outcome: Not Progressing     Problem: MUSCULOSKELETAL - ADULT  Goal: Return mobility to safest level of function  Description: INTERVENTIONS:  - Assess patient stability and activity tolerance for standing, transferring and ambulating w/ or w/o assistive devices  - Assist with transfers and ambulation using safe patient handling equipment as needed  - Ensure adequate protection for wounds/incisions during mobilization  - Obtain PT/OT consults as needed  - Advance activity as appropriate  - Communicate ordered activity level and limitations with patient/family  Outcome: Not Progressing  Goal: Maintain proper alignment of affected body part  Description: INTERVENTIONS:  - Support and protect limb and body alignment per provider's orders  - Instruct and reinforce with patient and family use of appropriate assistive device and precautions (e.g. spinal or hip dislocation precautions)  Outcome: Not Progressing     Problem: Impaired Activities of Daily Living  Goal: Achieve highest/safest level of independence in self care  Description: Interventions:  - Assess ability and encourage patient to participate in ADLs to maximize function  - Promote sitting position while performing ADLs such as feeding, grooming, and bathing  - Educate and encourage patient/family in tolerated functional activity level and precautions during self-care  -  Encourage patient to incorporate impaired side during daily activities to promote function  Outcome: Not Progressing     Problem: NEUROLOGICAL - ADULT  Goal: Achieves stable or improved neurological status  Description: INTERVENTIONS  - Assess for and report changes in neurological status  - Initiate measures to prevent increased intracranial pressure  - Maintain blood pressure and fluid volume within ordered parameters to optimize cerebral perfusion and minimize risk of hemorrhage  - Monitor temperature, glucose, and sodium. Initiate appropriate interventions as ordered  Outcome: Not Progressing

## 2024-04-19 NOTE — PROGRESS NOTES
[facility]  part of [facility]     Progress Note    Dennys Manzano Patient Status:  Inpatient    1935 MRN [id number]   Location [facility] 2W/SW Attending Jeimy Driscoll MD   Hosp Day # 4 PCP CALOS FITZGERALD MD       Subjective:   Seen and examined.  Slightly more arousable this morning but still somewhat somnolent.  No significant distress    Objective:   Blood pressure (!) 140/99, pulse 97, temperature 97.7 °F (36.5 °C), temperature source Temporal, resp. rate 24, height 6' (1.829 m), weight 235 lb 10.8 oz (106.9 kg), SpO2 92%.  Intake/Output:   Last 3 shifts: I/O last 3 completed shifts:  In: 434.3 [P.O.:200; I.V.:34.3; IV PIGGYBACK:200]  Out: 1950 [Urine:1950]   This shift: No intake/output data recorded.     Vent Settings:      Hemodynamic parameters (last 24 hours):      Scheduled Meds:   Current Facility-Administered Medications   Medication Dose Route Frequency    ipratropium-albuterol (Duoneb) 0.5-2.5 (3) MG/3ML inhalation solution 3 mL  3 mL Nebulization Q6H WA    magnesium oxide (Mag-Ox) tab 400 mg  400 mg Oral Once    ipratropium-albuterol (Duoneb) 0.5-2.5 (3) MG/3ML inhalation solution 3 mL  3 mL Nebulization Q6H PRN    piperacillin-tazobactam (Zosyn) 4.5 g in dextrose 5% 100 mL IVPB-ADDV  4.5 g Intravenous Q8H    fluticasone propionate (Flonase) 50 MCG/ACT nasal suspension 1 spray  1 spray Each Nare Daily    benzonatate (Tessalon) cap 100 mg  100 mg Oral TID PRN    guaiFENesin-codeine (Robitussin AC) 100-10 MG/5ML oral solution 5 mL  5 mL Oral Q4H PRN    montelukast (Singulair) tab 10 mg  10 mg Oral Nightly    aspirin chewable tab 81 mg  81 mg Oral Daily    atorvastatin (Lipitor) tab 40 mg  40 mg Oral Nightly    finasteride (Proscar) tab 5 mg  5 mg Oral Daily    metoprolol succinate ER (Toprol XL) 24 hr tab 25 mg  25 mg Oral Daily Beta Blocker    pantoprazole (Protonix) DR tab 40 mg  40 mg Oral QAM AC    senna-docusate (Senokot-S) 8.6-50 MG per tab 1 tablet  1 tablet  Oral BID    umeclidinium-vilanterol (Anoro Ellipta) 62.5-25 MCG/ACT inhaler 1 puff  1 puff Inhalation Daily    tamsulosin (Flomax) cap 0.4 mg  0.4 mg Oral Daily with dinner    ondansetron (Zofran) 4 MG/2ML injection 4 mg  4 mg Intravenous Q6H PRN    acetaminophen (Tylenol) tab 650 mg  650 mg Oral Q6H PRN    hydrALAzine (Apresoline) 20 mg/mL injection 10 mg  10 mg Intravenous Q4H PRN    glucose (Dex4) 15 GM/59ML oral liquid 15 g  15 g Oral Q15 Min PRN    Or    glucose (Glutose) 40% oral gel 15 g  15 g Oral Q15 Min PRN    Or    glucose-vitamin C (Dex-4) chewable tab 4 tablet  4 tablet Oral Q15 Min PRN    Or    dextrose 50% injection 50 mL  50 mL Intravenous Q15 Min PRN    Or    glucose (Dex4) 15 GM/59ML oral liquid 30 g  30 g Oral Q15 Min PRN    Or    glucose (Glutose) 40% oral gel 30 g  30 g Oral Q15 Min PRN    Or    glucose-vitamin C (Dex-4) chewable tab 8 tablet  8 tablet Oral Q15 Min PRN    vancomycin (Vancocin) 1.25 g in sodium chloride 0.9% 250mL IVPB premix  12.5 mg/kg Intravenous Q24H    insulin aspart (NovoLOG) 100 Units/mL FlexPen 1-5 Units  1-5 Units Subcutaneous TID CC    ondansetron (Zofran) 4 MG/2ML injection 4 mg  4 mg Intravenous Once    sodium chloride 0.9 % IV bolus 1,000 mL  1,000 mL Intravenous Once       Continuous Infusions:     Physical Exam  Constitutional: no acute distress somnolent but arousable  Eyes: PERRL  ENT: nares pateint  Neck: supple, no JVD  Cardio: RRR, S1 S2  Respiratory: Basilar crackles  GI: abdomen soft, non tender, active bowel sounds, no organomegaly  Extremities: no clubbing, cyanosis, edema  Neurologic: no gross motor deficits  Skin: warm, dry      Results:     Lab Results   Component Value Date    WBC 15.7 04/19/2024    HGB 11.4 04/19/2024    HCT 32.8 04/19/2024    .0 04/19/2024    CREATSERUM 0.61 04/19/2024    BUN 15 04/19/2024     04/19/2024    K 4.2 04/19/2024    CL 98 04/19/2024    CO2 31.0 04/19/2024     04/19/2024    CA 9.0 04/19/2024    INR 3.20  04/19/2024    MG 1.8 04/19/2024       CT SPINE CERVICAL (CPT=72125)    Result Date: 4/18/2024  CONCLUSION:  1. No acute fracture or dislocation involving the cervical spine. 2. Mild vertebral body compression fracture at T2 without posterior osseous retropulsion, which is unchanged from the recent CT performed 04/15/2024 but was not present in 2013. This fracture is of uncertain chronicity; correlate clinically for overlying  point tenderness.  3. Mild-to-moderate multilevel spondylosis throughout the cervical spine.  There is resulting mild central vertebral canal stenosis at C3-C4, C4-C5 and C5-C6.  Multilevel neural foraminal narrowing/stenoses are also seen bilaterally.   [facility]-remote  Dictated by (CST): Naren Palomares MD on 4/18/2024 at 1:42 PM     Finalized by (CST): Naren Palomares MD on 4/18/2024 at 1:50 PM          CT BRAIN OR HEAD (73603)    Result Date: 4/18/2024  CONCLUSION:  1. No acute intracranial process. 2. Stable mild generalized atrophy and mild chronic microangiopathic ischemic changes with large vessel calcific atherosclerosis. 3. Lesser incidental findings as above.   [facility]m-remote  Dictated by (CST): Naren Palomares MD on 4/18/2024 at 1:39 PM     Finalized by (CST): Naren Palomares MD on 4/18/2024 at 1:42 PM          XR CHEST AP PORTABLE  (CPT=71045)    Result Date: 4/17/2024  PROCEDURE: XR CHEST AP PORTABLE  (CPT=71045) TIME: 1841  COMPARISON: [facility], XR CHEST AP PORTABLE (CPT=71045), 4/16/2024, 12:16 PM.  INDICATIONS: Hypoxia post right shoulder arthroscopy, extensive debridement of right shoulder, bicep tenotomy.  TECHNIQUE:   Single view.   Findings and impression:  Stable heart with persistent vascular congestion but no edema  Lung volumes remain moderately low with extensive basilar opacity, most likely atelectasis  No pneumothorax    Dictated by (CST): Bib Horn MD on 4/17/2024 at 6:59 PM     Finalized by (CST): Bib Horn MD on 4/17/2024 at 7:00 PM                  Assessment   1.  Acute encephalopathy  2.  Right shoulder pain status post right shoulder arthroscopy POD #2  3.  Acute hypoxemic hypercapnic respiratory failure  4.  COPD   5.  Prior DVT PE  6.  Hyponatremia  7.  Diabetes mellitus     Plan   -Patient initially presented with evidence of intractable right shoulder pain.  Underwent joint aspiration with subsequent right shoulder arthroscopy and extensive debridement on 4/17/2024.  -Worsening lethargy altered mental status noted.  ABG reviewed from 4/17/2024 with hypercapnic respiratory failure with respiratory acidosis noted.  Repeat ABG improved.  Less likely contributing to patient's mental status  -Hold off pain medications and narcotics.  No significant improvement after trial of Narcan  -Close neuromonitoring  -CT head with no acute intracranial findings  -MRI brain pending  -Further recommendations per neurology  -Hold off further BiPAP at this time  -Wean oxygen as tolerated currently on 1 L  -Nebulizer treatments  -CT chest with some mild atelectatic changes seen  -Incentive spirometry when patient more alert  -DVT prophylaxis: MARISSA Zuniga, DO  Pulmonary Critical Care Medicine  [facility]

## 2024-04-19 NOTE — SLP NOTE
SPEECH DAILY NOTE - INPATIENT    ASSESSMENT & PLAN   ASSESSMENT    Proper PPE worn. Hands sanitized upon entrance/exit Pt room.       Pt with fluctuating NORMA. Pt transferred to CCU 4/18/24 2/2 lethargy. Pt, afebrile and on 3L/min 02 via NC. Pt seen for swallow analysis per BSE recommendations (after consulting with RN). Pt agreed to participate. Pt's preferred method of learning verbal. Pt upright in bed; observed with previous diet of solid/thin liquids for monitoring diet tolerance. Swallowing precautions/strategies discussed as SLP directed oral trials. Functional bite reflex on solid; however effortful prolonged mastication on solids d/t fatigue/weakness. Lingual skills grossly functional for bolus control of both consistencies. No significant oral retention. Pharyngeal response appeared delayed per hyolaryngeal elevation to completion (functional rise/strength per palpation). No overt CSA on solid nor thin liquids (no coughing, no throat clearing, clear vocal quality and no SOB). Rec downgrade to BITE SIZE food/continue thin liquids with Pt to eat ONLY WHEN FULLY ALERT. Collaborated with RN regarding Pt's swallowing plan of care. Rec pills whole in pureed. Sp02 ~92  % during this session. Call light within Pt's reach upon SLP discharge from room.      CXR 4/17/24:  Findings and impression:    Stable heart with persistent vascular congestion but no edema    Lung volumes remain moderately low with extensive basilar opacity, most likely atelectasis    No pneumothorax     CT Brain 4/18/24:  CONCLUSION:   1. No acute intracranial process.   2. Stable mild generalized atrophy and mild chronic microangiopathic ischemic changes with large vessel calcific atherosclerosis.   3. Lesser incidental findings as above.     MRI ordered.     PLAN:    To f/u x3 (S/P transfer to CCU) sessions to ensure safe intake of diet and reinforce swallowing/aspiration precautions. To monitor for new CXR results and MRI results. Diet upgrade  as tolerated. VFSS IF appropriate. Family education as available.      Diet Recommendations - Solids: Mechanical soft chopped/ Soft & Bite Sized  Diet Recommendations - Liquids: Thin Liquids    Compensatory Strategies Recommended: Slow rate;Small bites and sips  Aspiration Precautions: Upright position;Slow rate;Small bites and sips  Medication Administration Recommendations: Whole in puree    Patient Experiencing Pain: No  Treatment Plan  Treatment Plan/Recommendations: Aspiration precautions  Interdisciplinary Communication: Discussed with RN  Plan posted at bedside      GOALS  Goal #1 The patient will tolerate regular consistency and thin liquids without overt signs or symptoms of aspiration with 100 % accuracy over 1-2 session(s).    No CSA solid/thin liquids. Reduced mastication skills 2/2 fatigue. DOWNGRADE diet to BITE SIZE food/remain on thin liquids. F/U for diet tolerance; candidacy for upgrade/return to SOLIDS/         In Progress/Goal revised 4/19   Goal #2 The patient/family/caregiver will demonstrate understanding and implementation of aspiration precautions and swallow strategies independently over 1-2 session(s).    Swallowing precautions posted in room.     In Progress   Goal #3 The patient will utilize compensatory strategies as outlined by  BSSE (clinical evaluation) including Slow rate, Small bites, Small sips, Upright 90 degrees, Eliminate distractions, alternating consistencies, Supervision with meals with PRN assistance 100 % of the time across 1-2 sessions.    SLP directed oral trials.     In Progress   FOLLOW UP  Follow Up Needed (Documentation Required): Yes  SLP Follow-up Date: 04/20/24  Number of Visits to Meet Established Goals: 3 (since transfer to CCU)    Session: 1    If you have any questions, please contact   Babita Galaviz M.S. [facility]/SLP  Speech-Language Pathologist  [facility]  #[phone]

## 2024-04-19 NOTE — CM/SW NOTE
Department  notified of request for [facility] referrals started. Assigned CM/SW to follow up with pt/family on further discharge planning.     Amy Solano, DSC

## 2024-04-19 NOTE — PROCEDURES
EEG report    REFERRING PHYSICIAN: Jeimy Driscoll MD    PCP and phone number:  CALOS FITZGERALD MD  [phone]    TECHNIQUE: 21 channels of EEG, 2 channels of EOG, and 1 channel of EKG were recorded utilizing the International 10/20 System. The recording was performed in a digitized monopolar referential format and playback was reformatted into various referential and bipolar montages utilizing appropriate filter settings. Automatic seizure and spike detection programs were utilized throughout the recording.  Video was recorded during the study    CLINICAL DATA:  Patient is sent for the evaluation of possible seizures.    MEDICATION:  Continuous Medications:    Scheduled Medications:  No current outpatient medications on file.  PRN Medications:    ipratropium-albuterol    benzonatate    guaiFENesin-codeine    ondansetron    acetaminophen    hydrALAzine    glucose **OR** glucose **OR** glucose-vitamin C **OR** dextrose **OR** glucose **OR** glucose **OR** glucose-vitamin C    ACTIVATION:  Hyperventilation: Not done  Photic stimulation: Done, with significant response  Sleep: No clear sleep architecture was seen.    BACKGROUND    Background slow activity was seen. Throughout the record, a moderate amount of low to medium voltage, polymorphic, 5-6 Hz slow wave activity was seen diffusely over both hemispheres without localization or lateralization. No clear posterior dominant rhythm was seen.    EEG ABNORMALITY  Occasional triphasic waves were seen    IMPRESSION:    This is an abnormal EEG. Diffuse slowing was present continuously, with occasional triphasic waves. These waveforms are not considered epileptiform in nature. This constellation of findings indicates a moderate degree of cerebral dysfunction consistent with metabolic/hypoxic encephalopathy or bilateral structural process or a medication effect. No focal, lateralized, or epileptiform features are noted.

## 2024-04-19 NOTE — PROGRESS NOTES
[facility]  part of [facility]    Progress Note    Dennys Manzano Patient Status:  Inpatient    1935 MRN [id number]   Location [facility] 4W/SW/SE Attending Jeimy Driscoll MD   Hosp Day # 4 PCP CALOS FITZGERALD MD     Chief Complaint:   Chief Complaint   Patient presents with    Abdomen/Flank Pain    Nausea/Vomiting/Diarrhea       Subjective:   Dennys Manzano is still lethargic. He wakes to pain but is otherwise falling asleep wakes to sternal rub. Son at bedside. No fevers. He c/o neck pain and shoulder pain. He c/o L elbow pain and his LUE is edematous   Objective:   Objective:    Blood pressure (!) 172/78, pulse 111, temperature 97.7 °F (36.5 °C), temperature source Temporal, resp. rate 21, height 6' (1.829 m), weight 235 lb 10.8 oz (106.9 kg), SpO2 93%.    Physical Exam:    General: lethargic,   Respiratory: Diminished bases B/L   Cardiovascular: S1, S2. Regular rate and rhythm. No murmurs, rubs or gallops.   Abdomen: Soft, nontender, nondistended.  Positive bowel sounds. No rebound or guarding.  Neurologic: lethargic, Pinpoint pupils, EOMI, CN II-XII GI, motor unable to assess as pt falls asleep lethargic.   Extremities: No edema.      Results:   Results:    Labs:  Recent Labs   Lab 24  0524  0656 24  0434   WBC  --  18.0* 15.5* 14.6* 18.1* 15.7*   HGB  --  14.0 12.6* 12.2* 11.7* 11.4*   MCV  --  90.2 98.3 96.5 95.3 94.0   PLT  --  332.0 340.0 337.0 348.0 314.0   INR 3.00*  --  4.66*  --  3.12* 3.20*       Recent Labs   Lab 24  0529 24  0656 24  0434   *   < > 137* 144* 130*   BUN 15   < > 14 15 15   CREATSERUM 0.84   < > 0.70 0.65* 0.61*   CA 9.5   < > 9.0 9.0 9.0   ALB 4.1  --   --  3.5  --    *   < > 130* 135* 134*   K 4.3   < > 4.5 4.2 4.2   CL 97*   < > 98 100 98   CO2 27.0   < > 28.0 31.0 31.0   ALKPHO 79  --   --  91  --    AST 21  --   --  <8   --    ALT 24  --   --  16  --    BILT 1.1  --   --  1.1  --    TP 7.4  --   --  6.8  --     < > = values in this interval not displayed.       Estimated Creatinine Clearance: 95.4 mL/min (A) (based on SCr of 0.61 mg/dL (L)).    Recent Labs   Lab 04/16/24  0620 04/18/24  0656 04/19/24  0434   PTP 46.8* 34.1* 34.8*   INR 4.66* 3.12* 3.20*            Culture:  Hospital Encounter on 04/14/24   1. Tissue Aerobic Culture     Status: None (Preliminary result)    Collection Time: 04/17/24  2:35 PM    Specimen: Tissue   Result Value Ref Range    Tissue Culture Result No Growth 2 Days N/A    Tissue Smear 1+ WBCs seen N/A    Tissue Smear No organisms seen N/A   2. Body Fluid Cult Aerobic and Anaerobic     Status: None (Preliminary result)    Collection Time: 04/15/24  7:26 AM    Specimen: Synovial fluid,shoulder; Body fluid, unspecified   Result Value Ref Range    Body Fluid Culture Result No Growth 4 Days N/A    Body Fluid Smear 3+ WBCs seen N/A    Body Fluid Smear No organisms seen N/A    Body Fluid Smear This is a cytocentrifuged smear. N/A   3. Urine Culture, Routine     Status: Abnormal    Collection Time: 04/15/24  5:43 AM    Specimen: Urine, clean catch   Result Value Ref Range    Urine Culture >100,000 CFU/ML Enterococcus faecalis NOT VRE (A) N/A   4. Blood Culture     Status: None (Preliminary result)    Collection Time: 04/15/24  2:27 AM    Specimen: Blood,peripheral   Result Value Ref Range    Blood Culture Result No Growth 4 Days N/A       Cardiac  No results for input(s): \"TROP\", \"PBNP\" in the last 168 hours.      Imaging: Imaging data reviewed in Epic.  CT SPINE CERVICAL (CPT=72125)    Result Date: 4/18/2024  CONCLUSION:  1. No acute fracture or dislocation involving the cervical spine. 2. Mild vertebral body compression fracture at T2 without posterior osseous retropulsion, which is unchanged from the recent CT performed 04/15/2024 but was not present in 2013. This fracture is of uncertain chronicity; correlate  clinically for overlying  point tenderness.  3. Mild-to-moderate multilevel spondylosis throughout the cervical spine.  There is resulting mild central vertebral canal stenosis at C3-C4, C4-C5 and C5-C6.  Multilevel neural foraminal narrowing/stenoses are also seen bilaterally.   Elm-remote  Dictated by (CST): Naren Palomares MD on 4/18/2024 at 1:42 PM     Finalized by (CST): Naren Palomares MD on 4/18/2024 at 1:50 PM          CT BRAIN OR HEAD (03442)    Result Date: 4/18/2024  CONCLUSION:  1. No acute intracranial process. 2. Stable mild generalized atrophy and mild chronic microangiopathic ischemic changes with large vessel calcific atherosclerosis. 3. Lesser incidental findings as above.   Elm-remote  Dictated by (CST): Naren Palomares MD on 4/18/2024 at 1:39 PM     Finalized by (CST): Naren Palomares MD on 4/18/2024 at 1:42 PM          XR CHEST AP PORTABLE  (CPT=71045)    Result Date: 4/17/2024  PROCEDURE: XR CHEST AP PORTABLE  (CPT=71045) TIME: 1841  COMPARISON: [facility], XR CHEST AP PORTABLE (CPT=71045), 4/16/2024, 12:16 PM.  INDICATIONS: Hypoxia post right shoulder arthroscopy, extensive debridement of right shoulder, bicep tenotomy.  TECHNIQUE:   Single view.   Findings and impression:  Stable heart with persistent vascular congestion but no edema  Lung volumes remain moderately low with extensive basilar opacity, most likely atelectasis  No pneumothorax    Dictated by (CST): Bib Horn MD on 4/17/2024 at 6:59 PM     Finalized by (CST): Bib Horn MD on 4/17/2024 at 7:00 PM           Medications:    ipratropium-albuterol  3 mL Nebulization Q6H WA    magnesium oxide  400 mg Oral Once    piperacillin-tazobactam  4.5 g Intravenous Q8H    fluticasone propionate  1 spray Each Nare Daily    montelukast  10 mg Oral Nightly    aspirin  81 mg Oral Daily    atorvastatin  40 mg Oral Nightly    finasteride  5 mg Oral Daily    metoprolol succinate ER  25 mg Oral Daily Beta Blocker     pantoprazole  40 mg Oral QAM AC    senna-docusate  1 tablet Oral BID    umeclidinium-vilanterol  1 puff Inhalation Daily    tamsulosin  0.4 mg Oral Daily with dinner    vancomycin  12.5 mg/kg Intravenous Q24H    insulin aspart  1-5 Units Subcutaneous TID CC    ondansetron  4 mg Intravenous Once    sodium chloride  1,000 mL Intravenous Once         Assessment and Plan:   Assessment & Plan:      Intractable right shoulder pain  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   - s/p R shoulder joint aspiration 4/15   - 59,000 WBC's with predominately neutrophils. + calcium pyrophosphate crystals --> pseudogout. Culture negative.   - stop zosyn. Start IV rocephin   - stop Pain meds due to lethargy   - ID on consult.   - PT/OT - [facility] eventually   - afebrile. WBC elevated.   - blood cx x 2 NGTD.   - R shoulder arthroscopy extensive debridement of R shoulder, bicep tenotomy 4/17     Acute encephalopathy   - etiology unclear. ? Meningitis.   - ABG reviewed, CT head no acute findings. MRI brain pending   - EEG c/w encephalopathy   - IV acyclovir empirically stop zosyn. Start rocephin 2g IV BID   - Vitamin K IV x 1 now. Once INR < 1.5 plan for LP  - LP to r/o meningitis.   - ID and neuro on consult.   - neuro checks.     Acute respiratory failure with hypoxia  Likely COPD exacerbation in combination with atelectasis  - was on 5L NC wean o2 now down to 3L.   - duonebs q6hrs WA  - SLP eval regular thin liquids.   - NPO while lethargic.   - IS/flutter valve.   - CXR atelectasis   - Antitussives PRN   - CT chest on admit showed dense atelectasis no PE.     DVT/pulmonary embolism  - INR elevated  - hold home warfarin   - vitamin K today      Hyponatremia  - appears euvolemic now   - sodium better with IVF.   - off IVF.   - Urine osm high, urine sdoium high.   - free water restriction     NSVT  - ECHO LVEF 55-60%. No WMA.   - keep Mg 2.0 and K 4.0   - cont metoprolol     DM  II  - A1c 6.4  - ISS     BPH  Chronic urinary retention   - Continue Flomax and finasteride  - cont valladares last changed 04/01/24    >55min spent, >50% spent counseling and coordinating care in the form of educating pt/family and d/w consultants and staff. Most of the time spent discussing the above plan. D/w Evangelina dtr on phone       PCU status      Plan of care discussed with patient or family at bedside.    Jeimy Driscoll MD  Hospitalist          Supplementary Documentation:     Quality:  DVT Prophylaxis: SCD  CODE status: Full  Dispo: per clinical course           Estimated date of discharge: TBD  Discharge is dependent on: clinical stability  At this point Mr. Manzano is expected to be discharge to: TBD

## 2024-04-19 NOTE — PLAN OF CARE
Problem: Patient Centered Care  Goal: Patient preferences are identified and integrated in the patient's plan of care  Description: Interventions:  - What would you like us to know as we care for you? Patient is from home with family and he has a daughter that works here who is his support system.  Patient is hard of hearing.  - Provide timely, complete, and accurate information to patient/family  - Incorporate patient and family knowledge, values, beliefs, and cultural backgrounds into the planning and delivery of care  - Encourage patient/family to participate in care and decision-making at the level they choose  - Honor patient and family perspectives and choices  Outcome: Progressing     Problem: Patient/Family Goals  Goal: Patient/Family Long Term Goal  Description: Patient's Long Term Goal: Pain on right shoulder will be resolved and will be able to walk well with walker.    Interventions:  - No weight bearing on right arm till surgeon says so.  - Home health PT/OT as ordered.  - Pain management with ,oral medication.  - Monitor right shoulder for swelling or increasing pain or weakness.  - Follow up with surgery as recommended.  - See additional Care Plan goals for specific interventions  Outcome: Progressing  Goal: Patient/Family Short Term Goal  Description: Patient's Short Term Goal: Home with [facility] when pain is resolved.    Interventions:   - NWB to right arm.  - PT/OT as ordered.  - Administer IV antibiotics as ordered.  - Administer oral antibiotics as ordered.  - Maintain enteric/contact isolation as ordered.  - Out of bed as much as tolerated.  - Administer oral anticoagulation as ordered, SCD's to both legs as well.  - See additional Care Plan goals for specific interventions  Outcome: Progressing     Problem: PAIN - ADULT  Goal: Verbalizes/displays adequate comfort level or patient's stated pain goal  Description: INTERVENTIONS:  - Encourage pt to monitor pain and request assistance  - Assess pain  using appropriate pain scale  - Administer analgesics based on type and severity of pain and evaluate response  - Implement non-pharmacological measures as appropriate and evaluate response  - Consider cultural and social influences on pain and pain management  - Manage/alleviate anxiety  - Utilize distraction and/or relaxation techniques  - Monitor for opioid side effects  - Notify MD/LIP if interventions unsuccessful or patient reports new pain  - Anticipate increased pain with activity and pre-medicate as appropriate  Outcome: Progressing     Problem: RISK FOR INFECTION - ADULT  Goal: Absence of fever/infection during anticipated neutropenic period  Description: INTERVENTIONS  - Monitor WBC  - Administer growth factors as ordered  - Implement neutropenic guidelines  Outcome: Progressing     Problem: SAFETY ADULT - FALL  Goal: Free from fall injury  Description: INTERVENTIONS:  - Assess pt frequently for physical needs  - Identify cognitive and physical deficits and behaviors that affect risk of falls.  - [place] fall precautions as indicated by assessment.  - Educate pt/family on patient safety including physical limitations  - Instruct pt to call for assistance with activity based on assessment  - Modify environment to reduce risk of injury  - Provide assistive devices as appropriate  - Consider OT/PT consult to assist with strengthening/mobility  - Encourage toileting schedule  Outcome: Progressing     Problem: DISCHARGE PLANNING  Goal: Discharge to home or other facility with appropriate resources  Description: INTERVENTIONS:  - Identify barriers to discharge w/pt and caregiver  - Include patient/family/discharge partner in discharge planning  - Arrange for needed discharge resources and transportation as appropriate  - Identify discharge learning needs (meds, wound care, etc)  - Arrange for interpreters to assist at discharge as needed  - Consider post-discharge preferences of patient/family/discharge  partner  - Complete POLST form as appropriate  - Assess patient's ability to be responsible for managing their own health  - Refer to Case Management Department for coordinating discharge planning if the patient needs post-hospital services based on physician/LIP order or complex needs related to functional status, cognitive ability or social support system  Outcome: Progressing     Problem: CARDIOVASCULAR - ADULT  Goal: Maintains optimal cardiac output and hemodynamic stability  Description: INTERVENTIONS:  - Monitor vital signs, rhythm, and trends  - Monitor for bleeding, hypotension and signs of decreased cardiac output  - Evaluate effectiveness of vasoactive medications to optimize hemodynamic stability  - Monitor arterial and/or venous puncture sites for bleeding and/or hematoma  - Assess quality of pulses, skin color and temperature  - Assess for signs of decreased coronary artery perfusion - ex. Angina  - Evaluate fluid balance, assess for edema, trend weights  Outcome: Progressing  Goal: Absence of cardiac arrhythmias or at baseline  Description: INTERVENTIONS:  - Continuous cardiac monitoring, monitor vital signs, obtain 12 lead EKG if indicated  - Evaluate effectiveness of antiarrhythmic and heart rate control medications as ordered  - Initiate emergency measures for life threatening arrhythmias  - Monitor electrolytes and administer replacement therapy as ordered  Outcome: Progressing     Problem: RESPIRATORY - ADULT  Goal: Achieves optimal ventilation and oxygenation  Description: INTERVENTIONS:  - Assess for changes in respiratory status  - Assess for changes in mentation and behavior  - Position to facilitate oxygenation and minimize respiratory effort  - Oxygen supplementation based on oxygen saturation or ABGs  - Provide Smoking Cessation handout, if applicable  - Encourage broncho-pulmonary hygiene including cough, deep breathe, Incentive Spirometry  - Assess the need for suctioning and perform as  needed  - Assess and instruct to report SOB or any respiratory difficulty  - Respiratory Therapy support as indicated  - Manage/alleviate anxiety  - Monitor for signs/symptoms of CO2 retention  Outcome: Progressing     Problem: CARDIOVASCULAR - ADULT  Goal: Maintains optimal cardiac output and hemodynamic stability  Description: INTERVENTIONS:  - Monitor vital signs, rhythm, and trends  - Monitor for bleeding, hypotension and signs of decreased cardiac output  - Evaluate effectiveness of vasoactive medications to optimize hemodynamic stability  - Monitor arterial and/or venous puncture sites for bleeding and/or hematoma  - Assess quality of pulses, skin color and temperature  - Assess for signs of decreased coronary artery perfusion - ex. Angina  - Evaluate fluid balance, assess for edema, trend weights  Outcome: Progressing  Goal: Absence of cardiac arrhythmias or at baseline  Description: INTERVENTIONS:  - Continuous cardiac monitoring, monitor vital signs, obtain 12 lead EKG if indicated  - Evaluate effectiveness of antiarrhythmic and heart rate control medications as ordered  - Initiate emergency measures for life threatening arrhythmias  - Monitor electrolytes and administer replacement therapy as ordered  Outcome: Progressing     Problem: RESPIRATORY - ADULT  Goal: Achieves optimal ventilation and oxygenation  Description: INTERVENTIONS:  - Assess for changes in respiratory status  - Assess for changes in mentation and behavior  - Position to facilitate oxygenation and minimize respiratory effort  - Oxygen supplementation based on oxygen saturation or ABGs  - Provide Smoking Cessation handout, if applicable  - Encourage broncho-pulmonary hygiene including cough, deep breathe, Incentive Spirometry  - Assess the need for suctioning and perform as needed  - Assess and instruct to report SOB or any respiratory difficulty  - Respiratory Therapy support as indicated  - Manage/alleviate anxiety  - Monitor for  signs/symptoms of CO2 retention  Outcome: Progressing     Problem: GASTROINTESTINAL - ADULT  Goal: Minimal or absence of nausea and vomiting  Description: INTERVENTIONS:  - Maintain adequate hydration with IV or PO as ordered and tolerated  - Nasogastric tube to low intermittent suction as ordered  - Evaluate effectiveness of ordered antiemetic medications  - Provide nonpharmacologic comfort measures as appropriate  - Advance diet as tolerated, if ordered  - Obtain nutritional consult as needed  - Evaluate fluid balance  Outcome: Progressing  Goal: Maintains or returns to baseline bowel function  Description: INTERVENTIONS:  - Assess bowel function  - Maintain adequate hydration with IV or PO as ordered and tolerated  - Evaluate effectiveness of GI medications  - Encourage mobilization and activity  - Obtain nutritional consult as needed  - Establish a toileting routine/schedule  - Consider collaborating with pharmacy to review patient's medication profile  Outcome: Progressing     Problem: GENITOURINARY - ADULT  Goal: Absence of urinary retention  Description: INTERVENTIONS:  - Assess patient’s ability to void and empty bladder  - Monitor intake/output and perform bladder scan as needed  - Follow urinary retention protocol/standard of care  - Consider collaborating with pharmacy to review patient's medication profile  - Implement strategies to promote bladder emptying  Outcome: Progressing     Problem: METABOLIC/FLUID AND ELECTROLYTES - ADULT  Goal: Electrolytes maintained within normal limits  Description: INTERVENTIONS:  - Monitor labs and rhythm and assess patient for signs and symptoms of electrolyte imbalances  - Administer electrolyte replacement as ordered  - Monitor response to electrolyte replacements, including rhythm and repeat lab results as appropriate  - Fluid restriction as ordered  - Instruct patient on fluid and nutrition restrictions as appropriate  Outcome: Progressing     Problem:  MUSCULOSKELETAL - ADULT  Goal: Return mobility to safest level of function  Description: INTERVENTIONS:  - Assess patient stability and activity tolerance for standing, transferring and ambulating w/ or w/o assistive devices  - Assist with transfers and ambulation using safe patient handling equipment as needed  - Ensure adequate protection for wounds/incisions during mobilization  - Obtain PT/OT consults as needed  - Advance activity as appropriate  - Communicate ordered activity level and limitations with patient/family  Outcome: Progressing  Goal: Maintain proper alignment of affected body part  Description: INTERVENTIONS:  - Support and protect limb and body alignment per provider's orders  - Instruct and reinforce with patient and family use of appropriate assistive device and precautions (e.g. spinal or hip dislocation precautions)  Outcome: Progressing     Problem: Impaired Activities of Daily Living  Goal: Achieve highest/safest level of independence in self care  Description: Interventions:  - Assess ability and encourage patient to participate in ADLs to maximize function  - Promote sitting position while performing ADLs such as feeding, grooming, and bathing  - Educate and encourage patient/family in tolerated functional activity level and precautions during self-care  - Encourage patient to incorporate impaired side during daily activities to promote function  Outcome: Progressing     Problem: NEUROLOGICAL - ADULT  Goal: Achieves stable or improved neurological status  Description: INTERVENTIONS  - Assess for and report changes in neurological status  - Initiate measures to prevent increased intracranial pressure  - Maintain blood pressure and fluid volume within ordered parameters to optimize cerebral perfusion and minimize risk of hemorrhage  - Monitor temperature, glucose, and sodium. Initiate appropriate interventions as ordered  Outcome: Progressing     Problem: Delirium  Goal: Minimize duration of  delirium  Description: Interventions:  - Encourage use of hearing aids, eye glasses  - Promote highest level of mobility daily  - Provide frequent reorientation  - Promote wakefulness i.e. lights on, blinds open  - Promote sleep, encourage patient's normal rest cycle i.e. lights off, TV off, minimize noise and interruptions  - Encourage family to assist in orientation and promotion of home routines  Outcome: Progressing

## 2024-04-19 NOTE — PROGRESS NOTES
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 Faecalis - colonization        PLAN:     -  continue IV zosyn  -  discontinue IV vancomycin  -  start IV acyclovir  -  await MRI brain  -  f/up cx  -  Follow fever curve, wbc  -  Reviewed labs, micro, imaging reports  -  d/w patient, staff     History of Present Illness:  Dennys Manzano is a a(n) 88 year old male. Patient is a 88-year-old male with a history of CAD post CABG and pacemaker, BPH, CHF, DM, depression, HLD, PAD, CVA, previous right rotator cuff surgery who now presents to the hospital for right shoulder pain with effusion, difficulty with movement started about 2 days prior.  Afebrile here.  WBC initially 18.  X-ray of the shoulder with DJD without fracture.  CT chest, abdomen, pelvis nausea and vomiting without clear infectious process.  Seen by orthopedic surgery underwent aspiration 4/15 noted WBC around 60,000 with 94% segs and positive crystals.  Cultures so far no growth.  On IV vancomycin and Zosyn.  Plan for surgery tomorrow for arthroscopy and I&D.    OBJECTIVE  /71 (BP Location: Left arm)   Pulse 82   Temp 97.7 °F (36.5 °C) (Temporal)   Resp 19   Ht 182.9 cm (6')   Wt 235 lb 10.8 oz (106.9 kg)   SpO2 94%   BMI 31.96 kg/m²     General: in bed,  lethargic  HEENT: EOMI, neck supple  Abdomen: Soft, nontender, nondistended.   Musculoskeletal: R shoulder with post op dressing, sling  Integument: No rashes        Howie Jalloh MD  [facility]  [phone]

## 2024-04-19 NOTE — CM/SW NOTE
Anticipated therapy need: Gradual Rehabilitative Therapy.    Per [facility] office, will need IV therapy at [facility].  Patient prefers [facility] for IV abx.    [facility] to start referrals in [facility].  CM completed PASRR will need to be uploaded in [facility] once [facility] referrals have been sent.    PLAN:  [facility] at [facility], will need list for choice.  [employer] insurance so not prior auth is required.    Radha Duggan MBA BSN RN CRRN   RN Case Manager  [phone]

## 2024-04-19 NOTE — PROGRESS NOTES
[facility]  [address] [place] [address], [address]  [facility] [address]  [phone]        INPATIENT NEUROLOGY   FOLLOW UP PROGRESS NOTE  [facility]  part of [facility]    Dennys Manzano Patient Status:  Inpatient     1935 MRN [id number]    Location [facility] 2W/SW Attending Jeimy Driscoll MD    Hosp Day # 4 PCP CALOS FITZGERALD MD    Date of Admission:  2024  Date of Consult Follow Up:  2024     Assessment and Plan:   Dennys Manzano is a 88 year old    male w/ a pmhx sig. for  HTN, DM, and HLD,AFIB,depression ,and history of malignant hyperthermia,  Prior history of stroke,CAD post CABG and pacemaker, BPH, CHF, DM, depression, HLD, PAD, CVA, previous right rotator cuff surgery  who  presented for R pseuodogout and is being evaluated for difficulty maintaining wakefulness/lethargy.     On exam he will maintain wakefulness but then drifts back to sleep and requires repeated stimuli to attend.  He had asterixis.  Gradually improving on his own.  More awake, alert and interactive.  Suspect he may be a component of hypoactive delirium, combined with untreated sleep apnea, and what ever metabolic process has been yet to to be discovered.  His EEG is also consistent with a metabolic encephalopathy.  Do not think the patient needs a lumbar puncture at this time.  Do not think that he has a viral encephalitis.  Recommend continued supportive care    Dr. Campos will attend the neurology service beginning in the afternoon on 2024.  Any questions after that time should be directed to  him.     Difficulty maintaining level of wakefulness   Differential Diagnosis:  Metabolic derangement   doubt acute ischemic stroke  Not intracranial hemorrhage  ?underlying neurodegenerative d/o now being unmasked + toxic/metabolic encepalopathy      Plan    Diagnostics:  Mri brain  Discussed LP but suspicion for encephalitis is low; no indication at this  time.  Therapeutics:  Delirium  precautions  Neurochecks Q4           INTERVAL EVENTS  04/19/24: in the icu more awake more alert.     SUBJECTIVE:     More awake more alert.  Reports that he is in some pain.  Answered all of the patient's son's questions at the bedside.  His daughter is a nurse in urgent care.  He asked that this author speak with her so that he did not have to reiterate everything with her a second time.  Pertinent positive and negatives per HPI.  All others were reviewed and negative.       Objective   OBJECTIVE:   Last vitals and weight :  Blood pressure 144/74, pulse 93, temperature 97.7 °F (36.5 °C), temperature source Temporal, resp. rate 22, height 72\", weight 235 lb 10.8 oz (106.9 kg), SpO2 93%.   Vitals:    04/19/24 0435 04/19/24 0600 04/19/24 0800 04/19/24 1000   BP: 131/58 149/69 (!) 140/99 144/74   BP Location: Left arm Left arm Left arm Left arm   Pulse: 94 95 97 93   Resp: (!) 28 24 24 22   Temp:       TempSrc:       SpO2: 92% 92% 92% 93%   Weight:       Height:           Exam:  - General: appears stated age and no distress  - CV: symmetric pulses           Carotids:   - Pulmonary: no signs of respiratory distress. Normal excursion of the chest.      Neurologic Exam  - Mental Status:   Exam is significant improved compared to 4/18/2024 exam.  He maintains wakefulness.  He is awake.  He is alert.  He regards.  He knew his age.  Initially he did not know the month and said it was August and then September, but later he was able to recall that it was April when he was asked again.  He knew the year.  He knew was in the hospital.  Comprehension was intact.  - Cranial Nerves: No gaze preference. Visual fields: Blink to threat intact.  Pupils are 3mm briskly constricting to 2mm and equally round and reactive to light  in a well lit room. EOMI. No nystagmus. No ptosis. V1-V3 intact B/L to light touch.No pathological facial asymmetry. No flattening of the nasolabial fold. .  Hearing grossly  intact.  Tongue midline. No atrophy or fasiculations of the tongue noted. Palate and uvula elevate symmetrically.      - Motor:  normal tone, normal bulk.  No obvious cogwheel rigidity in his left upper extremity.  Cannot assess his right arm because it is in a sling.  No interosseous wasting. No flattening of hypothenar eminences.  EHL FHL intact.  He is able to wiggle his toes.  He does not lift his legs off the bed.  He cannot lift his legs antigravity.                         Asterixis:  No asterixis noted.           Tremor: Postural tremor.             Reflexes:     C5 C6 C7  L4 S1   R       0 0   L 0 0   0 0   Adductor Spread: No adductor spread noted.    Frontal release signs:Not assessed.    Catalina's sign:absent   Nonsustained clonus: Absent   Sustained clonus: Absent            - Sensory:   Light touch: normal     - Cerebellum: No truncal ataxia. No titubations. No dysmetria, no dysdiadochokinesis. No overshoot.     Medications:   ipratropium-albuterol  3 mL Nebulization Q6H WA    magnesium oxide  400 mg Oral Once    piperacillin-tazobactam  4.5 g Intravenous Q8H    fluticasone propionate  1 spray Each Nare Daily    montelukast  10 mg Oral Nightly    aspirin  81 mg Oral Daily    atorvastatin  40 mg Oral Nightly    finasteride  5 mg Oral Daily    metoprolol succinate ER  25 mg Oral Daily Beta Blocker    pantoprazole  40 mg Oral QAM AC    senna-docusate  1 tablet Oral BID    umeclidinium-vilanterol  1 puff Inhalation Daily    tamsulosin  0.4 mg Oral Daily with dinner    vancomycin  12.5 mg/kg Intravenous Q24H    insulin aspart  1-5 Units Subcutaneous TID CC    ondansetron  4 mg Intravenous Once    sodium chloride  1,000 mL Intravenous Once       PRNS:   ipratropium-albuterol    benzonatate    guaiFENesin-codeine    ondansetron    acetaminophen    hydrALAzine    glucose **OR** glucose **OR** glucose-vitamin C **OR** dextrose **OR** glucose **OR** glucose **OR** glucose-vitamin C    Infusions:           Results:   Laboratory Data:  Lab Results   Component Value Date    WBC 15.7 (H) 04/19/2024    HGB 11.4 (L) 04/19/2024    HCT 32.8 (L) 04/19/2024    .0 04/19/2024    CREATSERUM 0.61 (L) 04/19/2024    BUN 15 04/19/2024     (L) 04/19/2024    K 4.2 04/19/2024    CL 98 04/19/2024    CO2 31.0 04/19/2024     (H) 04/19/2024    CA 9.0 04/19/2024    ALB 3.5 04/18/2024    ALKPHO 91 04/18/2024    TP 6.8 04/18/2024    AST <8 04/18/2024    ALT 16 04/18/2024    PTT 41.0 (H) 04/14/2024    INR 3.20 (H) 04/19/2024    PTP 34.8 (H) 04/19/2024    T4F 1.0 04/18/2024    TSH 0.277 (L) 04/18/2024    LIP 26 04/14/2024    ESRML 111 (H) 04/14/2024    CRP 13.9 (H) 02/10/2018    MG 1.8 04/19/2024    PHOS 3.1 08/23/2021    TROP <0.045 08/18/2021     03/23/2021     Recent Results (from the past 72 hour(s))   POCT Glucose    Collection Time: 04/16/24  2:16 PM   Result Value Ref Range    POC Glucose  199 (H) 70 - 99 mg/dL   POCT Glucose    Collection Time: 04/16/24  5:41 PM   Result Value Ref Range    POC Glucose  173 (H) 70 - 99 mg/dL   POCT Glucose    Collection Time: 04/16/24  9:21 PM   Result Value Ref Range    POC Glucose  146 (H) 70 - 99 mg/dL   CBC, Platelet; No Differential    Collection Time: 04/17/24  5:29 AM   Result Value Ref Range    WBC 14.6 (H) 4.0 - 11.0 x10(3) uL    RBC 3.97 3.80 - 5.80 x10(6)uL    HGB 12.2 (L) 13.0 - 17.5 g/dL    HCT 38.3 (L) 39.0 - 53.0 %    MCV 96.5 80.0 - 100.0 fL    MCH 30.7 26.0 - 34.0 pg    MCHC 31.9 31.0 - 37.0 g/dL    RDW 13.1 11.0 - 15.0 %    RDW-SD 46.7 (H) 35.1 - 46.3 fL    .0 150.0 - 450.0 10(3)uL   Basic Metabolic Panel (8)    Collection Time: 04/17/24  5:29 AM   Result Value Ref Range    Glucose 137 (H) 70 - 99 mg/dL    Sodium 130 (L) 136 - 145 mmol/L    Potassium 4.5 3.5 - 5.1 mmol/L    Chloride 98 98 - 112 mmol/L    CO2 28.0 21.0 - 32.0 mmol/L    Anion Gap 4 0 - 18 mmol/L    BUN 14 9 - 23 mg/dL    Creatinine 0.70 0.70 - 1.30 mg/dL    BUN/CREA Ratio 20.0 10.0 - 20.0     Calcium, Total 9.0 8.7 - 10.4 mg/dL    Calculated Osmolality 273 (L) 275 - 295 mOsm/kg    eGFR-Cr 90 >=60 mL/min/1.73m2   Magnesium    Collection Time: 04/17/24  5:29 AM   Result Value Ref Range    Magnesium 1.8 1.6 - 2.6 mg/dL   BNP (Brain Natriuretic Peptide)    Collection Time: 04/17/24  5:29 AM   Result Value Ref Range    Beta Natriuretic Peptide 225 (H) <100 pg/mL   POCT Glucose    Collection Time: 04/17/24  6:23 AM   Result Value Ref Range    POC Glucose  136 (H) 70 - 99 mg/dL   POCT Glucose    Collection Time: 04/17/24 11:47 AM   Result Value Ref Range    POC Glucose  150 (H) 70 - 99 mg/dL   Tissue Aerobic Culture    Collection Time: 04/17/24  2:35 PM    Specimen: Tissue   Result Value Ref Range    Tissue Culture Result No Growth 2 Days     Tissue Smear 1+ WBCs seen     Tissue Smear No organisms seen    POCT Glucose    Collection Time: 04/17/24  3:19 PM   Result Value Ref Range    POC Glucose  149 (H) 70 - 99 mg/dL   Expanded Arterial Blood Gas    Collection Time: 04/17/24  4:12 PM   Result Value Ref Range    Sample Site Left Radial     ABG pH 7.28 (L) 7.35 - 7.45    ABG pCO2 64 (HH) 35 - 45 mm Hg    ABG PO2 111 (H) 80 - 100 mm Hg    ABG HCO3 26.3 21.0 - 27.0 mEq/L    Blood Gas Base Excess 1.8 -2.0 - 2.0 mmol/L    Potassium Blood Gas 4.1 3.6 - 5.1 mmol/L    Sodium Blood Gas 127 (L) 135 - 145 mmol/L    Lactic Acid (Blood Gas) 0.7 0.5 - 2.0 mmol/L    Ionized Calcium 1.14 0.95 - 1.32 mmol/L    Total Hemoglobin 13.0 13.0 - 17.5 g/dL    ABG O2 Saturation >99.0 94.0 - 100.0 %    Carboxyhemoglobin 3.2 (H) 0.0 - 3.0 %    Methemoglobin 1.2 0.4 - 1.5 % SAT    Oxygen Content 17.6 15.0 - 23.0 Vol%    Oxygen Delivery Device NR MASK     L/M 15.00 L/min    Modified Allens Test Positive     Puncture Charge Yes     Blood Gas Analyzer [id number] CCU    POCT Glucose    Collection Time: 04/17/24  6:15 PM   Result Value Ref Range    POC Glucose  134 (H) 70 - 99 mg/dL   POCT Glucose    Collection Time: 04/17/24  9:24 PM   Result  Value Ref Range    POC Glucose  147 (H) 70 - 99 mg/dL   Magnesium    Collection Time: 04/18/24  6:56 AM   Result Value Ref Range    Magnesium 1.9 1.6 - 2.6 mg/dL   Prothrombin Time (PT)    Collection Time: 04/18/24  6:56 AM   Result Value Ref Range    PT 34.1 (H) 11.6 - 14.8 seconds    INR 3.12 (H) 0.80 - 1.20   CBC, Platelet; No Differential    Collection Time: 04/18/24  6:56 AM   Result Value Ref Range    WBC 18.1 (H) 4.0 - 11.0 x10(3) uL    RBC 3.80 3.80 - 5.80 x10(6)uL    HGB 11.7 (L) 13.0 - 17.5 g/dL    HCT 36.2 (L) 39.0 - 53.0 %    MCV 95.3 80.0 - 100.0 fL    MCH 30.8 26.0 - 34.0 pg    MCHC 32.3 31.0 - 37.0 g/dL    RDW 12.9 11.0 - 15.0 %    RDW-SD 45.1 35.1 - 46.3 fL    .0 150.0 - 450.0 10(3)uL   Basic Metabolic Panel (8)    Collection Time: 04/18/24  6:56 AM   Result Value Ref Range    Glucose 144 (H) 70 - 99 mg/dL    Sodium 135 (L) 136 - 145 mmol/L    Potassium 4.2 3.5 - 5.1 mmol/L    Chloride 100 98 - 112 mmol/L    CO2 31.0 21.0 - 32.0 mmol/L    Anion Gap 4 0 - 18 mmol/L    BUN 15 9 - 23 mg/dL    Creatinine 0.65 (L) 0.70 - 1.30 mg/dL    BUN/CREA Ratio 23.1 (H) 10.0 - 20.0    Calcium, Total 9.0 8.7 - 10.4 mg/dL    Calculated Osmolality 283 275 - 295 mOsm/kg    eGFR-Cr 92 >=60 mL/min/1.73m2   Hepatic Function Panel (7)    Collection Time: 04/18/24  6:56 AM   Result Value Ref Range    AST <8 <=34 U/L    ALT 16 10 - 49 U/L    Alkaline Phosphatase 91 45 - 117 U/L    Bilirubin, Total 1.1 0.2 - 1.1 mg/dL    Bilirubin, Direct 0.6 (H) <=0.3 mg/dL    Total Protein 6.8 5.7 - 8.2 g/dL    Albumin 3.5 3.2 - 4.8 g/dL   TSH W Reflex To Free T4    Collection Time: 04/18/24  6:56 AM   Result Value Ref Range    TSH 0.277 (L) 0.550 - 4.780 mIU/mL   T4, FREE (S)    Collection Time: 04/18/24  6:56 AM   Result Value Ref Range    Free T4 1.0 0.8 - 1.7 ng/dL   Free T3 (Triiodothryronine)    Collection Time: 04/18/24  6:56 AM   Result Value Ref Range    T3 Free 1.69 (L) 2.40 - 4.20 pg/mL   Procalcitonin    Collection Time:  04/18/24  6:56 AM   Result Value Ref Range    Procalcitonin 0.25 (H) <0.05 ng/mL   POCT Glucose    Collection Time: 04/18/24  9:17 AM   Result Value Ref Range    POC Glucose  130 (H) 70 - 99 mg/dL   Vancomycin Peak, Serum    Collection Time: 04/18/24 12:04 PM   Result Value Ref Range    Vancomycin Peak 16.1 (L) 30.0 - 50.0 ug/mL   POCT Glucose    Collection Time: 04/18/24 12:17 PM   Result Value Ref Range    POC Glucose  174 (H) 70 - 99 mg/dL   Arterial blood gas    Collection Time: 04/18/24  1:00 PM   Result Value Ref Range    Sample Site Left Radial     ABG pH 7.37 7.35 - 7.45    ABG pCO2 58 (H) 35 - 45 mm Hg    ABG PO2 56 (L) 80 - 100 mm Hg    ABG HCO3 29.9 (H) 21.0 - 27.0 mEq/L    Blood Gas Base Excess 6.6 (H) -2.0 - 2.0 mmol/L    ABG O2 Saturation 93.9 (L) 94.0 - 100.0 %    Oxygen Content 14.9 (L) 15.0 - 23.0 Vol%    Oxygen Delivery Device Nasal Canula     L/M 1.00 L/min    Modified Allens Test Positive     Puncture Charge Yes     Blood Gas Analyzer [id number] CCU    POCT Glucose    Collection Time: 04/18/24  5:32 PM   Result Value Ref Range    POC Glucose  125 (H) 70 - 99 mg/dL   Ammonia, Plasma    Collection Time: 04/18/24  5:47 PM   Result Value Ref Range    Ammonia 26 11 - 32 umol/L   POCT Glucose    Collection Time: 04/18/24  8:31 PM   Result Value Ref Range    POC Glucose  116 (H) 70 - 99 mg/dL   CBC, Platelet; No Differential    Collection Time: 04/19/24  4:34 AM   Result Value Ref Range    WBC 15.7 (H) 4.0 - 11.0 x10(3) uL    RBC 3.49 (L) 3.80 - 5.80 x10(6)uL    HGB 11.4 (L) 13.0 - 17.5 g/dL    HCT 32.8 (L) 39.0 - 53.0 %    MCV 94.0 80.0 - 100.0 fL    MCH 32.7 26.0 - 34.0 pg    MCHC 34.8 31.0 - 37.0 g/dL    RDW 12.9 11.0 - 15.0 %    RDW-SD 43.9 35.1 - 46.3 fL    .0 150.0 - 450.0 10(3)uL   Basic Metabolic Panel (8)    Collection Time: 04/19/24  4:34 AM   Result Value Ref Range    Glucose 130 (H) 70 - 99 mg/dL    Sodium 134 (L) 136 - 145 mmol/L    Potassium 4.2 3.5 - 5.1 mmol/L    Chloride 98 98 - 112  mmol/L    CO2 31.0 21.0 - 32.0 mmol/L    Anion Gap 5 0 - 18 mmol/L    BUN 15 9 - 23 mg/dL    Creatinine 0.61 (L) 0.70 - 1.30 mg/dL    BUN/CREA Ratio 24.6 (H) 10.0 - 20.0    Calcium, Total 9.0 8.7 - 10.4 mg/dL    Calculated Osmolality 281 275 - 295 mOsm/kg    eGFR-Cr 94 >=60 mL/min/1.73m2   Prothrombin Time (PT)    Collection Time: 04/19/24  4:34 AM   Result Value Ref Range    PT 34.8 (H) 11.6 - 14.8 seconds    INR 3.20 (H) 0.80 - 1.20   POCT Glucose    Collection Time: 04/19/24  8:00 AM   Result Value Ref Range    POC Glucose  135 (H) 70 - 99 mg/dL   Vancomycin Trough, Serum    Collection Time: 04/19/24  8:20 AM   Result Value Ref Range    Vancomycin Trough 4.3 (L) 10.0 - 20.0 ug/mL   Magnesium    Collection Time: 04/19/24  8:20 AM   Result Value Ref Range    Magnesium 1.8 1.6 - 2.6 mg/dL         Test results/Imaging:   CT BRAIN OR HEAD ([id number])    Result Date: 4/18/2024  CONCLUSION:  1. No acute intracranial process. 2. Stable mild generalized atrophy and mild chronic microangiopathic ischemic changes with large vessel calcific atherosclerosis. 3. Lesser incidental findings as above.   Elm-remote  Dictated by (CST): Naren Palomares MD on 4/18/2024 at 1:39 PM     Finalized by (CST): Naren Palomares MD on 4/18/2024 at 1:42 PM                Performed an independent visualization of:  CT brain WO   Imaging revealed: Agree with radiology read.         Disclaimer:   This record was dictated using Dragon software. There may be errors due to voice recognition problems that were not realized and corrected during the completion of the note.      This document is not intended to support charting by exception.  Sections left blank in a completed note should be presumed not to have been done.     Total face to face time was 35 minutes, more than 50% of the time was spent in counseling and/or coordination of care related to toxic metabolic encephalopathy.    Thank you.  Tad Mcmahon D.O.   Vascular & General  Neurology    4/19/2024  11:44 AM

## 2024-04-19 NOTE — PHYSICAL THERAPY NOTE
Discussed with RN, pt lethargic and not medically appropriate for out of bed mobility today. RN request follow up tomorrow 4/20.

## 2024-04-20 ENCOUNTER — APPOINTMENT (OUTPATIENT)
Dept: ULTRASOUND IMAGING | Facility: HOSPITAL | Age: 89
DRG: 500 | End: 2024-04-20
Attending: HOSPITALIST
Payer: MEDICARE

## 2024-04-20 ENCOUNTER — APPOINTMENT (OUTPATIENT)
Dept: ULTRASOUND IMAGING | Facility: HOSPITAL | Age: 89
End: 2024-04-20
Attending: HOSPITALIST
Payer: MEDICARE

## 2024-04-20 LAB
ANION GAP SERPL CALC-SCNC: 3 MMOL/L (ref 0–18)
BUN BLD-MCNC: 17 MG/DL (ref 9–23)
BUN/CREAT SERPL: 28.3 (ref 10–20)
CALCIUM BLD-MCNC: 8.8 MG/DL (ref 8.7–10.4)
CHLORIDE SERPL-SCNC: 99 MMOL/L (ref 98–112)
CO2 SERPL-SCNC: 34 MMOL/L (ref 21–32)
CREAT BLD-MCNC: 0.6 MG/DL
DEPRECATED RDW RBC AUTO: 43.7 FL (ref 35.1–46.3)
EGFRCR SERPLBLD CKD-EPI 2021: 94 ML/MIN/1.73M2 (ref 60–?)
ERYTHROCYTE [DISTWIDTH] IN BLOOD BY AUTOMATED COUNT: 12.6 % (ref 11–15)
GLUCOSE BLD-MCNC: 138 MG/DL (ref 70–99)
GLUCOSE BLDC GLUCOMTR-MCNC: 141 MG/DL (ref 70–99)
GLUCOSE BLDC GLUCOMTR-MCNC: 151 MG/DL (ref 70–99)
GLUCOSE BLDC GLUCOMTR-MCNC: 153 MG/DL (ref 70–99)
GLUCOSE BLDC GLUCOMTR-MCNC: 167 MG/DL (ref 70–99)
HCT VFR BLD AUTO: 31.3 %
HGB BLD-MCNC: 10.6 G/DL
INR BLD: 1.54 (ref 0.8–1.2)
MAGNESIUM SERPL-MCNC: 2.2 MG/DL (ref 1.6–2.6)
MCH RBC QN AUTO: 32.2 PG (ref 26–34)
MCHC RBC AUTO-ENTMCNC: 33.9 G/DL (ref 31–37)
MCV RBC AUTO: 95.1 FL
OSMOLALITY SERPL CALC.SUM OF ELEC: 286 MOSM/KG (ref 275–295)
PHOSPHATE SERPL-MCNC: 2.7 MG/DL (ref 2.4–5.1)
PLATELET # BLD AUTO: 332 10(3)UL (ref 150–450)
POTASSIUM SERPL-SCNC: 3.8 MMOL/L (ref 3.5–5.1)
PROTHROMBIN TIME: 19.5 SECONDS (ref 11.6–14.8)
RBC # BLD AUTO: 3.29 X10(6)UL
SODIUM SERPL-SCNC: 136 MMOL/L (ref 136–145)
WBC # BLD AUTO: 14.2 X10(3) UL (ref 4–11)

## 2024-04-20 PROCEDURE — 93971 EXTREMITY STUDY: CPT | Performed by: HOSPITALIST

## 2024-04-20 PROCEDURE — 99232 SBSQ HOSP IP/OBS MODERATE 35: CPT | Performed by: INTERNAL MEDICINE

## 2024-04-20 PROCEDURE — 99291 CRITICAL CARE FIRST HOUR: CPT | Performed by: INTERNAL MEDICINE

## 2024-04-20 PROCEDURE — 99232 SBSQ HOSP IP/OBS MODERATE 35: CPT | Performed by: OTHER

## 2024-04-20 RX ORDER — POTASSIUM CHLORIDE 1.5 G/1.58G
40 POWDER, FOR SOLUTION ORAL ONCE
Status: COMPLETED | OUTPATIENT
Start: 2024-04-20 | End: 2024-04-20

## 2024-04-20 RX ORDER — SODIUM CHLORIDE 9 MG/ML
INJECTION, SOLUTION INTRAVENOUS CONTINUOUS
Status: ACTIVE | OUTPATIENT
Start: 2024-04-20 | End: 2024-04-21

## 2024-04-20 RX ORDER — POTASSIUM CHLORIDE 14.9 MG/ML
20 INJECTION INTRAVENOUS ONCE
Status: DISCONTINUED | OUTPATIENT
Start: 2024-04-20 | End: 2024-04-20

## 2024-04-20 NOTE — PROGRESS NOTES
INFECTIOUS DISEASE PROGRESS NOTE    Dennys Manzano Patient Status:  Inpatient    1935 MRN [id number]   Location [facility] 4W/SW/SE Attending Jeimy Driscoll MD   Hosp Day # 5 PCP CALOS FITZGERALD MD       SUBJECTIVE  ROS reviewed. Afebrile. VSS. Staff reports pt much improved this morning. Awake and alert eating breakfast with PCT. No resp distress on 3L NC.    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 Faecalis - colonization     PLAN:     -  Continue IV acyclovir + CTX  -  Notified MRI cancelled + no plans for LP  -  FU cx  -  Follow fever curve, wbc  -  Reviewed labs, micro, imaging reports  -  d/w patient, staff     History of Present Illness:    Dennys Manzano is a a(n) 88 year old male. Patient is a 88-year-old male with a history of CAD post CABG and pacemaker, BPH, CHF, DM, depression, HLD, PAD, CVA, previous right rotator cuff surgery who now presents to the hospital for right shoulder pain with effusion, difficulty with movement started about 2 days prior.  Afebrile here.  WBC initially 18.  X-ray of the shoulder with DJD without fracture.  CT chest, abdomen, pelvis nausea and vomiting without clear infectious process.  Seen by orthopedic surgery underwent aspiration 4/15 noted WBC around 60,000 with 94% segs and positive crystals.  Cultures so far no growth.  On IV vancomycin and Zosyn.  Plan for surgery tomorrow for arthroscopy and I&D.    OBJECTIVE  /77 (BP Location: Left arm)   Pulse 77   Temp 97.7 °F (36.5 °C) (Temporal)   Resp 19   Ht 6' (1.829 m)   Wt 232 lb 12.9 oz (105.6 kg)   SpO2 96%    BMI 31.57 kg/m²     General: Awake, alert  HEENT: EOMI, neck supple, NC+  Abdomen: Soft, NT/ND  Musculoskeletal: R shoulder dressed in sling  Integument: No rashes  : Navi+    [facility]  [phone]

## 2024-04-20 NOTE — PLAN OF CARE
Problem: METABOLIC/FLUID AND ELECTROLYTES - ADULT  Goal: Electrolytes maintained within normal limits  Description: INTERVENTIONS:  - Monitor labs and rhythm and assess patient for signs and symptoms of electrolyte imbalances  - Administer electrolyte replacement as ordered  - Monitor response to electrolyte replacements, including rhythm and repeat lab results as appropriate  - Fluid restriction as ordered  - Instruct patient on fluid and nutrition restrictions as appropriate  4/20/2024 1231 by Jnaneth Cage RN  Outcome: Progressing  4/20/2024 1231 by Janneth Cage RN  Outcome: Progressing     Problem: MUSCULOSKELETAL - ADULT  Goal: Return mobility to safest level of function  Description: INTERVENTIONS:  - Assess patient stability and activity tolerance for standing, transferring and ambulating w/ or w/o assistive devices  - Assist with transfers and ambulation using safe patient handling equipment as needed  - Ensure adequate protection for wounds/incisions during mobilization  - Obtain PT/OT consults as needed  - Advance activity as appropriate  - Communicate ordered activity level and limitations with patient/family  4/20/2024 1231 by Janneth Cage RN  Outcome: Progressing  4/20/2024 1231 by Janneth Cage RN  Outcome: Progressing  Goal: Maintain proper alignment of affected body part  Description: INTERVENTIONS:  - Support and protect limb and body alignment per provider's orders  - Instruct and reinforce with patient and family use of appropriate assistive device and precautions (e.g. spinal or hip dislocation precautions)  4/20/2024 1231 by Janneth Cage RN  Outcome: Progressing  4/20/2024 1231 by Janneth Cage RN  Outcome: Progressing     Problem: Impaired Activities of Daily Living  Goal: Achieve highest/safest level of independence in self care  Description: Interventions:  - Assess ability and encourage patient to participate in ADLs to maximize function  -  Promote sitting position while performing ADLs such as feeding, grooming, and bathing  - Educate and encourage patient/family in tolerated functional activity level and precautions during self-care  - Encourage patient to incorporate impaired side during daily activities to promote function  4/20/2024 1231 by Janneth Cage RN  Outcome: Progressing  4/20/2024 1231 by Janneth Cage RN  Outcome: Progressing     Problem: NEUROLOGICAL - ADULT  Goal: Achieves stable or improved neurological status  Description: INTERVENTIONS  - Assess for and report changes in neurological status  - Initiate measures to prevent increased intracranial pressure  - Maintain blood pressure and fluid volume within ordered parameters to optimize cerebral perfusion and minimize risk of hemorrhage  - Monitor temperature, glucose, and sodium. Initiate appropriate interventions as ordered  4/20/2024 1231 by Janneth Cage RN  Outcome: Progressing  4/20/2024 1231 by Janneth Cage RN  Outcome: Progressing     Problem: Delirium  Goal: Minimize duration of delirium  Description: Interventions:  - Encourage use of hearing aids, eye glasses  - Promote highest level of mobility daily  - Provide frequent reorientation  - Promote wakefulness i.e. lights on, blinds open  - Promote sleep, encourage patient's normal rest cycle i.e. lights off, TV off, minimize noise and interruptions  - Encourage family to assist in orientation and promotion of home routines  4/20/2024 1231 by Janneth aCge RN  Outcome: Progressing  4/20/2024 1231 by Janneth Cage RN  Outcome: Progressing

## 2024-04-20 NOTE — PLAN OF CARE
Problem: PAIN - ADULT  Goal: Verbalizes/displays adequate comfort level or patient's stated pain goal  Description: INTERVENTIONS:  - Encourage pt to monitor pain and request assistance  - Assess pain using appropriate pain scale  - Administer analgesics based on type and severity of pain and evaluate response  - Implement non-pharmacological measures as appropriate and evaluate response  - Consider cultural and social influences on pain and pain management  - Manage/alleviate anxiety  - Utilize distraction and/or relaxation techniques  - Monitor for opioid side effects  - Notify MD/LIP if interventions unsuccessful or patient reports new pain  - Anticipate increased pain with activity and pre-medicate as appropriate  Outcome: Progressing     Problem: SAFETY ADULT - FALL  Goal: Free from fall injury  Description: INTERVENTIONS:  - Assess pt frequently for physical needs  - Identify cognitive and physical deficits and behaviors that affect risk of falls.  - [place] fall precautions as indicated by assessment.  - Educate pt/family on patient safety including physical limitations  - Instruct pt to call for assistance with activity based on assessment  - Modify environment to reduce risk of injury  - Provide assistive devices as appropriate  - Consider OT/PT consult to assist with strengthening/mobility  - Encourage toileting schedule  Outcome: Progressing     Problem: CARDIOVASCULAR - ADULT  Goal: Maintains optimal cardiac output and hemodynamic stability  Description: INTERVENTIONS:  - Monitor vital signs, rhythm, and trends  - Monitor for bleeding, hypotension and signs of decreased cardiac output  - Evaluate effectiveness of vasoactive medications to optimize hemodynamic stability  - Monitor arterial and/or venous puncture sites for bleeding and/or hematoma  - Assess quality of pulses, skin color and temperature  - Assess for signs of decreased coronary artery perfusion - ex. Angina  - Evaluate fluid balance,  assess for edema, trend weights  Outcome: Progressing     Problem: GASTROINTESTINAL - ADULT  Goal: Minimal or absence of nausea and vomiting  Description: INTERVENTIONS:  - Maintain adequate hydration with IV or PO as ordered and tolerated  - Nasogastric tube to low intermittent suction as ordered  - Evaluate effectiveness of ordered antiemetic medications  - Provide nonpharmacologic comfort measures as appropriate  - Advance diet as tolerated, if ordered  - Obtain nutritional consult as needed  - Evaluate fluid balance  Outcome: Progressing

## 2024-04-20 NOTE — CM/SW NOTE
SNF list available, spoke w/ daughter Evangelina who is agreeable to plan. Emailed list of options to [email or web address]. PASRR Level 1 completed and attached to referral.     SW/CM to f/u with Evangelina regarding choice.    Kanwal Schmidt, JASONW [id number]

## 2024-04-20 NOTE — PLAN OF CARE
Problem: Patient Centered Care  Goal: Patient preferences are identified and integrated in the patient's plan of care  Description: Interventions:  - What would you like us to know as we care for you? Patient is from home with family and he has a daughter that works here who is his support system.  Patient is hard of hearing.  - Provide timely, complete, and accurate information to patient/family  - Incorporate patient and family knowledge, values, beliefs, and cultural backgrounds into the planning and delivery of care  - Encourage patient/family to participate in care and decision-making at the level they choose  - Honor patient and family perspectives and choices  Outcome: Progressing     Problem: Patient/Family Goals  Goal: Patient/Family Long Term Goal  Description: Patient's Long Term Goal: Pain on right shoulder will be resolved and will be able to walk well with walker.    Interventions:  - No weight bearing on right arm till surgeon says so.  - Home health PT/OT as ordered.  - Pain management with ,oral medication.  - Monitor right shoulder for swelling or increasing pain or weakness.  - Follow up with surgery as recommended.  - See additional Care Plan goals for specific interventions  Outcome: Progressing  Goal: Patient/Family Short Term Goal  Description: Patient's Short Term Goal: Home with [facility] when pain is resolved.    Interventions:   - NWB to right arm.  - PT/OT as ordered.  - Administer IV antibiotics as ordered.  - Administer oral antibiotics as ordered.  - Maintain enteric/contact isolation as ordered.  - Out of bed as much as tolerated.  - Administer oral anticoagulation as ordered, SCD's to both legs as well.  - See additional Care Plan goals for specific interventions  Outcome: Progressing     Problem: PAIN - ADULT  Goal: Verbalizes/displays adequate comfort level or patient's stated pain goal  Description: INTERVENTIONS:  - Encourage pt to monitor pain and request assistance  - Assess pain  using appropriate pain scale  - Administer analgesics based on type and severity of pain and evaluate response  - Implement non-pharmacological measures as appropriate and evaluate response  - Consider cultural and social influences on pain and pain management  - Manage/alleviate anxiety  - Utilize distraction and/or relaxation techniques  - Monitor for opioid side effects  - Notify MD/LIP if interventions unsuccessful or patient reports new pain  - Anticipate increased pain with activity and pre-medicate as appropriate  Outcome: Progressing     Problem: RISK FOR INFECTION - ADULT  Goal: Absence of fever/infection during anticipated neutropenic period  Description: INTERVENTIONS  - Monitor WBC  - Administer growth factors as ordered  - Implement neutropenic guidelines  Outcome: Progressing     Problem: SAFETY ADULT - FALL  Goal: Free from fall injury  Description: INTERVENTIONS:  - Assess pt frequently for physical needs  - Identify cognitive and physical deficits and behaviors that affect risk of falls.  - [place] fall precautions as indicated by assessment.  - Educate pt/family on patient safety including physical limitations  - Instruct pt to call for assistance with activity based on assessment  - Modify environment to reduce risk of injury  - Provide assistive devices as appropriate  - Consider OT/PT consult to assist with strengthening/mobility  - Encourage toileting schedule  Outcome: Progressing     Problem: DISCHARGE PLANNING  Goal: Discharge to home or other facility with appropriate resources  Description: INTERVENTIONS:  - Identify barriers to discharge w/pt and caregiver  - Include patient/family/discharge partner in discharge planning  - Arrange for needed discharge resources and transportation as appropriate  - Identify discharge learning needs (meds, wound care, etc)  - Arrange for interpreters to assist at discharge as needed  - Consider post-discharge preferences of patient/family/discharge  partner  - Complete POLST form as appropriate  - Assess patient's ability to be responsible for managing their own health  - Refer to Case Management Department for coordinating discharge planning if the patient needs post-hospital services based on physician/LIP order or complex needs related to functional status, cognitive ability or social support system  Outcome: Progressing     Problem: CARDIOVASCULAR - ADULT  Goal: Maintains optimal cardiac output and hemodynamic stability  Description: INTERVENTIONS:  - Monitor vital signs, rhythm, and trends  - Monitor for bleeding, hypotension and signs of decreased cardiac output  - Evaluate effectiveness of vasoactive medications to optimize hemodynamic stability  - Monitor arterial and/or venous puncture sites for bleeding and/or hematoma  - Assess quality of pulses, skin color and temperature  - Assess for signs of decreased coronary artery perfusion - ex. Angina  - Evaluate fluid balance, assess for edema, trend weights  Outcome: Progressing  Goal: Absence of cardiac arrhythmias or at baseline  Description: INTERVENTIONS:  - Continuous cardiac monitoring, monitor vital signs, obtain 12 lead EKG if indicated  - Evaluate effectiveness of antiarrhythmic and heart rate control medications as ordered  - Initiate emergency measures for life threatening arrhythmias  - Monitor electrolytes and administer replacement therapy as ordered  Outcome: Progressing     Problem: GASTROINTESTINAL - ADULT  Goal: Minimal or absence of nausea and vomiting  Description: INTERVENTIONS:  - Maintain adequate hydration with IV or PO as ordered and tolerated  - Nasogastric tube to low intermittent suction as ordered  - Evaluate effectiveness of ordered antiemetic medications  - Provide nonpharmacologic comfort measures as appropriate  - Advance diet as tolerated, if ordered  - Obtain nutritional consult as needed  - Evaluate fluid balance  Outcome: Progressing  Goal: Maintains or returns to  baseline bowel function  Description: INTERVENTIONS:  - Assess bowel function  - Maintain adequate hydration with IV or PO as ordered and tolerated  - Evaluate effectiveness of GI medications  - Encourage mobilization and activity  - Obtain nutritional consult as needed  - Establish a toileting routine/schedule  - Consider collaborating with pharmacy to review patient's medication profile  Outcome: Progressing     Problem: GENITOURINARY - ADULT  Goal: Absence of urinary retention  Description: INTERVENTIONS:  - Assess patient’s ability to void and empty bladder  - Monitor intake/output and perform bladder scan as needed  - Follow urinary retention protocol/standard of care  - Consider collaborating with pharmacy to review patient's medication profile  - Implement strategies to promote bladder emptying  Outcome: Progressing     Problem: METABOLIC/FLUID AND ELECTROLYTES - ADULT  Goal: Electrolytes maintained within normal limits  Description: INTERVENTIONS:  - Monitor labs and rhythm and assess patient for signs and symptoms of electrolyte imbalances  - Administer electrolyte replacement as ordered  - Monitor response to electrolyte replacements, including rhythm and repeat lab results as appropriate  - Fluid restriction as ordered  - Instruct patient on fluid and nutrition restrictions as appropriate  Outcome: Progressing     Problem: MUSCULOSKELETAL - ADULT  Goal: Return mobility to safest level of function  Description: INTERVENTIONS:  - Assess patient stability and activity tolerance for standing, transferring and ambulating w/ or w/o assistive devices  - Assist with transfers and ambulation using safe patient handling equipment as needed  - Ensure adequate protection for wounds/incisions during mobilization  - Obtain PT/OT consults as needed  - Advance activity as appropriate  - Communicate ordered activity level and limitations with patient/family  Outcome: Progressing  Goal: Maintain proper alignment of  affected body part  Description: INTERVENTIONS:  - Support and protect limb and body alignment per provider's orders  - Instruct and reinforce with patient and family use of appropriate assistive device and precautions (e.g. spinal or hip dislocation precautions)  Outcome: Progressing     Problem: Impaired Activities of Daily Living  Goal: Achieve highest/safest level of independence in self care  Description: Interventions:  - Assess ability and encourage patient to participate in ADLs to maximize function  - Promote sitting position while performing ADLs such as feeding, grooming, and bathing  - Educate and encourage patient/family in tolerated functional activity level and precautions during self-care  - Encourage patient to incorporate impaired side during daily activities to promote function  Outcome: Progressing     Problem: NEUROLOGICAL - ADULT  Goal: Achieves stable or improved neurological status  Description: INTERVENTIONS  - Assess for and report changes in neurological status  - Initiate measures to prevent increased intracranial pressure  - Maintain blood pressure and fluid volume within ordered parameters to optimize cerebral perfusion and minimize risk of hemorrhage  - Monitor temperature, glucose, and sodium. Initiate appropriate interventions as ordered  Outcome: Progressing     Problem: Delirium  Goal: Minimize duration of delirium  Description: Interventions:  - Encourage use of hearing aids, eye glasses  - Promote highest level of mobility daily  - Provide frequent reorientation  - Promote wakefulness i.e. lights on, blinds open  - Promote sleep, encourage patient's normal rest cycle i.e. lights off, TV off, minimize noise and interruptions  - Encourage family to assist in orientation and promotion of home routines  Outcome: Progressing

## 2024-04-20 NOTE — PROGRESS NOTES
[facility]  [address] [place] [address], [address]  [facility] [address]  [phone]            Dennys Manzano Patient Status:  Inpatient    1935 MRN [id number]   Location [facility] 2W/SW Attending Augustin Hester MD   Hosp Day # 5 PCP CALOS FITZGERALD MD     Subjective:  Dennys Manzano is a(n) 88 year old male.    Hospital course to date: Patient with a history of hypertension, diabetes, atrial fibrillation, depression, history of malignant hyperthermia.  History of strokes, coronary artery disease, post CABG and pacemaker.  Presented to the hospital with right pseudogout and was being evaluated for difficulty maintaining wakefulness in relation to her history.  Asterixis was noted.  EEG was not revealing except some slowness of background.  By the next day patient was improving to some degree.  CT of the head was not revealing.  I started the patient was improving even further.  Able to maintain conversation, but difficulty hearing    Current Facility-Administered Medications   Medication Dose Route Frequency    acyclovir (Zovirax) 800 mg in sodium chloride 0.9% 150 mL IVPB  10 mg/kg (Ideal) Intravenous Q8H    phytonadione (Aqua-[place]ephton) 10 mg in sodium chloride 0.9% 50 mL IVPB  10 mg Intravenous Once    cefTRIAXone (Rocephin) 2 g in D5W 100 mL IVPB-ADD  2 g Intravenous Q12H    metoprolol succinate ER (Toprol XL) 24 hr tab 50 mg  50 mg Oral Daily Beta Blocker    ipratropium-albuterol (Duoneb) 0.5-2.5 (3) MG/3ML inhalation solution 3 mL  3 mL Nebulization Q6H WA    magnesium oxide (Mag-Ox) tab 400 mg  400 mg Oral Once    ipratropium-albuterol (Duoneb) 0.5-2.5 (3) MG/3ML inhalation solution 3 mL  3 mL Nebulization Q6H PRN    fluticasone propionate (Flonase) 50 MCG/ACT nasal suspension 1 spray  1 spray Each Nare Daily    benzonatate (Tessalon) cap 100 mg  100 mg Oral TID PRN    guaiFENesin-codeine (Robitussin AC) 100-10 MG/5ML oral solution 5 mL  5 mL Oral Q4H PRN     montelukast (Singulair) tab 10 mg  10 mg Oral Nightly    aspirin chewable tab 81 mg  81 mg Oral Daily    atorvastatin (Lipitor) tab 40 mg  40 mg Oral Nightly    finasteride (Proscar) tab 5 mg  5 mg Oral Daily    pantoprazole (Protonix) DR tab 40 mg  40 mg Oral QAM AC    senna-docusate (Senokot-S) 8.6-50 MG per tab 1 tablet  1 tablet Oral BID    umeclidinium-vilanterol (Anoro Ellipta) 62.5-25 MCG/ACT inhaler 1 puff  1 puff Inhalation Daily    tamsulosin (Flomax) cap 0.4 mg  0.4 mg Oral Daily with dinner    ondansetron (Zofran) 4 MG/2ML injection 4 mg  4 mg Intravenous Q6H PRN    acetaminophen (Tylenol) tab 650 mg  650 mg Oral Q6H PRN    hydrALAzine (Apresoline) 20 mg/mL injection 10 mg  10 mg Intravenous Q4H PRN    glucose (Dex4) 15 GM/59ML oral liquid 15 g  15 g Oral Q15 Min PRN    Or    glucose (Glutose) 40% oral gel 15 g  15 g Oral Q15 Min PRN    Or    glucose-vitamin C (Dex-4) chewable tab 4 tablet  4 tablet Oral Q15 Min PRN    Or    dextrose 50% injection 50 mL  50 mL Intravenous Q15 Min PRN    Or    glucose (Dex4) 15 GM/59ML oral liquid 30 g  30 g Oral Q15 Min PRN    Or    glucose (Glutose) 40% oral gel 30 g  30 g Oral Q15 Min PRN    Or    glucose-vitamin C (Dex-4) chewable tab 8 tablet  8 tablet Oral Q15 Min PRN    insulin aspart (NovoLOG) 100 Units/mL FlexPen 1-5 Units  1-5 Units Subcutaneous TID CC    ondansetron (Zofran) 4 MG/2ML injection 4 mg  4 mg Intravenous Once    sodium chloride 0.9 % IV bolus 1,000 mL  1,000 mL Intravenous Once       Objective:  Blood pressure 139/59, pulse 73, temperature 97.7 °F (36.5 °C), temperature source Temporal, resp. rate 16, height 72\", weight 232 lb 12.9 oz (105.6 kg), SpO2 98%.    Physical Exam:  Vitals:    04/20/24 0400 04/20/24 0500 04/20/24 0600 04/20/24 0800   BP: 136/60  150/77 139/59   Pulse: 75  77 73   Resp: 19  19 16   Temp: 97.7 °F (36.5 °C)      TempSrc: Temporal      SpO2: 95%  96% 98%   Weight:  232 lb 12.9 oz (105.6 kg)     Height:           General: No  apparent distress, well nourished, well groomed.  Head- Normocephalic, atraumatic  Eyes- No redness or swelling  Neck- No masses or adenopathy  CV: pulses were palpable and normal, no cyanosis or edema     Neurological:     Mental Status- Alert, limited by poor hearing, but able to answer all questions, he knew the month and the year, somewhat slow speech production    Cranial Nerves:  III, IV, VI- EOM intact, CARLOS  V. Facial sensation intact  VII. Face symmetric, no facial weakness  VIII. Hearing intact to whisper, tuning fork or finger rub.  IX. Pallet elevates symmetrically.  XI. Shoulder shrug is intact  XII. Tongue is midline    Motor Exam:  Muscle tone normal  No atrophy or fasciculations  Strength- upper extremities 2/5 proximally and distally                  - lower  extremities 2/5 proximally and distally       Lab Results   Component Value Date    WBC 14.2 (H) 04/20/2024    HGB 10.6 (L) 04/20/2024    HCT 31.3 (L) 04/20/2024    .0 04/20/2024    CREATSERUM 0.60 (L) 04/20/2024    BUN 17 04/20/2024     04/20/2024    K 3.8 04/20/2024    CL 99 04/20/2024    CO2 34.0 (H) 04/20/2024     (H) 04/20/2024    CA 8.8 04/20/2024    ALB 3.5 04/18/2024    ALKPHO 91 04/18/2024    BILT 1.1 04/18/2024    TP 6.8 04/18/2024    AST <8 04/18/2024    ALT 16 04/18/2024    PTT 41.0 (H) 04/14/2024    INR 1.54 (H) 04/20/2024    T4F 1.0 04/18/2024    TSH 0.277 (L) 04/18/2024    LIP 26 04/14/2024    ESRML 111 (H) 04/14/2024    CRP 13.9 (H) 02/10/2018    MG 1.8 04/19/2024    PHOS 3.1 08/23/2021    TROP <0.045 08/18/2021     03/23/2021       CT SPINE CERVICAL (CPT=72125)    Result Date: 4/18/2024  CONCLUSION:  1. No acute fracture or dislocation involving the cervical spine. 2. Mild vertebral body compression fracture at T2 without posterior osseous retropulsion, which is unchanged from the recent CT performed 04/15/2024 but was not present in 2013. This fracture is of uncertain chronicity; correlate clinically  for overlying  point tenderness.  3. Mild-to-moderate multilevel spondylosis throughout the cervical spine.  There is resulting mild central vertebral canal stenosis at C3-C4, C4-C5 and C5-C6.  Multilevel neural foraminal narrowing/stenoses are also seen bilaterally.   [facility]  Dictated by (CST): Naren Palomares MD on 4/18/2024 at 1:42 PM     Finalized by (CST): Naren Palomares MD on 4/18/2024 at 1:50 PM          CT BRAIN OR HEAD (38572)    Result Date: 4/18/2024  CONCLUSION:  1. No acute intracranial process. 2. Stable mild generalized atrophy and mild chronic microangiopathic ischemic changes with large vessel calcific atherosclerosis. 3. Lesser incidental findings as above.   [facility]  Dictated by (CST): Naren Palomares MD on 4/18/2024 at 1:39 PM     Finalized by (CST): Naren Palomares MD on 4/18/2024 at 1:42 PM                 Assessment:  Patient Active Problem List   Diagnosis    Rotator cuff (capsule) sprain    BPH with obstruction/lower urinary tract symptoms    Gross hematuria    Chronic indwelling Mcelroy catheter    Hyponatremia    Leukocytosis, unspecified type    Acute pain of right shoulder    Hypoxia    Transient disorder of initiating or maintaining wakefulness    Acute metabolic encephalopathy     Improving encephalopathy.  Most likely was metabolic in nature.  No evidence of structural pathology on CT of the head, no evidence of any epileptic activity on EEG.  Continue supportive management.  Please us know if any other questions remain.    Nimesh Campos MD  4/20/2024  8:03 AM

## 2024-04-20 NOTE — PROGRESS NOTES
Progress Note     Dennys Manzano Patient Status:  Inpatient    1935 MRN [id number]   Location [facility] 2W/SW Attending Augustin Hester MD   Hosp Day # 5 PCP ACLOS FITZGERALD MD     Chief Complaint:   Chief Complaint   Patient presents with    Abdomen/Flank Pain    Nausea/Vomiting/Diarrhea         Subjective:   S: Patient seen and examined, chart reviewed.   Doing much better today, neuro status back to basline.       Review of Systems:   10 point ROS completed and was negative, except for pertinent positive and negatives stated in subjective.                Objective:   Vital signs:  Temp:  [97.2 °F (36.2 °C)-98.5 °F (36.9 °C)] 98 °F (36.7 °C)  Pulse:  [] 81  Resp:  [16-25] 20  BP: (127-150)/() 135/59  SpO2:  [94 %-98 %] 97 %    Wt Readings from Last 6 Encounters:   24 232 lb 12.9 oz (105.6 kg)   24 229 lb 8 oz (104.1 kg)   23 218 lb 6.4 oz (99.1 kg)   22 224 lb (101.6 kg)   22 224 lb (101.6 kg)   22 224 lb 13.9 oz (102 kg)       Physical Exam:    General: No acute distress.   Respiratory: Clear to auscultation bilaterally. No wheezes. No rhonchi.  Cardiovascular: irreg/irreg   Abdomen: Soft, nontender, nondistended.  Positive bowel sounds. No rebound or guarding.  Neurologic: No focal neurological deficits.   Musculoskeletal: right shoulder in sling extremities.  Extremities: trace edema R>L    Results:   Diagnostic Data:      Labs:    Labs Last 24 Hours:   BMP     CBC    Other     Na 136 Cl 99 BUN 17 Glu 138   Hb 10.6   PTT - Procal -   K 3.8 CO2 34.0 Cr 0.60   WBC 14.2 >< .0  INR 1.54 CRP -   Renal Lytes Endo    Hct 31.3   Trop - D dim -   eGFR - Ca 8.8 POC Gluc  151    LFT   pBNP - Lactic -   eGFR AA - PO4 2.7 A1c -   AST - APk - Prot -  LDL -      Pro-Calcitonin  Recent Labs   Lab 24  0656   PCT 0.25*       Cardiac  No results for input(s): \"TROP\", \"PBNP\" in the last 168 hours.    Imaging: Imaging data reviewed in Epic.    US  VENOUS DOPPLER ARM LEFT - DIAG IMG (CPT=93971)    Result Date: 4/20/2024  CONCLUSION:  Negative for sonographic evidence of deep venous thrombosis in the left upper extremity.    Dictated by (CST): Jay Rojas MD on 4/20/2024 at 1:09 PM     Finalized by (CST): Jay Rojas MD on 4/20/2024 at 1:10 PM              Medications:    acyclovir  10 mg/kg (Ideal) Intravenous Q8H    phytonadione (Aqua-Mephton) 10 mg in sodium chloride 0.9% 50 mL IVPB  10 mg Intravenous Once    cefTRIAXone  2 g Intravenous Q12H    metoprolol succinate ER  50 mg Oral Daily Beta Blocker    ipratropium-albuterol  3 mL Nebulization Q6H WA    magnesium oxide  400 mg Oral Once    fluticasone propionate  1 spray Each Nare Daily    montelukast  10 mg Oral Nightly    aspirin  81 mg Oral Daily    atorvastatin  40 mg Oral Nightly    finasteride  5 mg Oral Daily    pantoprazole  40 mg Oral QAM AC    senna-docusate  1 tablet Oral BID    umeclidinium-vilanterol  1 puff Inhalation Daily    tamsulosin  0.4 mg Oral Daily with dinner    insulin aspart  1-5 Units Subcutaneous TID CC    ondansetron  4 mg Intravenous Once    sodium chloride  1,000 mL Intravenous Once       Assessment & Plan:   ASSESSMENT / PLAN:     Intractable right shoulder pain  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   - s/p R shoulder joint aspiration 4/15   - 59,000 WBC's with predominately neutrophils. + calcium pyrophosphate crystals --> pseudogout. Culture negative.   - stop zosyn. Start IV rocephin   - stop Pain meds due to lethargy   - ID on consult.   - PT/OT - LISA eventually   - afebrile. WBC elevated.   - blood cx x 2 NGTD.   - R shoulder arthroscopy extensive debridement of R shoulder, bicep tenotomy 4/17      Acute encephalopathy   - etiology unclear. ? Meningitis.  LP to be done Monday now by Radiology  - ABG reviewed, CT head no acute findings. MRI no acute CVA  - EEG c/w encephalopathy   - IV acyclovir  empirically stop zosyn. Start rocephin 2g IV BID   - Vitamin K IV x 1 now. Once INR < 1.5 plan for LP Monday.  Hold coumadin  - LP to r/o meningitis.   - ID and neuro on consult.   - neuro checks.      Acute respiratory failure with hypoxia  Likely COPD exacerbation in combination with atelectasis  - was on 5L NC wean o2 now down to 3L.   - duonebs q6hrs WA  - SLP eval regular thin liquids.   - start food again   - IS/flutter valve.   - CXR atelectasis   - Antitussives PRN   - CT chest on admit showed dense atelectasis no PE.      DVT/pulmonary embolism  - INR elevated  - hold home warfarin   - vitamin K today      Hyponatremia  - appears euvolemic now   - sodium better with IVF.   - off IVF.   - Urine osm high, urine sdoium high.   - free water restriction      NSVT  - ECHO LVEF 55-60%. No WMA.   - keep Mg 2.0 and K 4.0   - cont metoprolol     DM II  - A1c 6.4  - ISS     BPH  Chronic urinary retention   - Continue Flomax and finasteride  - cont valladares last changed 04/01/24     >55min spent, >50% spent counseling and coordinating care in the form of educating pt/family and d/w consultants and staff. Most of the time spent discussing the above plan. D/w Evangelina blancor at bedside      Augustin Hester MD, FAAP, FACP  [facility] Hospitalist  I respond to [employer] Chat and [employer]

## 2024-04-20 NOTE — PROGRESS NOTES
[facility]  part of Located within [facility]     Progress Note    Dennys Manzano Patient Status:  Inpatient    1935 MRN [id number]   Location [facility] 2W/SW Attending Jeimy Driscoll MD   Hosp Day # 5 PCP CALOS FITZGERALD MD       Subjective:   Seen and examined.  More arousable alert this morning.  No significant respiratory distress    Objective:   Blood pressure 139/59, pulse 73, temperature 97.7 °F (36.5 °C), temperature source Temporal, resp. rate 16, height 6' (1.829 m), weight 232 lb 12.9 oz (105.6 kg), SpO2 98%.  Intake/Output:   Last 3 shifts: I/O last 3 completed shifts:  In: 1452.3 [P.O.:368; I.V.:284.3; IV PIGGYBACK:800]  Out: 2450 [Urine:2450]   This shift: No intake/output data recorded.     Vent Settings:      Hemodynamic parameters (last 24 hours):      Scheduled Meds:   Current Facility-Administered Medications   Medication Dose Route Frequency    acyclovir (Zovirax) 800 mg in sodium chloride 0.9% 150 mL IVPB  10 mg/kg (Ideal) Intravenous Q8H    phytonadione (Aqua-Mephton) 10 mg in sodium chloride 0.9% 50 mL IVPB  10 mg Intravenous Once    cefTRIAXone (Rocephin) 2 g in D5W 100 mL IVPB-ADD  2 g Intravenous Q12H    metoprolol succinate ER (Toprol XL) 24 hr tab 50 mg  50 mg Oral Daily Beta Blocker    ipratropium-albuterol (Duoneb) 0.5-2.5 (3) MG/3ML inhalation solution 3 mL  3 mL Nebulization Q6H WA    magnesium oxide (Mag-Ox) tab 400 mg  400 mg Oral Once    ipratropium-albuterol (Duoneb) 0.5-2.5 (3) MG/3ML inhalation solution 3 mL  3 mL Nebulization Q6H PRN    fluticasone propionate (Flonase) 50 MCG/ACT nasal suspension 1 spray  1 spray Each Nare Daily    benzonatate (Tessalon) cap 100 mg  100 mg Oral TID PRN    guaiFENesin-codeine (Robitussin AC) 100-10 MG/5ML oral solution 5 mL  5 mL Oral Q4H PRN    montelukast (Singulair) tab 10 mg  10 mg Oral Nightly    aspirin chewable tab 81 mg  81 mg Oral Daily    atorvastatin (Lipitor) tab 40 mg  40 mg Oral Nightly    finasteride  (Proscar) tab 5 mg  5 mg Oral Daily    pantoprazole (Protonix) DR tab 40 mg  40 mg Oral QAM AC    senna-docusate (Senokot-S) 8.6-50 MG per tab 1 tablet  1 tablet Oral BID    umeclidinium-vilanterol (Anoro Ellipta) 62.5-25 MCG/ACT inhaler 1 puff  1 puff Inhalation Daily    tamsulosin (Flomax) cap 0.4 mg  0.4 mg Oral Daily with dinner    ondansetron (Zofran) 4 MG/2ML injection 4 mg  4 mg Intravenous Q6H PRN    acetaminophen (Tylenol) tab 650 mg  650 mg Oral Q6H PRN    hydrALAzine (Apresoline) 20 mg/mL injection 10 mg  10 mg Intravenous Q4H PRN    glucose (Dex4) 15 GM/59ML oral liquid 15 g  15 g Oral Q15 Min PRN    Or    glucose (Glutose) 40% oral gel 15 g  15 g Oral Q15 Min PRN    Or    glucose-vitamin C (Dex-4) chewable tab 4 tablet  4 tablet Oral Q15 Min PRN    Or    dextrose 50% injection 50 mL  50 mL Intravenous Q15 Min PRN    Or    glucose (Dex4) 15 GM/59ML oral liquid 30 g  30 g Oral Q15 Min PRN    Or    glucose (Glutose) 40% oral gel 30 g  30 g Oral Q15 Min PRN    Or    glucose-vitamin C (Dex-4) chewable tab 8 tablet  8 tablet Oral Q15 Min PRN    insulin aspart (NovoLOG) 100 Units/mL FlexPen 1-5 Units  1-5 Units Subcutaneous TID CC    ondansetron (Zofran) 4 MG/2ML injection 4 mg  4 mg Intravenous Once    sodium chloride 0.9 % IV bolus 1,000 mL  1,000 mL Intravenous Once       Continuous Infusions:     Physical Exam  Constitutional: no acute distress somnolent but arousable  Eyes: PERRL  ENT: nares pateint  Neck: supple, no JVD  Cardio: RRR, S1 S2  Respiratory: Basilar crackles  GI: abdomen soft, non tender, active bowel sounds, no organomegaly  Extremities: no clubbing, cyanosis, edema  Neurologic: no gross motor deficits  Skin: warm, dry      Results:     Lab Results   Component Value Date    WBC 14.2 04/20/2024    HGB 10.6 04/20/2024    HCT 31.3 04/20/2024    .0 04/20/2024    CREATSERUM 0.60 04/20/2024    BUN 17 04/20/2024     04/20/2024    K 3.8 04/20/2024    CL 99 04/20/2024    CO2 34.0  04/20/2024     04/20/2024    CA 8.8 04/20/2024    INR 1.54 04/20/2024    MG 2.2 04/20/2024    PHOS 2.7 04/20/2024       CT SPINE CERVICAL (CPT=72125)    Result Date: 4/18/2024  CONCLUSION:  1. No acute fracture or dislocation involving the cervical spine. 2. Mild vertebral body compression fracture at T2 without posterior osseous retropulsion, which is unchanged from the recent CT performed 04/15/2024 but was not present in 2013. This fracture is of uncertain chronicity; correlate clinically for overlying  point tenderness.  3. Mild-to-moderate multilevel spondylosis throughout the cervical spine.  There is resulting mild central vertebral canal stenosis at C3-C4, C4-C5 and C5-C6.  Multilevel neural foraminal narrowing/stenoses are also seen bilaterally.   Elm-remote  Dictated by (CST): Naren Palomares MD on 4/18/2024 at 1:42 PM     Finalized by (CST): Naren Palomares MD on 4/18/2024 at 1:50 PM          CT BRAIN OR HEAD (65348)    Result Date: 4/18/2024  CONCLUSION:  1. No acute intracranial process. 2. Stable mild generalized atrophy and mild chronic microangiopathic ischemic changes with large vessel calcific atherosclerosis. 3. Lesser incidental findings as above.   Elm-remote  Dictated by (CST): Naren Palomares MD on 4/18/2024 at 1:39 PM     Finalized by (CST): Naren Palomares MD on 4/18/2024 at 1:42 PM                 Assessment   1.  Acute encephalopathy  2.  Right shoulder pain status post right shoulder arthroscopy POD #2  3.  Acute hypoxemic hypercapnic respiratory failure  4.  COPD   5.  Prior DVT PE  6.  Hyponatremia  7.  Diabetes mellitus     Plan   -Patient initially presented with evidence of intractable right shoulder pain.  Underwent joint aspiration with subsequent right shoulder arthroscopy and extensive debridement on 4/17/2024.  -Worsening lethargy altered mental status noted.  ABG reviewed from 4/17/2024 with hypercapnic respiratory failure with respiratory acidosis noted.   Repeat ABG improved.    -Status improving over the last 24 hours closely monitor.  -Acyclovir per ID recommendations.  Lumbar puncture on hold for now given improvement from neurological perspective  -Hold off pain medications and narcotics.  No significant improvement after trial of Narcan  -Close neuromonitoring  -CT head with no acute intracranial findings  -Further recommendations per neurology  -Hold off further BiPAP at this time  -Wean oxygen as tolerated currently on 1 L  -Nebulizer treatments  -CT chest with some mild atelectatic changes seen  -Incentive spirometry when patient more alert  -DVT prophylaxis: SCD  -Okay to transfer to floor    Harini Zuniga DO  Pulmonary Critical Care Medicine  [facility]

## 2024-04-21 LAB
GLUCOSE BLDC GLUCOMTR-MCNC: 142 MG/DL (ref 70–99)
GLUCOSE BLDC GLUCOMTR-MCNC: 160 MG/DL (ref 70–99)
GLUCOSE BLDC GLUCOMTR-MCNC: 189 MG/DL (ref 70–99)
GLUCOSE BLDC GLUCOMTR-MCNC: 198 MG/DL (ref 70–99)
POTASSIUM SERPL-SCNC: 4.1 MMOL/L (ref 3.5–5.1)

## 2024-04-21 PROCEDURE — 99232 SBSQ HOSP IP/OBS MODERATE 35: CPT | Performed by: INTERNAL MEDICINE

## 2024-04-21 PROCEDURE — 99233 SBSQ HOSP IP/OBS HIGH 50: CPT | Performed by: INTERNAL MEDICINE

## 2024-04-21 NOTE — PLAN OF CARE
Pt alert, occasional lethargy but easily arousable. No acute events overnight. Neuros intact w/ no new deficits noted. VSS. Meds given, see eMAR. Hourly nursing rounds made. Plan of care ongoing. Safety maintained.     Problem: Patient Centered Care  Goal: Patient preferences are identified and integrated in the patient's plan of care  Description: Interventions:  - What would you like us to know as we care for you? Patient is from home with family and he has a daughter that works here who is his support system.  Patient is hard of hearing.  - Provide timely, complete, and accurate information to patient/family  - Incorporate patient and family knowledge, values, beliefs, and cultural backgrounds into the planning and delivery of care  - Encourage patient/family to participate in care and decision-making at the level they choose  - Honor patient and family perspectives and choices  Outcome: Progressing     Problem: Patient/Family Goals  Goal: Patient/Family Long Term Goal  Description: Patient's Long Term Goal: Pain on right shoulder will be resolved and will be able to walk well with walker.    Interventions:  - No weight bearing on right arm till surgeon says so.  - Home health PT/OT as ordered.  - Pain management with ,oral medication.  - Monitor right shoulder for swelling or increasing pain or weakness.  - Follow up with surgery as recommended.  - See additional Care Plan goals for specific interventions  Outcome: Progressing  Goal: Patient/Family Short Term Goal  Description: Patient's Short Term Goal: Home with [facility] when pain is resolved.    Interventions:   - NWB to right arm.  - PT/OT as ordered.  - Administer IV antibiotics as ordered.  - Administer oral antibiotics as ordered.  - Maintain enteric/contact isolation as ordered.  - Out of bed as much as tolerated.  - Administer oral anticoagulation as ordered, SCD's to both legs as well.  - See additional Care Plan goals for specific interventions  Outcome:  Progressing     Problem: PAIN - ADULT  Goal: Verbalizes/displays adequate comfort level or patient's stated pain goal  Description: INTERVENTIONS:  - Encourage pt to monitor pain and request assistance  - Assess pain using appropriate pain scale  - Administer analgesics based on type and severity of pain and evaluate response  - Implement non-pharmacological measures as appropriate and evaluate response  - Consider cultural and social influences on pain and pain management  - Manage/alleviate anxiety  - Utilize distraction and/or relaxation techniques  - Monitor for opioid side effects  - Notify MD/LIP if interventions unsuccessful or patient reports new pain  - Anticipate increased pain with activity and pre-medicate as appropriate  Outcome: Progressing     Problem: RISK FOR INFECTION - ADULT  Goal: Absence of fever/infection during anticipated neutropenic period  Description: INTERVENTIONS  - Monitor WBC  - Administer growth factors as ordered  - Implement neutropenic guidelines  Outcome: Progressing     Problem: SAFETY ADULT - FALL  Goal: Free from fall injury  Description: INTERVENTIONS:  - Assess pt frequently for physical needs  - Identify cognitive and physical deficits and behaviors that affect risk of falls.  - [place] fall precautions as indicated by assessment.  - Educate pt/family on patient safety including physical limitations  - Instruct pt to call for assistance with activity based on assessment  - Modify environment to reduce risk of injury  - Provide assistive devices as appropriate  - Consider OT/PT consult to assist with strengthening/mobility  - Encourage toileting schedule  Outcome: Progressing     Problem: DISCHARGE PLANNING  Goal: Discharge to home or other facility with appropriate resources  Description: INTERVENTIONS:  - Identify barriers to discharge w/pt and caregiver  - Include patient/family/discharge partner in discharge planning  - Arrange for needed discharge resources and  transportation as appropriate  - Identify discharge learning needs (meds, wound care, etc)  - Arrange for interpreters to assist at discharge as needed  - Consider post-discharge preferences of patient/family/discharge partner  - Complete POLST form as appropriate  - Assess patient's ability to be responsible for managing their own health  - Refer to Case Management Department for coordinating discharge planning if the patient needs post-hospital services based on physician/LIP order or complex needs related to functional status, cognitive ability or social support system  Outcome: Progressing     Problem: CARDIOVASCULAR - ADULT  Goal: Maintains optimal cardiac output and hemodynamic stability  Description: INTERVENTIONS:  - Monitor vital signs, rhythm, and trends  - Monitor for bleeding, hypotension and signs of decreased cardiac output  - Evaluate effectiveness of vasoactive medications to optimize hemodynamic stability  - Monitor arterial and/or venous puncture sites for bleeding and/or hematoma  - Assess quality of pulses, skin color and temperature  - Assess for signs of decreased coronary artery perfusion - ex. Angina  - Evaluate fluid balance, assess for edema, trend weights  Outcome: Progressing  Goal: Absence of cardiac arrhythmias or at baseline  Description: INTERVENTIONS:  - Continuous cardiac monitoring, monitor vital signs, obtain 12 lead EKG if indicated  - Evaluate effectiveness of antiarrhythmic and heart rate control medications as ordered  - Initiate emergency measures for life threatening arrhythmias  - Monitor electrolytes and administer replacement therapy as ordered  Outcome: Progressing     Problem: RESPIRATORY - ADULT  Goal: Achieves optimal ventilation and oxygenation  Description: INTERVENTIONS:  - Assess for changes in respiratory status  - Assess for changes in mentation and behavior  - Position to facilitate oxygenation and minimize respiratory effort  - Oxygen supplementation based on  oxygen saturation or ABGs  - Provide Smoking Cessation handout, if applicable  - Encourage broncho-pulmonary hygiene including cough, deep breathe, Incentive Spirometry  - Assess the need for suctioning and perform as needed  - Assess and instruct to report SOB or any respiratory difficulty  - Respiratory Therapy support as indicated  - Manage/alleviate anxiety  - Monitor for signs/symptoms of CO2 retention  Outcome: Progressing     Problem: GASTROINTESTINAL - ADULT  Goal: Minimal or absence of nausea and vomiting  Description: INTERVENTIONS:  - Maintain adequate hydration with IV or PO as ordered and tolerated  - Nasogastric tube to low intermittent suction as ordered  - Evaluate effectiveness of ordered antiemetic medications  - Provide nonpharmacologic comfort measures as appropriate  - Advance diet as tolerated, if ordered  - Obtain nutritional consult as needed  - Evaluate fluid balance  Outcome: Progressing  Goal: Maintains or returns to baseline bowel function  Description: INTERVENTIONS:  - Assess bowel function  - Maintain adequate hydration with IV or PO as ordered and tolerated  - Evaluate effectiveness of GI medications  - Encourage mobilization and activity  - Obtain nutritional consult as needed  - Establish a toileting routine/schedule  - Consider collaborating with pharmacy to review patient's medication profile  Outcome: Progressing     Problem: GENITOURINARY - ADULT  Goal: Absence of urinary retention  Description: INTERVENTIONS:  - Assess patient’s ability to void and empty bladder  - Monitor intake/output and perform bladder scan as needed  - Follow urinary retention protocol/standard of care  - Consider collaborating with pharmacy to review patient's medication profile  - Implement strategies to promote bladder emptying  Outcome: Progressing     Problem: METABOLIC/FLUID AND ELECTROLYTES - ADULT  Goal: Electrolytes maintained within normal limits  Description: INTERVENTIONS:  - Monitor labs and  rhythm and assess patient for signs and symptoms of electrolyte imbalances  - Administer electrolyte replacement as ordered  - Monitor response to electrolyte replacements, including rhythm and repeat lab results as appropriate  - Fluid restriction as ordered  - Instruct patient on fluid and nutrition restrictions as appropriate  Outcome: Progressing     Problem: MUSCULOSKELETAL - ADULT  Goal: Return mobility to safest level of function  Description: INTERVENTIONS:  - Assess patient stability and activity tolerance for standing, transferring and ambulating w/ or w/o assistive devices  - Assist with transfers and ambulation using safe patient handling equipment as needed  - Ensure adequate protection for wounds/incisions during mobilization  - Obtain PT/OT consults as needed  - Advance activity as appropriate  - Communicate ordered activity level and limitations with patient/family  Outcome: Progressing  Goal: Maintain proper alignment of affected body part  Description: INTERVENTIONS:  - Support and protect limb and body alignment per provider's orders  - Instruct and reinforce with patient and family use of appropriate assistive device and precautions (e.g. spinal or hip dislocation precautions)  Outcome: Progressing     Problem: Impaired Activities of Daily Living  Goal: Achieve highest/safest level of independence in self care  Description: Interventions:  - Assess ability and encourage patient to participate in ADLs to maximize function  - Promote sitting position while performing ADLs such as feeding, grooming, and bathing  - Educate and encourage patient/family in tolerated functional activity level and precautions during self-care  - Encourage patient to incorporate impaired side during daily activities to promote function  Outcome: Progressing     Problem: NEUROLOGICAL - ADULT  Goal: Achieves stable or improved neurological status  Description: INTERVENTIONS  - Assess for and report changes in neurological  status  - Initiate measures to prevent increased intracranial pressure  - Maintain blood pressure and fluid volume within ordered parameters to optimize cerebral perfusion and minimize risk of hemorrhage  - Monitor temperature, glucose, and sodium. Initiate appropriate interventions as ordered  Outcome: Progressing     Problem: Delirium  Goal: Minimize duration of delirium  Description: Interventions:  - Encourage use of hearing aids, eye glasses  - Promote highest level of mobility daily  - Provide frequent reorientation  - Promote wakefulness i.e. lights on, blinds open  - Promote sleep, encourage patient's normal rest cycle i.e. lights off, TV off, minimize noise and interruptions  - Encourage family to assist in orientation and promotion of home routines  Outcome: Progressing

## 2024-04-21 NOTE — PROGRESS NOTES
Progress Note     Dennys Manzano Patient Status:  Inpatient    1935 MRN [id number]   Location [facility] 2W/SW Attending Augustin Hester MD   Hosp Day # 6 PCP CALOS FITZGERALD MD     Chief Complaint:   Chief Complaint   Patient presents with    Abdomen/Flank Pain    Nausea/Vomiting/Diarrhea         Subjective:   S: Patient seen and examined, chart reviewed.   Feeling a lot of discomfort today, particularly after being moved around.  But he is happy to be on the mend. Mental status is back to baseline.       Review of Systems:   10 point ROS completed and was negative, except for pertinent positive and negatives stated in subjective.                Objective:   Vital signs:  Temp:  [97.5 °F (36.4 °C)-98 °F (36.7 °C)] 97.6 °F (36.4 °C)  Pulse:  [71-84] 80  Resp:  [16-23] 21  BP: (114-143)/(55-81) 143/77  SpO2:  [94 %-98 %] 97 %    Wt Readings from Last 6 Encounters:   24 241 lb 13.5 oz (109.7 kg)   24 229 lb 8 oz (104.1 kg)   23 218 lb 6.4 oz (99.1 kg)   22 224 lb (101.6 kg)   22 224 lb (101.6 kg)   22 224 lb 13.9 oz (102 kg)       Physical Exam:    General: No acute distress.   Respiratory: Clear to auscultation bilaterally. No wheezes. No rhonchi.  Cardiovascular: irreg rhyth. Rate controlled No murmurs, rubs or gallops.   Abdomen: Soft, nontender, nondistended.  Positive bowel sounds. No rebound or guarding.  Neurologic: No focal neurological deficits.   Musculoskeletal: Moves all extremities.  Extremities: trace edema, right shoulder is sore.    Results:   Diagnostic Data:      Labs:    Labs Last 24 Hours:   BMP     CBC    Other     Na - Cl - BUN - Glu -   Hb -   PTT - Procal -   K 4.1 CO2 - Cr -   WBC - >< PLT -  INR - CRP -   Renal Lytes Endo    Hct -   Trop - D dim -   eGFR - Ca - POC Gluc  142    LFT   pBNP - Lactic -   eGFR AA - PO4 - A1c -   AST - APk - Prot -  LDL -      Pro-Calcitonin  Recent Labs   Lab 24  0656   PCT 0.25*       US VENOUS  DOPPLER ARM LEFT - DIAG IMG (CPT=93971)    Result Date: 4/20/2024  CONCLUSION:  Negative for sonographic evidence of deep venous thrombosis in the left upper extremity.    Dictated by (CST): Jay Rojas MD on 4/20/2024 at 1:09 PM     Finalized by (CST): Jay Rojas MD on 4/20/2024 at 1:10 PM              Medications:    acyclovir  10 mg/kg (Ideal) Intravenous Q8H    phytonadione (Aqua-[place]ep[place]) 10 mg in sodium chloride 0.9% 50 mL IVPB  10 mg Intravenous Once    cefTRIAXone  2 g Intravenous Q12H    metoprolol succinate ER  50 mg Oral Daily Beta Blocker    ipratropium-albuterol  3 mL Nebulization Q6H WA    magnesium oxide  400 mg Oral Once    fluticasone propionate  1 spray Each Nare Daily    montelukast  10 mg Oral Nightly    aspirin  81 mg Oral Daily    atorvastatin  40 mg Oral Nightly    finasteride  5 mg Oral Daily    pantoprazole  40 mg Oral QAM AC    senna-docusate  1 tablet Oral BID    umeclidinium-vilanterol  1 puff Inhalation Daily    tamsulosin  0.4 mg Oral Daily with dinner    insulin aspart  1-5 Units Subcutaneous TID CC    ondansetron  4 mg Intravenous Once    sodium chloride  1,000 mL Intravenous Once       Assessment & Plan:   ASSESSMENT / PLAN:     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   - s/p R shoulder joint aspiration 4/15   - 59,000 WBC's with predominately neutrophils. + calcium pyrophosphate crystals --> pseudogout. Culture negative.   - IV rocephin 2gm q 12, day 3, s/p zosyn 4 days.   - ID on consult.   - PT/OT - LISA eventually   - blood cx x 2 NGTD.   - R shoulder arthroscopy extensive debridement of R shoulder, bicep tenotomy 4/17      Acute encephalopathy now completely resolved  - etiology unclear. ? Meningitis.  LP to be done Monday now by Radiology  - CT head no acute findings. MRI no acute CVA  - EEG c/w encephalopathy   - IV acyclovir empirically   - stop zosyn after 4 days. Now on rocephin 2g IV BID day #3  - Vitamin K IV x 1 now.  Once INR < 1.5 plan for LP Monday.  Hold coumadin  - ID and neuro on consult.   - neuro checks.      Ecoli UTI  - cx + on 4/15, now treatment complete.    Acute respiratory failure with hypoxia  Likely COPD exacerbation in combination with atelectasis  - was on 5L NC wean o2 now down to 3L.   - duonebs q6hrs WA  - SLP eval regular thin liquids.   - Oral diet   - IS/flutter valve.   - CXR atelectasis   - Antitussives PRN   - CT chest on admit showed dense atelectasis no PE.      DVT/pulmonary embolism  - INR elevated yesterday  - hold home warfarin   - got vit k 4/19     Hyponatremia  - appears euvolemic now   - sodium better with IVF.   - off IVF.   - Urine osm high, urine sdoium high.   - free water restriction      NSVT  - ECHO LVEF 55-60%. No WMA.   - keep Mg 2.0 and K 4.0   - cont metoprolol     DM II  - A1c 6.4  - ISS     BPH  Chronic urinary retention   - Continue Flomax and finasteride  - cont valladares last changed 04/01/24     >55min spent, >50% spent counseling and coordinating care in the form of educating pt/family and d/w consultants and staff. Most of the time spent discussing the above plan. D/w Evangelina dtr at bedside         Estimated date of discharge: TBD  Discharge is dependent on: completed care  At this point Mr. Manzano is expected to be discharge to: SNF vs. AIR    Plan of care discussed with patient    Augustin Hester MD, FAAP, FACP  [facility] Hospitalist  I respond to Epic Chat and AMS Connect    45 minutes spent on this admission - examining patient, obtaining history, reviewing previous medical records, going over test results/imaging and discussing plan of care. All questions answered.

## 2024-04-21 NOTE — PHYSICAL THERAPY NOTE
PHYSICAL THERAPY RE-EVALUATION - INPATIENT   Patient is s/p extensive debridement of R shoulder, bicep tenotomy 4/17/24    Room Number: 212/212-A  Evaluation Date: 4/21/2024  Type of Evaluation: Re-evaluation   Physician Order: PT Eval and Treat    Presenting Problem: hyponatremia, recent GI virus, nausea, vomiting, diarrhea, R shoulder pain s/p bedside aspiration     Reason for Therapy: Mobility Dysfunction and Discharge Planning    PHYSICAL THERAPY ASSESSMENT   Patient is a 88 year old male admitted 4/14/2024 for right shoulder pain.  Prior to admission, patient's baseline is independent with performance of all ADL's and performance of functional mobility with no assistive device.  Patient is currently functioning below baseline with bed mobility, transfers, gait, stair negotiation, maintaining seated position, standing prolonged periods, and performing household tasks.  Patient is requiring dependent as a result of the following impairments: decreased functional strength, decreased endurance/aerobic capacity, pain, decreased muscular endurance, medical status, decreased compliance/participation, and increased O2 needs from baseline.  Physical Therapy will continue to follow for duration of hospitalization.    Patient will benefit from continued skilled PT Services to promote return to prior level of function and safety with continuous assistance and gradual rehabilitative therapy .    PLAN  PT Treatment Plan: Bed mobility;Body mechanics;Coordination;Endurance;Energy conservation;Strengthening;Transfer training;Balance training  Rehab Potential : Guarded  Frequency (Obs): 3-5x/week    PHYSICAL THERAPY MEDICAL/SOCIAL HISTORY   History related to current admission: Dennys Manzano is a(n) 88 year old male, with a past medical history significant for coronary artery disease status post CABG, CHF, COPD, PE diabetes along with right-sided repair for complete rotator cuff tear complicated by RSD presents with a  complaint of acute onset right shoulder pain, claims he is unable to move his shoulder at all secondary to pain also experiencing weakness at this time.  Rates his pain as a 10 out of 10 in severity nonradiating in nature denies any associated trauma to the affected area.      Problem List  Principal Problem:    Hyponatremia  Active Problems:    Leukocytosis, unspecified type    Acute pain of right shoulder    Hypoxia    Transient disorder of initiating or maintaining wakefulness    Acute metabolic encephalopathy      HOME SITUATION  Home Situation  Type of Home: House  Home Layout: Two level;Able to live on main level  Stairs to Enter : 0  Drives: No  Patient Owned Equipment: Rolling walker  Patient Regularly Uses: Reading glasses     Prior Level of [place]: Per discussion with daughter at bedside, patient lived with spouse (spouse has worsening dementia). Was ambulatory with a rolling walker and able to perform self cares. Family visits intermittently.    SUBJECTIVE  \"Dont tell me all your troubles\"    PHYSICAL THERAPY EXAMINATION   OBJECTIVE  Precautions: Limb alert - right  Fall Risk: High fall risk    WEIGHT BEARING RESTRICTION  Weight Bearing Restriction: R upper extremity  R Upper Extremity: Weight Bearing as Tolerated (ROM as tolerated)    PAIN ASSESSMENT  Rating: Unable to rate  Location: pain with BUE/BLE PROM  Management Techniques: Activity promotion;Relaxation;Breathing techniques;Repositioning    COGNITION  Overall Cognitive Status:  Impaired  Arousal/Alertness:  delayed responses to stimuli  Safety Judgement:  decreased awareness of need for assistance and decreased awareness of need for safety    RANGE OF MOTION AND STRENGTH ASSESSMENT  Upper extremity ROM and strength are limited due to pain. Grossly 1/5 BUE.  Lower extremity ROM is limited due to pain.  Lower extremity strength is limited due to pain, grossly 1/5 BLE>  BALANCE  Static Sitting: Fair  Dynamic Sitting: Fair -  Static Standing:  Poor +  Dynamic Standing: Poor    NEUROLOGICAL FINDINGS    Sensation: bilateral feet - neuropathy     ACTIVITY TOLERANCE  Pulse: 85  Heart Rate Source: Monitor  Resp: 19  BP: 150/73  BP Location: Left leg  BP Method: Automatic  Patient Position: Lying    O2 WALK  Oxygen Therapy  SPO2% on Oxygen at Rest: 96  At rest oxygen flow (liters per minute): 3    AM-PAC '6-Clicks' INPATIENT SHORT FORM - BASIC MOBILITY  How much difficulty does the patient currently have...  Patient Difficulty: Turning over in bed (including adjusting bedclothes, sheets and blankets)?: Unable   Patient Difficulty: Sitting down on and standing up from a chair with arms (e.g., wheelchair, bedside commode, etc.): Unable   Patient Difficulty: Moving from lying on back to sitting on the side of the bed?: Unable   How much help from another person does the patient currently need...   Help from Another: Moving to and from a bed to a chair (including a wheelchair)?: Total   Help from Another: Need to walk in hospital room?: Total   Help from Another: Climbing 3-5 steps with a railing?: Total     AM-PAC Score:  Raw Score: 6   Approx Degree of Impairment: 100%   Standardized Score (AM-PAC Scale): 23.55   CMS Modifier (G-Code): CN    FUNCTIONAL ABILITY STATUS  Functional Mobility/Gait Assessment  Gait Assistance: Not tested  Rolling: dependent (maximal assistance x 2)   Supine to Sit: not tested (patient unable to tolerate this session due to pain and decreased command following) (able to perform 2 repetitions of long sitting with maximal assistance x 2)    Additional information: Patient received supine in bed at initiation of session agreeable to participation in PT. Patient tolerates treatment poorly, demonstrates decrease in activity tolerance and increased level of physical assistance for functional mobility in comparison to previous session. Patient is s/p extensive debridement of R shoulder, bicep tenotomy 4/17/24. Per MD patient WBAT RUE, ROM as  tolerated. Patient in a significant amount of pain with all PROM to BUE/BLE. Performed rolling to both sides with maximal assistance x 2 to assist with repositioning/pressure offloading. Patient able to perform 2 repetitions of long sitting with maximal assistance x 2. Tolerates long sit for ~30 seconds on each repetition. Patient left bed, lines intact, needs in reach and handoff to RN, family at bedside.    The patient's Approx Degree of Impairment: 100% has been calculated based on documentation in the [facility] '6 clicks' Inpatient Daily Activity Short Form.  Research supports that patients with this level of impairment may benefit from LTAC, however given patients PLOF will recommend gradual rehab.  Final disposition will be made by interdisciplinary medical team.    Patient End of Session: In bed;Needs met;Call light within reach;RN aware of session/findings;All patient questions and concerns addressed;Family present;Alarm set    CURRENT GOALS  Goals to be met by: 5/5/24  Patient Goal Patient's self-stated goal is: not formally stated   Goal #1 Patient is able to demonstrate supine - sit EOB @ level: moderate assistance     Goal #1   Current Status    Goal #2 Patient is able to demonstrate transfers Sit to/from Stand at assistance level: moderate assistance with  least restrictive device     Goal #2  Current Status    Goal #3 Patient is able to ambulate >10 feet with assist device:  least restrictive device  at assistance level: moderate assistance   Goal #3   Current Status    Goal #4 Patient will negotiate 1 stairs/one curb w/ assistive device and moderate assistance   Goal #4   Current Status    Goal #5 Patient to demonstrate independence with home activity/exercise instructions provided to patient in preparation for discharge.   Goal #5   Current Status    Goal #6    Goal #6  Current Status      Therapeutic Activity:  20 minutes    Nilda Rodriguez PT, DPT

## 2024-04-21 NOTE — OCCUPATIONAL THERAPY NOTE
OCCUPATIONAL THERAPY TREATMENT NOTE - INPATIENT        Room Number: 212/212-A     Presenting Problem: right shoulder pain (NWB RUE, ROM as tolerated)    Problem List  Principal Problem:    Hyponatremia  Active Problems:    Leukocytosis, unspecified type    Acute pain of right shoulder    Hypoxia    Transient disorder of initiating or maintaining wakefulness    Acute metabolic encephalopathy      OCCUPATIONAL THERAPY ASSESSMENT   Patient demonstrates limited progress this session, goals updated to reflect patient performance.    Patient continues to function below baseline with toileting, bathing, upper body dressing, lower body dressing, grooming, eating, sling management, bed mobility, and transfers.   Contributing factors to remaining limitations include decreased functional strength, decreased functional reach, decreased endurance, pain, impaired seated/standing balance, impaired motor planning, decreased muscular endurance, limited BUE ROM, and decreased sensation.  Next session anticipate patient to progress bed mobility, stating sitting balance, dynamic sitting balance, maintaining seated position, and BUE ROM .  Occupational Therapy will continue to follow patient for duration of hospitalization.    Patient continues to benefit from continued skilled OT services: to promote return to prior level of function and safety with continuous assistance and gradual rehabilitative therapy .     PLAN  OT Treatment Plan: Balance activities;Energy conservation/work simplification techniques;ADL training;Functional transfer training;Endurance training;UE strengthening/ROM;Cognitive reorientation;Patient/Family education;Patient/Family training;Equipment eval/education;Fine motor coordination activities  OT Device Recommendations: TBD    SUBJECTIVE  \"11\" when asked pain level during ROM    OBJECTIVE  Precautions: Limb alert - right    WEIGHT BEARING RESTRICTION  R Upper Extremity: Weight Bearing as Tolerated    PAIN  ASSESSMENT  Rating: 10  Location: BUE    ACTIVITY TOLERANCE    O2 SATURATIONS       ACTIVITIES OF DAILY LIVING ASSESSMENT  AM-PAC ‘6-Clicks’ Inpatient Daily Activity Short Form  How much help from another person does the patient currently need…  -   Putting on and taking off regular lower body clothing?: Total  -   Bathing (including washing, rinsing, drying)?: Total  -   Toileting, which includes using toilet, bedpan or urinal? : Total  -   Putting on and taking off regular upper body clothing?: Total  -   Taking care of personal grooming such as brushing teeth?: Total  -   Eating meals?: A Lot    AM-PAC Score:  Score: 7  Approx Degree of Impairment: 92.44%  Standardized Score (AM-PAC Scale): 20.13  CMS Modifier (G-Code): CM    FUNCTIONAL TRANSFER ASSESSMENT  Sit to Stand: Edge of Bed  Edge of Bed: Minimal Assist (x2; moderate assist x 2 to chair -- impaired foot coordination)    BED MOBILITY  Rolling: Dependent  Supine to Sit : Minimal Assist    BALANCE ASSESSMENT     FUNCTIONAL ADL ASSESSMENT  Eating: Independent  Grooming Seated: Stand-by Assist  LB Dressing Seated: Maximum Assist    THERAPEUTIC EXERCISE     Skilled Therapy Provided:   Faciliated BUE PROM, pt with trace muscle contraction in wrist flexion/extension, unable to make a fist in B hands, reports pain in BUE with all jt PROM. 5 reps per jt in BUE provided.   Pt requires dependent assist to roll side/side in bed for sheet placement, tolerates 2 long sits with max a x2 and use of sheets to maintain upright positioning - rest break in between 2/2 fatigue, generalized weakness and pain.     EDUCATION PROVIDED  Patient: Role of Occupational Therapy; Plan of Care; Functional Transfer Techniques; Compensatory ADL Techniques  Patient's Response to Education: Verbalized Understanding; Returned Demonstration    The patient's Approx Degree of Impairment: 92.44% has been calculated based on documentation in the [facility] '6 clicks' Inpatient Daily Activity Short  Form.  Research supports that patients with this level of impairment may benefit from LTC, however recommending rehab to increased independence.  Final disposition will be made by interdisciplinary medical team.    Patient End of Session: In bed;Needs met;Call light within reach;RN aware of session/findings;All patient questions and concerns addressed;Family present    OT Goals:    Patients self stated goal is:home     Patient will complete functional transfer with min a  Comment:     Patient will complete toileting with mod a  Comment:     Patient will tolerate standing for 3 minutes in prep for adls with mod a  Comment:    Patient will complete item retrieval with sba  Comment:          Goals  on: 24  Frequency: 3x/w    Therapeutic Activity: 23 minutes    Francine BENSON/L  [facility]

## 2024-04-21 NOTE — PROGRESS NOTES
INFECTIOUS DISEASE PROGRESS NOTE    Dennys Manzano Patient Status:  Inpatient    1935 MRN [id number]   Location [facility] 4W/SW/SE Attending Jeimy Driscoll MD   Hosp Day # 6 PCP CALOS FITZGERALD MD       SUBJECTIVE  ROS reviewed. Tolerated PT. Fatigued after. Still overall improved.    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 Faecalis - colonization     PLAN:     -  Continue IV acyclovir + CTX  -  plan for LP Monday  -   cx  -  Follow fever curve, wbc  -  Reviewed labs, micro, imaging reports  -  d/w patient, staff, family     History of Present Illness:    Dennys Manzano is a a(n) 88 year old male. Patient is a 88-year-old male with a history of CAD post CABG and pacemaker, BPH, CHF, DM, depression, HLD, PAD, CVA, previous right rotator cuff surgery who now presents to the hospital for right shoulder pain with effusion, difficulty with movement started about 2 days prior.  Afebrile here.  WBC initially 18.  X-ray of the shoulder with DJD without fracture.  CT chest, abdomen, pelvis nausea and vomiting without clear infectious process.  Seen by orthopedic surgery underwent aspiration 4/15 noted WBC around 60,000 with 94% segs and positive crystals.  Cultures so far no growth.  On IV vancomycin and Zosyn.  Plan for surgery tomorrow for arthroscopy and I&D.    OBJECTIVE  /76 (BP Location: Left arm)   Pulse 70   Temp 97.6 °F (36.4 °C) (Temporal)   Resp 22   Ht 182.9 cm (6')   Wt 241 lb 13.5 oz (109.7 kg)   SpO2 97%   BMI 32.80 kg/m²     General: Awake, alert  HEENT: EOMI, neck supple, NC+  Abdomen: Soft,  NT/ND  Musculoskeletal: R shoulder dressed  Integument: No rashes  : Navi+    Howie Jalloh MD  [facility]  [phone]

## 2024-04-21 NOTE — PROGRESS NOTES
[facility]  part of [facility]     Progress Note    Dennys Manzano Patient Status:  Inpatient    1935 MRN [id number]   Location [facility] 2W/SW Attending Jeimy Driscoll MD   Hosp Day # 6 PCP CALOS FITZGERALD MD       Subjective:   Seen and examined.  Arousable.  No significant distress.    Objective:   Blood pressure 143/77, pulse 80, temperature 97.6 °F (36.4 °C), temperature source Temporal, resp. rate 21, height 6' (1.829 m), weight 241 lb 13.5 oz (109.7 kg), SpO2 97%.  Intake/Output:   Last 3 shifts: I/O last 3 completed shifts:  In: 3092 [P.O.:1400; I.V.:1042; IV PIGGYBACK:650]  Out:  [Urine:]   This shift: No intake/output data recorded.     Vent Settings:      Hemodynamic parameters (last 24 hours):      Scheduled Meds:   Current Facility-Administered Medications   Medication Dose Route Frequency    acyclovir (Zovirax) 800 mg in sodium chloride 0.9% 150 mL IVPB  10 mg/kg (Ideal) Intravenous Q8H    phytonadione (Aqua-[place]ephton) 10 mg in sodium chloride 0.9% 50 mL IVPB  10 mg Intravenous Once    cefTRIAXone (Rocephin) 2 g in D5W 100 mL IVPB-ADD  2 g Intravenous Q12H    metoprolol succinate ER (Toprol XL) 24 hr tab 50 mg  50 mg Oral Daily Beta Blocker    ipratropium-albuterol (Duoneb) 0.5-2.5 (3) MG/3ML inhalation solution 3 mL  3 mL Nebulization Q6H WA    magnesium oxide (Mag-Ox) tab 400 mg  400 mg Oral Once    ipratropium-albuterol (Duoneb) 0.5-2.5 (3) MG/3ML inhalation solution 3 mL  3 mL Nebulization Q6H PRN    fluticasone propionate (Flonase) 50 MCG/ACT nasal suspension 1 spray  1 spray Each Nare Daily    benzonatate (Tessalon) cap 100 mg  100 mg Oral TID PRN    guaiFENesin-codeine (Robitussin AC) 100-10 MG/5ML oral solution 5 mL  5 mL Oral Q4H PRN    montelukast (Singulair) tab 10 mg  10 mg Oral Nightly    aspirin chewable tab 81 mg  81 mg Oral Daily    atorvastatin (Lipitor) tab 40 mg  40 mg Oral Nightly    finasteride (Proscar) tab 5 mg  5 mg Oral Daily     pantoprazole (Protonix) DR tab 40 mg  40 mg Oral QAM AC    senna-docusate (Senokot-S) 8.6-50 MG per tab 1 tablet  1 tablet Oral BID    umeclidinium-vilanterol (Anoro Ellipta) 62.5-25 MCG/ACT inhaler 1 puff  1 puff Inhalation Daily    tamsulosin (Flomax) cap 0.4 mg  0.4 mg Oral Daily with dinner    ondansetron (Zofran) 4 MG/2ML injection 4 mg  4 mg Intravenous Q6H PRN    acetaminophen (Tylenol) tab 650 mg  650 mg Oral Q6H PRN    hydrALAzine (Apresoline) 20 mg/mL injection 10 mg  10 mg Intravenous Q4H PRN    glucose (Dex4) 15 GM/59ML oral liquid 15 g  15 g Oral Q15 Min PRN    Or    glucose (Glutose) 40% oral gel 15 g  15 g Oral Q15 Min PRN    Or    glucose-vitamin C (Dex-4) chewable tab 4 tablet  4 tablet Oral Q15 Min PRN    Or    dextrose 50% injection 50 mL  50 mL Intravenous Q15 Min PRN    Or    glucose (Dex4) 15 GM/59ML oral liquid 30 g  30 g Oral Q15 Min PRN    Or    glucose (Glutose) 40% oral gel 30 g  30 g Oral Q15 Min PRN    Or    glucose-vitamin C (Dex-4) chewable tab 8 tablet  8 tablet Oral Q15 Min PRN    insulin aspart (NovoLOG) 100 Units/mL FlexPen 1-5 Units  1-5 Units Subcutaneous TID CC    ondansetron (Zofran) 4 MG/2ML injection 4 mg  4 mg Intravenous Once    sodium chloride 0.9 % IV bolus 1,000 mL  1,000 mL Intravenous Once       Continuous Infusions:     Physical Exam  Constitutional: no acute distress somnolent but arousable  Eyes: PERRL  ENT: nares pateint  Neck: supple, no JVD  Cardio: RRR, S1 S2  Respiratory: Basilar crackles  GI: abdomen soft, non tender, active bowel sounds, no organomegaly  Extremities: no clubbing, cyanosis, edema  Neurologic: no gross motor deficits  Skin: warm, dry      Results:     Lab Results   Component Value Date    K 4.1 04/21/2024       US VENOUS DOPPLER ARM LEFT - DIAG IMG (CPT=93971)    Result Date: 4/20/2024  CONCLUSION:  Negative for sonographic evidence of deep venous thrombosis in the left upper extremity.    Dictated by (CST): Jay Rojas MD on 4/20/2024 at  1:09 PM     Finalized by (CST): Jay Rojas MD on 4/20/2024 at 1:10 PM                 Assessment   1.  Acute encephalopathy  2.  Right shoulder pain status post right shoulder arthroscopy   3.  Acute hypoxemic hypercapnic respiratory failure  4.  COPD   5.  Prior DVT PE  6.  Hyponatremia  7.  Diabetes mellitus     Plan   -Patient initially presented with evidence of intractable right shoulder pain.  Underwent joint aspiration with subsequent right shoulder arthroscopy and extensive debridement on 4/17/2024.  -Worsening lethargy altered mental status noted.  ABG reviewed from 4/17/2024 with hypercapnic respiratory failure with respiratory acidosis noted.  Repeat ABG improved.    -Status improving over the last 24 hours closely monitor.  -Acyclovir per ID recommendations.  Lumbar puncture on hold for now given improvement from neurological perspective  -Hold off pain medications and narcotics.  No significant improvement after trial of Narcan  -Close neuromonitoring  -CT head with no acute intracranial findings  -Further recommendations per neurology  -Hold off further BiPAP at this time  -Wean oxygen as tolerated currently on 3 L  -Nebulizer treatments  -CT chest with some mild atelectatic changes seen  -Incentive spirometry when patient more alert  -DVT prophylaxis: SCD  -Okay to transfer to floor    Harini Zuniga DO  Pulmonary Critical Care Medicine  [facility]

## 2024-04-21 NOTE — PLAN OF CARE
Problem: PAIN - ADULT  Goal: Verbalizes/displays adequate comfort level or patient's stated pain goal  Description: INTERVENTIONS:  - Encourage pt to monitor pain and request assistance  - Assess pain using appropriate pain scale  - Administer analgesics based on type and severity of pain and evaluate response  - Implement non-pharmacological measures as appropriate and evaluate response  - Consider cultural and social influences on pain and pain management  - Manage/alleviate anxiety  - Utilize distraction and/or relaxation techniques  - Monitor for opioid side effects  - Notify MD/LIP if interventions unsuccessful or patient reports new pain  - Anticipate increased pain with activity and pre-medicate as appropriate  Outcome: Progressing     Problem: RISK FOR INFECTION - ADULT  Goal: Absence of fever/infection during anticipated neutropenic period  Description: INTERVENTIONS  - Monitor WBC  - Administer growth factors as ordered  - Implement neutropenic guidelines  Outcome: Progressing     Problem: SAFETY ADULT - FALL  Goal: Free from fall injury  Description: INTERVENTIONS:  - Assess pt frequently for physical needs  - Identify cognitive and physical deficits and behaviors that affect risk of falls.  - [place] fall precautions as indicated by assessment.  - Educate pt/family on patient safety including physical limitations  - Instruct pt to call for assistance with activity based on assessment  - Modify environment to reduce risk of injury  - Provide assistive devices as appropriate  - Consider OT/PT consult to assist with strengthening/mobility  - Encourage toileting schedule  Outcome: Progressing     Problem: RESPIRATORY - ADULT  Goal: Achieves optimal ventilation and oxygenation  Description: INTERVENTIONS:  - Assess for changes in respiratory status  - Assess for changes in mentation and behavior  - Position to facilitate oxygenation and minimize respiratory effort  - Oxygen supplementation based on oxygen  saturation or ABGs  - Provide Smoking Cessation handout, if applicable  - Encourage broncho-pulmonary hygiene including cough, deep breathe, Incentive Spirometry  - Assess the need for suctioning and perform as needed  - Assess and instruct to report SOB or any respiratory difficulty  - Respiratory Therapy support as indicated  - Manage/alleviate anxiety  - Monitor for signs/symptoms of CO2 retention  Outcome: Progressing     Problem: GENITOURINARY - ADULT  Goal: Absence of urinary retention  Description: INTERVENTIONS:  - Assess patient’s ability to void and empty bladder  - Monitor intake/output and perform bladder scan as needed  - Follow urinary retention protocol/standard of care  - Consider collaborating with pharmacy to review patient's medication profile  - Implement strategies to promote bladder emptying  Outcome: Progressing     Problem: METABOLIC/FLUID AND ELECTROLYTES - ADULT  Goal: Electrolytes maintained within normal limits  Description: INTERVENTIONS:  - Monitor labs and rhythm and assess patient for signs and symptoms of electrolyte imbalances  - Administer electrolyte replacement as ordered  - Monitor response to electrolyte replacements, including rhythm and repeat lab results as appropriate  - Fluid restriction as ordered  - Instruct patient on fluid and nutrition restrictions as appropriate  Outcome: Progressing     Problem: MUSCULOSKELETAL - ADULT  Goal: Return mobility to safest level of function  Description: INTERVENTIONS:  - Assess patient stability and activity tolerance for standing, transferring and ambulating w/ or w/o assistive devices  - Assist with transfers and ambulation using safe patient handling equipment as needed  - Ensure adequate protection for wounds/incisions during mobilization  - Obtain PT/OT consults as needed  - Advance activity as appropriate  - Communicate ordered activity level and limitations with patient/family  Outcome: Progressing  Goal: Maintain proper  alignment of affected body part  Description: INTERVENTIONS:  - Support and protect limb and body alignment per provider's orders  - Instruct and reinforce with patient and family use of appropriate assistive device and precautions (e.g. spinal or hip dislocation precautions)  Outcome: Progressing     Problem: Impaired Activities of Daily Living  Goal: Achieve highest/safest level of independence in self care  Description: Interventions:  - Assess ability and encourage patient to participate in ADLs to maximize function  - Promote sitting position while performing ADLs such as feeding, grooming, and bathing  - Educate and encourage patient/family in tolerated functional activity level and precautions during self-care  - Encourage patient to incorporate impaired side during daily activities to promote function  Outcome: Progressing     Problem: NEUROLOGICAL - ADULT  Goal: Achieves stable or improved neurological status  Description: INTERVENTIONS  - Assess for and report changes in neurological status  - Initiate measures to prevent increased intracranial pressure  - Maintain blood pressure and fluid volume within ordered parameters to optimize cerebral perfusion and minimize risk of hemorrhage  - Monitor temperature, glucose, and sodium. Initiate appropriate interventions as ordered  Outcome: Progressing     Problem: Delirium  Goal: Minimize duration of delirium  Description: Interventions:  - Encourage use of hearing aids, eye glasses  - Promote highest level of mobility daily  - Provide frequent reorientation  - Promote wakefulness i.e. lights on, blinds open  - Promote sleep, encourage patient's normal rest cycle i.e. lights off, TV off, minimize noise and interruptions  - Encourage family to assist in orientation and promotion of home routines  Outcome: Progressing    Patient is alert and oriented, aware to call for help as needed. Patient is currently on 3LPM of NC, lung sounds diminish no c/o shortness of  breath nor chest pain.  Patient is currently on bedrest with limitations both upper and lower extremities. Patient has generalized edema. Patient has a valladares catheter draining to arthur colored urine with fair amount.  Patient is tolerating pain at this time.  Patient is awaiting for lumbar puncture done thru IR.

## 2024-04-22 LAB
ANION GAP SERPL CALC-SCNC: 4 MMOL/L (ref 0–18)
BASOPHILS # BLD AUTO: 0.06 X10(3) UL (ref 0–0.2)
BASOPHILS NFR BLD AUTO: 0.5 %
BUN BLD-MCNC: 15 MG/DL (ref 9–23)
BUN/CREAT SERPL: 28.3 (ref 10–20)
CALCIUM BLD-MCNC: 8.7 MG/DL (ref 8.7–10.4)
CHLORIDE SERPL-SCNC: 95 MMOL/L (ref 98–112)
CO2 SERPL-SCNC: 32 MMOL/L (ref 21–32)
CREAT BLD-MCNC: 0.53 MG/DL
DEPRECATED RDW RBC AUTO: 43.8 FL (ref 35.1–46.3)
EGFRCR SERPLBLD CKD-EPI 2021: 98 ML/MIN/1.73M2 (ref 60–?)
EOSINOPHIL # BLD AUTO: 0.29 X10(3) UL (ref 0–0.7)
EOSINOPHIL NFR BLD AUTO: 2.4 %
ERYTHROCYTE [DISTWIDTH] IN BLOOD BY AUTOMATED COUNT: 12.7 % (ref 11–15)
GLUCOSE BLD-MCNC: 166 MG/DL (ref 70–99)
GLUCOSE BLDC GLUCOMTR-MCNC: 145 MG/DL (ref 70–99)
GLUCOSE BLDC GLUCOMTR-MCNC: 163 MG/DL (ref 70–99)
GLUCOSE BLDC GLUCOMTR-MCNC: 182 MG/DL (ref 70–99)
GLUCOSE BLDC GLUCOMTR-MCNC: 185 MG/DL (ref 70–99)
HCT VFR BLD AUTO: 31.5 %
HGB BLD-MCNC: 10.6 G/DL
IMM GRANULOCYTES # BLD AUTO: 0.14 X10(3) UL (ref 0–1)
IMM GRANULOCYTES NFR BLD: 1.2 %
INR BLD: 1.19 (ref 0.8–1.2)
LYMPHOCYTES # BLD AUTO: 1.93 X10(3) UL (ref 1–4)
LYMPHOCYTES NFR BLD AUTO: 15.9 %
MAGNESIUM SERPL-MCNC: 1.8 MG/DL (ref 1.6–2.6)
MCH RBC QN AUTO: 31.7 PG (ref 26–34)
MCHC RBC AUTO-ENTMCNC: 33.7 G/DL (ref 31–37)
MCV RBC AUTO: 94.3 FL
MONOCYTES # BLD AUTO: 0.93 X10(3) UL (ref 0.1–1)
MONOCYTES NFR BLD AUTO: 7.7 %
NEUTROPHILS # BLD AUTO: 8.79 X10 (3) UL (ref 1.5–7.7)
NEUTROPHILS # BLD AUTO: 8.79 X10(3) UL (ref 1.5–7.7)
NEUTROPHILS NFR BLD AUTO: 72.3 %
OSMOLALITY SERPL CALC.SUM OF ELEC: 277 MOSM/KG (ref 275–295)
PLATELET # BLD AUTO: 363 10(3)UL (ref 150–450)
POTASSIUM SERPL-SCNC: 4.2 MMOL/L (ref 3.5–5.1)
PROTHROMBIN TIME: 15.8 SECONDS (ref 11.6–14.8)
RBC # BLD AUTO: 3.34 X10(6)UL
SODIUM SERPL-SCNC: 131 MMOL/L (ref 136–145)
WBC # BLD AUTO: 12.1 X10(3) UL (ref 4–11)

## 2024-04-22 PROCEDURE — 99232 SBSQ HOSP IP/OBS MODERATE 35: CPT | Performed by: INTERNAL MEDICINE

## 2024-04-22 PROCEDURE — 99233 SBSQ HOSP IP/OBS HIGH 50: CPT | Performed by: INTERNAL MEDICINE

## 2024-04-22 RX ORDER — BISACODYL 10 MG
10 SUPPOSITORY, RECTAL RECTAL
Status: DISCONTINUED | OUTPATIENT
Start: 2024-04-22 | End: 2024-05-01

## 2024-04-22 RX ORDER — POLYETHYLENE GLYCOL 3350 17 G/17G
17 POWDER, FOR SOLUTION ORAL DAILY PRN
Status: DISCONTINUED | OUTPATIENT
Start: 2024-04-22 | End: 2024-05-01

## 2024-04-22 RX ORDER — DOCUSATE SODIUM 100 MG/1
100 CAPSULE, LIQUID FILLED ORAL 2 TIMES DAILY
Status: DISCONTINUED | OUTPATIENT
Start: 2024-04-22 | End: 2024-05-01

## 2024-04-22 NOTE — PROGRESS NOTES
[facility]  part of [facility]    Progress Note    Dennys Manzano Patient Status:  Inpatient    1935 MRN [id number]   Location [facility] 4W/SW/SE Attending Augustin Hester MD   Hosp Day # 7 PCP CALOS FITZGERALD MD         Subjective:   Subjective:  Patient was seen and examined  Comfortable on 2 L of oxygen with no active cough or dyspnea or fever  Objective:   Blood pressure 137/61, pulse 87, temperature 98.6 °F (37 °C), temperature source Oral, resp. rate 22, height 6' (1.829 m), weight 241 lb 13.5 oz (109.7 kg), SpO2 94%.  Physical Exam  Constitutional:       General: He is not in acute distress.     Appearance: He is ill-appearing.   HENT:      Head: Atraumatic.   Eyes:      General: No scleral icterus.  Cardiovascular:      Rate and Rhythm: Normal rate.      Heart sounds:      No gallop.   Pulmonary:      Effort: No respiratory distress.      Breath sounds: No stridor. No wheezing, rhonchi or rales.   Abdominal:      General: Abdomen is flat. Bowel sounds are normal.      Palpations: Abdomen is soft.   Musculoskeletal:      Cervical back: No rigidity.   Neurological:      Mental Status: Mental status is at baseline.         Results:   Lab Results   Component Value Date    WBC 12.1 (H) 2024    HGB 10.6 (L) 2024    HCT 31.5 (L) 2024    .0 2024    CREATSERUM 0.53 (L) 2024    BUN 15 2024     (L) 2024    K 4.2 2024    CL 95 (L) 2024    CO2 32.0 2024     (H) 2024    CA 8.7 2024    ALB 3.5 2024    ALKPHO 91 2024    BILT 1.1 2024    TP 6.8 2024    AST <8 2024    ALT 16 2024    PTT 41.0 (H) 2024    INR 1.19 2024    T4F 1.0 2024    TSH 0.277 (L) 2024    LIP 26 2024    ESRML 111 (H) 2024    CRP 13.9 (H) 02/10/2018    MG 1.8 2024    PHOS 2.7 2024    TROP <0.045 2021    TROPHS 11 2024      03/23/2021       US VENOUS DOPPLER ARM LEFT - DIAG IMG (CPT=93971)    Result Date: 4/20/2024  CONCLUSION:  Negative for sonographic evidence of deep venous thrombosis in the left upper extremity.    Dictated by (CST): Jay Rojas MD on 4/20/2024 at 1:09 PM     Finalized by (CST): Jay Rojas MD on 4/20/2024 at 1:10 PM               Assessment & Plan:        1-encephalopathy  Head CT negative with EEG showing encephalopathy  Received antibiotics for possible CNS infection and followed by ID and neuro  Seems better    2-Acute and chronic respiratory failure with hypoxia and hypercapnia with underlying COPD and ? NAYAN   Likely decompensated overlap syndrome with acute illness .  Received BiPAP and now off BiPAP  Recommend use PAP during sleep at night  O2 requirement down to 2 L  Anoro and Nebulizers  Avoid extra use of sedative or narcotic    3-right shoulder septic arthritis  ID and Ortho following    4-prior history of VTE  Coumadin on hold for high INR    5- CHGF / LVEF 55 % and CAD           Jaspal Fitch MD  4/22/2024

## 2024-04-22 NOTE — CM/SW NOTE
SW followed up on DC planning.     SW spoke with pt's dtr regarding placement. Dtr still has not made a final choice yet but SW answered questions regarding private rooms and bed availibility. Dtr will have a top 3 choice by tomorrow to discuss. Pt is also not medically ready yet. Per dtr does not anticipate pt being ready for a few days    Will follow up with dtr on choice tomorrow     Per dtr's request - copy of care giver reasources also provided to review    PLAN: DC to [facility] - pending choice/ med clearance    Dayna See, LSW, MSW ext. [phone]

## 2024-04-22 NOTE — PROGRESS NOTES
INFECTIOUS DISEASE PROGRESS NOTE    Dennys Manzano Patient Status:  Inpatient    1935 MRN [id number]   Location [facility] 4W/SW/SE Attending Jeimy Driscoll MD   Hosp Day # 7 PCP CALOS FITZGERALD MD       SUBJECTIVE  No acute events. Tolerating abx.    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 Faecalis - colonization     PLAN:     -  Continue IV acyclovir + CTX  -  plan for LP tomorrow  -  FU cx  -  Follow fever curve, wbc  -  Reviewed labs, micro, imaging reports  -  d/w patient, staff     History of Present Illness:    Dennys Manzano is a a(n) 88 year old male. Patient is a 88-year-old male with a history of CAD post CABG and pacemaker, BPH, CHF, DM, depression, HLD, PAD, CVA, previous right rotator cuff surgery who now presents to the hospital for right shoulder pain with effusion, difficulty with movement started about 2 days prior.  Afebrile here.  WBC initially 18.  X-ray of the shoulder with DJD without fracture.  CT chest, abdomen, pelvis nausea and vomiting without clear infectious process.  Seen by orthopedic surgery underwent aspiration 4/15 noted WBC around 60,000 with 94% segs and positive crystals.  Cultures so far no growth.  On IV vancomycin and Zosyn.  Plan for surgery tomorrow for arthroscopy and I&D.    OBJECTIVE  /61 (BP Location: Left arm)   Pulse 87   Temp 98.6 °F (37 °C) (Oral)   Resp 22   Ht 182.9 cm (6')   Wt 241 lb 13.5 oz (109.7 kg)   SpO2 94%   BMI 32.80 kg/m²     General: Awake, alert  HEENT: EOMI, neck supple, NC+  Abdomen: Soft, NT/ND  Musculoskeletal: R shoulder  dressed  Integument: No rashes  : Navi+    MD Barrett [facility]  [phone]

## 2024-04-22 NOTE — PLAN OF CARE
Alert and oriented x 4 with periods of forgetfulness. Post-op day 5. Dressing in place to right shoulder. Receiving IV antibiotics per MD order. Voiding via valladares. SCDs for DVT prophylaxis, Aspirin on hold for lumbar puncture. Denies need for pain medication. Vital signs monitored. No acute changes noted throughout shift. Tolerating diet. Fall precautions in place- bed in lowest position, call light and personal belongings within reach, non-skid socks in place. Frequent rounding by nursing staff. Plan is subacute rehab pending choice and medical clearance.    Problem: Patient Centered Care  Goal: Patient preferences are identified and integrated in the patient's plan of care  Description: Interventions:  - What would you like us to know as we care for you? Patient is from home with family and he has a daughter that works here who is his support system.  Patient is hard of hearing.  - Provide timely, complete, and accurate information to patient/family  - Incorporate patient and family knowledge, values, beliefs, and cultural backgrounds into the planning and delivery of care  - Encourage patient/family to participate in care and decision-making at the level they choose  - Honor patient and family perspectives and choices  Outcome: Progressing     Problem: Patient/Family Goals  Goal: Patient/Family Long Term Goal  Description: Patient's Long Term Goal: Pain on right shoulder will be resolved and will be able to walk well with walker.    Interventions:  - No weight bearing on right arm till surgeon says so.  - Home health PT/OT as ordered.  - Pain management with ,oral medication.  - Monitor right shoulder for swelling or increasing pain or weakness.  - Follow up with surgery as recommended.  - See additional Care Plan goals for specific interventions  Outcome: Progressing  Goal: Patient/Family Short Term Goal  Description: Patient's Short Term Goal: Home with [facility] when pain is resolved.    Interventions:   - NWB to  right arm.  - PT/OT as ordered.  - Administer IV antibiotics as ordered.  - Administer oral antibiotics as ordered.  - Maintain enteric/contact isolation as ordered.  - Out of bed as much as tolerated.  - Administer oral anticoagulation as ordered, SCD's to both legs as well.  - See additional Care Plan goals for specific interventions  Outcome: Progressing     Problem: PAIN - ADULT  Goal: Verbalizes/displays adequate comfort level or patient's stated pain goal  Description: INTERVENTIONS:  - Encourage pt to monitor pain and request assistance  - Assess pain using appropriate pain scale  - Administer analgesics based on type and severity of pain and evaluate response  - Implement non-pharmacological measures as appropriate and evaluate response  - Consider cultural and social influences on pain and pain management  - Manage/alleviate anxiety  - Utilize distraction and/or relaxation techniques  - Monitor for opioid side effects  - Notify MD/LIP if interventions unsuccessful or patient reports new pain  - Anticipate increased pain with activity and pre-medicate as appropriate  Outcome: Progressing     Problem: RISK FOR INFECTION - ADULT  Goal: Absence of fever/infection during anticipated neutropenic period  Description: INTERVENTIONS  - Monitor WBC  - Administer growth factors as ordered  - Implement neutropenic guidelines  Outcome: Progressing     Problem: SAFETY ADULT - FALL  Goal: Free from fall injury  Description: INTERVENTIONS:  - Assess pt frequently for physical needs  - Identify cognitive and physical deficits and behaviors that affect risk of falls.  - [place] fall precautions as indicated by assessment.  - Educate pt/family on patient safety including physical limitations  - Instruct pt to call for assistance with activity based on assessment  - Modify environment to reduce risk of injury  - Provide assistive devices as appropriate  - Consider OT/PT consult to assist with strengthening/mobility  -  Encourage toileting schedule  Outcome: Progressing     Problem: DISCHARGE PLANNING  Goal: Discharge to home or other facility with appropriate resources  Description: INTERVENTIONS:  - Identify barriers to discharge w/pt and caregiver  - Include patient/family/discharge partner in discharge planning  - Arrange for needed discharge resources and transportation as appropriate  - Identify discharge learning needs (meds, wound care, etc)  - Arrange for interpreters to assist at discharge as needed  - Consider post-discharge preferences of patient/family/discharge partner  - Complete POLST form as appropriate  - Assess patient's ability to be responsible for managing their own health  - Refer to Case Management Department for coordinating discharge planning if the patient needs post-hospital services based on physician/LIP order or complex needs related to functional status, cognitive ability or social support system  Outcome: Progressing     Problem: CARDIOVASCULAR - ADULT  Goal: Maintains optimal cardiac output and hemodynamic stability  Description: INTERVENTIONS:  - Monitor vital signs, rhythm, and trends  - Monitor for bleeding, hypotension and signs of decreased cardiac output  - Evaluate effectiveness of vasoactive medications to optimize hemodynamic stability  - Monitor arterial and/or venous puncture sites for bleeding and/or hematoma  - Assess quality of pulses, skin color and temperature  - Assess for signs of decreased coronary artery perfusion - ex. Angina  - Evaluate fluid balance, assess for edema, trend weights  Outcome: Progressing  Goal: Absence of cardiac arrhythmias or at baseline  Description: INTERVENTIONS:  - Continuous cardiac monitoring, monitor vital signs, obtain 12 lead EKG if indicated  - Evaluate effectiveness of antiarrhythmic and heart rate control medications as ordered  - Initiate emergency measures for life threatening arrhythmias  - Monitor electrolytes and administer replacement  therapy as ordered  Outcome: Progressing     Problem: RESPIRATORY - ADULT  Goal: Achieves optimal ventilation and oxygenation  Description: INTERVENTIONS:  - Assess for changes in respiratory status  - Assess for changes in mentation and behavior  - Position to facilitate oxygenation and minimize respiratory effort  - Oxygen supplementation based on oxygen saturation or ABGs  - Provide Smoking Cessation handout, if applicable  - Encourage broncho-pulmonary hygiene including cough, deep breathe, Incentive Spirometry  - Assess the need for suctioning and perform as needed  - Assess and instruct to report SOB or any respiratory difficulty  - Respiratory Therapy support as indicated  - Manage/alleviate anxiety  - Monitor for signs/symptoms of CO2 retention  Outcome: Progressing     Problem: GASTROINTESTINAL - ADULT  Goal: Minimal or absence of nausea and vomiting  Description: INTERVENTIONS:  - Maintain adequate hydration with IV or PO as ordered and tolerated  - Nasogastric tube to low intermittent suction as ordered  - Evaluate effectiveness of ordered antiemetic medications  - Provide nonpharmacologic comfort measures as appropriate  - Advance diet as tolerated, if ordered  - Obtain nutritional consult as needed  - Evaluate fluid balance  Outcome: Progressing  Goal: Maintains or returns to baseline bowel function  Description: INTERVENTIONS:  - Assess bowel function  - Maintain adequate hydration with IV or PO as ordered and tolerated  - Evaluate effectiveness of GI medications  - Encourage mobilization and activity  - Obtain nutritional consult as needed  - Establish a toileting routine/schedule  - Consider collaborating with pharmacy to review patient's medication profile  Outcome: Progressing     Problem: GENITOURINARY - ADULT  Goal: Absence of urinary retention  Description: INTERVENTIONS:  - Assess patient’s ability to void and empty bladder  - Monitor intake/output and perform bladder scan as needed  - Follow  urinary retention protocol/standard of care  - Consider collaborating with pharmacy to review patient's medication profile  - Implement strategies to promote bladder emptying  Outcome: Progressing     Problem: METABOLIC/FLUID AND ELECTROLYTES - ADULT  Goal: Electrolytes maintained within normal limits  Description: INTERVENTIONS:  - Monitor labs and rhythm and assess patient for signs and symptoms of electrolyte imbalances  - Administer electrolyte replacement as ordered  - Monitor response to electrolyte replacements, including rhythm and repeat lab results as appropriate  - Fluid restriction as ordered  - Instruct patient on fluid and nutrition restrictions as appropriate  Outcome: Progressing     Problem: MUSCULOSKELETAL - ADULT  Goal: Return mobility to safest level of function  Description: INTERVENTIONS:  - Assess patient stability and activity tolerance for standing, transferring and ambulating w/ or w/o assistive devices  - Assist with transfers and ambulation using safe patient handling equipment as needed  - Ensure adequate protection for wounds/incisions during mobilization  - Obtain PT/OT consults as needed  - Advance activity as appropriate  - Communicate ordered activity level and limitations with patient/family  Outcome: Progressing  Goal: Maintain proper alignment of affected body part  Description: INTERVENTIONS:  - Support and protect limb and body alignment per provider's orders  - Instruct and reinforce with patient and family use of appropriate assistive device and precautions (e.g. spinal or hip dislocation precautions)  Outcome: Progressing     Problem: Impaired Activities of Daily Living  Goal: Achieve highest/safest level of independence in self care  Description: Interventions:  - Assess ability and encourage patient to participate in ADLs to maximize function  - Promote sitting position while performing ADLs such as feeding, grooming, and bathing  - Educate and encourage patient/family in  tolerated functional activity level and precautions during self-care  - Encourage patient to incorporate impaired side during daily activities to promote function  Outcome: Progressing     Problem: NEUROLOGICAL - ADULT  Goal: Achieves stable or improved neurological status  Description: INTERVENTIONS  - Assess for and report changes in neurological status  - Initiate measures to prevent increased intracranial pressure  - Maintain blood pressure and fluid volume within ordered parameters to optimize cerebral perfusion and minimize risk of hemorrhage  - Monitor temperature, glucose, and sodium. Initiate appropriate interventions as ordered  Outcome: Progressing     Problem: Delirium  Goal: Minimize duration of delirium  Description: Interventions:  - Encourage use of hearing aids, eye glasses  - Promote highest level of mobility daily  - Provide frequent reorientation  - Promote wakefulness i.e. lights on, blinds open  - Promote sleep, encourage patient's normal rest cycle i.e. lights off, TV off, minimize noise and interruptions  - Encourage family to assist in orientation and promotion of home routines  Outcome: Progressing

## 2024-04-22 NOTE — CONGREGATE LIVING REVIEW
[facility] Living Authorization    The [facility] Review Committee has reviewed this case and the patient IS APPROVED for discharge to a facility for Short Term Skilled once the following procedure is followed:     - The physician discharge instructions (contained within the JIM note for SNF) must inlcude the below appropriate and approved COVID instructions to the facility    For questions regarding [facility] approval process, please contact the CM assigned to the case.  For questions regarding RN discharge workflow, please contact the unit Clinical Leader.

## 2024-04-22 NOTE — PLAN OF CARE
Pt alert and oriented x3-4 with some forgetfulness at times on 2L O2 NC with scheduled duonebs. Per pt, neuropathy in upper extremities comes and goes. Baseline neuropathy in BLE. 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 Call light within reach. Awaiting lumbar puncture.    Problem: Patient Centered Care  Goal: Patient preferences are identified and integrated in the patient's plan of care  Description: Interventions:  - What would you like us to know as we care for you? Patient is from home with family and he has a daughter that works here who is his support system.  Patient is hard of hearing.  - Provide timely, complete, and accurate information to patient/family  - Incorporate patient and family knowledge, values, beliefs, and cultural backgrounds into the planning and delivery of care  - Encourage patient/family to participate in care and decision-making at the level they choose  - Honor patient and family perspectives and choices  Outcome: Progressing     Problem: Patient/Family Goals  Goal: Patient/Family Long Term Goal  Description: Patient's Long Term Goal: Pain on right shoulder will be resolved and will be able to walk well with walker.    Interventions:  - No weight bearing on right arm till surgeon says so.  - Home health PT/OT as ordered.  - Pain management with ,oral medication.  - Monitor right shoulder for swelling or increasing pain or weakness.  - Follow up with surgery as recommended.  - See additional Care Plan goals for specific interventions  Outcome: Progressing  Goal: Patient/Family Short Term Goal  Description: Patient's Short Term Goal: Home with [facility] when pain is resolved.    Interventions:   - NWB to right arm.  - PT/OT as ordered.  - Administer IV antibiotics as ordered.  - Administer oral antibiotics as ordered.  - Maintain enteric/contact isolation as ordered.  - Out of bed as much as tolerated.  - Administer oral anticoagulation as ordered, SCD's to both legs as well.  - See additional Care Plan goals  for specific interventions  Outcome: Progressing     Problem: PAIN - ADULT  Goal: Verbalizes/displays adequate comfort level or patient's stated pain goal  Description: INTERVENTIONS:  - Encourage pt to monitor pain and request assistance  - Assess pain using appropriate pain scale  - Administer analgesics based on type and severity of pain and evaluate response  - Implement non-pharmacological measures as appropriate and evaluate response  - Consider cultural and social influences on pain and pain management  - Manage/alleviate anxiety  - Utilize distraction and/or relaxation techniques  - Monitor for opioid side effects  - Notify MD/LIP if interventions unsuccessful or patient reports new pain  - Anticipate increased pain with activity and pre-medicate as appropriate  Outcome: Progressing     Problem: RISK FOR INFECTION - ADULT  Goal: Absence of fever/infection during anticipated neutropenic period  Description: INTERVENTIONS  - Monitor WBC  - Administer growth factors as ordered  - Implement neutropenic guidelines  Outcome: Progressing     Problem: SAFETY ADULT - FALL  Goal: Free from fall injury  Description: INTERVENTIONS:  - Assess pt frequently for physical needs  - Identify cognitive and physical deficits and behaviors that affect risk of falls.  - [place] fall precautions as indicated by assessment.  - Educate pt/family on patient safety including physical limitations  - Instruct pt to call for assistance with activity based on assessment  - Modify environment to reduce risk of injury  - Provide assistive devices as appropriate  - Consider OT/PT consult to assist with strengthening/mobility  - Encourage toileting schedule  Outcome: Progressing     Problem: DISCHARGE PLANNING  Goal: Discharge to home or other facility with appropriate resources  Description: INTERVENTIONS:  - Identify barriers to discharge w/pt and caregiver  - Include patient/family/discharge partner in discharge planning  - Arrange for  needed discharge resources and transportation as appropriate  - Identify discharge learning needs (meds, wound care, etc)  - Arrange for interpreters to assist at discharge as needed  - Consider post-discharge preferences of patient/family/discharge partner  - Complete POLST form as appropriate  - Assess patient's ability to be responsible for managing their own health  - Refer to Case Management Department for coordinating discharge planning if the patient needs post-hospital services based on physician/LIP order or complex needs related to functional status, cognitive ability or social support system  Outcome: Progressing     Problem: CARDIOVASCULAR - ADULT  Goal: Maintains optimal cardiac output and hemodynamic stability  Description: INTERVENTIONS:  - Monitor vital signs, rhythm, and trends  - Monitor for bleeding, hypotension and signs of decreased cardiac output  - Evaluate effectiveness of vasoactive medications to optimize hemodynamic stability  - Monitor arterial and/or venous puncture sites for bleeding and/or hematoma  - Assess quality of pulses, skin color and temperature  - Assess for signs of decreased coronary artery perfusion - ex. Angina  - Evaluate fluid balance, assess for edema, trend weights  Outcome: Progressing  Goal: Absence of cardiac arrhythmias or at baseline  Description: INTERVENTIONS:  - Continuous cardiac monitoring, monitor vital signs, obtain 12 lead EKG if indicated  - Evaluate effectiveness of antiarrhythmic and heart rate control medications as ordered  - Initiate emergency measures for life threatening arrhythmias  - Monitor electrolytes and administer replacement therapy as ordered  Outcome: Progressing     Problem: RESPIRATORY - ADULT  Goal: Achieves optimal ventilation and oxygenation  Description: INTERVENTIONS:  - Assess for changes in respiratory status  - Assess for changes in mentation and behavior  - Position to facilitate oxygenation and minimize respiratory effort  -  Oxygen supplementation based on oxygen saturation or ABGs  - Provide Smoking Cessation handout, if applicable  - Encourage broncho-pulmonary hygiene including cough, deep breathe, Incentive Spirometry  - Assess the need for suctioning and perform as needed  - Assess and instruct to report SOB or any respiratory difficulty  - Respiratory Therapy support as indicated  - Manage/alleviate anxiety  - Monitor for signs/symptoms of CO2 retention  Outcome: Progressing     Problem: GASTROINTESTINAL - ADULT  Goal: Minimal or absence of nausea and vomiting  Description: INTERVENTIONS:  - Maintain adequate hydration with IV or PO as ordered and tolerated  - Nasogastric tube to low intermittent suction as ordered  - Evaluate effectiveness of ordered antiemetic medications  - Provide nonpharmacologic comfort measures as appropriate  - Advance diet as tolerated, if ordered  - Obtain nutritional consult as needed  - Evaluate fluid balance  Outcome: Progressing  Goal: Maintains or returns to baseline bowel function  Description: INTERVENTIONS:  - Assess bowel function  - Maintain adequate hydration with IV or PO as ordered and tolerated  - Evaluate effectiveness of GI medications  - Encourage mobilization and activity  - Obtain nutritional consult as needed  - Establish a toileting routine/schedule  - Consider collaborating with pharmacy to review patient's medication profile  Outcome: Progressing     Problem: GENITOURINARY - ADULT  Goal: Absence of urinary retention  Description: INTERVENTIONS:  - Assess patient’s ability to void and empty bladder  - Monitor intake/output and perform bladder scan as needed  - Follow urinary retention protocol/standard of care  - Consider collaborating with pharmacy to review patient's medication profile  - Implement strategies to promote bladder emptying  Outcome: Progressing     Problem: METABOLIC/FLUID AND ELECTROLYTES - ADULT  Goal: Electrolytes maintained within normal limits  Description:  INTERVENTIONS:  - Monitor labs and rhythm and assess patient for signs and symptoms of electrolyte imbalances  - Administer electrolyte replacement as ordered  - Monitor response to electrolyte replacements, including rhythm and repeat lab results as appropriate  - Fluid restriction as ordered  - Instruct patient on fluid and nutrition restrictions as appropriate  Outcome: Progressing     Problem: MUSCULOSKELETAL - ADULT  Goal: Return mobility to safest level of function  Description: INTERVENTIONS:  - Assess patient stability and activity tolerance for standing, transferring and ambulating w/ or w/o assistive devices  - Assist with transfers and ambulation using safe patient handling equipment as needed  - Ensure adequate protection for wounds/incisions during mobilization  - Obtain PT/OT consults as needed  - Advance activity as appropriate  - Communicate ordered activity level and limitations with patient/family  Outcome: Progressing  Goal: Maintain proper alignment of affected body part  Description: INTERVENTIONS:  - Support and protect limb and body alignment per provider's orders  - Instruct and reinforce with patient and family use of appropriate assistive device and precautions (e.g. spinal or hip dislocation precautions)  Outcome: Progressing     Problem: Impaired Activities of Daily Living  Goal: Achieve highest/safest level of independence in self care  Description: Interventions:  - Assess ability and encourage patient to participate in ADLs to maximize function  - Promote sitting position while performing ADLs such as feeding, grooming, and bathing  - Educate and encourage patient/family in tolerated functional activity level and precautions during self-care  - Encourage patient to incorporate impaired side during daily activities to promote function  Outcome: Progressing     Problem: NEUROLOGICAL - ADULT  Goal: Achieves stable or improved neurological status  Description: INTERVENTIONS  - Assess for  and report changes in neurological status  - Initiate measures to prevent increased intracranial pressure  - Maintain blood pressure and fluid volume within ordered parameters to optimize cerebral perfusion and minimize risk of hemorrhage  - Monitor temperature, glucose, and sodium. Initiate appropriate interventions as ordered  Outcome: Progressing     Problem: Delirium  Goal: Minimize duration of delirium  Description: Interventions:  - Encourage use of hearing aids, eye glasses  - Promote highest level of mobility daily  - Provide frequent reorientation  - Promote wakefulness i.e. lights on, blinds open  - Promote sleep, encourage patient's normal rest cycle i.e. lights off, TV off, minimize noise and interruptions  - Encourage family to assist in orientation and promotion of home routines  Outcome: Progressing

## 2024-04-22 NOTE — OCCUPATIONAL THERAPY NOTE
OCCUPATIONAL THERAPY TREATMENT NOTE - INPATIENT        Room Number: 417/417-A     Presenting Problem: right shoulder pain (NWB RUE, ROM as tolerated)    Problem List  Principal Problem:    Hyponatremia  Active Problems:    Leukocytosis, unspecified type    Acute pain of right shoulder    Hypoxia    Transient disorder of initiating or maintaining wakefulness    Acute metabolic encephalopathy      OCCUPATIONAL THERAPY ASSESSMENT   Patient demonstrates fair progress this session, goals remain in progress.    Patient continues to function below baseline with   ADLs and functional mobility.   Contributing factors to remaining limitations include decreased functional strength, decreased functional reach, pain, impaired sitting balance balance, decreased muscular endurance, and limited bilateral UE (weakness) ROM.  Next session anticipate patient to progress seated grooming, unsupported sitting tolerance, functional transfers.  Occupational Therapy will continue to follow patient for duration of hospitalization.    Patient continues to benefit from continued skilled OT services: to promote return to prior level of function and safety with continuous assistance and gradual rehabilitative therapy .     PLAN  OT Treatment Plan: Balance activities;Energy conservation/work simplification techniques;ADL training;Functional transfer training;Endurance training;UE strengthening/ROM;Cognitive reorientation;Patient/Family education;Patient/Family training;Equipment eval/education;Fine motor coordination activities  OT Device Recommendations: TBD    SUBJECTIVE  \"I'm tired\"     OBJECTIVE  Precautions: Limb alert - right    WEIGHT BEARING RESTRICTION  R Upper Extremity: Weight Bearing as Tolerated (ROM as tolerated)    PAIN ASSESSMENT  Ratin  Location: right shoulder  Management Techniques: Activity promotion; Body mechanics; Repositioning    ACTIVITY TOLERANCE  89 bpm  2L/min 94%    ACTIVITIES OF DAILY LIVING ASSESSMENT  AM-PAC  ‘6-Clicks’ Inpatient Daily Activity Short Form  How much help from another person does the patient currently need…  -   Putting on and taking off regular lower body clothing?: Total  -   Bathing (including washing, rinsing, drying)?: Total  -   Toileting, which includes using toilet, bedpan or urinal? : Total  -   Putting on and taking off regular upper body clothing?: A Lot  -   Taking care of personal grooming such as brushing teeth?: A Lot  -   Eating meals?: A Lot    AM-PAC Score:  Score: 9  Approx Degree of Impairment: 79.59%  Standardized Score (AM-PAC Scale): 25.33  CMS Modifier (G-Code): CL    FUNCTIONAL TRANSFER ASSESSMENT  Sit to Stand: Edge of Bed  Edge of Bed: Not Tested    BED MOBILITY  Rolling: Dependent  Supine to Sit : Maximum Assist (x2)    BALANCE ASSESSMENT  Static Sitting: Contact Guard Assist  Sitting Bilateral: Minimal Assist    FUNCTIONAL ADL ASSESSMENT  Eating: Not Tested  Grooming Seated: Moderate Assist (b/l UE weakness)  LB Dressing Seated: Maximum Assist    Skilled Therapy Provided: RN cleared pt for participation in occupational therapy session, which was completed in collaboration with physical therapy. Upon arrival, pt was supine in bed and agreeable to activity. No family was present during session. Pt benefited from additional time, verbal cues to maximize participation.    To assess pt's safe participation during daily tasks while also providing intermittent education to maximize safety and participation, therapist facilitated the following: supine<>sit, eob sitting x 20 min -- UE therex AAROM (biceps flex/ext x10, cross midline reach x 5), OFH seated eob with Mod A for hand over hand assist + CGA - Min A for sitting balance.     EDUCATION PROVIDED  Patient: Role of Occupational Therapy; Plan of Care; Functional Transfer Techniques; Compensatory ADL Techniques  Patient's Response to Education: Verbalized Understanding; Returned Demonstration    The patient's Approx Degree of  Impairment: 79.59% has been calculated based on documentation in the [facility] '6 clicks' Inpatient Daily Activity Short Form.  Research supports that patients with this level of impairment may benefit from ltc.  Final disposition will be made by interdisciplinary medical team.    Patient End of Session: In bed;Needs met;Call light within reach;RN aware of session/findings    OT Goals:  Patients self stated goal is:home      Patient will complete functional transfer with min a  Comment:  ongoing    Patient will complete toileting with mod a  Comment:  not tested    Patient will tolerate standing for 3 minutes in prep for adls with mod a  Comment: not tested    Patient will complete item retrieval with sba  Comment: not tested             Goals  on: 24  Frequency: 3x/w     OT Session Time: 25 minutes  Self-Care Home Management: 25 minutes

## 2024-04-22 NOTE — PROGRESS NOTES
Progress Note     Dennys Manzano Patient Status:  Inpatient    1935 MRN [id number]   Location [facility] 4W/SW/SE Attending Augustin Hester MD   Hosp Day # 7 PCP CALOS FITZGERALD MD     Chief Complaint:   Chief Complaint   Patient presents with    Abdomen/Flank Pain    Nausea/Vomiting/Diarrhea         Subjective:   S: Patient seen and examined, chart reviewed.   LP held since ASA was given too recently.  LP scheduled for tomorrow morning.       Review of Systems:   10 point ROS completed and was negative, except for pertinent positive and negatives stated in subjective.                Objective:   Vital signs:  Temp:  [98.3 °F (36.8 °C)-99.4 °F (37.4 °C)] 98.5 °F (36.9 °C)  Pulse:  [77-87] 87  Resp:  [16-82] 16  BP: (135-174)/(61-82) 135/64  SpO2:  [92 %-98 %] 96 %    Wt Readings from Last 6 Encounters:   24 241 lb 13.5 oz (109.7 kg)   24 229 lb 8 oz (104.1 kg)   23 218 lb 6.4 oz (99.1 kg)   22 224 lb (101.6 kg)   22 224 lb (101.6 kg)   22 224 lb 13.9 oz (102 kg)       Physical Exam:    General: No acute distress. Weak, tired.   Respiratory: Clear to auscultation bilaterally. No wheezes. No rhonchi.  Cardiovascular: S1, S2. Regular rate and rhythm. No murmurs, rubs or gallops.   Abdomen: Soft, nontender, nondistended.  Positive bowel sounds. No rebound or guarding.  Neurologic: generalized weakness  Musculoskeletal: right shoulder tender  Extremities: trace edema.    Results:   Diagnostic Data:      Labs:    Labs Last 24 Hours:   BMP     CBC    Other     Na 131 Cl 95 BUN 15 Glu 166   Hb 10.6   PTT - Procal -   K 4.2 CO2 32.0 Cr 0.53   WBC 12.1 >< .0  INR 1.19 CRP -   Renal Lytes Endo    Hct 31.5   Trop - D dim -   eGFR - Ca 8.7 POC Gluc  145    LFT   pBNP - Lactic -   eGFR AA - PO4 - A1c -   AST - APk - Prot -  LDL -      Pro-Calcitonin  Recent Labs   Lab 24  0656   PCT 0.25*          Medications:    acyclovir  10 mg/kg (Ideal) Intravenous Q8H     phytonadione (Aqua-[place]ep[place]) 10 mg in sodium chloride 0.9% 50 mL IVPB  10 mg Intravenous Once    cefTRIAXone  2 g Intravenous Q12H    metoprolol succinate ER  50 mg Oral Daily Beta Blocker    ipratropium-albuterol  3 mL Nebulization Q6H WA    magnesium oxide  400 mg Oral Once    fluticasone propionate  1 spray Each Nare Daily    montelukast  10 mg Oral Nightly    aspirin  81 mg Oral Daily    atorvastatin  40 mg Oral Nightly    finasteride  5 mg Oral Daily    pantoprazole  40 mg Oral QAM AC    senna-docusate  1 tablet Oral BID    umeclidinium-vilanterol  1 puff Inhalation Daily    tamsulosin  0.4 mg Oral Daily with dinner    insulin aspart  1-5 Units Subcutaneous TID CC    ondansetron  4 mg Intravenous Once    sodium chloride  1,000 mL Intravenous Once       Assessment & Plan:   ASSESSMENT / PLAN:     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   - s/p R shoulder joint aspiration 4/15   - 59,000 WBC's with predominately neutrophils. + calcium pyrophosphate crystals --> pseudogout. Culture negative.   - IV rocephin 2gm q 12, day #4, s/p zosyn 4 days.   - ID on consult.   - PT/OT - LISA eventually   - blood cx x 2 NGTD.   - R shoulder arthroscopy extensive debridement of R shoulder, bicep tenotomy 4/17      Acute encephalopathy now completely resolved  - etiology unclear. ? Meningitis.    - LP to be done tomorrow by Radiology  - CT head no acute findings. MRI no acute CVA  - EEG c/w encephalopathy, now neuro normal  - IV acyclovir empirically day #4  - zosyn stopped after 4 days. Now on rocephin 2g IV BID day #4  - Vitamin K IV x 1 now. Once INR < 1.5, LP.  -Hold coumadin until LP done  - ID and neuro on consult.   - neuro checks.      Ecoli UTI  - cx + on 4/15, now treatment complete.     Acute respiratory failure with hypoxia  Likely COPD exacerbation in combination with atelectasis  - was on 5L NC wean o2 now RA  - duonebs q6hrs WA  - SLP eval regular thin liquids.   - Oral  diet   - IS/flutter valve.   - CXR atelectasis   - Antitussives PRN   - CT chest on admit showed dense atelectasis no PE.      DVT/pulmonary embolism  - INR elevated yesterday  - hold home warfarin restart after LP  - got vit k 4/19     Hyponatremia  - appears euvolemic now   - sodium better with IVF.   - off IVF.   - Urine osm high, urine sdoium high.   - free water restriction      NSVT  - ECHO LVEF 55-60%. No WMA.   - keep Mg 2.0 and K 4.0   - cont metoprolol     DM II  - A1c 6.4  - ISS     BPH  Chronic urinary retention   - Continue Flomax and finasteride  - cont valladares last changed 04/01/24     Constipation  - bowel regimen        Estimated date of discharge: TBD  Discharge is dependent on: w/u completed  At this point Mr. Manzano is expected to be discharge to: SNF    Plan of care discussed with patient, nurse    Augustin Hester MD, FAAP, FACP  [facility] Hospitalist  I respond to Epic Chat and AMS Connect    45 minutes spent on this admission - examining patient, obtaining history, reviewing previous medical records, going over test results/imaging and discussing plan of care. All questions answered.

## 2024-04-22 NOTE — PHYSICAL THERAPY NOTE
PHYSICAL THERAPY TREATMENT NOTE - INPATIENT     Room Number: 417/417-A       Presenting Problem: hyponatremia, recent GI virus, nausea, vomiting, diarrhea, R shoulder pain s/p bedside aspiration       Problem List  Principal Problem:    Hyponatremia  Active Problems:    Leukocytosis, unspecified type    Acute pain of right shoulder    Hypoxia    Transient disorder of initiating or maintaining wakefulness    Acute metabolic encephalopathy      PHYSICAL THERAPY ASSESSMENT   Patient demonstrates limited progress this session, goals  remain in progress.    Patient continues to function below baseline with bed mobility, transfers, gait, and maintaining seated position.  Contributing factors to remaining limitations include decreased functional strength, decreased endurance/aerobic capacity, and decreased muscular endurance.  Next session anticipate patient to progress bed mobility, transfers, gait, and standing prolonged periods.  Physical Therapy will continue to follow patient for duration of hospitalization.    Patient continues to benefit from continued skilled PT services: to promote return to prior level of function and safety with continuous assistance and gradual rehabilitative therapy .    PLAN  PT Treatment Plan: Bed mobility;Body mechanics;Coordination;Endurance;Patient education;Gait training;Strengthening;Transfer training;Balance training  Frequency (Obs): 3-5x/week    SUBJECTIVE  Pt was agreeable to therapy session.         OBJECTIVE  Precautions: Limb alert - right    WEIGHT BEARING RESTRICTION  R Upper Extremity: Weight Bearing as Tolerated (ROM as tolerated)             PAIN ASSESSMENT   Rating: Unable to rate  Location: pain with BUE/BLE PROM  Management Techniques: Activity promotion;Body mechanics;Relaxation;Repositioning    BALANCE  Static Sitting: Fair -  Dynamic Sitting: Fair -  Static Standing: Not tested  Dynamic Standing: Not tested    ACTIVITY TOLERANCE                          O2 WALK  Oxygen  Therapy  At rest oxygen flow (liters per minute): 2  Ambulation oxygen flow (liters per minute): 2    AM-PAC '6-Clicks' INPATIENT SHORT FORM - BASIC MOBILITY  How much difficulty does the patient currently have...  Patient Difficulty: Turning over in bed (including adjusting bedclothes, sheets and blankets)?: A Lot   Patient Difficulty: Sitting down on and standing up from a chair with arms (e.g., wheelchair, bedside commode, etc.): A Lot   Patient Difficulty: Moving from lying on back to sitting on the side of the bed?: A Lot   How much help from another person does the patient currently need...   Help from Another: Moving to and from a bed to a chair (including a wheelchair)?: Total   Help from Another: Need to walk in hospital room?: Total   Help from Another: Climbing 3-5 steps with a railing?: Total     AM-PAC Score:  Raw Score: 9   Approx Degree of Impairment: 81.38%   Standardized Score (AM-PAC Scale): 30.55   CMS Modifier (G-Code): CM    FUNCTIONAL ABILITY STATUS  Functional Mobility/Gait Assessment  Gait Assistance: Not tested  Rolling: maximum assist  Supine to Sit: maximum assist  Sit to Supine: maximum assist  Sit to Stand:  NT    Additional information: Pt is received in the bed and was cleared for therapy session. Co treat session with YON Hernandez. Pt is max A x2 with bed mobility and to transfer to the EOB. Pt required assist with his B LE's and his upper trunk to sit up. Pt sat EOB for about 20 minutes with min/CGA with sitting balance. Pt worked on sitting balance and ADL's while sitting EOB. Pt fatigued and returned back to supine in the bed. Pt is repositioned and left in the bed with all needs within reach. Reported to the RN on the status of the pt.     The patient's Approx Degree of Impairment: 81.38% has been calculated based on documentation in the [facility] '6 clicks' Inpatient Daily Activity Short Form.  Research supports that patients with this level of impairment may benefit from Rehab.  Final  disposition will be made by interdisciplinary medical team.        Patient End of Session: In bed;Needs met;Call light within reach;RN aware of session/findings;All patient questions and concerns addressed;Alarm set;SCDs in place    CURRENT GOALS   Goals to be met by: 5/5/24  Patient Goal Patient's self-stated goal is: not formally stated   Goal #1 Patient is able to demonstrate supine - sit EOB @ level: moderate assistance     Goal #1   Current Status    Goal #2 Patient is able to demonstrate transfers Sit to/from Stand at assistance level: moderate assistance with least restrictive device     Goal #2  Current Status    Goal #3 Patient is able to ambulate >10 feet with assist device: least restrictive device at assistance level: moderate assistance   Goal #3   Current Status    Goal #4 Patient will negotiate 1 stairs/one curb w/ assistive device and moderate assistance   Goal #4   Current Status    Goal #5 Patient to demonstrate independence with home activity/exercise instructions provided to patient in preparation for discharge.   Goal #5   Current Status    Goal #6    Goal #6  Current Status        Therapeutic Activity: 30 minutes

## 2024-04-22 NOTE — SPIRITUAL CARE NOTE
Spiritual Care Visit Note    Patient Name: Dennys Manzano Date of Spiritual Care Visit: 24   : 1935 Primary Dx: Hyponatremia       Referred By: Referral From: MAY Sanchez    Visit Type/Summary:     - Spiritual Care: Attempted visit. Patient is unavailable. Left a Spiritual Care contact information card.  remains available as needed for follow up.    Spiritual Care support can be requested via an Epic consult. For urgent/immediate needs, please contact the On Call  at: [place]: ext [phone]    Chaplain Mert.

## 2024-04-23 ENCOUNTER — APPOINTMENT (OUTPATIENT)
Dept: GENERAL RADIOLOGY | Facility: HOSPITAL | Age: 89
DRG: 500 | End: 2024-04-23
Attending: HOSPITALIST
Payer: MEDICARE

## 2024-04-23 ENCOUNTER — APPOINTMENT (OUTPATIENT)
Dept: GENERAL RADIOLOGY | Facility: HOSPITAL | Age: 89
End: 2024-04-23
Attending: HOSPITALIST
Payer: MEDICARE

## 2024-04-23 LAB
COLOR CSF: COLORLESS
CRYPTOCOCCAL ANTIGEN CSF: NEGATIVE
GLUCOSE BLDC GLUCOMTR-MCNC: 145 MG/DL (ref 70–99)
GLUCOSE BLDC GLUCOMTR-MCNC: 146 MG/DL (ref 70–99)
GLUCOSE BLDC GLUCOMTR-MCNC: 155 MG/DL (ref 70–99)
GLUCOSE BLDC GLUCOMTR-MCNC: 184 MG/DL (ref 70–99)
GLUCOSE CSF-MCNC: 100 MG/DL (ref 40–70)
INR BLD: 1.2 (ref 0.8–1.2)
PROT PATTERN CSF ELPH-IMP: 47.9 MG/DL (ref 15–45)
PROTHROMBIN TIME: 16 SECONDS (ref 11.6–14.8)
RBC # CSF: 9 /CUMM (ref ?–1)
TOTAL CELLS COUNTED CSF: 2 /CUMM (ref 0–5)
TOTAL VOLUME CSF: 19 ML
TUBE # CSF: 3
TURBIDITY CSF QL: CLEAR

## 2024-04-23 PROCEDURE — 009U3ZZ DRAINAGE OF SPINAL CANAL, PERCUTANEOUS APPROACH: ICD-10-PCS | Performed by: HOSPITALIST

## 2024-04-23 PROCEDURE — 99232 SBSQ HOSP IP/OBS MODERATE 35: CPT | Performed by: PHYSICIAN ASSISTANT

## 2024-04-23 PROCEDURE — 62328 DX LMBR SPI PNXR W/FLUOR/CT: CPT | Performed by: HOSPITALIST

## 2024-04-23 PROCEDURE — 99233 SBSQ HOSP IP/OBS HIGH 50: CPT | Performed by: HOSPITALIST

## 2024-04-23 PROCEDURE — 009U3ZX DRAINAGE OF SPINAL CANAL, PERCUTANEOUS APPROACH, DIAGNOSTIC: ICD-10-PCS | Performed by: RADIOLOGY

## 2024-04-23 RX ORDER — LIDOCAINE HYDROCHLORIDE 10 MG/ML
INJECTION, SOLUTION EPIDURAL; INFILTRATION; INTRACAUDAL; PERINEURAL
Status: COMPLETED
Start: 2024-04-23 | End: 2024-04-23

## 2024-04-23 RX ORDER — LIDOCAINE HYDROCHLORIDE 10 MG/ML
5 INJECTION, SOLUTION EPIDURAL; INFILTRATION; INTRACAUDAL; PERINEURAL ONCE
Status: COMPLETED | OUTPATIENT
Start: 2024-04-23 | End: 2024-04-23

## 2024-04-23 NOTE — PROGRESS NOTES
[facility]  part of [facility]    Progress Note    Dennys Manzano Patient Status:  Inpatient    1935 MRN [id number]   Location [facility] 4W/SW/SE Attending Jeimy Driscoll MD   Hosp Day # 8 PCP CALOS FITZGERALD MD     Subjective:   Seen and examined while resting in bed. Drowsy but arousable. Very weak. Denies shortness of breath and cough. No fever or chills. On 2 L O2.    Objective:   Blood pressure 149/69, pulse 92, temperature 98.5 °F (36.9 °C), temperature source Oral, resp. rate 21, height 6' (1.829 m), weight 241 lb 13.5 oz (109.7 kg), SpO2 94%.  Physical Exam  Vitals and nursing note reviewed.   Constitutional:       General: He is sleeping. He is not in acute distress.     Appearance: He is ill-appearing.      Interventions: Nasal cannula in place.   Cardiovascular:      Rate and Rhythm: Normal rate and regular rhythm.   Pulmonary:      Effort: No respiratory distress.      Breath sounds: No wheezing, rhonchi or rales.   Abdominal:      General: There is no distension.      Palpations: Abdomen is soft.      Tenderness: There is no abdominal tenderness.   Musculoskeletal:      Cervical back: Normal range of motion and neck supple.      Right lower leg: No edema.      Left lower leg: No edema.   Skin:     General: Skin is warm and dry.   Neurological:      Mental Status: He is oriented to person, place, and time and easily aroused.      Motor: Weakness present.       Results:   Lab Results   Component Value Date    WBC 12.1 (H) 2024    HGB 10.6 (L) 2024    HCT 31.5 (L) 2024    .0 2024    CREATSERUM 0.53 (L) 2024    BUN 15 2024     (L) 2024    K 4.2 2024    CL 95 (L) 2024    CO2 32.0 2024     (H) 2024    CA 8.7 2024    ALB 3.5 2024    ALKPHO 91 2024    BILT 1.1 2024    TP 6.8 2024    AST <8 2024    ALT 16 2024    PTT 41.0 (H) 2024    INR 1.19  04/22/2024    T4F 1.0 04/18/2024    TSH 0.277 (L) 04/18/2024    LIP 26 04/14/2024    ESRML 111 (H) 04/14/2024    CRP 13.9 (H) 02/10/2018    MG 1.8 04/22/2024    PHOS 2.7 04/20/2024    TROP <0.045 08/18/2021    TROPHS 11 04/14/2024     03/23/2021       Assessment & Plan:   Encephalopathy  Etiology unclear  CT brain no acute findings  EEG no seizure  S/p lumbar puncture today  Seen by neuro  Plan:  -Follow up lumbar puncture results  -On acyclovir and ceftriaxone as per ID  -Avoid sedatives and narcotics    Acute hypoxemic and hypercapnic respiratory failure  Underlying COPD and possible NAYAN  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    Bernardo Zarate PA-C  Pulmonary Medicine  4/23/2024

## 2024-04-23 NOTE — PLAN OF CARE
Alert and oriented x4. Admitted for debridement of joint. POD 6. Max assist lift. Tele. ACHS. Fluid restriction. 1:1 feed. Chronic valladares for BPH. ACHS. 2L NC, history of copd. Aspirin on hold. Acyclovir and ceftriaxone. Mepilex to elbows. Plan for lumbar puncture 10am.  Problem: Patient Centered Care  Goal: Patient preferences are identified and integrated in the patient's plan of care  Description: Interventions:  - What would you like us to know as we care for you? Patient is from home with family and he has a daughter that works here who is his support system.  Patient is hard of hearing.  - Provide timely, complete, and accurate information to patient/family  - Incorporate patient and family knowledge, values, beliefs, and cultural backgrounds into the planning and delivery of care  - Encourage patient/family to participate in care and decision-making at the level they choose  - Honor patient and family perspectives and choices  Outcome: Progressing     Problem: Patient/Family Goals  Goal: Patient/Family Long Term Goal  Description: Patient's Long Term Goal: Pain on right shoulder will be resolved and will be able to walk well with walker.    Interventions:  - No weight bearing on right arm till surgeon says so.  - Home health PT/OT as ordered.  - Pain management with ,oral medication.  - Monitor right shoulder for swelling or increasing pain or weakness.  - Follow up with surgery as recommended.  - See additional Care Plan goals for specific interventions  Outcome: Progressing  Goal: Patient/Family Short Term Goal  Description: Patient's Short Term Goal: Home with [facility] when pain is resolved.    Interventions:   - NWB to right arm.  - PT/OT as ordered.  - Administer IV antibiotics as ordered.  - Administer oral antibiotics as ordered.  - Maintain enteric/contact isolation as ordered.  - Out of bed as much as tolerated.  - Administer oral anticoagulation as ordered, SCD's to both legs as well.  - See additional  Care Plan goals for specific interventions  Outcome: Progressing     Problem: PAIN - ADULT  Goal: Verbalizes/displays adequate comfort level or patient's stated pain goal  Description: INTERVENTIONS:  - Encourage pt to monitor pain and request assistance  - Assess pain using appropriate pain scale  - Administer analgesics based on type and severity of pain and evaluate response  - Implement non-pharmacological measures as appropriate and evaluate response  - Consider cultural and social influences on pain and pain management  - Manage/alleviate anxiety  - Utilize distraction and/or relaxation techniques  - Monitor for opioid side effects  - Notify MD/LIP if interventions unsuccessful or patient reports new pain  - Anticipate increased pain with activity and pre-medicate as appropriate  Outcome: Progressing     Problem: RISK FOR INFECTION - ADULT  Goal: Absence of fever/infection during anticipated neutropenic period  Description: INTERVENTIONS  - Monitor WBC  - Administer growth factors as ordered  - Implement neutropenic guidelines  Outcome: Progressing     Problem: SAFETY ADULT - FALL  Goal: Free from fall injury  Description: INTERVENTIONS:  - Assess pt frequently for physical needs  - Identify cognitive and physical deficits and behaviors that affect risk of falls.  - [place] fall precautions as indicated by assessment.  - Educate pt/family on patient safety including physical limitations  - Instruct pt to call for assistance with activity based on assessment  - Modify environment to reduce risk of injury  - Provide assistive devices as appropriate  - Consider OT/PT consult to assist with strengthening/mobility  - Encourage toileting schedule  Outcome: Progressing     Problem: DISCHARGE PLANNING  Goal: Discharge to home or other facility with appropriate resources  Description: INTERVENTIONS:  - Identify barriers to discharge w/pt and caregiver  - Include patient/family/discharge partner in discharge  planning  - Arrange for needed discharge resources and transportation as appropriate  - Identify discharge learning needs (meds, wound care, etc)  - Arrange for interpreters to assist at discharge as needed  - Consider post-discharge preferences of patient/family/discharge partner  - Complete POLST form as appropriate  - Assess patient's ability to be responsible for managing their own health  - Refer to Case Management Department for coordinating discharge planning if the patient needs post-hospital services based on physician/LIP order or complex needs related to functional status, cognitive ability or social support system  Outcome: Progressing     Problem: CARDIOVASCULAR - ADULT  Goal: Maintains optimal cardiac output and hemodynamic stability  Description: INTERVENTIONS:  - Monitor vital signs, rhythm, and trends  - Monitor for bleeding, hypotension and signs of decreased cardiac output  - Evaluate effectiveness of vasoactive medications to optimize hemodynamic stability  - Monitor arterial and/or venous puncture sites for bleeding and/or hematoma  - Assess quality of pulses, skin color and temperature  - Assess for signs of decreased coronary artery perfusion - ex. Angina  - Evaluate fluid balance, assess for edema, trend weights  Outcome: Progressing  Goal: Absence of cardiac arrhythmias or at baseline  Description: INTERVENTIONS:  - Continuous cardiac monitoring, monitor vital signs, obtain 12 lead EKG if indicated  - Evaluate effectiveness of antiarrhythmic and heart rate control medications as ordered  - Initiate emergency measures for life threatening arrhythmias  - Monitor electrolytes and administer replacement therapy as ordered  Outcome: Progressing     Problem: RESPIRATORY - ADULT  Goal: Achieves optimal ventilation and oxygenation  Description: INTERVENTIONS:  - Assess for changes in respiratory status  - Assess for changes in mentation and behavior  - Position to facilitate oxygenation and minimize  respiratory effort  - Oxygen supplementation based on oxygen saturation or ABGs  - Provide Smoking Cessation handout, if applicable  - Encourage broncho-pulmonary hygiene including cough, deep breathe, Incentive Spirometry  - Assess the need for suctioning and perform as needed  - Assess and instruct to report SOB or any respiratory difficulty  - Respiratory Therapy support as indicated  - Manage/alleviate anxiety  - Monitor for signs/symptoms of CO2 retention  Outcome: Progressing     Problem: GASTROINTESTINAL - ADULT  Goal: Minimal or absence of nausea and vomiting  Description: INTERVENTIONS:  - Maintain adequate hydration with IV or PO as ordered and tolerated  - Nasogastric tube to low intermittent suction as ordered  - Evaluate effectiveness of ordered antiemetic medications  - Provide nonpharmacologic comfort measures as appropriate  - Advance diet as tolerated, if ordered  - Obtain nutritional consult as needed  - Evaluate fluid balance  Outcome: Progressing  Goal: Maintains or returns to baseline bowel function  Description: INTERVENTIONS:  - Assess bowel function  - Maintain adequate hydration with IV or PO as ordered and tolerated  - Evaluate effectiveness of GI medications  - Encourage mobilization and activity  - Obtain nutritional consult as needed  - Establish a toileting routine/schedule  - Consider collaborating with pharmacy to review patient's medication profile  Outcome: Progressing     Problem: GENITOURINARY - ADULT  Goal: Absence of urinary retention  Description: INTERVENTIONS:  - Assess patient’s ability to void and empty bladder  - Monitor intake/output and perform bladder scan as needed  - Follow urinary retention protocol/standard of care  - Consider collaborating with pharmacy to review patient's medication profile  - Implement strategies to promote bladder emptying  Outcome: Progressing     Problem: METABOLIC/FLUID AND ELECTROLYTES - ADULT  Goal: Electrolytes maintained within normal  limits  Description: INTERVENTIONS:  - Monitor labs and rhythm and assess patient for signs and symptoms of electrolyte imbalances  - Administer electrolyte replacement as ordered  - Monitor response to electrolyte replacements, including rhythm and repeat lab results as appropriate  - Fluid restriction as ordered  - Instruct patient on fluid and nutrition restrictions as appropriate  Outcome: Progressing     Problem: MUSCULOSKELETAL - ADULT  Goal: Return mobility to safest level of function  Description: INTERVENTIONS:  - Assess patient stability and activity tolerance for standing, transferring and ambulating w/ or w/o assistive devices  - Assist with transfers and ambulation using safe patient handling equipment as needed  - Ensure adequate protection for wounds/incisions during mobilization  - Obtain PT/OT consults as needed  - Advance activity as appropriate  - Communicate ordered activity level and limitations with patient/family  Outcome: Progressing  Goal: Maintain proper alignment of affected body part  Description: INTERVENTIONS:  - Support and protect limb and body alignment per provider's orders  - Instruct and reinforce with patient and family use of appropriate assistive device and precautions (e.g. spinal or hip dislocation precautions)  Outcome: Progressing     Problem: Impaired Activities of Daily Living  Goal: Achieve highest/safest level of independence in self care  Description: Interventions:  - Assess ability and encourage patient to participate in ADLs to maximize function  - Promote sitting position while performing ADLs such as feeding, grooming, and bathing  - Educate and encourage patient/family in tolerated functional activity level and precautions during self-care  - Encourage patient to incorporate impaired side during daily activities to promote function  Outcome: Progressing     Problem: NEUROLOGICAL - ADULT  Goal: Achieves stable or improved neurological status  Description:  INTERVENTIONS  - Assess for and report changes in neurological status  - Initiate measures to prevent increased intracranial pressure  - Maintain blood pressure and fluid volume within ordered parameters to optimize cerebral perfusion and minimize risk of hemorrhage  - Monitor temperature, glucose, and sodium. Initiate appropriate interventions as ordered  Outcome: Progressing     Problem: Delirium  Goal: Minimize duration of delirium  Description: Interventions:  - Encourage use of hearing aids, eye glasses  - Promote highest level of mobility daily  - Provide frequent reorientation  - Promote wakefulness i.e. lights on, blinds open  - Promote sleep, encourage patient's normal rest cycle i.e. lights off, TV off, minimize noise and interruptions  - Encourage family to assist in orientation and promotion of home routines  Outcome: Progressing

## 2024-04-23 NOTE — PROGRESS NOTES
04/23/24 1100   VISIT TYPE   SLP Inpatient Visit Type (Documentation Required) Attempted Treatment   FOLLOW UP/PLAN   Follow Up Needed (Documentation Required) Yes   SLP Follow-up Date 04/24/24     Unable to see Pt for dysphagia tx d/t Pt to remain in supine position until later in p.m post procedure. To f/u 4/24/24. Collaborated with RN regarding Pt's swallowing plan of care.

## 2024-04-23 NOTE — PROGRESS NOTES
[facility]  part of [facility]    Progress Note    Dennys Manzano Patient Status:  Inpatient    1935 MRN [id number]   Location [facility] 4W/SW/SE Attending Jeimy Driscoll MD   Hosp Day # 8 PCP CALOS FITZGERALD MD     Chief Complaint:   Chief Complaint   Patient presents with    Abdomen/Flank Pain    Nausea/Vomiting/Diarrhea       Subjective:   Dennys Manzano is [place].He is still drowsy but better wakes and answers questions. On 2L NC o2. No F/C still c/o neck pain and R shoulder pain       Objective:   Objective:    Blood pressure 149/69, pulse 92, temperature 98.5 °F (36.9 °C), temperature source Oral, resp. rate 21, height 6' (1.829 m), weight 241 lb 13.5 oz (109.7 kg), SpO2 94%.    Physical Exam:    General: No acute distress. Drowsy but wakes and answers questions.   Respiratory: Clear to auscultation bilaterally. No wheezes. No rhonchi.  Cardiovascular: S1, S2. Regular rate and rhythm. No murmurs, rubs or gallops.   Abdomen: Soft, nontender, nondistended.  Positive bowel sounds. No rebound or guarding.  Neurologic: No focal neurological deficits.   Musculoskeletal: Moves all extremities.  Extremities: No edema.      Results:   Results:    Labs:  Recent Labs   Lab 24  0529 24  0656 24  0434 24  0425 24  0412   WBC 14.6* 18.1* 15.7* 14.2* 12.1*   HGB 12.2* 11.7* 11.4* 10.6* 10.6*   MCV 96.5 95.3 94.0 95.1 94.3   .0 348.0 314.0 332.0 363.0   INR  --  3.12* 3.20* 1.54* 1.19       Recent Labs   Lab 24  0656 24  0434 24  0425 24  0357 24  0412   * 130* 138*  --  166*   BUN 15 15 17  --  15   CREATSERUM 0.65* 0.61* 0.60*  --  0.53*   CA 9.0 9.0 8.8  --  8.7   ALB 3.5  --   --   --   --    * 134* 136  --  131*   K 4.2 4.2 3.8 4.1 4.2    98 99  --  95*   CO2 31.0 31.0 34.0*  --  32.0   ALKPHO 91  --   --   --   --    AST <8  --   --   --   --    ALT 16  --   --   --   --    BILT 1.1  --   --   --   --     TP 6.8  --   --   --   --        Estimated Creatinine Clearance: 109.8 mL/min (A) (based on SCr of 0.53 mg/dL (L)).    Recent Labs   Lab 04/19/24  0434 04/20/24  0425 04/22/24  0412   PTP 34.8* 19.5* 15.8*   INR 3.20* 1.54* 1.19            Culture:  Hospital Encounter on 04/14/24   1. Blood Culture     Status: None (Preliminary result)    Collection Time: 04/18/24  9:41 PM    Specimen: Blood,peripheral   Result Value Ref Range    Blood Culture Result No Growth 4 Days N/A   2. Tissue Aerobic Culture     Status: None    Collection Time: 04/17/24  2:35 PM    Specimen: Tissue   Result Value Ref Range    Tissue Culture Result No Growth 3 Days N/A    Tissue Smear 1+ WBCs seen N/A    Tissue Smear No organisms seen N/A   3. Anaerobic Culture     Status: None    Collection Time: 04/17/24  2:35 PM    Specimen: Tissue   Result Value Ref Range    Anaerobic Culture No Anaerobes isolated N/A   4. Body Fluid Cult Aerobic and Anaerobic     Status: None    Collection Time: 04/15/24  7:26 AM    Specimen: Synovial fluid,shoulder; Body fluid, unspecified   Result Value Ref Range    Body Fluid Culture Result No Growth 5 Days N/A    Body Fluid Smear 3+ WBCs seen N/A    Body Fluid Smear No organisms seen N/A    Body Fluid Smear This is a cytocentrifuged smear. N/A   5. Urine Culture, Routine     Status: Abnormal    Collection Time: 04/15/24  5:43 AM    Specimen: Urine, clean catch   Result Value Ref Range    Urine Culture >100,000 CFU/ML Enterococcus faecalis NOT VRE (A) N/A       Cardiac  No results for input(s): \"TROP\", \"PBNP\" in the last 168 hours.      Imaging: Imaging data reviewed in Epic.  No results found.    Medications:    lidocaine PF        docusate sodium  100 mg Oral BID    acyclovir  10 mg/kg (Ideal) Intravenous Q8H    phytonadione (Aqua-Mephton) 10 mg in sodium chloride 0.9% 50 mL IVPB  10 mg Intravenous Once    cefTRIAXone  2 g Intravenous Q12H    metoprolol succinate ER  50 mg Oral Daily Beta Blocker     ipratropium-albuterol  3 mL Nebulization Q6H WA    magnesium oxide  400 mg Oral Once    fluticasone propionate  1 spray Each Nare Daily    montelukast  10 mg Oral Nightly    aspirin  81 mg Oral Daily    atorvastatin  40 mg Oral Nightly    finasteride  5 mg Oral Daily    pantoprazole  40 mg Oral QAM AC    senna-docusate  1 tablet Oral BID    umeclidinium-vilanterol  1 puff Inhalation Daily    tamsulosin  0.4 mg Oral Daily with dinner    insulin aspart  1-5 Units Subcutaneous TID CC    ondansetron  4 mg Intravenous Once    sodium chloride  1,000 mL Intravenous Once         Assessment and Plan:   Assessment & Plan:      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   - s/p R shoulder joint aspiration 4/15   - 59,000 WBC's with predominately neutrophils. + calcium pyrophosphate crystals --> pseudogout. Culture negative.   - IV rocephin 2gm q 12, day #5  - ID on consult.   - PT/OT - [facility] eventually   - blood cx x 2 NGTD.   - R shoulder arthroscopy extensive debridement of R shoulder, bicep tenotomy 4/17      Acute encephalopathy now completely resolved  - etiology unclear. ? Meningitis.    - s/p LP today. Await results.   - IV Acyclovir empirically + Rocephin.   - CT head no acute findings. MRI no acute CVA  - EEG c/w encephalopathy, now neuro normal  - ID and neuro on consult.   - neuro checks.      Ecoli UTI  - cx + on 4/15, now treatment complete.     Acute respiratory failure with hypoxia  Likely COPD exacerbation in combination with atelectasis  - was on 5L NC wean o2 now RA  - duonebs q6hrs WA  - SLP eval regular thin liquids.   - Oral diet   - IS/flutter valve.   - CXR atelectasis   - Antitussives PRN   - CT chest on admit showed dense atelectasis no PE.      DVT/pulmonary embolism  - resume coumadin today      Hyponatremia  - appears euvolemic now   - sodium better with IVF.   - off IVF.   - Urine osm high, urine sdoium high.   - free water restriction      NSVT  - ECHO  LVEF 55-60%. No WMA.   - keep Mg 2.0 and K 4.0   - cont metoprolol     DM II  - A1c 6.4  - ISS     BPH  Chronic urinary retention   - Continue Flomax and finasteride  - cont valladares last changed 04/01/24     Constipation  - bowel regimen      >55min spent, >50% spent counseling and coordinating care in the form of educating pt/family and d/w consultants and staff. Most of the time spent discussing the above plan.        Plan of care discussed with patient or family at bedside.    eJimy Driscoll MD  Hospitalist          Supplementary Documentation:     Quality:  DVT Prophylaxis: warfarin  CODE status: Full   Dispo: per clinical course           Estimated date of discharge: TBD  Discharge is dependent on: clinical stability  At this point Mr. Manzano is expected to be discharge to: [facility]

## 2024-04-23 NOTE — PROGRESS NOTES
INFECTIOUS DISEASE PROGRESS NOTE    Dennys Manzano Patient Status:  Inpatient    1935 MRN [id number]   Location [facility] 4W/SW/SE Attending Jeimy Driscoll MD   Hosp Day # 8 PCP CALOS FITZGERALD MD       SUBJECTIVE  Somewhat fatigued today. S/p LP    ASSESSMENT/PLAN:    Antibiotics: IV ctx, acyclovir; (IV vancomycin, zosyn)     # AMS - unclear etiology; r/o infectious process  # R shoulder crystal arthropathy with possible secondary septic arthritis               - s/p aspiration 4/15/24 59,798, 94% segs, +crystals; cx ngtd   - s/p OR 24 - with degenerative changes, biceps tearing - cx ngtd  # Complete heart block s/p PPM  # CAD s/p CABG  # Chronic valladares, BPH - UCx E. Faecalis - colonization     PLAN:     -  Continue IV acyclovir + CTX  -  await LP results  -  FU cx  -  Follow fever curve, wbc  -  Reviewed labs, micro, imaging reports  -  d/w patient, staff, Primary     History of Present Illness:    Dennys Manzano is a a(n) 88 year old male. Patient is a 88-year-old male with a history of CAD post CABG and pacemaker, BPH, CHF, DM, depression, HLD, PAD, CVA, previous right rotator cuff surgery who now presents to the hospital for right shoulder pain with effusion, difficulty with movement started about 2 days prior.  Afebrile here.  WBC initially 18.  X-ray of the shoulder with DJD without fracture.  CT chest, abdomen, pelvis nausea and vomiting without clear infectious process.  Seen by orthopedic surgery underwent aspiration 4/15 noted WBC around 60,000 with 94% segs and positive crystals.  Cultures so far no growth.  On IV vancomycin and Zosyn.  Plan for surgery tomorrow for arthroscopy and I&D.    OBJECTIVE  /69 (BP Location: Left arm)   Pulse 92   Temp 98.5 °F (36.9 °C) (Oral)   Resp 21   Ht 182.9 cm (6')   Wt 241 lb 13.5 oz (109.7 kg)   SpO2 94%   BMI 32.80 kg/m²     General: Awake, alert, fatigued  HEENT: EOMI, neck supple  Abdomen: Soft, NT/ND  Musculoskeletal: R shoulder  dressed  Integument: No rashes  : Navi+    MD [facility]  [phone]

## 2024-04-24 LAB
ANION GAP SERPL CALC-SCNC: 2 MMOL/L (ref 0–18)
BUN BLD-MCNC: 13 MG/DL (ref 9–23)
BUN/CREAT SERPL: 26.5 (ref 10–20)
CALCIUM BLD-MCNC: 8.9 MG/DL (ref 8.7–10.4)
CHLORIDE SERPL-SCNC: 95 MMOL/L (ref 98–112)
CO2 SERPL-SCNC: 30 MMOL/L (ref 21–32)
CREAT BLD-MCNC: 0.49 MG/DL
DEPRECATED RDW RBC AUTO: 43.9 FL (ref 35.1–46.3)
EGFRCR SERPLBLD CKD-EPI 2021: 100 ML/MIN/1.73M2 (ref 60–?)
ERYTHROCYTE [DISTWIDTH] IN BLOOD BY AUTOMATED COUNT: 12.9 % (ref 11–15)
GLUCOSE BLD-MCNC: 135 MG/DL (ref 70–99)
GLUCOSE BLDC GLUCOMTR-MCNC: 129 MG/DL (ref 70–99)
GLUCOSE BLDC GLUCOMTR-MCNC: 129 MG/DL (ref 70–99)
GLUCOSE BLDC GLUCOMTR-MCNC: 135 MG/DL (ref 70–99)
GLUCOSE BLDC GLUCOMTR-MCNC: 143 MG/DL (ref 70–99)
HCT VFR BLD AUTO: 32.2 %
HGB BLD-MCNC: 10.6 G/DL
MCH RBC QN AUTO: 30.8 PG (ref 26–34)
MCHC RBC AUTO-ENTMCNC: 32.9 G/DL (ref 31–37)
MCV RBC AUTO: 93.6 FL
OSMOLALITY SERPL CALC.SUM OF ELEC: 266 MOSM/KG (ref 275–295)
PLATELET # BLD AUTO: 410 10(3)UL (ref 150–450)
POTASSIUM SERPL-SCNC: 4.5 MMOL/L (ref 3.5–5.1)
RBC # BLD AUTO: 3.44 X10(6)UL
SODIUM SERPL-SCNC: 127 MMOL/L (ref 136–145)
WBC # BLD AUTO: 13.1 X10(3) UL (ref 4–11)

## 2024-04-24 PROCEDURE — 99233 SBSQ HOSP IP/OBS HIGH 50: CPT | Performed by: HOSPITALIST

## 2024-04-24 PROCEDURE — 99232 SBSQ HOSP IP/OBS MODERATE 35: CPT | Performed by: PHYSICIAN ASSISTANT

## 2024-04-24 RX ORDER — ENEMA 19; 7 G/133ML; G/133ML
1 ENEMA RECTAL DAILY PRN
Status: DISCONTINUED | OUTPATIENT
Start: 2024-04-24 | End: 2024-05-01

## 2024-04-24 RX ORDER — IPRATROPIUM BROMIDE AND ALBUTEROL SULFATE 2.5; .5 MG/3ML; MG/3ML
3 SOLUTION RESPIRATORY (INHALATION) 2 TIMES DAILY
Status: DISCONTINUED | OUTPATIENT
Start: 2024-04-24 | End: 2024-04-30

## 2024-04-24 NOTE — PROGRESS NOTES
INFECTIOUS DISEASE PROGRESS NOTE    Dennys Manzano Patient Status:  Inpatient    1935 MRN [id number]   Location [facility] 4W/SW/SE Attending Jeimy Driscoll MD   Hosp Day # 9 PCP CALOS FITZGERALD MD       SUBJECTIVE  Remains awake. Weakness BUE/BLE.     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 Faecalis - colonization     PLAN:     -  Continue IV acyclovir + CTX  -  await LP results from PCR  -  FU cx  -  Follow fever curve, wbc  -  Reviewed labs, micro, imaging reports  -  d/w patient, staff, Primary     History of Present Illness:    Dennys Manzano is a a(n) 88 year old male. Patient is a 88-year-old male with a history of CAD post CABG and pacemaker, BPH, CHF, DM, depression, HLD, PAD, CVA, previous right rotator cuff surgery who now presents to the hospital for right shoulder pain with effusion, difficulty with movement started about 2 days prior.  Afebrile here.  WBC initially 18.  X-ray of the shoulder with DJD without fracture.  CT chest, abdomen, pelvis nausea and vomiting without clear infectious process.  Seen by orthopedic surgery underwent aspiration 4/15 noted WBC around 60,000 with 94% segs and positive crystals.  Cultures so far no growth.  On IV vancomycin and Zosyn.  Plan for surgery tomorrow for arthroscopy and I&D.    OBJECTIVE  /72 (BP Location: Left arm)   Pulse 96   Temp 98 °F (36.7 °C) (Temporal)   Resp 18   Ht 182.9 cm (6')   Wt 241 lb 13.5 oz (109.7 kg)   SpO2 97%   BMI 32.80 kg/m²     General: awake, in bed, alert, fatigued  HEENT: EOMI, neck supple  Abdomen: Soft,  NT/ND  Musculoskeletal: R shoulder dressed  Integument: No rashes    MD [facility]  [phone]

## 2024-04-24 NOTE — CM/SW NOTE
Clinical updates sent to [facility] referrals in [place].    SW spoke to pt's dtr Evangelina via phone. Confirmed top 3 [facility] choices (pending bed availability): 1. [facility] (currently unavail), 2. [facility], and 3. [facility].      SW confirmed pt may not be cleared yet, however, these choices will be notified and continue to monitor pt's progress while he is here. Pt's dtr expressed understanding.      Notified [facility], [facility], and [facility] of current preferences for location upon DC. This is pending who has a bed available when pt is medically cleared.     Pt's dtr confirmed pt would prefer a private room if available but understands it may not be.    UPDATE: Currently [facility] has next possible bed available Wednesday 5/1/24.    PLAN: Likely [facility] vs [facility] - location pending bed availability when pt is cleared for DC        SW/CM to remain available for support and/or discharge planning.         Rolanda Mayers, MSW, LSW [phone]

## 2024-04-24 NOTE — SLP NOTE
SPEECH DAILY NOTE - INPATIENT    ASSESSMENT & PLAN   ASSESSMENT  PPE REQUIRED. THIS THERAPIST WORE GLOVES AND MASK FOR DURATION OF EVALUATION. HANDS WASHED UPON ENTRANCE/EXIT.    SLP f/u for ongoing dysphagia tx/meal assessment per recommendations of soft bite sized/thin liquids per swallow f/u 4/19. Pt's current diet order is for regular/thin liquids per MD? RN reports pt tolerates diet and medication well with no overt clinical s/s aspiration. Pt denies any swallowing challenges.     Pt positioned upright in bed, alert/cooperative/family present. Pt afebrile, tolerating 2L/Min O2NC with oxygen status 97% prior to the start of oral trials. SLP reviewed aspiration precautions and safe swallowing compensatory strategies with the patient. Safe swallow guidelines remain written on the white board in purple. Patient and family v/u. Provided 1:1 assistance, pt tolerates hard solids and thin liquids via straw with no overt clinical signs/symptoms of aspiration. Oxygen status remained stable t/o the entire session. Recommend remain on current diet with strict adherence to aspiration precautions and swallow strategies.    SLP to f/u with meal assessment x1-2, monitor  CXR, and VFSS if any overt CSA and/or decline in CXR. RN alerted with results and recommendations.     MOST RECENT CXR 4/17  Findings and impression:   Stable heart with persistent vascular congestion but no edema   Lung volumes remain moderately low with extensive basilar opacity, most likely atelectasis   No pneumothorax           Diet Recommendations - Solids: Regular  Diet Recommendations - Liquids: Thin Liquids    Compensatory Strategies Recommended: Slow rate;Small bites and sips  Aspiration Precautions: Upright position;Slow rate;Small bites and sips  Medication Administration Recommendations: Whole in puree    Patient Experiencing Pain: No                Treatment Plan  Treatment Plan/Recommendations: Aspiration precautions    Interdisciplinary  Communication: Discussed with RN  Plan posted at bedside            GOALS  Goal #1 The patient will tolerate regular consistency and thin liquids without overt signs or symptoms of aspiration with 100 % accuracy over 1-2 session(s).     In Progress   Goal #2 The patient/family/caregiver will demonstrate understanding and implementation of aspiration precautions and swallow strategies independently over 1-2 session(s).    In Progress   Goal #3 The patient will utilize compensatory strategies as outlined by  BSSE (clinical evaluation) including Slow rate, Small bites, Small sips, Upright 90 degrees, Eliminate distractions, alternating consistencies, Supervision with meals with PRN assistance 100 % of the time across 1-2 sessions.    In Progress     FOLLOW UP  Follow Up Needed (Documentation Required): Yes  SLP Follow-up Date: 04/25/24  Number of Visits to Meet Established Goals: 3 (since transfer to CCU)    Session: 3    If you have any questions, please contact MONIQUE Shah M.S. CCC-SLP  Speech Language Pathologist  Phone Number Ext. [phone]

## 2024-04-24 NOTE — PHYSICAL THERAPY NOTE
PHYSICAL THERAPY TREATMENT NOTE - INPATIENT     Room Number: 429/429-A       Presenting Problem: hyponatremia, recent GI virus, nausea, vomiting, diarrhea, R shoulder pain s/p bedside aspiration       Problem List  Principal Problem:    Hyponatremia  Active Problems:    Leukocytosis, unspecified type    Acute pain of right shoulder    Hypoxia    Transient disorder of initiating or maintaining wakefulness    Acute metabolic encephalopathy      PHYSICAL THERAPY ASSESSMENT   Patient demonstrates good  progress this session, goals  remain in progress.    Patient continues to function below baseline with bed mobility, transfers, and gait.  Contributing factors to remaining limitations include decreased functional strength, decreased endurance/aerobic capacity, and pain.  Next session anticipate patient to progress bed mobility, transfers, and gait.  Physical Therapy will continue to follow patient for duration of hospitalization.    Patient continues to benefit from continued skilled PT services: to promote return to prior level of function and safety with continuous assistance and gradual rehabilitative therapy .    PLAN  PT Treatment Plan: Bed mobility;Body mechanics;Endurance;Patient education;Family education  Frequency (Obs): 3-5x/week    SUBJECTIVE  Limited participation    OBJECTIVE  Precautions: Limb alert - right    WEIGHT BEARING RESTRICTION  R Upper Extremity: Weight Bearing as Tolerated             PAIN ASSESSMENT   Rating: Unable to rate  Location: pain with BUE/BLE PROM  Management Techniques: Activity promotion;Body mechanics;Relaxation;Repositioning    BALANCE  Static Sitting: Fair -  Dynamic Sitting: Fair -  Static Standing: Not tested  Dynamic Standing: Not tested    ACTIVITY TOLERANCE                          O2 WALK       AM-PAC '6-Clicks' INPATIENT SHORT FORM - BASIC MOBILITY  How much difficulty does the patient currently have...  Patient Difficulty: Turning over in bed (including adjusting  bedclothes, sheets and blankets)?: A Lot   Patient Difficulty: Sitting down on and standing up from a chair with arms (e.g., wheelchair, bedside commode, etc.): A Lot   Patient Difficulty: Moving from lying on back to sitting on the side of the bed?: A Lot   How much help from another person does the patient currently need...   Help from Another: Moving to and from a bed to a chair (including a wheelchair)?: Total   Help from Another: Need to walk in hospital room?: Total   Help from Another: Climbing 3-5 steps with a railing?: Total     AM-PAC Score:  Raw Score: 9   Approx Degree of Impairment: 81.38%   Standardized Score (AM-PAC Scale): 30.55   CMS Modifier (G-Code): CM    FUNCTIONAL ABILITY STATUS  Functional Mobility/Gait Assessment  Gait Assistance: Not tested  Rolling: maximum assist  Supine to Sit: maximum assist  Sit to Supine: maximum assist  Sit to Stand: maximum assist    Additional information: Pt seen daily.Per RN request,PT RX limited to bed mobility,positioning for pt comfort as well as there ex.Pt educated on deep breathing and relaxation technique.Family present;family education;all questions and concerns addressed.    The patient's Approx Degree of Impairment: 81.38% has been calculated based on documentation in the [facility] '6 clicks' Inpatient Daily Activity Short Form.  Research supports that patients with this level of impairment may benefit from Sub-acute Rehabilitation..  Final disposition will be made by interdisciplinary medical team.    THERAPEUTIC EXERCISES  Lower Extremity Ankle pumps  Glut sets  Quad sets     Position Supine       Patient End of Session: In bed;Call light within reach;RN aware of session/findings;All patient questions and concerns addressed    CURRENT GOALS     Patient Goal Patient's self-stated goal is: none stated   Goal #1 Patient is able to demonstrate supine - sit EOB @ level: SBA     Goal #1   Current Status Max a   Goal #2 Patient is able to demonstrate transfers  Sit to/from Stand at assistance level: SBA with cane or les-walker     Goal #2  Current Status NT   Goal #3 Patient is able to ambulate 25 feet with assist device: cane or les-walker at assistance level: minimum assistance   Goal #3   Current Status NT   Goal #4 Patient to demonstrate independence with home activity/exercise instructions provided to patient in preparation for discharge.   Goal #4   Current Status In progress     Gait Training:  minutes  Therapeutic Activity: 20 minutes  Neuromuscular Re-education:  minutes  Therapeutic Exercise: 10 minutes  Canalith Repositioning:  minutes  Manual Therapy:  minutes  Can add/delete any of these

## 2024-04-24 NOTE — PLAN OF CARE
Alert today. O2 2 L NC. Bipap at HS. Unable to grasp bilaterally and is able to move arms bilaterally, unable to dorsi plantar flex bilaterally, toes barely mobile and is able to bend legs. 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 Abdomen soft, passing gas, refused suppository for now; miralax and colace and senokot given. Taking some PO today with feed assist. ACHS. Awaiting results of testing for ABX. Right shoulder dressing gauze with tegaderm without drainage. Pills one at a time, whole with water. Blue boots bilaterally on, tilted side to side with pillows for comfort.     Speech informed okay to work with him today, alert and active getting ready to eat lunch.     Meds whole in puree ; small bites and sips upright feed assist    Problem: Patient Centered Care  Goal: Patient preferences are identified and integrated in the patient's plan of care  Description: Interventions:  - What would you like us to know as we care for you? Patient is from home with family and he has a daughter that works here who is his support system.  Patient is hard of hearing.  - Provide timely, complete, and accurate information to patient/family  - Incorporate patient and family knowledge, values, beliefs, and cultural backgrounds into the planning and delivery of care  - Encourage patient/family to participate in care and decision-making at the level they choose  - Honor patient and family perspectives and choices  Outcome: Progressing     Problem: Patient/Family Goals  Goal: Patient/Family Long Term Goal  Description: Patient's Long Term Goal: Pain on right shoulder will be resolved and will be able to walk well with walker.    Interventions:  - No weight bearing on right arm till surgeon says so.  - Home health PT/OT as ordered.  - Pain management with ,oral medication.  - Monitor right shoulder for swelling or increasing pain or weakness.  - Follow up with surgery as recommended.  - See additional Care Plan goals for  specific interventions  Outcome: Progressing  Goal: Patient/Family Short Term Goal  Description: Patient's Short Term Goal: Home with [facility] when pain is resolved.    Interventions:   - NWB to right arm.  - PT/OT as ordered.  - Administer IV antibiotics as ordered.  - Administer oral antibiotics as ordered.  - Maintain enteric/contact isolation as ordered.  - Out of bed as much as tolerated.  - Administer oral anticoagulation as ordered, SCD's to both legs as well.  - See additional Care Plan goals for specific interventions  Outcome: Progressing     Problem: PAIN - ADULT  Goal: Verbalizes/displays adequate comfort level or patient's stated pain goal  Description: INTERVENTIONS:  - Encourage pt to monitor pain and request assistance  - Assess pain using appropriate pain scale  - Administer analgesics based on type and severity of pain and evaluate response  - Implement non-pharmacological measures as appropriate and evaluate response  - Consider cultural and social influences on pain and pain management  - Manage/alleviate anxiety  - Utilize distraction and/or relaxation techniques  - Monitor for opioid side effects  - Notify MD/LIP if interventions unsuccessful or patient reports new pain  - Anticipate increased pain with activity and pre-medicate as appropriate  Outcome: Progressing     Problem: RISK FOR INFECTION - ADULT  Goal: Absence of fever/infection during anticipated neutropenic period  Description: INTERVENTIONS  - Monitor WBC  - Administer growth factors as ordered  - Implement neutropenic guidelines  Outcome: Progressing     Problem: SAFETY ADULT - FALL  Goal: Free from fall injury  Description: INTERVENTIONS:  - Assess pt frequently for physical needs  - Identify cognitive and physical deficits and behaviors that affect risk of falls.  - [place] fall precautions as indicated by assessment.  - Educate pt/family on patient safety including physical limitations  - Instruct pt to call for assistance with  activity based on assessment  - Modify environment to reduce risk of injury  - Provide assistive devices as appropriate  - Consider OT/PT consult to assist with strengthening/mobility  - Encourage toileting schedule  Outcome: Progressing     Problem: DISCHARGE PLANNING  Goal: Discharge to home or other facility with appropriate resources  Description: INTERVENTIONS:  - Identify barriers to discharge w/pt and caregiver  - Include patient/family/discharge partner in discharge planning  - Arrange for needed discharge resources and transportation as appropriate  - Identify discharge learning needs (meds, wound care, etc)  - Arrange for interpreters to assist at discharge as needed  - Consider post-discharge preferences of patient/family/discharge partner  - Complete POLST form as appropriate  - Assess patient's ability to be responsible for managing their own health  - Refer to Case Management Department for coordinating discharge planning if the patient needs post-hospital services based on physician/LIP order or complex needs related to functional status, cognitive ability or social support system  Outcome: Progressing     Problem: CARDIOVASCULAR - ADULT  Goal: Maintains optimal cardiac output and hemodynamic stability  Description: INTERVENTIONS:  - Monitor vital signs, rhythm, and trends  - Monitor for bleeding, hypotension and signs of decreased cardiac output  - Evaluate effectiveness of vasoactive medications to optimize hemodynamic stability  - Monitor arterial and/or venous puncture sites for bleeding and/or hematoma  - Assess quality of pulses, skin color and temperature  - Assess for signs of decreased coronary artery perfusion - ex. Angina  - Evaluate fluid balance, assess for edema, trend weights  Outcome: Progressing  Goal: Absence of cardiac arrhythmias or at baseline  Description: INTERVENTIONS:  - Continuous cardiac monitoring, monitor vital signs, obtain 12 lead EKG if indicated  - Evaluate  effectiveness of antiarrhythmic and heart rate control medications as ordered  - Initiate emergency measures for life threatening arrhythmias  - Monitor electrolytes and administer replacement therapy as ordered  Outcome: Progressing     Problem: RESPIRATORY - ADULT  Goal: Achieves optimal ventilation and oxygenation  Description: INTERVENTIONS:  - Assess for changes in respiratory status  - Assess for changes in mentation and behavior  - Position to facilitate oxygenation and minimize respiratory effort  - Oxygen supplementation based on oxygen saturation or ABGs  - Provide Smoking Cessation handout, if applicable  - Encourage broncho-pulmonary hygiene including cough, deep breathe, Incentive Spirometry  - Assess the need for suctioning and perform as needed  - Assess and instruct to report SOB or any respiratory difficulty  - Respiratory Therapy support as indicated  - Manage/alleviate anxiety  - Monitor for signs/symptoms of CO2 retention  Outcome: Progressing     Problem: GASTROINTESTINAL - ADULT  Goal: Minimal or absence of nausea and vomiting  Description: INTERVENTIONS:  - Maintain adequate hydration with IV or PO as ordered and tolerated  - Nasogastric tube to low intermittent suction as ordered  - Evaluate effectiveness of ordered antiemetic medications  - Provide nonpharmacologic comfort measures as appropriate  - Advance diet as tolerated, if ordered  - Obtain nutritional consult as needed  - Evaluate fluid balance  Outcome: Progressing  Goal: Maintains or returns to baseline bowel function  Description: INTERVENTIONS:  - Assess bowel function  - Maintain adequate hydration with IV or PO as ordered and tolerated  - Evaluate effectiveness of GI medications  - Encourage mobilization and activity  - Obtain nutritional consult as needed  - Establish a toileting routine/schedule  - Consider collaborating with pharmacy to review patient's medication profile  Outcome: Progressing     Problem: GENITOURINARY -  ADULT  Goal: Absence of urinary retention  Description: INTERVENTIONS:  - Assess patient’s ability to void and empty bladder  - Monitor intake/output and perform bladder scan as needed  - Follow urinary retention protocol/standard of care  - Consider collaborating with pharmacy to review patient's medication profile  - Implement strategies to promote bladder emptying  Outcome: Progressing     Problem: METABOLIC/FLUID AND ELECTROLYTES - ADULT  Goal: Electrolytes maintained within normal limits  Description: INTERVENTIONS:  - Monitor labs and rhythm and assess patient for signs and symptoms of electrolyte imbalances  - Administer electrolyte replacement as ordered  - Monitor response to electrolyte replacements, including rhythm and repeat lab results as appropriate  - Fluid restriction as ordered  - Instruct patient on fluid and nutrition restrictions as appropriate  Outcome: Progressing     Problem: MUSCULOSKELETAL - ADULT  Goal: Return mobility to safest level of function  Description: INTERVENTIONS:  - Assess patient stability and activity tolerance for standing, transferring and ambulating w/ or w/o assistive devices  - Assist with transfers and ambulation using safe patient handling equipment as needed  - Ensure adequate protection for wounds/incisions during mobilization  - Obtain PT/OT consults as needed  - Advance activity as appropriate  - Communicate ordered activity level and limitations with patient/family  Outcome: Progressing  Goal: Maintain proper alignment of affected body part  Description: INTERVENTIONS:  - Support and protect limb and body alignment per provider's orders  - Instruct and reinforce with patient and family use of appropriate assistive device and precautions (e.g. spinal or hip dislocation precautions)  Outcome: Progressing     Problem: Impaired Activities of Daily Living  Goal: Achieve highest/safest level of independence in self care  Description: Interventions:  - Assess ability and  encourage patient to participate in ADLs to maximize function  - Promote sitting position while performing ADLs such as feeding, grooming, and bathing  - Educate and encourage patient/family in tolerated functional activity level and precautions during self-care  - Encourage patient to incorporate impaired side during daily activities to promote function  Outcome: Progressing     Problem: NEUROLOGICAL - ADULT  Goal: Achieves stable or improved neurological status  Description: INTERVENTIONS  - Assess for and report changes in neurological status  - Initiate measures to prevent increased intracranial pressure  - Maintain blood pressure and fluid volume within ordered parameters to optimize cerebral perfusion and minimize risk of hemorrhage  - Monitor temperature, glucose, and sodium. Initiate appropriate interventions as ordered  Outcome: Progressing     Problem: Delirium  Goal: Minimize duration of delirium  Description: Interventions:  - Encourage use of hearing aids, eye glasses  - Promote highest level of mobility daily  - Provide frequent reorientation  - Promote wakefulness i.e. lights on, blinds open  - Promote sleep, encourage patient's normal rest cycle i.e. lights off, TV off, minimize noise and interruptions  - Encourage family to assist in orientation and promotion of home routines  Outcome: Progressing

## 2024-04-24 NOTE — PROGRESS NOTES
[facility]  part of [facility]    Progress Note    Dennys Manzano Patient Status:  Inpatient    1935 MRN [id number]   Location [facility] 4W/SW/SE Attending Jeimy Driscoll MD   Hosp Day # 9 PCP CALOS FITZGERALD MD     Subjective:   Seen and examined while resting in bed. Drowsy but arousable. Very weak but otherwise no complaints. Denies shortness of breath and cough. No fever or chills. On 2 L O2. Used BiPAP overnight.    Objective:   Blood pressure 132/72, pulse 96, temperature 98 °F (36.7 °C), temperature source Temporal, resp. rate 18, height 6' (1.829 m), weight 241 lb 13.5 oz (109.7 kg), SpO2 97%.  Physical Exam  Vitals and nursing note reviewed.   Constitutional:       General: He is sleeping. He is not in acute distress.     Appearance: He is ill-appearing.      Interventions: Nasal cannula in place.   Cardiovascular:      Rate and Rhythm: Normal rate and regular rhythm.   Pulmonary:      Effort: No respiratory distress.      Breath sounds: No wheezing, rhonchi or rales.      Comments: Diminished breath sounds bilaterally  Abdominal:      General: There is no distension.      Palpations: Abdomen is soft.      Tenderness: There is no abdominal tenderness.   Musculoskeletal:         General: Swelling (RUE) present.      Cervical back: Normal range of motion and neck supple.      Right lower leg: No edema.      Left lower leg: No edema.   Skin:     General: Skin is warm and dry.   Neurological:      Mental Status: He is oriented to person, place, and time and easily aroused.      Motor: Weakness present.      Comments: Dozes off during conversation but easily arousable  Minimal movement in bilateral UE and LE       Results:   Lab Results   Component Value Date    WBC 13.1 (H) 2024    HGB 10.6 (L) 2024    HCT 32.2 (L) 2024    .0 2024    CREATSERUM 0.49 (L) 2024    BUN 13 2024     (L) 2024    K 4.5 2024    CL 95 (L)  04/24/2024    CO2 30.0 04/24/2024     (H) 04/24/2024    CA 8.9 04/24/2024    ALB 3.5 04/18/2024    ALKPHO 91 04/18/2024    BILT 1.1 04/18/2024    TP 6.8 04/18/2024    AST <8 04/18/2024    ALT 16 04/18/2024    PTT 41.0 (H) 04/14/2024    INR 1.20 04/23/2024    T4F 1.0 04/18/2024    TSH 0.277 (L) 04/18/2024    LIP 26 04/14/2024    ESRML 111 (H) 04/14/2024    CRP 13.9 (H) 02/10/2018    MG 1.8 04/22/2024    PHOS 2.7 04/20/2024    TROP <0.045 08/18/2021    TROPHS 11 04/14/2024     03/23/2021       Assessment & Plan:   Encephalopathy  Etiology unclear  CT brain no acute findings  EEG no seizure  S/p lumbar puncture 4/23 - CSF culture no growth to date  Seen by neuro  Plan:  -Await lumbar puncture results  -On acyclovir and ceftriaxone as per ID  -Avoid sedatives and narcotics    Acute hypoxemic and hypercapnic respiratory failure  Underlying COPD and possible NAYAN  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

## 2024-04-24 NOTE — PLAN OF CARE
Alert, forgetful on time. Bipap on at night. VSS. Repositioned as tolerated. Call light within reach, fall precautions. Plan for LISA.    Problem: Patient Centered Care  Goal: Patient preferences are identified and integrated in the patient's plan of care  Description: Interventions:  - What would you like us to know as we care for you? Patient is from home with family and he has a daughter that works here who is his support system.  Patient is hard of hearing.  - Provide timely, complete, and accurate information to patient/family  - Incorporate patient and family knowledge, values, beliefs, and cultural backgrounds into the planning and delivery of care  - Encourage patient/family to participate in care and decision-making at the level they choose  - Honor patient and family perspectives and choices  Outcome: Progressing     Problem: Patient/Family Goals  Goal: Patient/Family Long Term Goal  Description: Patient's Long Term Goal: Pain on right shoulder will be resolved and will be able to walk well with walker.    Interventions:  - No weight bearing on right arm till surgeon says so.  - Home health PT/OT as ordered.  - Pain management with ,oral medication.  - Monitor right shoulder for swelling or increasing pain or weakness.  - Follow up with surgery as recommended.  - See additional Care Plan goals for specific interventions  Outcome: Progressing  Goal: Patient/Family Short Term Goal  Description: Patient's Short Term Goal: Home with [facility] when pain is resolved.    Interventions:   - NWB to right arm.  - PT/OT as ordered.  - Administer IV antibiotics as ordered.  - Administer oral antibiotics as ordered.  - Maintain enteric/contact isolation as ordered.  - Out of bed as much as tolerated.  - Administer oral anticoagulation as ordered, SCD's to both legs as well.  - See additional Care Plan goals for specific interventions  Outcome: Progressing     Problem: PAIN - ADULT  Goal: Verbalizes/displays adequate comfort  level or patient's stated pain goal  Description: INTERVENTIONS:  - Encourage pt to monitor pain and request assistance  - Assess pain using appropriate pain scale  - Administer analgesics based on type and severity of pain and evaluate response  - Implement non-pharmacological measures as appropriate and evaluate response  - Consider cultural and social influences on pain and pain management  - Manage/alleviate anxiety  - Utilize distraction and/or relaxation techniques  - Monitor for opioid side effects  - Notify MD/LIP if interventions unsuccessful or patient reports new pain  - Anticipate increased pain with activity and pre-medicate as appropriate  Outcome: Progressing     Problem: RISK FOR INFECTION - ADULT  Goal: Absence of fever/infection during anticipated neutropenic period  Description: INTERVENTIONS  - Monitor WBC  - Administer growth factors as ordered  - Implement neutropenic guidelines  Outcome: Progressing     Problem: SAFETY ADULT - FALL  Goal: Free from fall injury  Description: INTERVENTIONS:  - Assess pt frequently for physical needs  - Identify cognitive and physical deficits and behaviors that affect risk of falls.  - [place] fall precautions as indicated by assessment.  - Educate pt/family on patient safety including physical limitations  - Instruct pt to call for assistance with activity based on assessment  - Modify environment to reduce risk of injury  - Provide assistive devices as appropriate  - Consider OT/PT consult to assist with strengthening/mobility  - Encourage toileting schedule  Outcome: Progressing     Problem: DISCHARGE PLANNING  Goal: Discharge to home or other facility with appropriate resources  Description: INTERVENTIONS:  - Identify barriers to discharge w/pt and caregiver  - Include patient/family/discharge partner in discharge planning  - Arrange for needed discharge resources and transportation as appropriate  - Identify discharge learning needs (meds, wound care,  etc)  - Arrange for interpreters to assist at discharge as needed  - Consider post-discharge preferences of patient/family/discharge partner  - Complete POLST form as appropriate  - Assess patient's ability to be responsible for managing their own health  - Refer to Case Management Department for coordinating discharge planning if the patient needs post-hospital services based on physician/LIP order or complex needs related to functional status, cognitive ability or social support system  Outcome: Progressing     Problem: CARDIOVASCULAR - ADULT  Goal: Maintains optimal cardiac output and hemodynamic stability  Description: INTERVENTIONS:  - Monitor vital signs, rhythm, and trends  - Monitor for bleeding, hypotension and signs of decreased cardiac output  - Evaluate effectiveness of vasoactive medications to optimize hemodynamic stability  - Monitor arterial and/or venous puncture sites for bleeding and/or hematoma  - Assess quality of pulses, skin color and temperature  - Assess for signs of decreased coronary artery perfusion - ex. Angina  - Evaluate fluid balance, assess for edema, trend weights  Outcome: Progressing  Goal: Absence of cardiac arrhythmias or at baseline  Description: INTERVENTIONS:  - Continuous cardiac monitoring, monitor vital signs, obtain 12 lead EKG if indicated  - Evaluate effectiveness of antiarrhythmic and heart rate control medications as ordered  - Initiate emergency measures for life threatening arrhythmias  - Monitor electrolytes and administer replacement therapy as ordered  Outcome: Progressing     Problem: RESPIRATORY - ADULT  Goal: Achieves optimal ventilation and oxygenation  Description: INTERVENTIONS:  - Assess for changes in respiratory status  - Assess for changes in mentation and behavior  - Position to facilitate oxygenation and minimize respiratory effort  - Oxygen supplementation based on oxygen saturation or ABGs  - Provide Smoking Cessation handout, if applicable  -  Encourage broncho-pulmonary hygiene including cough, deep breathe, Incentive Spirometry  - Assess the need for suctioning and perform as needed  - Assess and instruct to report SOB or any respiratory difficulty  - Respiratory Therapy support as indicated  - Manage/alleviate anxiety  - Monitor for signs/symptoms of CO2 retention  Outcome: Progressing     Problem: GASTROINTESTINAL - ADULT  Goal: Minimal or absence of nausea and vomiting  Description: INTERVENTIONS:  - Maintain adequate hydration with IV or PO as ordered and tolerated  - Nasogastric tube to low intermittent suction as ordered  - Evaluate effectiveness of ordered antiemetic medications  - Provide nonpharmacologic comfort measures as appropriate  - Advance diet as tolerated, if ordered  - Obtain nutritional consult as needed  - Evaluate fluid balance  Outcome: Progressing  Goal: Maintains or returns to baseline bowel function  Description: INTERVENTIONS:  - Assess bowel function  - Maintain adequate hydration with IV or PO as ordered and tolerated  - Evaluate effectiveness of GI medications  - Encourage mobilization and activity  - Obtain nutritional consult as needed  - Establish a toileting routine/schedule  - Consider collaborating with pharmacy to review patient's medication profile  Outcome: Progressing     Problem: GENITOURINARY - ADULT  Goal: Absence of urinary retention  Description: INTERVENTIONS:  - Assess patient’s ability to void and empty bladder  - Monitor intake/output and perform bladder scan as needed  - Follow urinary retention protocol/standard of care  - Consider collaborating with pharmacy to review patient's medication profile  - Implement strategies to promote bladder emptying  Outcome: Progressing     Problem: METABOLIC/FLUID AND ELECTROLYTES - ADULT  Goal: Electrolytes maintained within normal limits  Description: INTERVENTIONS:  - Monitor labs and rhythm and assess patient for signs and symptoms of electrolyte imbalances  -  Administer electrolyte replacement as ordered  - Monitor response to electrolyte replacements, including rhythm and repeat lab results as appropriate  - Fluid restriction as ordered  - Instruct patient on fluid and nutrition restrictions as appropriate  Outcome: Progressing     Problem: MUSCULOSKELETAL - ADULT  Goal: Return mobility to safest level of function  Description: INTERVENTIONS:  - Assess patient stability and activity tolerance for standing, transferring and ambulating w/ or w/o assistive devices  - Assist with transfers and ambulation using safe patient handling equipment as needed  - Ensure adequate protection for wounds/incisions during mobilization  - Obtain PT/OT consults as needed  - Advance activity as appropriate  - Communicate ordered activity level and limitations with patient/family  Outcome: Progressing  Goal: Maintain proper alignment of affected body part  Description: INTERVENTIONS:  - Support and protect limb and body alignment per provider's orders  - Instruct and reinforce with patient and family use of appropriate assistive device and precautions (e.g. spinal or hip dislocation precautions)  Outcome: Progressing     Problem: Impaired Activities of Daily Living  Goal: Achieve highest/safest level of independence in self care  Description: Interventions:  - Assess ability and encourage patient to participate in ADLs to maximize function  - Promote sitting position while performing ADLs such as feeding, grooming, and bathing  - Educate and encourage patient/family in tolerated functional activity level and precautions during self-care  - Encourage patient to incorporate impaired side during daily activities to promote function  Outcome: Progressing     Problem: NEUROLOGICAL - ADULT  Goal: Achieves stable or improved neurological status  Description: INTERVENTIONS  - Assess for and report changes in neurological status  - Initiate measures to prevent increased intracranial pressure  -  Maintain blood pressure and fluid volume within ordered parameters to optimize cerebral perfusion and minimize risk of hemorrhage  - Monitor temperature, glucose, and sodium. Initiate appropriate interventions as ordered  Outcome: Progressing     Problem: Delirium  Goal: Minimize duration of delirium  Description: Interventions:  - Encourage use of hearing aids, eye glasses  - Promote highest level of mobility daily  - Provide frequent reorientation  - Promote wakefulness i.e. lights on, blinds open  - Promote sleep, encourage patient's normal rest cycle i.e. lights off, TV off, minimize noise and interruptions  - Encourage family to assist in orientation and promotion of home routines  Outcome: Progressing

## 2024-04-24 NOTE — PLAN OF CARE
Patient AOX 3-4. Hard or hearing. 2L nasal cannula. General diet. POD5 right should debridement. Dressing clean dry and intact. Lumbar puncture done this morning. PRN tylenol given for pain management. ASA on held per md orders. Family updated on the plan of care. Plan for rehab once medically cleared. Call light within reach.      Problem: Patient Centered Care  Goal: Patient preferences are identified and integrated in the patient's plan of care  Description: Interventions:  - What would you like us to know as we care for you? Patient is from home with family and he has a daughter that works here who is his support system.  Patient is hard of hearing.  - Provide timely, complete, and accurate information to patient/family  - Incorporate patient and family knowledge, values, beliefs, and cultural backgrounds into the planning and delivery of care  - Encourage patient/family to participate in care and decision-making at the level they choose  - Honor patient and family perspectives and choices  Outcome: Progressing     Problem: Patient/Family Goals  Goal: Patient/Family Long Term Goal  Description: Patient's Long Term Goal: Pain on right shoulder will be resolved and will be able to walk well with walker.    Interventions:  - No weight bearing on right arm till surgeon says so.  - Home health PT/OT as ordered.  - Pain management with ,oral medication.  - Monitor right shoulder for swelling or increasing pain or weakness.  - Follow up with surgery as recommended.  - See additional Care Plan goals for specific interventions  Outcome: Progressing  Goal: Patient/Family Short Term Goal  Description: Patient's Short Term Goal: Home with [facility] when pain is resolved.    Interventions:   - NWB to right arm.  - PT/OT as ordered.  - Administer IV antibiotics as ordered.  - Administer oral antibiotics as ordered.  - Maintain enteric/contact isolation as ordered.  - Out of bed as much as tolerated.  - Administer oral  anticoagulation as ordered, SCD's to both legs as well.  - See additional Care Plan goals for specific interventions  Outcome: Progressing     Problem: PAIN - ADULT  Goal: Verbalizes/displays adequate comfort level or patient's stated pain goal  Description: INTERVENTIONS:  - Encourage pt to monitor pain and request assistance  - Assess pain using appropriate pain scale  - Administer analgesics based on type and severity of pain and evaluate response  - Implement non-pharmacological measures as appropriate and evaluate response  - Consider cultural and social influences on pain and pain management  - Manage/alleviate anxiety  - Utilize distraction and/or relaxation techniques  - Monitor for opioid side effects  - Notify MD/LIP if interventions unsuccessful or patient reports new pain  - Anticipate increased pain with activity and pre-medicate as appropriate  Outcome: Progressing     Problem: RISK FOR INFECTION - ADULT  Goal: Absence of fever/infection during anticipated neutropenic period  Description: INTERVENTIONS  - Monitor WBC  - Administer growth factors as ordered  - Implement neutropenic guidelines  Outcome: Progressing     Problem: SAFETY ADULT - FALL  Goal: Free from fall injury  Description: INTERVENTIONS:  - Assess pt frequently for physical needs  - Identify cognitive and physical deficits and behaviors that affect risk of falls.  - [place] fall precautions as indicated by assessment.  - Educate pt/family on patient safety including physical limitations  - Instruct pt to call for assistance with activity based on assessment  - Modify environment to reduce risk of injury  - Provide assistive devices as appropriate  - Consider OT/PT consult to assist with strengthening/mobility  - Encourage toileting schedule  Outcome: Progressing     Problem: DISCHARGE PLANNING  Goal: Discharge to home or other facility with appropriate resources  Description: INTERVENTIONS:  - Identify barriers to discharge w/pt and  caregiver  - Include patient/family/discharge partner in discharge planning  - Arrange for needed discharge resources and transportation as appropriate  - Identify discharge learning needs (meds, wound care, etc)  - Arrange for interpreters to assist at discharge as needed  - Consider post-discharge preferences of patient/family/discharge partner  - Complete POLST form as appropriate  - Assess patient's ability to be responsible for managing their own health  - Refer to Case Management Department for coordinating discharge planning if the patient needs post-hospital services based on physician/LIP order or complex needs related to functional status, cognitive ability or social support system  Outcome: Progressing     Problem: CARDIOVASCULAR - ADULT  Goal: Maintains optimal cardiac output and hemodynamic stability  Description: INTERVENTIONS:  - Monitor vital signs, rhythm, and trends  - Monitor for bleeding, hypotension and signs of decreased cardiac output  - Evaluate effectiveness of vasoactive medications to optimize hemodynamic stability  - Monitor arterial and/or venous puncture sites for bleeding and/or hematoma  - Assess quality of pulses, skin color and temperature  - Assess for signs of decreased coronary artery perfusion - ex. Angina  - Evaluate fluid balance, assess for edema, trend weights  Outcome: Progressing  Goal: Absence of cardiac arrhythmias or at baseline  Description: INTERVENTIONS:  - Continuous cardiac monitoring, monitor vital signs, obtain 12 lead EKG if indicated  - Evaluate effectiveness of antiarrhythmic and heart rate control medications as ordered  - Initiate emergency measures for life threatening arrhythmias  - Monitor electrolytes and administer replacement therapy as ordered  Outcome: Progressing     Problem: RESPIRATORY - ADULT  Goal: Achieves optimal ventilation and oxygenation  Description: INTERVENTIONS:  - Assess for changes in respiratory status  - Assess for changes in  mentation and behavior  - Position to facilitate oxygenation and minimize respiratory effort  - Oxygen supplementation based on oxygen saturation or ABGs  - Provide Smoking Cessation handout, if applicable  - Encourage broncho-pulmonary hygiene including cough, deep breathe, Incentive Spirometry  - Assess the need for suctioning and perform as needed  - Assess and instruct to report SOB or any respiratory difficulty  - Respiratory Therapy support as indicated  - Manage/alleviate anxiety  - Monitor for signs/symptoms of CO2 retention  Outcome: Progressing     Problem: GASTROINTESTINAL - ADULT  Goal: Minimal or absence of nausea and vomiting  Description: INTERVENTIONS:  - Maintain adequate hydration with IV or PO as ordered and tolerated  - Nasogastric tube to low intermittent suction as ordered  - Evaluate effectiveness of ordered antiemetic medications  - Provide nonpharmacologic comfort measures as appropriate  - Advance diet as tolerated, if ordered  - Obtain nutritional consult as needed  - Evaluate fluid balance  Outcome: Progressing  Goal: Maintains or returns to baseline bowel function  Description: INTERVENTIONS:  - Assess bowel function  - Maintain adequate hydration with IV or PO as ordered and tolerated  - Evaluate effectiveness of GI medications  - Encourage mobilization and activity  - Obtain nutritional consult as needed  - Establish a toileting routine/schedule  - Consider collaborating with pharmacy to review patient's medication profile  Outcome: Progressing     Problem: GENITOURINARY - ADULT  Goal: Absence of urinary retention  Description: INTERVENTIONS:  - Assess patient’s ability to void and empty bladder  - Monitor intake/output and perform bladder scan as needed  - Follow urinary retention protocol/standard of care  - Consider collaborating with pharmacy to review patient's medication profile  - Implement strategies to promote bladder emptying  Outcome: Progressing     Problem: METABOLIC/FLUID  AND ELECTROLYTES - ADULT  Goal: Electrolytes maintained within normal limits  Description: INTERVENTIONS:  - Monitor labs and rhythm and assess patient for signs and symptoms of electrolyte imbalances  - Administer electrolyte replacement as ordered  - Monitor response to electrolyte replacements, including rhythm and repeat lab results as appropriate  - Fluid restriction as ordered  - Instruct patient on fluid and nutrition restrictions as appropriate  Outcome: Progressing     Problem: MUSCULOSKELETAL - ADULT  Goal: Return mobility to safest level of function  Description: INTERVENTIONS:  - Assess patient stability and activity tolerance for standing, transferring and ambulating w/ or w/o assistive devices  - Assist with transfers and ambulation using safe patient handling equipment as needed  - Ensure adequate protection for wounds/incisions during mobilization  - Obtain PT/OT consults as needed  - Advance activity as appropriate  - Communicate ordered activity level and limitations with patient/family  Outcome: Progressing  Goal: Maintain proper alignment of affected body part  Description: INTERVENTIONS:  - Support and protect limb and body alignment per provider's orders  - Instruct and reinforce with patient and family use of appropriate assistive device and precautions (e.g. spinal or hip dislocation precautions)  Outcome: Progressing     Problem: Impaired Activities of Daily Living  Goal: Achieve highest/safest level of independence in self care  Description: Interventions:  - Assess ability and encourage patient to participate in ADLs to maximize function  - Promote sitting position while performing ADLs such as feeding, grooming, and bathing  - Educate and encourage patient/family in tolerated functional activity level and precautions during self-care  - Encourage patient to incorporate impaired side during daily activities to promote function  Outcome: Progressing     Problem: NEUROLOGICAL - ADULT  Goal:  Achieves stable or improved neurological status  Description: INTERVENTIONS  - Assess for and report changes in neurological status  - Initiate measures to prevent increased intracranial pressure  - Maintain blood pressure and fluid volume within ordered parameters to optimize cerebral perfusion and minimize risk of hemorrhage  - Monitor temperature, glucose, and sodium. Initiate appropriate interventions as ordered  Outcome: Progressing     Problem: Delirium  Goal: Minimize duration of delirium  Description: Interventions:  - Encourage use of hearing aids, eye glasses  - Promote highest level of mobility daily  - Provide frequent reorientation  - Promote wakefulness i.e. lights on, blinds open  - Promote sleep, encourage patient's normal rest cycle i.e. lights off, TV off, minimize noise and interruptions  - Encourage family to assist in orientation and promotion of home routines  Outcome: Progressing

## 2024-04-24 NOTE — PROGRESS NOTES
[facility]  part of [facility]    Progress Note    Dennys Manzano Patient Status:  Inpatient    1935 MRN [id number]   Location [facility] 4W/SW/SE Attending Charlie Alarcon MD   Hosp Day # 9 PCP CALOS FITZGERALD MD     Chief Complaint:   Chief Complaint   Patient presents with    Abdomen/Flank Pain    Nausea/Vomiting/Diarrhea       Subjective:   Dennys Manzano was seen and examined  Remains drowsy but arousable   No acute distress      Objective:   Objective:    Blood pressure 132/72, pulse 96, temperature 98 °F (36.7 °C), temperature source Temporal, resp. rate 18, height 6' (1.829 m), weight 241 lb 13.5 oz (109.7 kg), SpO2 97%.    Physical Exam:    General: No acute distress. Drowsy but wakes and answers questions.   Respiratory: Clear to auscultation bilaterally. No wheezes. No rhonchi.  Cardiovascular: S1, S2. Regular rate and rhythm. No murmurs, rubs or gallops.   Abdomen: Soft, nontender, nondistended.  Positive bowel sounds. No rebound or guarding.  Neurologic: No focal neurological deficits.   Musculoskeletal: Moves all extremities.  Extremities: No edema.      Results:   Results:    Labs:  Recent Labs   Lab 24   WBC 18.1* 15.7* 14.2* 12.1*  --  13.1*   HGB 11.7* 11.4* 10.6* 10.6*  --  10.6*   MCV 95.3 94.0 95.1 94.3  --  93.6   .0 314.0 332.0 363.0  --  410.0   INR 3.12* 3.20* 1.54* 1.19 1.20  --        Recent Labs   Lab 2456 24   *   < > 138*  --  166* 135*   BUN 15   < > 17  --  15 13   CREATSERUM 0.65*   < > 0.60*  --  0.53* 0.49*   CA 9.0   < > 8.8  --  8.7 8.9   ALB 3.5  --   --   --   --   --    *   < > 136  --  131* 127*   K 4.2   < > 3.8 4.1 4.2 4.5      < > 99  --  95* 95*   CO2 31.0   < > 34.0*  --  32.0 30.0   ALKPHO 91  --   --   --   --   --    AST <8  --   --   --    --   --    ALT 16  --   --   --   --   --    BILT 1.1  --   --   --   --   --    TP 6.8  --   --   --   --   --     < > = values in this interval not displayed.       Estimated Creatinine Clearance: 118.8 mL/min (A) (based on SCr of 0.49 mg/dL (L)).    Recent Labs   Lab 04/20/24  0425 04/22/24  0412 04/23/24  2001   PTP 19.5* 15.8* 16.0*   INR 1.54* 1.19 1.20            Culture:  Hospital Encounter on 04/14/24   1. CSF culture     Status: None (Preliminary result)    Collection Time: 04/23/24 10:00 AM    Specimen: CSF, lumbar puncture; Cerebral spinal fluid   Result Value Ref Range    CSF Culture Result No Growth at 18-24 hrs. N/A    CSF Smear No WBCs seen N/A    CSF Smear No organisms seen N/A    CSF Smear This is a cytocentrifuged smear. N/A   2. Blood Culture     Status: None    Collection Time: 04/18/24  9:41 PM    Specimen: Blood,peripheral   Result Value Ref Range    Blood Culture Result No Growth 5 Days N/A   3. Tissue Aerobic Culture     Status: None    Collection Time: 04/17/24  2:35 PM    Specimen: Tissue   Result Value Ref Range    Tissue Culture Result No Growth 3 Days N/A    Tissue Smear 1+ WBCs seen N/A    Tissue Smear No organisms seen N/A   4. Anaerobic Culture     Status: None    Collection Time: 04/17/24  2:35 PM    Specimen: Tissue   Result Value Ref Range    Anaerobic Culture No Anaerobes isolated N/A   5. Body Fluid Cult Aerobic and Anaerobic     Status: None    Collection Time: 04/15/24  7:26 AM    Specimen: Synovial fluid,shoulder; Body fluid, unspecified   Result Value Ref Range    Body Fluid Culture Result No Growth 5 Days N/A    Body Fluid Smear 3+ WBCs seen N/A    Body Fluid Smear No organisms seen N/A    Body Fluid Smear This is a cytocentrifuged smear. N/A   6. Urine Culture, Routine     Status: Abnormal    Collection Time: 04/15/24  5:43 AM    Specimen: Urine, clean catch   Result Value Ref Range    Urine Culture >100,000 CFU/ML Enterococcus faecalis NOT VRE (A) N/A       Cardiac  No  results for input(s): \"TROP\", \"PBNP\" in the last 168 hours.      Imaging: Imaging data reviewed in Epic.  XR LUMBAR PUNCTURE DIAG, INCLD IMG (CPT=62328)    Result Date: 4/23/2024  CONCLUSION:  1. Successful lumbar puncture at L2 without complication.  Four tubes of clear colored CSF with approximately 19 cc of fluid obtained were sent for laboratory analysis.    Dictated by (CST): Miguel Soto MD on 4/23/2024 at 3:31 PM     Finalized by (CST): Miguel Soto MD on 4/23/2024 at 3:33 PM           Medications:    ipratropium-albuterol  3 mL Nebulization BID    warfarin  8.5 mg Oral Nightly    docusate sodium  100 mg Oral BID    acyclovir  10 mg/kg (Ideal) Intravenous Q8H    phytonadione (Aqua-Mephton) 10 mg in sodium chloride 0.9% 50 mL IVPB  10 mg Intravenous Once    cefTRIAXone  2 g Intravenous Q12H    metoprolol succinate ER  50 mg Oral Daily Beta Blocker    magnesium oxide  400 mg Oral Once    fluticasone propionate  1 spray Each Nare Daily    montelukast  10 mg Oral Nightly    aspirin  81 mg Oral Daily    atorvastatin  40 mg Oral Nightly    finasteride  5 mg Oral Daily    pantoprazole  40 mg Oral QAM AC    senna-docusate  1 tablet Oral BID    umeclidinium-vilanterol  1 puff Inhalation Daily    tamsulosin  0.4 mg Oral Daily with dinner    insulin aspart  1-5 Units Subcutaneous TID CC    ondansetron  4 mg Intravenous Once    sodium chloride  1,000 mL Intravenous Once         Assessment and Plan:   Assessment & Plan:      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   - s/p R shoulder joint aspiration 4/15   - 59,000 WBC's with predominately neutrophils. + calcium pyrophosphate crystals --> pseudogout. Culture negative.   - IV rocephin 2gm q 12, day #5  - ID on consult.   - PT/OT - LISA eventually   - blood cx x 2 NGTD.   - R shoulder arthroscopy extensive debridement of R shoulder, bicep tenotomy 4/17      Acute encephalopathy now completely resolved  - etiology unclear. ?  Meningitis.    - s/p LP. Await results.   - IV Acyclovir empirically + Rocephin.   - CT head no acute findings. MRI no acute CVA  - EEG c/w encephalopathy, now neuro normal  - ID and neuro on consult.   - neuro checks.      Ecoli UTI  - cx + on 4/15, now treatment complete.     Acute respiratory failure with hypoxia  Likely COPD exacerbation in combination with atelectasis  - was on 5L NC wean o2 now RA  - duonebs q6hrs WA  - SLP eval regular thin liquids.   - Oral diet   - IS/flutter valve.   - CXR atelectasis   - Antitussives PRN   - CT chest on admit showed dense atelectasis no PE.      DVT/pulmonary embolism  - resume coumadin today      Hyponatremia  - appears euvolemic now   - sodium lower today, cont to monitor   - Urine osm high, urine sdoium high.   - free water restriction      NSVT  - ECHO LVEF 55-60%. No WMA.   - keep Mg 2.0 and K 4.0   - cont metoprolol     DM II  - A1c 6.4  - ISS     BPH  Chronic urinary retention   - Continue Flomax and finasteride  - cont valladares last changed 04/01/24     Constipation  - bowel regimen      >55min spent, >50% spent counseling and coordinating care in the form of educating pt/family and d/w consultants and staff. Most of the time spent discussing the above plan.        Plan of care discussed with patient or family at bedside.    Supplementary Documentation:     Quality:  DVT Prophylaxis: warfarin  CODE status: Full   Dispo: per clinical course           Estimated date of discharge: TBD  Discharge is dependent on: clinical stability  At this point Mr. Manzano is expected to be discharge to: [facility]

## 2024-04-25 PROBLEM — G93.40 ENCEPHALOPATHY: Status: ACTIVE | Noted: 2024-04-25

## 2024-04-25 LAB
ALBUMIN SERPL-MCNC: 3.2 G/DL (ref 3.2–4.8)
ANION GAP SERPL CALC-SCNC: 3 MMOL/L (ref 0–18)
ATRIAL RATE: 86 BPM
BASOPHILS # BLD AUTO: 0.1 X10(3) UL (ref 0–0.2)
BASOPHILS NFR BLD AUTO: 0.7 %
BUN BLD-MCNC: 12 MG/DL (ref 9–23)
BUN/CREAT SERPL: 22.6 (ref 10–20)
CALCIUM BLD-MCNC: 9.2 MG/DL (ref 8.7–10.4)
CHLORIDE SERPL-SCNC: 93 MMOL/L (ref 98–112)
CO2 SERPL-SCNC: 32 MMOL/L (ref 21–32)
CREAT BLD-MCNC: 0.53 MG/DL
CYTOMEGALOVIRUS (CMV): NOT DETECTED
CYTOMEGALOVIRUS (CMV): NOT DETECTED
DEPRECATED RDW RBC AUTO: 43.7 FL (ref 35.1–46.3)
EGFRCR SERPLBLD CKD-EPI 2021: 98 ML/MIN/1.73M2 (ref 60–?)
ENTEROVIRUS (EV): NOT DETECTED
ENTEROVIRUS (EV): NOT DETECTED
EOSINOPHIL # BLD AUTO: 0.35 X10(3) UL (ref 0–0.7)
EOSINOPHIL NFR BLD AUTO: 2.4 %
ERYTHROCYTE [DISTWIDTH] IN BLOOD BY AUTOMATED COUNT: 12.9 % (ref 11–15)
ESCHERICHIA COLI K1: NOT DETECTED
ESCHERICHIA COLI K1: NOT DETECTED
GLUCOSE BLD-MCNC: 134 MG/DL (ref 70–99)
GLUCOSE BLDC GLUCOMTR-MCNC: 133 MG/DL (ref 70–99)
GLUCOSE BLDC GLUCOMTR-MCNC: 141 MG/DL (ref 70–99)
GLUCOSE BLDC GLUCOMTR-MCNC: 154 MG/DL (ref 70–99)
GLUCOSE BLDC GLUCOMTR-MCNC: 157 MG/DL (ref 70–99)
HAEMOPHILUS INFLUENZAE: NOT DETECTED
HAEMOPHILUS INFLUENZAE: NOT DETECTED
HCT VFR BLD AUTO: 31.2 %
HERPES SIMPLEX VIRUS 1 (HSV-1): NOT DETECTED
HERPES SIMPLEX VIRUS 1 (HSV-1): NOT DETECTED
HERPES SIMPLEX VIRUS 2 (HSV-2): NOT DETECTED
HERPES SIMPLEX VIRUS 2 (HSV-2): NOT DETECTED
HGB BLD-MCNC: 10.5 G/DL
HUMAN HERPESVIRUS 6 (HHV-6): NOT DETECTED
HUMAN HERPESVIRUS 6 (HHV-6): NOT DETECTED
HUMAN PARECHOVIRUS (HPEV): NOT DETECTED
HUMAN PARECHOVIRUS (HPEV): NOT DETECTED
IMM GRANULOCYTES # BLD AUTO: 0.45 X10(3) UL (ref 0–1)
IMM GRANULOCYTES NFR BLD: 3 %
INR BLD: 1.15 (ref 0.8–1.2)
LISTERIA MONOCYTOGENES: NOT DETECTED
LISTERIA MONOCYTOGENES: NOT DETECTED
LYMPHOCYTES # BLD AUTO: 2.28 X10(3) UL (ref 1–4)
LYMPHOCYTES NFR BLD AUTO: 15.4 %
MAGNESIUM SERPL-MCNC: 1.8 MG/DL (ref 1.6–2.6)
MAGNESIUM SERPL-MCNC: 1.9 MG/DL (ref 1.6–2.6)
MCH RBC QN AUTO: 31.6 PG (ref 26–34)
MCHC RBC AUTO-ENTMCNC: 33.7 G/DL (ref 31–37)
MCV RBC AUTO: 94 FL
MONOCYTES # BLD AUTO: 1.78 X10(3) UL (ref 0.1–1)
MONOCYTES NFR BLD AUTO: 12 %
NEISSERIA MENINGIDITIS: NOT DETECTED
NEISSERIA MENINGIDITIS: NOT DETECTED
NEUTROPHILS # BLD AUTO: 9.86 X10 (3) UL (ref 1.5–7.7)
NEUTROPHILS # BLD AUTO: 9.86 X10(3) UL (ref 1.5–7.7)
NEUTROPHILS NFR BLD AUTO: 66.5 %
NS CRYPTOCOCCUS (C. NEOFORMANS/C. GATTII): NOT DETECTED
OSMOLALITY SERPL CALC.SUM OF ELEC: 268 MOSM/KG (ref 275–295)
P AXIS: 82 DEGREES
P-R INTERVAL: 248 MS
PHOSPHATE SERPL-MCNC: 4.5 MG/DL (ref 2.4–5.1)
PLATELET # BLD AUTO: 479 10(3)UL (ref 150–450)
POTASSIUM SERPL-SCNC: 4.4 MMOL/L (ref 3.5–5.1)
PROTHROMBIN TIME: 15.4 SECONDS (ref 11.6–14.8)
Q-T INTERVAL: 394 MS
QRS DURATION: 134 MS
QTC CALCULATION (BEZET): 471 MS
R AXIS: -61 DEGREES
RBC # BLD AUTO: 3.32 X10(6)UL
SODIUM SERPL-SCNC: 128 MMOL/L (ref 136–145)
STREPTOCOCCUS AGALACTIAE: NOT DETECTED
STREPTOCOCCUS AGALACTIAE: NOT DETECTED
STREPTOCOCCUS PNEUMONIAE: NOT DETECTED
STREPTOCOCCUS PNEUMONIAE: NOT DETECTED
T AXIS: 107 DEGREES
TROPONIN I SERPL HS-MCNC: 20 NG/L
TROPONIN I SERPL HS-MCNC: 25 NG/L
TROPONIN I SERPL HS-MCNC: 26 NG/L
VARICELLA ZOSTER VIRUS (VZV): NOT DETECTED
VARICELLA ZOSTER VIRUS (VZV): NOT DETECTED
VDRL CSF: NON REACTIVE
VENTRICULAR RATE: 86 BPM
WBC # BLD AUTO: 14.8 X10(3) UL (ref 4–11)

## 2024-04-25 PROCEDURE — 99233 SBSQ HOSP IP/OBS HIGH 50: CPT | Performed by: HOSPITALIST

## 2024-04-25 PROCEDURE — 99233 SBSQ HOSP IP/OBS HIGH 50: CPT | Performed by: OTHER

## 2024-04-25 PROCEDURE — 99232 SBSQ HOSP IP/OBS MODERATE 35: CPT | Performed by: PHYSICIAN ASSISTANT

## 2024-04-25 RX ORDER — MAGNESIUM OXIDE 400 MG/1
400 TABLET ORAL ONCE
Status: COMPLETED | OUTPATIENT
Start: 2024-04-25 | End: 2024-04-25

## 2024-04-25 NOTE — PROGRESS NOTES
INFECTIOUS DISEASE PROGRESS NOTE    Dennys Manzano Patient Status:  Inpatient    1935 MRN [id number]   Location [facility] 4W/SW/SE Attending Jeimy Driscoll MD   Hosp Day # 10 PCP CALOS FITZGERALD MD       SUBJECTIVE  Remains awake. Weakness BUE/BLE.     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 Faecalis - colonization     PLAN:     -  d/c IV acyclovir + CTX - monitor off abx  -  FU cx  -  Follow fever curve, wbc  -  Reviewed labs, micro, imaging reports  -  d/w patient, staff     History of Present Illness:    Dennys Manzano is a a(n) 88 year old male. Patient is a 88-year-old male with a history of CAD post CABG and pacemaker, BPH, CHF, DM, depression, HLD, PAD, CVA, previous right rotator cuff surgery who now presents to the hospital for right shoulder pain with effusion, difficulty with movement started about 2 days prior.  Afebrile here.  WBC initially 18.  X-ray of the shoulder with DJD without fracture.  CT chest, abdomen, pelvis nausea and vomiting without clear infectious process.  Seen by orthopedic surgery underwent aspiration 4/15 noted WBC around 60,000 with 94% segs and positive crystals.  Cultures so far no growth.  On IV vancomycin and Zosyn.  Plan for surgery tomorrow for arthroscopy and I&D.    OBJECTIVE  /46 (BP Location: Right arm)   Pulse 85   Temp 97.5 °F (36.4 °C) (Oral)   Resp 18   Ht 182.9 cm (6')   Wt 241 lb 13.5 oz (109.7 kg)   SpO2 93%   BMI 32.80 kg/m²     General: in bed, fatigued  HEENT: EOMI  Abdomen: Soft, NT/ND  Musculoskeletal: R shoulder  dressed  Integument: No rashes    MD [facility]  [phone]

## 2024-04-25 NOTE — HISTORICAL OFFICE NOTE
Facility [facility]  [address], [address], [place] [address]  [phone]      Dennys Manzano  Progress Note  Demographics:  Name: Dennys Manzano YOB: 1935  Age: 87, Male Medical Record No: [id number]  Visited Date/Time: 11/30/2022 03:40 PM    Chief Complaints  Follow up 1 yr.  History of Present Illness  The patient is here for follow-up regarding pacemaker for heart block.    Patient developed heart block pacemaker placed in 2021 check today 100% ventricular paced.  FAINA status around 2033.  No more PVCs seen off sotalol.  Also follows with Dr. Rincon    Previously noted: We stopped the sotalol he had an urgent pacemaker placed in August 2021 echo at the time showed EF 53%.  Pacemaker check today shows will be FAINA status until at least 2033 he atrially paces 60% ventricular paces 100% no specific PVCs noted off sotalol.    He also follows with Dr. Castro.  In the past he had such frequent PVCs and was symptomatic from the effective bradycardia. Sotalol has surppressed the PVCs and his heart rates have been normal.  Is significantly limited by all lung disease with asbestosis also follows with Dr. Castro walks slowly with a walker.  He has had no lightheadedness dizziness syncope or palpitations.  He has a history of coronary artery disease with coronary artery bypass grafting in the mid 1990s and drug-eluting stent in 2012.    He also follows with Dr. Castro. He walks slowly with a walker.  He has had no lightheadedness dizziness syncope or palpitations.    Last BMP stable. BMP ordered for 6 months. Patient with family he forgot his hearing aids so family helped. He has no symptoms or problems chronic generalized weakness but no current issues.    CARDIAC HISTORY:   pvc's - better on sotalol  Cad/cabg '95, bms '12 lad  dvt  Hl  Emphysema/asbestosis  CARDIAC TESTING:(tracings and images viewed by myself)   EKG shows sinus rhythm left bundle branch block QTc interval  479  Stress nuc 12/2016 normal  Cardiac risk factors Former smoker  Past Medical History  1.Cardiac pacemaker (S/P PPM)  2.Bradycardia, unspecified  3.Coronary artery disease  4.DVT (deep venous thrombosis)  5.Dyslipidemia  6.Chest discomfort  7.Hx of CABG  8.Pure hypercholesterolemia  9.Palpitation  10.PVC (premature ventricular contraction)  11.Encounter for long-term (current) drug use  Past Surgical History  1.Cardiac pacemaker (S/P PPM)  2.H/O heart artery stent  3.Hx of CABG  Family History  No significant family history noted.  Social History  Smoking status Former [id number] (End Date)  Tobacco usage - No (Non-smoker for personal reasons (finding))  Review of systems  Constitutional No history of Weight Loss and Anorexia  Cardiovascular No history of Chest pain, CORONADO, Palpitations, Syncope and PND  Musculoskeletal Arthritis  Respiratory No history of SOB  Physical Examination  Constitutional O2 sat 96% on room air  Vitals Left Arm Sitting  / 64 mmHg, Pulse rate 73 bpm, Height in 6', BMI: 29.6, Weight in 218 lbs (or) 99 kgs and BSA : 2.26 cc/m²  General Appearance No Acute Distress and Appropriate  Head/Eyes/Ears/Nose/Mouth/Throat Conjunctiva pink, Sclera Clear and Mucous membranes Moist  Neck Normal carotid pulsations  Respiratory Unlabored and Equal bilaterally  Cardiovascular Regular rhythm. Normal and normal S1 and S2    Gastrointestinal Abdomen soft, Non-tender and Normoactive bowel sounds  Musculoskeletal Normal spine  Gait Normal gait  Strength and tone Normal muscle strength  Upper Extremities No clubbing and No cyanosis  Lower Extremities Pulses 2+ and equal bilaterally and No edema  Skin Warm and dry and No rashes or lesions  Neurologic / Psychiatric Alert and Oriented  Speech Normal speech  Allergies  1.Heparin(Reaction:, Severity:Mild)  2.Tetanus Immune Globulin(Reaction:, Severity:Mild)  Medications (Info obtained by: Verbal)  1.aspirin 81 mg tablet,delayed release, Take 1 tablet  orally once a day.  2.atorvastatin (LIPITOR) 40 MG tablet, Take 40 mg by mouth nightly.  3.Calcium 600 + D(3) 600 mg (1,500 mg)-200 unit tablet, Take 1 tablet orally once a day.  4.Colace 100 mg capsule, Take orally once a day.  5.escitalopram (LEXAPRO) 20 MG tablet, 20 mg.  6.finasteride (PROSCAR) 5 MG tablet  7.Flomax 0.4 mg capsule, Take 1 capsule orally once a day.  8.fluticasone 100 mcg-salmeteroL 50 mcg/dose blistr powdr for inhalation, (inhalation) once a day at night as needed.  9.metFORMIN (GLUCOPHAGE) 500 MG tablet, Take 500 mg by mouth 2 times daily.  10.metoPROLOL succinate (TOPROL-XL) 25 MG 24 hr tablet, Take 25 mg by mouth daily.  11.montelukast (SINGULAIR) 10 MG tablet  12.Multiple Vitamins-Minerals (CENTRUM SILVER) tablet, 1 daily  13.Omega-3 Fatty Acids (FISH OIL) 1000 MG capsule, 1,000 mg.  14.STIOLTO RESPIMAT 2.5-2.5 MCG/ACT inhaler  15.tamsulosin (FLOMAX) 0.4 MG Cap  16.Vitamin D3 25 mcg (1,000 unit) capsule, Take 1 capsule orally once a day.  17.warfarin (COUMADIN) 5 MG tablet, 5 mg. Indications: taking 7 mg  Impression  1.Cardiac pacemaker (S/P PPM)  2.Coronary artery disease  3.Third degree AV block (Complete Heart Block)  4.DVT (deep venous thrombosis)  5.PVC (premature ventricular contraction)  Assessment & Plan  1.  PVCs  Now off sotalol none seen on paced rhythm  No PVCs seen no palpitations  2.  Coronary disease   cabg  history of stent   no chest pain   follows Dr. Castro  3.  Dyslipidemia   cholesterol at goal.  4.  Dyspnea   related to significant emphysema and asbestosis.  5.  Heart block  Status post urgent pacemaker August 2021  FAINA status 2033 or after  Dependent 100% ventricular paced    PLAN:  Continue current medications  Regular follow-up with Dr. Castro as scheduled  Follow myself annually during pacemaker visit  pacemaker clinic annually  Medications Ordered  1.metoprolol succinate ER 25 mg tablet,extended release 24 hr, Take 1 tablet orally once a day.  Labs and Diagnostics  ordered  1.EKG (electrocardiogram) (Today)  2.EKG (electrocardiogram) (Today)  Future appointments  1.Follow up visit - Dick King with device (1 Year)  Nurses documentation  EKG done  refill cardiac meds  Patient instructions  PLAN:  Continue current medications  Regular follow-up with Dr. Castro as scheduled  Follow myself annually during pacemaker visit  pacemaker clinic annually  Diagnostics Details  Exercise Myocardial Perfusion Imaging 04/11/2022  1.Stress EKG is non-diagnostic due to paced rhythm    2.No anginal symptoms    3.Rare PVC    1.This is a normal perfusion study, no perfusion defects noted.    2.The left ventricular cavity is noted to be normal on the stress study. The left ventricular ejection fraction was calculated to be 54% and left ventricular global function is normal.    3.The study quality is below average.    CPOE Orders carried out by: Dick King MD, Sri Butler RN, Francine Asif and Lesley Adhikari [facility]  Care Providers: Dick King MD, Sri Butler RN, Francine Asif and Lesley Adhikari [facility]  Electronically Authenticated by  Dick King MD  11/30/2022 06:00:44 PM  Disclaimer: Components of this note were documented using voice recognition system and are subject to errors not corrected at proofreading. Contact the author of this note for any clarifications.

## 2024-04-25 NOTE — PROGRESS NOTES
[facility]  part of [facility]    Progress Note    Dennys Manzano Patient Status:  Inpatient    1935 MRN [id number]   Location [facility] 4W/SW/SE Attending Charlie Alarcon MD   Hosp Day # 10 PCP CALOS FITZGERALD MD     Chief Complaint:   Chief Complaint   Patient presents with    Abdomen/Flank Pain    Nausea/Vomiting/Diarrhea       Subjective:   Dennys Manzano was seen and examined  Sitting up in chair  No acute distress  Claims he feels somewhat better  No significant pain       Objective:   Objective:    Blood pressure 125/46, pulse 85, temperature 97.5 °F (36.4 °C), temperature source Oral, resp. rate 18, height 6' (1.829 m), weight 241 lb 13.5 oz (109.7 kg), SpO2 93%.    Physical Exam:    General: No acute distress. Drowsy but wakes and answers questions.   Respiratory: Clear to auscultation bilaterally. No wheezes. No rhonchi.  Cardiovascular: S1, S2. Regular rate and rhythm. No murmurs, rubs or gallops.   Abdomen: Soft, nontender, nondistended.  Positive bowel sounds. No rebound or guarding.  Neurologic: No focal neurological deficits.   Musculoskeletal: Moves all extremities.  Extremities: No edema.      Results:   Results:    Labs:  Recent Labs   Lab 24   WBC 15.7* 14.2* 12.1*  --  13.1* 14.8*   HGB 11.4* 10.6* 10.6*  --  10.6* 10.5*   MCV 94.0 95.1 94.3  --  93.6 94.0   .0 332.0 363.0  --  410.0 479.0*   INR 3.20* 1.54* 1.19 1.20  --  1.15       Recent Labs   Lab 24   * 135* 134*   BUN 15 13 12   CREATSERUM 0.53* 0.49* 0.53*   CA 8.7 8.9 9.2   ALB  --   --  3.2   * 127* 128*   K 4.2 4.5 4.4   CL 95* 95* 93*   CO2 32.0 30.0 32.0       Estimated Creatinine Clearance: 109.8 mL/min (A) (based on SCr of 0.53 mg/dL (L)).    Recent Labs   Lab 24  0424   PTP 15.8* 16.0* 15.4*   INR 1.19 1.20 1.15             Culture:  Hospital Encounter on 04/14/24   1. CSF culture     Status: None (Preliminary result)    Collection Time: 04/23/24 10:00 AM    Specimen: CSF, lumbar puncture; Cerebral spinal fluid   Result Value Ref Range    CSF Culture Result No Growth at 18-24 hrs. N/A    CSF Smear No WBCs seen N/A    CSF Smear No organisms seen N/A    CSF Smear This is a cytocentrifuged smear. N/A   2. Blood Culture     Status: None    Collection Time: 04/18/24  9:41 PM    Specimen: Blood,peripheral   Result Value Ref Range    Blood Culture Result No Growth 5 Days N/A   3. Tissue Aerobic Culture     Status: None    Collection Time: 04/17/24  2:35 PM    Specimen: Tissue   Result Value Ref Range    Tissue Culture Result No Growth 3 Days N/A    Tissue Smear 1+ WBCs seen N/A    Tissue Smear No organisms seen N/A   4. Anaerobic Culture     Status: None    Collection Time: 04/17/24  2:35 PM    Specimen: Tissue   Result Value Ref Range    Anaerobic Culture No Anaerobes isolated N/A   5. Body Fluid Cult Aerobic and Anaerobic     Status: None    Collection Time: 04/15/24  7:26 AM    Specimen: Synovial fluid,shoulder; Body fluid, unspecified   Result Value Ref Range    Body Fluid Culture Result No Growth 5 Days N/A    Body Fluid Smear 3+ WBCs seen N/A    Body Fluid Smear No organisms seen N/A    Body Fluid Smear This is a cytocentrifuged smear. N/A   6. Urine Culture, Routine     Status: Abnormal    Collection Time: 04/15/24  5:43 AM    Specimen: Urine, clean catch   Result Value Ref Range    Urine Culture >100,000 CFU/ML Enterococcus faecalis NOT VRE (A) N/A       Cardiac  No results for input(s): \"TROP\", \"PBNP\" in the last 168 hours.      Imaging: Imaging data reviewed in Epic.  No results found.    Medications:    ipratropium-albuterol  3 mL Nebulization BID    warfarin  8.5 mg Oral Nightly    docusate sodium  100 mg Oral BID    acyclovir  10 mg/kg (Ideal) Intravenous Q8H    phytonadione (Aqua-Mephton) 10 mg in sodium chloride 0.9%  50 mL IVPB  10 mg Intravenous Once    cefTRIAXone  2 g Intravenous Q12H    metoprolol succinate ER  50 mg Oral Daily Beta Blocker    magnesium oxide  400 mg Oral Once    fluticasone propionate  1 spray Each Nare Daily    montelukast  10 mg Oral Nightly    aspirin  81 mg Oral Daily    atorvastatin  40 mg Oral Nightly    finasteride  5 mg Oral Daily    pantoprazole  40 mg Oral QAM AC    senna-docusate  1 tablet Oral BID    umeclidinium-vilanterol  1 puff Inhalation Daily    tamsulosin  0.4 mg Oral Daily with dinner    insulin aspart  1-5 Units Subcutaneous TID CC    ondansetron  4 mg Intravenous Once    sodium chloride  1,000 mL Intravenous Once         Assessment and Plan:   Assessment & Plan:      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   - s/p R shoulder joint aspiration 4/15   - 59,000 WBC's with predominately neutrophils. + calcium pyrophosphate crystals --> pseudogout. Culture negative.   - IV rocephin 2gm q 12, day #6  - ID on consult.   - PT/OT - LISA eventually   - blood cx x 2 NGTD.   - R shoulder arthroscopy extensive debridement of R shoulder, bicep tenotomy 4/17      Acute encephalopathy now completely resolved  - etiology unclear. ? Meningitis.    - s/p LP. Await results.   - IV Acyclovir empirically + Rocephin.   - CT head no acute findings. MRI no acute CVA  - EEG c/w encephalopathy, now neuro normal  - ID and neuro on consult.   - neuro checks.   - asked neuro to re-eval today     Ecoli UTI  - cx + on 4/15, now treatment complete.     Acute respiratory failure with hypoxia  Likely COPD exacerbation in combination with atelectasis  - was on 5L NC wean o2 now RA  - duonebs q6hrs WA  - SLP eval regular thin liquids.   - Oral diet   - IS/flutter valve.   - CXR atelectasis   - Antitussives PRN   - CT chest on admit showed dense atelectasis no PE.      DVT/pulmonary embolism  - resume coumadin today      Hyponatremia  - appears euvolemic now   - Na up today  -  Urine osm high, urine sdoium high.   - free water restriction      NSVT  - ECHO LVEF 55-60%. No WMA.   - keep Mg 2.0 and K 4.0   - cont metoprolol     DM II  - A1c 6.4  - ISS     BPH  Chronic urinary retention   - Continue Flomax and finasteride  - cont valladares last changed 04/01/24     Constipation  - bowel regimen      >55min spent, >50% spent counseling and coordinating care in the form of educating pt/family and d/w consultants and staff. Most of the time spent discussing the above plan.        Plan of care discussed with patient or family at bedside.    Supplementary Documentation:     Quality:  DVT Prophylaxis: warfarin  CODE status: Full   Dispo: per clinical course           Estimated date of discharge: TBD  Discharge is dependent on: clinical stability  At this point Mr. Manzano is expected to be discharge to: [facility]

## 2024-04-25 NOTE — PLAN OF CARE
More alert, forgetful on time. Bipap on at night. VSS. Aspiration precautions. Generalized edema. Remote tele. MD aware of 2 separate occasions of 5 beats of vtach. Consulted cardio. On-call MD gave order for nurse to notify cardio tonight of new consult. On-call NP notified and will assess in AM. Repositioned as tolerated. Call light within reach, fall precautions. Monitored frequently by staff. Plan for LISA once medically cleared.    Problem: Patient Centered Care  Goal: Patient preferences are identified and integrated in the patient's plan of care  Description: Interventions:  - What would you like us to know as we care for you? Patient is from home with family and he has a daughter that works here who is his support system.  Patient is hard of hearing.  - Provide timely, complete, and accurate information to patient/family  - Incorporate patient and family knowledge, values, beliefs, and cultural backgrounds into the planning and delivery of care  - Encourage patient/family to participate in care and decision-making at the level they choose  - Honor patient and family perspectives and choices  Outcome: Progressing     Problem: Patient/Family Goals  Goal: Patient/Family Long Term Goal  Description: Patient's Long Term Goal: Pain on right shoulder will be resolved and will be able to walk well with walker.    Interventions:  - No weight bearing on right arm till surgeon says so.  - Home health PT/OT as ordered.  - Pain management with ,oral medication.  - Monitor right shoulder for swelling or increasing pain or weakness.  - Follow up with surgery as recommended.  - See additional Care Plan goals for specific interventions  Outcome: Progressing  Goal: Patient/Family Short Term Goal  Description: Patient's Short Term Goal: [facility] is resolved.    Interventions:   - NWB to right arm.  - PT/OT as ordered.  - Administer IV antibiotics as ordered.  - Administer oral antibiotics as ordered.  - Maintain  enteric/contact isolation as ordered.  - Out of bed as much as tolerated.  - Administer oral anticoagulation as ordered, SCD's to both legs as well.  - See additional Care Plan goals for specific interventions  Outcome: Progressing     Problem: PAIN - ADULT  Goal: Verbalizes/displays adequate comfort level or patient's stated pain goal  Description: INTERVENTIONS:  - Encourage pt to monitor pain and request assistance  - Assess pain using appropriate pain scale  - Administer analgesics based on type and severity of pain and evaluate response  - Implement non-pharmacological measures as appropriate and evaluate response  - Consider cultural and social influences on pain and pain management  - Manage/alleviate anxiety  - Utilize distraction and/or relaxation techniques  - Monitor for opioid side effects  - Notify MD/LIP if interventions unsuccessful or patient reports new pain  - Anticipate increased pain with activity and pre-medicate as appropriate  Outcome: Progressing     Problem: RISK FOR INFECTION - ADULT  Goal: Absence of fever/infection during anticipated neutropenic period  Description: INTERVENTIONS  - Monitor WBC  - Administer growth factors as ordered  - Implement neutropenic guidelines  Outcome: Progressing     Problem: SAFETY ADULT - FALL  Goal: Free from fall injury  Description: INTERVENTIONS:  - Assess pt frequently for physical needs  - Identify cognitive and physical deficits and behaviors that affect risk of falls.  - [place] fall precautions as indicated by assessment.  - Educate pt/family on patient safety including physical limitations  - Instruct pt to call for assistance with activity based on assessment  - Modify environment to reduce risk of injury  - Provide assistive devices as appropriate  - Consider OT/PT consult to assist with strengthening/mobility  - Encourage toileting schedule  Outcome: Progressing     Problem: DISCHARGE PLANNING  Goal: Discharge to home or other facility with  appropriate resources  Description: INTERVENTIONS:  - Identify barriers to discharge w/pt and caregiver  - Include patient/family/discharge partner in discharge planning  - Arrange for needed discharge resources and transportation as appropriate  - Identify discharge learning needs (meds, wound care, etc)  - Arrange for interpreters to assist at discharge as needed  - Consider post-discharge preferences of patient/family/discharge partner  - Complete POLST form as appropriate  - Assess patient's ability to be responsible for managing their own health  - Refer to Case Management Department for coordinating discharge planning if the patient needs post-hospital services based on physician/LIP order or complex needs related to functional status, cognitive ability or social support system  Outcome: Progressing     Problem: CARDIOVASCULAR - ADULT  Goal: Maintains optimal cardiac output and hemodynamic stability  Description: INTERVENTIONS:  - Monitor vital signs, rhythm, and trends  - Monitor for bleeding, hypotension and signs of decreased cardiac output  - Evaluate effectiveness of vasoactive medications to optimize hemodynamic stability  - Monitor arterial and/or venous puncture sites for bleeding and/or hematoma  - Assess quality of pulses, skin color and temperature  - Assess for signs of decreased coronary artery perfusion - ex. Angina  - Evaluate fluid balance, assess for edema, trend weights  Outcome: Progressing  Goal: Absence of cardiac arrhythmias or at baseline  Description: INTERVENTIONS:  - Continuous cardiac monitoring, monitor vital signs, obtain 12 lead EKG if indicated  - Evaluate effectiveness of antiarrhythmic and heart rate control medications as ordered  - Initiate emergency measures for life threatening arrhythmias  - Monitor electrolytes and administer replacement therapy as ordered  Outcome: Progressing     Problem: RESPIRATORY - ADULT  Goal: Achieves optimal ventilation and  oxygenation  Description: INTERVENTIONS:  - Assess for changes in respiratory status  - Assess for changes in mentation and behavior  - Position to facilitate oxygenation and minimize respiratory effort  - Oxygen supplementation based on oxygen saturation or ABGs  - Provide Smoking Cessation handout, if applicable  - Encourage broncho-pulmonary hygiene including cough, deep breathe, Incentive Spirometry  - Assess the need for suctioning and perform as needed  - Assess and instruct to report SOB or any respiratory difficulty  - Respiratory Therapy support as indicated  - Manage/alleviate anxiety  - Monitor for signs/symptoms of CO2 retention  Outcome: Progressing     Problem: GASTROINTESTINAL - ADULT  Goal: Minimal or absence of nausea and vomiting  Description: INTERVENTIONS:  - Maintain adequate hydration with IV or PO as ordered and tolerated  - Nasogastric tube to low intermittent suction as ordered  - Evaluate effectiveness of ordered antiemetic medications  - Provide nonpharmacologic comfort measures as appropriate  - Advance diet as tolerated, if ordered  - Obtain nutritional consult as needed  - Evaluate fluid balance  Outcome: Progressing  Goal: Maintains or returns to baseline bowel function  Description: INTERVENTIONS:  - Assess bowel function  - Maintain adequate hydration with IV or PO as ordered and tolerated  - Evaluate effectiveness of GI medications  - Encourage mobilization and activity  - Obtain nutritional consult as needed  - Establish a toileting routine/schedule  - Consider collaborating with pharmacy to review patient's medication profile  Outcome: Progressing     Problem: GENITOURINARY - ADULT  Goal: Absence of urinary retention  Description: INTERVENTIONS:  - Assess patient’s ability to void and empty bladder  - Monitor intake/output and perform bladder scan as needed  - Follow urinary retention protocol/standard of care  - Consider collaborating with pharmacy to review patient's medication  profile  - Implement strategies to promote bladder emptying  Outcome: Progressing     Problem: METABOLIC/FLUID AND ELECTROLYTES - ADULT  Goal: Electrolytes maintained within normal limits  Description: INTERVENTIONS:  - Monitor labs and rhythm and assess patient for signs and symptoms of electrolyte imbalances  - Administer electrolyte replacement as ordered  - Monitor response to electrolyte replacements, including rhythm and repeat lab results as appropriate  - Fluid restriction as ordered  - Instruct patient on fluid and nutrition restrictions as appropriate  Outcome: Progressing     Problem: MUSCULOSKELETAL - ADULT  Goal: Return mobility to safest level of function  Description: INTERVENTIONS:  - Assess patient stability and activity tolerance for standing, transferring and ambulating w/ or w/o assistive devices  - Assist with transfers and ambulation using safe patient handling equipment as needed  - Ensure adequate protection for wounds/incisions during mobilization  - Obtain PT/OT consults as needed  - Advance activity as appropriate  - Communicate ordered activity level and limitations with patient/family  Outcome: Progressing  Goal: Maintain proper alignment of affected body part  Description: INTERVENTIONS:  - Support and protect limb and body alignment per provider's orders  - Instruct and reinforce with patient and family use of appropriate assistive device and precautions (e.g. spinal or hip dislocation precautions)  Outcome: Progressing     Problem: Impaired Activities of Daily Living  Goal: Achieve highest/safest level of independence in self care  Description: Interventions:  - Assess ability and encourage patient to participate in ADLs to maximize function  - Promote sitting position while performing ADLs such as feeding, grooming, and bathing  - Educate and encourage patient/family in tolerated functional activity level and precautions during self-care  - Encourage patient to incorporate impaired  side during daily activities to promote function  Outcome: Progressing     Problem: NEUROLOGICAL - ADULT  Goal: Achieves stable or improved neurological status  Description: INTERVENTIONS  - Assess for and report changes in neurological status  - Initiate measures to prevent increased intracranial pressure  - Maintain blood pressure and fluid volume within ordered parameters to optimize cerebral perfusion and minimize risk of hemorrhage  - Monitor temperature, glucose, and sodium. Initiate appropriate interventions as ordered  Outcome: Progressing     Problem: Delirium  Goal: Minimize duration of delirium  Description: Interventions:  - Encourage use of hearing aids, eye glasses  - Promote highest level of mobility daily  - Provide frequent reorientation  - Promote wakefulness i.e. lights on, blinds open  - Promote sleep, encourage patient's normal rest cycle i.e. lights off, TV off, minimize noise and interruptions  - Encourage family to assist in orientation and promotion of home routines  Outcome: Progressing

## 2024-04-25 NOTE — PHYSICAL THERAPY NOTE
PHYSICAL THERAPY TREATMENT NOTE - INPATIENT     Room Number: 429/429-A       Presenting Problem: hyponatremia, recent GI virus, nausea, vomiting, diarrhea, R shoulder pain s/p bedside aspiration       Problem List  Principal Problem:    Hyponatremia  Active Problems:    Leukocytosis, unspecified type    Acute pain of right shoulder    Hypoxia    Transient disorder of initiating or maintaining wakefulness    Acute metabolic encephalopathy      PHYSICAL THERAPY ASSESSMENT   Patient demonstrates good  progress this session, goals  remain in progress.    Patient continues to function below baseline with bed mobility, transfers, and gait.  Contributing factors to remaining limitations include decreased functional strength, decreased endurance/aerobic capacity, and pain.  Next session anticipate patient to progress bed mobility, transfers, and gait.  Physical Therapy will continue to follow patient for duration of hospitalization.    Patient continues to benefit from continued skilled PT services: to promote return to prior level of function and safety with continuous assistance and gradual rehabilitative therapy .    PLAN  PT Treatment Plan: Bed mobility;Body mechanics;Endurance;Gait training  Frequency (Obs): 3-5x/week    SUBJECTIVE  Limited participation    OBJECTIVE  Precautions: Limb alert - right    WEIGHT BEARING RESTRICTION  R Upper Extremity: Weight Bearing as Tolerated             PAIN ASSESSMENT   Rating: Unable to rate  Location: pain with BUE/BLE PROM  Management Techniques: Activity promotion;Body mechanics;Relaxation;Repositioning    BALANCE  Static Sitting: Fair -  Dynamic Sitting: Fair -  Static Standing: Not tested  Dynamic Standing: Not tested    ACTIVITY TOLERANCE                          O2 WALK       AM-PAC '6-Clicks' INPATIENT SHORT FORM - BASIC MOBILITY  How much difficulty does the patient currently have...  Patient Difficulty: Turning over in bed (including adjusting bedclothes, sheets and  blankets)?: A Lot   Patient Difficulty: Sitting down on and standing up from a chair with arms (e.g., wheelchair, bedside commode, etc.): A Lot   Patient Difficulty: Moving from lying on back to sitting on the side of the bed?: A Lot   How much help from another person does the patient currently need...   Help from Another: Moving to and from a bed to a chair (including a wheelchair)?: Total   Help from Another: Need to walk in hospital room?: Total   Help from Another: Climbing 3-5 steps with a railing?: Total     AM-PAC Score:  Raw Score: 9   Approx Degree of Impairment: 81.38%   Standardized Score (AM-PAC Scale): 30.55   CMS Modifier (G-Code): CM    FUNCTIONAL ABILITY STATUS  Functional Mobility/Gait Assessment  Gait Assistance: Not tested  Rolling: maximum assist  Supine to Sit: maximum assist  Sit to Supine: maximum assist  Sit to Stand:   Additional information: Pt seen daily..Pt educated on deep breathing and relaxation technique.There ex in supine ;Max a for bed mobility.Pt transfer via lif to side chair;positioning for his comfort.Pt ended session in sitting position.RN aware.Call light within reach..    The patient's Approx Degree of Impairment: 81.38% has been calculated based on documentation in the [facility] '6 clicks' Inpatient Daily Activity Short Form.  Research supports that patients with this level of impairment may benefit from Sub-acute Rehabilitation..  Final disposition will be made by interdisciplinary medical team.    THERAPEUTIC EXERCISES  Lower Extremity Ankle pumps  Glut sets  Quad sets     Position Supine       Patient End of Session: Up in chair;Call light within reach;RN aware of session/findings;All patient questions and concerns addressed    CURRENT GOALS     Patient Goal Patient's self-stated goal is: none stated   Goal #1 Patient is able to demonstrate supine - sit EOB @ level: SBA     Goal #1   Current Status Max a   Goal #2 Patient is able to demonstrate transfers Sit to/from Stand at  assistance level: SBA with cane or les-walker     Goal #2  Current Status NT   Goal #3 Patient is able to ambulate 25 feet with assist device: cane or les-walker at assistance level: minimum assistance   Goal #3   Current Status NT   Goal #4 Patient to demonstrate independence with home activity/exercise instructions provided to patient in preparation for discharge.   Goal #4   Current Status In progress     Gait Training:  minutes  Therapeutic Activity: 20 minutes  Neuromuscular Re-education:  minutes  Therapeutic Exercise: 10 minutes  [place] Repositioning:  minutes  Manual Therapy:  minutes  Can add/delete any of these

## 2024-04-25 NOTE — PROGRESS NOTES
[facility]  part of [facility]    Progress Note    Dennys Manzano Patient Status:  Inpatient    1935 MRN [id number]   Location [facility] 4W/SW/SE Attending Jeimy Driscoll MD   Hosp Day # 10 PCP CALOS FITZGERALD MD     Subjective:   Seen and examined while sitting in chair. Drowsy but arousable. Confused and not answering questions appropriately. On 2 L O2. Used BiPAP overnight. Had 2 episodes of NSVT overnight.    Objective:   Blood pressure 125/46, pulse 85, temperature 97.5 °F (36.4 °C), temperature source Oral, resp. rate 18, height 6' (1.829 m), weight 241 lb 13.5 oz (109.7 kg), SpO2 93%.  Physical Exam  Vitals and nursing note reviewed.   Constitutional:       General: He is sleeping. He is not in acute distress.     Appearance: He is ill-appearing.      Interventions: Nasal cannula in place.   Cardiovascular:      Rate and Rhythm: Normal rate and regular rhythm.   Pulmonary:      Effort: No respiratory distress.      Breath sounds: Rales present. No wheezing or rhonchi.      Comments: Diminished breath sounds bilaterally  Abdominal:      General: There is no distension.      Palpations: Abdomen is soft.      Tenderness: There is no abdominal tenderness.   Musculoskeletal:      Cervical back: Normal range of motion and neck supple.      Right lower leg: No edema.      Left lower leg: No edema.   Skin:     General: Skin is warm and dry.   Neurological:      Mental Status: He is oriented to person, place, and time and easily aroused.      Motor: Weakness present.      Comments: Dozes off during conversation but easily arousable  Confused and not answering questions appropriately  Minimal movement in bilateral UE and LE       Results:   Lab Results   Component Value Date    WBC 14.8 (H) 2024    HGB 10.5 (L) 2024    HCT 31.2 (L) 2024    .0 (H) 2024    CREATSERUM 0.53 (L) 2024    BUN 12 2024     (L) 2024    K 4.4 2024     CL 93 (L) 04/25/2024    CO2 32.0 04/25/2024     (H) 04/25/2024    CA 9.2 04/25/2024    ALB 3.2 04/25/2024    ALKPHO 91 04/18/2024    BILT 1.1 04/18/2024    TP 6.8 04/18/2024    AST <8 04/18/2024    ALT 16 04/18/2024    PTT 41.0 (H) 04/14/2024    INR 1.15 04/25/2024    T4F 1.0 04/18/2024    TSH 0.277 (L) 04/18/2024    LIP 26 04/14/2024    ESRML 111 (H) 04/14/2024    CRP 13.9 (H) 02/10/2018    MG 1.9 04/25/2024    PHOS 4.5 04/25/2024    TROP <0.045 08/18/2021    TROPHS 25 04/25/2024     03/23/2021       Assessment & Plan:   Encephalopathy  Etiology unclear  CT brain no acute findings  EEG no seizure  S/p lumbar puncture 4/23 - CSF culture no growth to date  Meningitis/encephalitis panel and cryptococcus Ag negative  Worsening leukocytosis  Seen by neuro  Still drowsy, confused, weak  Plan:  -Await lumbar puncture results  -On acyclovir and ceftriaxone as per ID  -Avoid sedatives and narcotics    Acute hypoxemic and hypercapnic respiratory failure  Underlying COPD and possible NAYAN  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

## 2024-04-25 NOTE — HISTORICAL OFFICE NOTE
Facility Logo [facility]  [address], [address], [place] [address]  [phone]      Dennys Manzano  Progress Note  Demographics:  Name: Dennys Manzano YOB: 1935  Age: 87, Male Medical Record No: [id number]  Visited Date/Time: 04/25/2023 09:15 AM    Chief Complaints  follow up  History of Present Illness  Mr. Manzano is here for a 1 year follow-up visit.  His cardiac status has been stable.  He has had no chest pain.  He is frequently short of breath with minimal activity.  He walks with a walker.  His lungs are clear today.    He has a history of CAD and had double bypass in 1995 at [facility] involving a LIMA to the LAD and an SVG to the OM.  The LIMA became atretic due to adequate perfusion from the native LAD, but by 2012 the LAD itself had progressed to where he needed a drug-eluting stent.  Cath at that time showed a patent SVG to the OM.  A nuclear stress test last year showed normal perfusion and normal systolic function.    He had frequent PVCs which caused functional bradycardia and required suppressive therapy with sotalol.  The sotalol was stopped and the PVCs have remained suppressed.  Lipids are excellent and his blood pressure is well controlled.  Cardiac risk factors Former smoker  Past Medical History  1.Cardiac pacemaker (S/P PPM)  2.Bradycardia, unspecified  3.Coronary artery disease  4.DVT (deep venous thrombosis)  5.Dyslipidemia  6.Chest discomfort  7.Hx of CABG  8.Pure hypercholesterolemia  9.Palpitation  10.PVC (premature ventricular contraction)  11.Encounter for long-term (current) drug use  Past Surgical History  1.Cardiac pacemaker (S/P PPM)  2.H/O heart artery stent  3.Hx of CABG  Family History  No significant family history noted.  Social History  Smoking status Former [id number] (End Date)  Tobacco usage - No (Non-smoker for personal reasons (finding))  Review of systems  Cardiovascular No history of Chest pain, CORONADO,  Palpitations, Syncope, PND, Orthopnea, Edema and Claudication  Musculoskeletal No history of Myalgias and Arthritis  Respiratory No history of SOB, Wheezing and Sputum  Hem/Lymphatic No history of Easy bruising, Blood clots, Hx of blood transfusion, Anemia and Bleeding problems  Physical Examination  Constitutional 95  Vitals Left Arm Sitting  / 68 mmHg, Pulse rate 95 bpm, Regular, Height in 6', BMI: 29.6, Weight in 218 lbs (or) 99 kgs and BSA : 2.26 cc/m²  General Appearance No Acute Distress  Head/Eyes/Ears/Nose/Mouth/Throat Mucous membranes Moist  Neck No carotid bruits and No JVD  Respiratory Unlabored and Lungs clear with normal breath sounds  Cardiovascular Intact distal pulses and Regular rhythm. Normal rate present. Normal and normal S1 and S2  Low-pitched early murmur is present with a grade of 1 at the upper right sternal border.    Gastrointestinal Abdomen soft and Non-tender  Lower Extremities Pulses 2+ and equal bilaterally  Skin Warm and dry  Neurologic / Psychiatric Alert and Oriented  Allergies  1.Heparin(Reaction:, Severity:Mild)  2.Tetanus Immune Globulin(Reaction:, Severity:Mild)  Medications  1.aspirin 81 mg tablet,delayed release, Take 1 tablet orally once a day.  2.atorvastatin (LIPITOR) 40 MG tablet, Take 40 mg by mouth nightly.  3.Calcium 600 + D(3) 600 mg (1,500 mg)-200 unit tablet, Take 1 tablet orally once a day.  4.Colace 100 mg capsule, Take orally once a day.  5.escitalopram (LEXAPRO) 20 MG tablet, 20 mg.  6.finasteride (PROSCAR) 5 MG tablet  7.Flomax 0.4 mg capsule, Take 1 capsule orally once a day.  8.fluticasone 100 mcg-salmeteroL 50 mcg/dose blistr powdr for inhalation, (inhalation) once a day at night as needed.  9.metFORMIN (GLUCOPHAGE) 500 MG tablet, Take 500 mg by mouth 2 times daily.  10.metoprolol succinate ER 25 mg tablet,extended release 24 hr, Take 1 tablet orally once a day.  11.montelukast (SINGULAIR) 10 MG tablet  12.Multiple Vitamins-Minerals (CENTRUM SILVER)  tablet, 1 daily  13.Omega-3 Fatty Acids (FISH OIL) 1000 MG capsule, 1,000 mg.  14.STIOLTO RESPIMAT 2.5-2.5 MCG/ACT inhaler  15.tamsulosin (FLOMAX) 0.4 MG Cap  16.Vitamin D3 25 mcg (1,000 unit) capsule, Take 1 capsule orally once a day.  17.warfarin (COUMADIN) 5 MG tablet, 5 mg. 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  CAD CABG s/p remote CABG and more recent PCI.  Stable and asymptomatic with excellent risk factor control.  Normal stress test 1 year ago.  Normal LV systolic function.    2.  History of DVT.  On chronic Coumadin therapy for that purpose.    Plan:  1.  Continue same cardiac medications.    2.  Office visit in 1 year.  Future appointments  1.Follow up visit - Geovany Castro (1 Year)  Patient instructions  1.  Continue same cardiac medications.    2.  Office visit in 1 year.    Diagnostics Details  Exercise Myocardial Perfusion Imaging 04/11/2022  1.Stress EKG is non-diagnostic due to paced rhythm    2.No anginal symptoms    3.Rare PVC    1.This is a normal perfusion study, no perfusion defects noted.    2.The left ventricular cavity is noted to be normal on the stress study. The left ventricular ejection fraction was calculated to be 54% and left ventricular global function is normal.    3.The study quality is below average.    Care Providers: Geovany Castro MD, Adrianna LEE and Nora LEE  Electronically Authenticated by  Geovany Castro MD  04/29/2023 02:04:39 PM  Disclaimer: Components of this note were documented using voice recognition system and are subject to errors not corrected at proofreading. Contact the author of this note for any clarifications.

## 2024-04-25 NOTE — CONSULTS
[facility] - CARDIOLOGY CONSULT NOTE    Dennys Manzano Patient Status:  Inpatient    1935 MRN [id number]   Location [facility] 4W/SW/SE Attending Charlie Alarcon MD   Hosp Day # 10 PCP CALOS FITZGERALD MD     Date of Admission:  2024  Date of Consult:  2024  I was asked by Charlie Alarcon MD to provide recommendations for evaluation and management of cardiac issues.  Impression:     Nonsustained ventricular tachycardia  History normal EF asymptomatic nonsustained VT  PVC history  Keep stable electrolytes  No active treatment at this time    Coronary disease  History of CABG and PCI  No chest pain    Heart block  Pacemaker normal functioning    Recommendations:    Keep magnesium 2.0 or greater and potassium 4.0 or greater  Continue metoprolol  Telemetry while hospitalized  Reason for Consultation:     Nonsustained ventricular tachycardia    History of Present Illness:   Patient is a 88 year old male who was admitted to the hospital for shoulder crystal arthritis.    Patient has history of coronary disease PCI and CABG, heart block with pacemaker, PVC history and dyslipidemia who had nonsustained VT and cardiology consulted.  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  Ortho on consult had joint aspiration is on antibiotics as negative blood cultures.  Had surgery and extensive debridement on .  Had acute encephalopathy that resolved was given empiric treatment and neurologic workup.  He also had a UTI and completed treatment and had acute respiratory failure with hypoxia related to his COPD.  He has an echocardiogram with EF 58% no wall motion abnormalities.      Cardiac tests:  Telemetry with nonsustained VT up to 5 beats x 2  Echocardiogram EF 58% on 417  INR 1.5  EKG atrial sensed ventricular paced 86  Results:     Lab Results   Component Value Date    WBC 14.8 (H) 2024    HGB 10.5 (L) 2024    HCT 31.2 (L)  04/25/2024    .0 (H) 04/25/2024    CREATSERUM 0.53 (L) 04/25/2024    BUN 12 04/25/2024     (L) 04/25/2024    K 4.4 04/25/2024    CL 93 (L) 04/25/2024    CO2 32.0 04/25/2024     (H) 04/25/2024    CA 9.2 04/25/2024    ALB 3.2 04/25/2024    ALKPHO 91 04/18/2024    BILT 1.1 04/18/2024    TP 6.8 04/18/2024    AST <8 04/18/2024    ALT 16 04/18/2024    PTT 41.0 (H) 04/14/2024    INR 1.15 04/25/2024    T4F 1.0 04/18/2024    TSH 0.277 (L) 04/18/2024    LIP 26 04/14/2024    ESRML 111 (H) 04/14/2024    CRP 13.9 (H) 02/10/2018    MG 1.9 04/25/2024    PHOS 4.5 04/25/2024    TROP <0.045 08/18/2021     03/23/2021       No results found.  EKG 12 Lead    Result Date: 4/25/2024  Atrial-sensed ventricular-paced rhythm with prolonged AV conduction with Occasional Premature ventricular complexes Abnormal ECG When compared with ECG of 14-APR-2024 21:48, Vent. rate has increased BY   5 BPM Confirmed by MAHNAZ MENG, DARON ([id number]) on 4/25/2024 8:36:29 AM     Past Medical History  Past Medical History:    Anxiety state    Arrhythmia    Arthritis    Asbestos exposure    lung    Atherosclerosis of coronary artery    Bleeding tendency (HCC)    Blood disorder    BPH (benign prostatic hyperplasia)    Colitis    Congestive heart disease (HCC)    Coronary atherosclerosis    Deep vein thrombosis (HCC)    Depression    Diabetes (HCC)    Diverticulosis of large intestine    Esophageal reflux    Fibromyalgia    Ganglion, finger joint of right hand    Right middle finger excision of painful ganglion, rotator flap closure    Gout    Hearing impairment    Heart attack (HCC)    Heart disease    Heart valve disease    High blood pressure    High cholesterol    History of blood clots    legs & lungs    Hyperlipidemia    IBS (irritable bowel syndrome)    Incontinence    Malignant hyperthermia    Muscle weakness    Neuropathy    Osteoarthritis    PE (pulmonary embolism)    Peripheral vascular disease (HCC)    Pulmonary embolism  (HCC)    RSD (reflex sympathetic dystrophy)    right arm, little in the left    Screening PSA (prostate specific antigen)    Shortness of breath    Thrombophlebitis    Visual impairment    glasses for reading       Past Surgical History  Past Surgical History:   Procedure Laterality Date    Cabg      Cardiac pacemaker placement      Cath percutaneous  transluminal coronary angioplasty      Foot surgery      right     Inguinal herniorrhaphy      Ir ivc filter placement      for blood clots    Other surgical history  2013    heart bypass and stent    Other surgical history      basal cell carcinoma    Shoulder arthroscopy  1994(?)    Right rotator cuff       Family History  Family History   Problem Relation Age of Onset    Cancer Mother     Stroke Mother     Diabetes Other         Granddaughter has DM       Social History  Pediatric History   Patient Parents    Not on file     Other Topics Concern    Not on file   Social History Narrative    Not on file           Current Medications:  Current Facility-Administered Medications   Medication Dose Route Frequency    ipratropium-albuterol (Duoneb) 0.5-2.5 (3) MG/3ML inhalation solution 3 mL  3 mL Nebulization BID    magnesium hydroxide (Milk of Magnesia) 400 MG/5ML oral suspension 30 mL  30 mL Oral Daily PRN    fleet enema (Fleet) 7-19 GM/118ML rectal enema 133 mL  1 enema Rectal Daily PRN    warfarin (Coumadin) tab 8.5 mg  8.5 mg Oral Nightly    docusate sodium (Colace) cap 100 mg  100 mg Oral BID    polyethylene glycol (PEG 3350) (Miralax) 17 g oral packet 17 g  17 g Oral Daily PRN    bisacodyl (Dulcolax) 10 MG rectal suppository 10 mg  10 mg Rectal Daily PRN    acyclovir (Zovirax) 800 mg in sodium chloride 0.9% 150 mL IVPB  10 mg/kg (Ideal) Intravenous Q8H    phytonadione (Aqua-Mephton) 10 mg in sodium chloride 0.9% 50 mL IVPB  10 mg Intravenous Once    cefTRIAXone (Rocephin) 2 g in D5W 100 mL IVPB-ADD  2 g Intravenous Q12H    metoprolol succinate ER (Toprol XL) 24 hr  tab 50 mg  50 mg Oral Daily Beta Blocker    magnesium oxide (Mag-Ox) tab 400 mg  400 mg Oral Once    ipratropium-albuterol (Duoneb) 0.5-2.5 (3) MG/3ML inhalation solution 3 mL  3 mL Nebulization Q6H PRN    fluticasone propionate (Flonase) 50 MCG/ACT nasal suspension 1 spray  1 spray Each Nare Daily    benzonatate (Tessalon) cap 100 mg  100 mg Oral TID PRN    guaiFENesin-codeine (Robitussin AC) 100-10 MG/5ML oral solution 5 mL  5 mL Oral Q4H PRN    montelukast (Singulair) tab 10 mg  10 mg Oral Nightly    aspirin chewable tab 81 mg  81 mg Oral Daily    atorvastatin (Lipitor) tab 40 mg  40 mg Oral Nightly    finasteride (Proscar) tab 5 mg  5 mg Oral Daily    pantoprazole (Protonix) DR tab 40 mg  40 mg Oral QAM AC    senna-docusate (Senokot-S) 8.6-50 MG per tab 1 tablet  1 tablet Oral BID    umeclidinium-vilanterol (Anoro Ellipta) 62.5-25 MCG/ACT inhaler 1 puff  1 puff Inhalation Daily    tamsulosin (Flomax) cap 0.4 mg  0.4 mg Oral Daily with dinner    ondansetron (Zofran) 4 MG/2ML injection 4 mg  4 mg Intravenous Q6H PRN    acetaminophen (Tylenol) tab 650 mg  650 mg Oral Q6H PRN    hydrALAzine (Apresoline) 20 mg/mL injection 10 mg  10 mg Intravenous Q4H PRN    glucose (Dex4) 15 GM/59ML oral liquid 15 g  15 g Oral Q15 Min PRN    Or    glucose (Glutose) 40% oral gel 15 g  15 g Oral Q15 Min PRN    Or    glucose-vitamin C (Dex-4) chewable tab 4 tablet  4 tablet Oral Q15 Min PRN    Or    dextrose 50% injection 50 mL  50 mL Intravenous Q15 Min PRN    Or    glucose (Dex4) 15 GM/59ML oral liquid 30 g  30 g Oral Q15 Min PRN    Or    glucose (Glutose) 40% oral gel 30 g  30 g Oral Q15 Min PRN    Or    glucose-vitamin C (Dex-4) chewable tab 8 tablet  8 tablet Oral Q15 Min PRN    insulin aspart (NovoLOG) 100 Units/mL FlexPen 1-5 Units  1-5 Units Subcutaneous TID CC    ondansetron (Zofran) 4 MG/2ML injection 4 mg  4 mg Intravenous Once    sodium chloride 0.9 % IV bolus 1,000 mL  1,000 mL Intravenous Once     Medications Prior to  Admission   Medication Sig    polyethylene glycol, PEG 3350, 17 g Oral Powd Pack Take 17 g by mouth daily.    warfarin (JANTOVEN) 10 MG Oral Tab Take 8.5 mg by mouth nightly.    Calcium-Magnesium-Vitamin D (CALCIUM 1200+D3 OR) Take 1 capsule by mouth daily.    fluticasone propionate 50 MCG/ACT Nasal Suspension 1 spray(s)    metFORMIN 500 MG Oral Tab Take 1 tablet (500 mg total) by mouth 2 (two) times daily.    metoprolol succinate 25 MG Oral Tablet 24 Hr     montelukast 10 MG Oral Tab     Senna-Docusate Sodium 8.6-50 MG Oral Tab Take 1 tablet by mouth 2 (two) times daily.    Pantoprazole Sodium 40 MG Oral Tab EC Take 1 tablet (40 mg total) by mouth every morning before breakfast.    Escitalopram Oxalate (LEXAPRO) 20 MG Oral Tab Take 1 tablet (20 mg total) by mouth daily.    tamsulosin HCl (FLOMAX) 0.4 MG Oral Cap Take 1 capsule (0.4 mg total) by mouth daily with dinner.    aspirin (BABY ASPIRIN) 81 MG Oral Chew Tab Chew 1 tablet (81 mg total) by mouth daily.    atorvastatin 40 MG Oral Tab Take 1 tablet (40 mg total) by mouth nightly.    Ascorbic Acid (VITAMIN C OR) Take by mouth.    Ergocalciferol (VITAMIN D OR) Take by mouth.    Multiple Vitamins-Minerals (CENTRUM SILVER OR) Take  by mouth.    finasteride (PROSCAR) 5 MG Oral Tab Take 1 tablet (5 mg total) by mouth daily.    STIOLTO RESPIMAT 2.5-2.5 MCG/ACT Inhalation Aero Soln  (Patient not taking: Reported on 4/15/2024)       Allergies  Allergies   Allergen Reactions    Heparin SWELLING     Low platelets    Nitrofurantoin NAUSEA AND VOMITING    Heparin OTHER (SEE COMMENTS)     Low platelets    Tetanus Immune Globulin     Tetanus Toxoids UNKNOWN     As a child    Amoxicillin-Pot Clavulanate DIARRHEA    Azithromycin DIARRHEA       Review of Systems:   10 pt ROS performed, separate from HPI  Review of Systems:  GENERAL: no fevers, chills, sweats  HEENT: no visual or hearing changes  SKIN: denies any unusual skin lesions or rashes  RESPIRATORY: denies shortness of  breath with exertion  CARDIOVASCULAR: no active chest pain, no claudication  GI: denies abdominal pain and denies heartburn  : no dysuria or hematuria  NEURO: denies headaches, focal weaknesses or paresthesias  All other systems reviewed and negative.  fatigue is present  All other systems were reviewed are negative  Physical Exam:   Blood pressure 125/46, pulse 85, temperature 97.5 °F (36.4 °C), temperature source Oral, resp. rate 18, height 6' (1.829 m), weight 241 lb 13.5 oz (109.7 kg), SpO2 93%.    Scheduled Meds:    ipratropium-albuterol  3 mL Nebulization BID    warfarin  8.5 mg Oral Nightly    docusate sodium  100 mg Oral BID    acyclovir  10 mg/kg (Ideal) Intravenous Q8H    phytonadione (Aqua-Mephton) 10 mg in sodium chloride 0.9% 50 mL IVPB  10 mg Intravenous Once    cefTRIAXone  2 g Intravenous Q12H    metoprolol succinate ER  50 mg Oral Daily Beta Blocker    magnesium oxide  400 mg Oral Once    fluticasone propionate  1 spray Each Nare Daily    montelukast  10 mg Oral Nightly    aspirin  81 mg Oral Daily    atorvastatin  40 mg Oral Nightly    finasteride  5 mg Oral Daily    pantoprazole  40 mg Oral QAM AC    senna-docusate  1 tablet Oral BID    umeclidinium-vilanterol  1 puff Inhalation Daily    tamsulosin  0.4 mg Oral Daily with dinner    insulin aspart  1-5 Units Subcutaneous TID CC    ondansetron  4 mg Intravenous Once    sodium chloride  1,000 mL Intravenous Once         Physical Exam:    General: Alert and oriented. No apparent distress. No respiratory or constitutional distress.  HEENT: Normocephalic, anicteric sclera, neck supple  Neck: No JVD, carotids 2+, supple  Cardiac: Regular rate. No pathologic murmur.  Lungs: Clear with normal effort.  Normal excursions and effort.  Abdomen: Soft, non-tender. BS-present.  Extremities: Without clubbing, cyanosis.  Peripheral pulsespresent.  Neurologic: Alert and oriented, normal affect. Motor ok.  Skin: Warm and dry.     Imaging: I independently visualized  all relevant chest imaging in PACS, agree with radiology interpretation except where noted.  Thank you for allowing me to participate in the care of your patient.    Dick King MD  [facility]  Cardiac Electrophysiology  4/25/2024  5 level consult

## 2024-04-25 NOTE — PROGRESS NOTES
History of Presenting Illness:  Patient seen for follow-up.  Patient previous seen by Dr. Mcmahon for encephalopathy/cognitive decline.  Patient with multiple medical problem including coronary artery disease status post CABG, CHF, COPD, PE, diabetes, history of right rotator cuff injury complicated by RSD, admitted because of increasing right shoulder pain on 15 April.  Patient was having fluctuation of alertness, initially suspected to be due to pain medications.  On 17 April patient had debridement of joint done and was noted to have septic arthritis.  Neurology team had seen the patient and suspected the patient's clinical condition was multifactorial due to cognitive decline as well as toxic metabolic etiology.  Patient had a spinal tap done on 23rd April.  Initial reports showed 2 WBC, 9 RBC and protein of 47.9.  Pulmonary meningitis/encephalitis panel was unremarkable.  Rest of the testing results are still pending.  Patient was seen in the room, sitting in the chair able to tell me the day and the place.  He was also able to follow simple commands did not seem to have any hallucinations or delusions.  Complained of pain.    Vitals:   Vitals:    04/25/24 0755   BP: 125/46   Pulse:    Resp: 18   Temp: 97.5 °F (36.4 °C)      Examination:    Awake , alert, non-dysarthric, expressive an receptive language normal.   Pupils round and reactive to light, extra ocular movement normal, no facial weakness, hearing normal.  Motor strength unable to assess for right upper extremity.  Left upper extremity seems to have reasonably good strength.  Lower extremity strength seems symmetrical.  No tremor or cogwheeling noted.    Finger to Nose testing normal on left.     Investigations:       Lab Results   Component Value Date    A1C 6.4 (H) 03/22/2024        Lab Results   Component Value Date    LDL 80 03/22/2024    HDL 40 03/22/2024    TRIG 84 03/22/2024        Recent Labs   Lab 04/22/24  0412 04/24/24  0428 04/25/24  0424    RBC 3.34* 3.44* 3.32*   HGB 10.6* 10.6* 10.5*   HCT 31.5* 32.2* 31.2*   MCV 94.3 93.6 94.0   MCH 31.7 30.8 31.6   MCHC 33.7 32.9 33.7   RDW 12.7 12.9 12.9   NEPRELIM 8.79*  --  9.86*   WBC 12.1* 13.1* 14.8*   .0 410.0 479.0*       Recent Labs   Lab 04/22/24  0412 04/24/24  0428 04/25/24  0424   * 135* 134*   BUN 15 13 12   CREATSERUM 0.53* 0.49* 0.53*   EGFRCR 98 100 98   CA 8.7 8.9 9.2   * 127* 128*   K 4.2 4.5 4.4   CL 95* 95* 93*   CO2 32.0 30.0 32.0         Impression/MDM.    Signs symptoms of encephalopathy, probably due to toxic/metabolic etiology, complicated by underlying cognitive decline.  Initially was CSF studies did not show any evidence of acute infection.  Will review test results when available.  Also advised EEG.  Patient's last MRI of the brain was done in 2018.  Will consider repeating it if initial CSF studies are negative.

## 2024-04-25 NOTE — PLAN OF CARE
Feed assist, ate well at breakfast. Refused lunch. O2 at 2 L NC. Tele. Pacemaker. Up to chair with therapy with lift. Grasps stronger today. Back to bed with lift. Blue boots to elevate bilateral heels. Bowel regimen in place, passing gas, awaiting BM results; no breakdown noted to sacral area, mepilex overtop, repositioned with pillows/offloading. Chronic valladares patent, was last inserted 4/1/24. Left saline lock. Right shoulder with dry gauze dressing and tegaderm overtop. Bipap at HS.     DC planning in progress.     Problem: Patient Centered Care  Goal: Patient preferences are identified and integrated in the patient's plan of care  Description: Interventions:  - What would you like us to know as we care for you? Patient is from home with family and he has a daughter that works here who is his support system.  Patient is hard of hearing.  - Provide timely, complete, and accurate information to patient/family  - Incorporate patient and family knowledge, values, beliefs, and cultural backgrounds into the planning and delivery of care  - Encourage patient/family to participate in care and decision-making at the level they choose  - Honor patient and family perspectives and choices  Outcome: Progressing     Problem: Patient/Family Goals  Goal: Patient/Family Long Term Goal  Description: Patient's Long Term Goal: Pain on right shoulder will be resolved and will be able to walk well with walker.    Interventions:  - No weight bearing on right arm till surgeon says so.  - Home health PT/OT as ordered.  - Pain management with ,oral medication.  - Monitor right shoulder for swelling or increasing pain or weakness.  - Follow up with surgery as recommended.  - See additional Care Plan goals for specific interventions  Outcome: Progressing  Goal: Patient/Family Short Term Goal  Description: Patient's Short Term Goal: Home with [facility] when pain is resolved.    Interventions:   - NWB to right arm.  - PT/OT as ordered.  -  Administer IV antibiotics as ordered.  - Administer oral antibiotics as ordered.  - Maintain enteric/contact isolation as ordered.  - Out of bed as much as tolerated.  - Administer oral anticoagulation as ordered, SCD's to both legs as well.  - See additional Care Plan goals for specific interventions  Outcome: Progressing     Problem: PAIN - ADULT  Goal: Verbalizes/displays adequate comfort level or patient's stated pain goal  Description: INTERVENTIONS:  - Encourage pt to monitor pain and request assistance  - Assess pain using appropriate pain scale  - Administer analgesics based on type and severity of pain and evaluate response  - Implement non-pharmacological measures as appropriate and evaluate response  - Consider cultural and social influences on pain and pain management  - Manage/alleviate anxiety  - Utilize distraction and/or relaxation techniques  - Monitor for opioid side effects  - Notify MD/LIP if interventions unsuccessful or patient reports new pain  - Anticipate increased pain with activity and pre-medicate as appropriate  Outcome: Progressing     Problem: RISK FOR INFECTION - ADULT  Goal: Absence of fever/infection during anticipated neutropenic period  Description: INTERVENTIONS  - Monitor WBC  - Administer growth factors as ordered  - Implement neutropenic guidelines  Outcome: Progressing     Problem: SAFETY ADULT - FALL  Goal: Free from fall injury  Description: INTERVENTIONS:  - Assess pt frequently for physical needs  - Identify cognitive and physical deficits and behaviors that affect risk of falls.  - [place] fall precautions as indicated by assessment.  - Educate pt/family on patient safety including physical limitations  - Instruct pt to call for assistance with activity based on assessment  - Modify environment to reduce risk of injury  - Provide assistive devices as appropriate  - Consider OT/PT consult to assist with strengthening/mobility  - Encourage toileting schedule  Outcome:  Progressing     Problem: DISCHARGE PLANNING  Goal: Discharge to home or other facility with appropriate resources  Description: INTERVENTIONS:  - Identify barriers to discharge w/pt and caregiver  - Include patient/family/discharge partner in discharge planning  - Arrange for needed discharge resources and transportation as appropriate  - Identify discharge learning needs (meds, wound care, etc)  - Arrange for interpreters to assist at discharge as needed  - Consider post-discharge preferences of patient/family/discharge partner  - Complete POLST form as appropriate  - Assess patient's ability to be responsible for managing their own health  - Refer to Case Management Department for coordinating discharge planning if the patient needs post-hospital services based on physician/LIP order or complex needs related to functional status, cognitive ability or social support system  Outcome: Progressing     Problem: CARDIOVASCULAR - ADULT  Goal: Maintains optimal cardiac output and hemodynamic stability  Description: INTERVENTIONS:  - Monitor vital signs, rhythm, and trends  - Monitor for bleeding, hypotension and signs of decreased cardiac output  - Evaluate effectiveness of vasoactive medications to optimize hemodynamic stability  - Monitor arterial and/or venous puncture sites for bleeding and/or hematoma  - Assess quality of pulses, skin color and temperature  - Assess for signs of decreased coronary artery perfusion - ex. Angina  - Evaluate fluid balance, assess for edema, trend weights  Outcome: Progressing  Goal: Absence of cardiac arrhythmias or at baseline  Description: INTERVENTIONS:  - Continuous cardiac monitoring, monitor vital signs, obtain 12 lead EKG if indicated  - Evaluate effectiveness of antiarrhythmic and heart rate control medications as ordered  - Initiate emergency measures for life threatening arrhythmias  - Monitor electrolytes and administer replacement therapy as ordered  Outcome: Progressing      Problem: RESPIRATORY - ADULT  Goal: Achieves optimal ventilation and oxygenation  Description: INTERVENTIONS:  - Assess for changes in respiratory status  - Assess for changes in mentation and behavior  - Position to facilitate oxygenation and minimize respiratory effort  - Oxygen supplementation based on oxygen saturation or ABGs  - Provide Smoking Cessation handout, if applicable  - Encourage broncho-pulmonary hygiene including cough, deep breathe, Incentive Spirometry  - Assess the need for suctioning and perform as needed  - Assess and instruct to report SOB or any respiratory difficulty  - Respiratory Therapy support as indicated  - Manage/alleviate anxiety  - Monitor for signs/symptoms of CO2 retention  Outcome: Progressing     Problem: GASTROINTESTINAL - ADULT  Goal: Minimal or absence of nausea and vomiting  Description: INTERVENTIONS:  - Maintain adequate hydration with IV or PO as ordered and tolerated  - Nasogastric tube to low intermittent suction as ordered  - Evaluate effectiveness of ordered antiemetic medications  - Provide nonpharmacologic comfort measures as appropriate  - Advance diet as tolerated, if ordered  - Obtain nutritional consult as needed  - Evaluate fluid balance  Outcome: Progressing  Goal: Maintains or returns to baseline bowel function  Description: INTERVENTIONS:  - Assess bowel function  - Maintain adequate hydration with IV or PO as ordered and tolerated  - Evaluate effectiveness of GI medications  - Encourage mobilization and activity  - Obtain nutritional consult as needed  - Establish a toileting routine/schedule  - Consider collaborating with pharmacy to review patient's medication profile  Outcome: Progressing     Problem: GENITOURINARY - ADULT  Goal: Absence of urinary retention  Description: INTERVENTIONS:  - Assess patient’s ability to void and empty bladder  - Monitor intake/output and perform bladder scan as needed  - Follow urinary retention protocol/standard of  care  - Consider collaborating with pharmacy to review patient's medication profile  - Implement strategies to promote bladder emptying  Outcome: Progressing     Problem: METABOLIC/FLUID AND ELECTROLYTES - ADULT  Goal: Electrolytes maintained within normal limits  Description: INTERVENTIONS:  - Monitor labs and rhythm and assess patient for signs and symptoms of electrolyte imbalances  - Administer electrolyte replacement as ordered  - Monitor response to electrolyte replacements, including rhythm and repeat lab results as appropriate  - Fluid restriction as ordered  - Instruct patient on fluid and nutrition restrictions as appropriate  Outcome: Progressing     Problem: MUSCULOSKELETAL - ADULT  Goal: Return mobility to safest level of function  Description: INTERVENTIONS:  - Assess patient stability and activity tolerance for standing, transferring and ambulating w/ or w/o assistive devices  - Assist with transfers and ambulation using safe patient handling equipment as needed  - Ensure adequate protection for wounds/incisions during mobilization  - Obtain PT/OT consults as needed  - Advance activity as appropriate  - Communicate ordered activity level and limitations with patient/family  Outcome: Progressing  Goal: Maintain proper alignment of affected body part  Description: INTERVENTIONS:  - Support and protect limb and body alignment per provider's orders  - Instruct and reinforce with patient and family use of appropriate assistive device and precautions (e.g. spinal or hip dislocation precautions)  Outcome: Progressing     Problem: Impaired Activities of Daily Living  Goal: Achieve highest/safest level of independence in self care  Description: Interventions:  - Assess ability and encourage patient to participate in ADLs to maximize function  - Promote sitting position while performing ADLs such as feeding, grooming, and bathing  - Educate and encourage patient/family in tolerated functional activity level and  precautions during self-care  - Encourage patient to incorporate impaired side during daily activities to promote function  Outcome: Progressing     Problem: NEUROLOGICAL - ADULT  Goal: Achieves stable or improved neurological status  Description: INTERVENTIONS  - Assess for and report changes in neurological status  - Initiate measures to prevent increased intracranial pressure  - Maintain blood pressure and fluid volume within ordered parameters to optimize cerebral perfusion and minimize risk of hemorrhage  - Monitor temperature, glucose, and sodium. Initiate appropriate interventions as ordered  Outcome: Progressing     Problem: Delirium  Goal: Minimize duration of delirium  Description: Interventions:  - Encourage use of hearing aids, eye glasses  - Promote highest level of mobility daily  - Provide frequent reorientation  - Promote wakefulness i.e. lights on, blinds open  - Promote sleep, encourage patient's normal rest cycle i.e. lights off, TV off, minimize noise and interruptions  - Encourage family to assist in orientation and promotion of home routines  Outcome: Progressing

## 2024-04-25 NOTE — SLP NOTE
SPEECH DAILY NOTE - INPATIENT    ASSESSMENT & PLAN   ASSESSMENT  PPE REQUIRED. THIS THERAPIST WORE GLOVES, DROPLET MASK, AND GOGGLES FOR DURATION OF EVALUATION. HANDS WASHED UPON ENTRANCE/EXIT.    SLP f/u for ongoing dysphagia tx/meal assessment per recommendations of regular (per MD)/thin per BSE. RN reports pt tolerates diet and medication well with no overt clinical s/s aspiration. Pt denies any swallowing challenges.     Pt positioned upright in bedside chair. Pt afebrile, tolerating 2L/Min O2NC with oxygen status 95 prior to the start of oral trials. SLP reviewed aspiration precautions and safe swallowing compensatory strategies with the patient. Safe swallow guidelines remain written on the white board in purple. Diet recommendations remain on the whiteboard in the room. Patient v/u. Provided 1:1 feed assistance, pt tolerates regular and thin liquids via open cup with no overt clinical signs/symptoms of aspiration. Oxygen status remained >94 t/o the entire session. Oral/buccal cavities clear of residue following all trials.     MOST RECENT CXR 4/17/24:  Findings and impression:      Stable heart with persistent vascular congestion but no edema      Lung volumes remain moderately low with extensive basilar opacity, most likely atelectasis      No pneumothorax      Dictated by (CST): Bib Horn MD on 4/17/2024 at 6:59 PM       Finalized by (CST): Bib Horn MD on 4/17/2024 at 7:00 PM     PLAN: SLP to sign off at this time secondary to pt tolerating least restrictive diet with no CSA.     Diet Recommendations - Solids: Regular  Diet Recommendations - Liquids: Thin Liquids    Compensatory Strategies Recommended: Slow rate;Small bites and sips  Aspiration Precautions: Upright position;Slow rate;Small bites and sips  Medication Administration Recommendations: Whole in puree    Patient Experiencing Pain: No    Treatment Plan  Treatment Plan/Recommendations: Aspiration precautions    Interdisciplinary  Communication: Discussed with RN  Recommendations posted at bedside    GOALS  Goal #1 The patient will tolerate regular consistency and thin liquids without overt signs or symptoms of aspiration with 100 % accuracy over 1-2 session(s).     Goal met   Goal #2 The patient/family/caregiver will demonstrate understanding and implementation of aspiration precautions and swallow strategies independently over 1-2 session(s).    Goal met   Goal #3 The patient will utilize compensatory strategies as outlined by  BSSE (clinical evaluation) including Slow rate, Small bites, Small sips, Upright 90 degrees, Eliminate distractions, alternating consistencies, Supervision with meals with PRN assistance 100 % of the time across 1-2 sessions.    Goal met     FOLLOW UP  Follow Up Needed (Documentation Required): No  SLP Follow-up Date:  (n/a)  Number of Visits to Meet Established Goals: 3 (since transfer to CCU)    Session: 4    If you have any questions, please contact MONIQUE Layton M.S. CCC-SLP  Speech Language Pathologist  Phone Number Ext. [phone]

## 2024-04-25 NOTE — PROGRESS NOTES
Dennys Manzano Patient Status:  Inpatient    1935 MRN [id number]   Location [facility] 4W/SW/SE Attending Charlie Alarcon MD   Hosp Day # 10 PCP CALOS FITZGERALD MD     Cardiology Nocturnal APN Note    Briefly: (Documentation from chart review)     Dennys Manzano is a 89 y/o male with PMH/PSH of CAD s/p PCI and CABG, CHB s/p ppm, frequent PVCs, HLD who is hospitalized for septic arthritis and had 2 NSVT at 5 beats each overnight.    Primary Cardiologist Gloria/Fernando    Vital Signs:       2024    11:33 PM 2024    11:50 PM   Vitals History   BP  132/68   BP Location  Left arm   Pulse  88   Resp  20   Temp  98.6 °F (37 °C)   SpO2 98 % 99 %        Labs:   Lab Results   Component Value Date    WBC 13.1 2024    HGB 10.6 2024    HCT 32.2 2024    .0 2024    CREATSERUM 0.49 2024    BUN 13 2024     2024    K 4.5 2024    CL 95 2024    CO2 30.0 2024     2024    CA 8.9 2024    MG 1.8 2024    TROPHS 26 2024       Diagnostics:   XR LUMBAR PUNCTURE DIAG, INCLD IMG (CPT=62328)    Result Date: 2024  CONCLUSION:  1. Successful lumbar puncture at L2 without complication.  Four tubes of clear colored CSF with approximately 19 cc of fluid obtained were sent for laboratory analysis.    Dictated by (CST): Miguel Soto MD on 2024 at 3:31 PM     Finalized by (CST): Miguel Soto MD on 2024 at 3:33 PM          US VENOUS DOPPLER ARM LEFT - DIAG IMG (CPT=93971)    Result Date: 2024  CONCLUSION:  Negative for sonographic evidence of deep venous thrombosis in the left upper extremity.    Dictated by (CST): Jay Rojas MD on 2024 at 1:09 PM     Finalized by (CST): Jay Rojas MD on 2024 at 1:10 PM          CT SPINE CERVICAL (CPT=72125)    Result Date: 2024  CONCLUSION:  1. No acute fracture or dislocation involving the cervical spine. 2. Mild vertebral body compression  fracture at T2 without posterior osseous retropulsion, which is unchanged from the recent CT performed 04/15/2024 but was not present in 2013. This fracture is of uncertain chronicity; correlate clinically for overlying  point tenderness.  3. Mild-to-moderate multilevel spondylosis throughout the cervical spine.  There is resulting mild central vertebral canal stenosis at C3-C4, C4-C5 and C5-C6.  Multilevel neural foraminal narrowing/stenoses are also seen bilaterally.   [facility]  Dictated by (CST): Naren Palomares MD on 4/18/2024 at 1:42 PM     Finalized by (CST): Naren Palomares MD on 4/18/2024 at 1:50 PM          CT BRAIN OR HEAD (67996)    Result Date: 4/18/2024  CONCLUSION:  1. No acute intracranial process. 2. Stable mild generalized atrophy and mild chronic microangiopathic ischemic changes with large vessel calcific atherosclerosis. 3. Lesser incidental findings as above.   [facility]remote  Dictated by (CST): Naren Palomares MD on 4/18/2024 at 1:39 PM     Finalized by (CST): Naren Palomares MD on 4/18/2024 at 1:42 PM          XR CHEST AP PORTABLE  (CPT=71045)    Result Date: 4/17/2024  PROCEDURE: XR CHEST AP PORTABLE  (CPT=71045) TIME: 1841  COMPARISON: [facility], XR CHEST AP PORTABLE (CPT=71045), 4/16/2024, 12:16 PM.  INDICATIONS: Hypoxia post right shoulder arthroscopy, extensive debridement of right shoulder, bicep tenotomy.  TECHNIQUE:   Single view.   Findings and impression:  Stable heart with persistent vascular congestion but no edema  Lung volumes remain moderately low with extensive basilar opacity, most likely atelectasis  No pneumothorax    Dictated by (CST): Bib Horn MD on 4/17/2024 at 6:59 PM     Finalized by (CST): Bib Horn MD on 4/17/2024 at 7:00 PM          XR CHEST AP PORTABLE  (CPT=71045)    Result Date: 4/16/2024  CONCLUSION:   Low lung volumes with unchanged bibasilar opacities and unchanged trace bilateral pleural effusions.    Dictated by (CST):  Rose Marie Fan MD on 4/16/2024 at 4:12 PM     Finalized by (CST): Rose Marie Fan MD on 4/16/2024 at 4:14 PM          CT CHEST+ABDOMEN+PELVIS(ALL CNTRST ONLY)(CPT=71260/53680)    Result Date: 4/15/2024  CONCLUSION:   1. Dense atelectasis in the lung bases.  Superimposed consolidation is not excluded.  2. Moderate colonic stool burden which may indicate constipation.  No small bowel obstruction.  Diverticulosis without evidence of acute diverticulitis.  3. Prostatomegaly.  The urinary bladder is decompressed with Mcelroy catheter in place.  4. L1 vertebral body compression deformity is nonacute appearing, but new from the prior exam dated 06/23/2022.  5. Narrowing at the origins of the SMA and celiac trunk.  6.  Additional chronic or incidental findings are described in the body of this report.    Preliminary report was given by [facility].  There are no clinically significant discrepancies.    Dictated by (CST): Tyler Eng MD on 4/15/2024 at 1:14 PM     Finalized by (CST): Tyler Eng MD on 4/15/2024 at 1:24 PM          XR CHEST AP PORTABLE  (CPT=71045)    Result Date: 4/15/2024  CONCLUSION: Low lung volumes and bibasilar atelectasis.  Question trace bilateral pleural effusions.  Preliminary report was given by [facility].  There are no clinically significant discrepancies.    Dictated by (CST): Tyler Eng MD on 4/15/2024 at 11:38 AM     Finalized by (CST): Tyler Eng MD on 4/15/2024 at 11:45 AM          XR SHOULDER, COMPLETE (MIN 2 VIEWS), RIGHT (CPT=73030)    Result Date: 4/15/2024   No acute fracture or dislocation.  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  Osteopenia.  Soft tissues are unremarkable.  See report of chest x-ray for discussion of the lungs.    Preliminary report was given by [facility].  There are no clinically significant discrepancies.      Dictated by (CST): Tyler Eng MD on 4/15/2024 at 11:36 AM     Finalized by (CST): Tyler Eng MD on  4/15/2024 at 11:38 AM           Allergies:  Allergies   Allergen Reactions    Heparin SWELLING     Low platelets    Nitrofurantoin NAUSEA AND VOMITING    Heparin OTHER (SEE COMMENTS)     Low platelets    Tetanus Immune Globulin     Tetanus Toxoids UNKNOWN     As a child    Amoxicillin-Pot Clavulanate DIARRHEA    Azithromycin DIARRHEA       Medications:    ipratropium-albuterol    magnesium hydroxide    fleet enema    warfarin    docusate sodium    polyethylene glycol (PEG 3350)    bisacodyl    acyclovir    phytonadione ([place]qua-[place]) 10 mg in sodium chloride 0.9% 50 mL IVPB    cefTRIAXone    metoprolol succinate ER    magnesium oxide    ipratropium-albuterol    fluticasone propionate    benzonatate    guaiFENesin-codeine    montelukast    aspirin    atorvastatin    finasteride    pantoprazole    senna-docusate    umeclidinium-vilanterol    tamsulosin    ondansetron    acetaminophen    hydrALAzine    glucose **OR** glucose **OR** glucose-vitamin C **OR** dextrose **OR** glucose **OR** glucose **OR** glucose-vitamin C    insulin aspart    ondansetron    sodium chloride    Assessment:   NSVT  - 2 events at 5 beats each. 2 different morphologies during each event  - Previously on sotalol for frequent PVCs which suppressed. Stopped in 2021 after PPM implanted  - As of 2/2024 PVCs average 5.6 per hour per outpatient PM remote    CHB  - s/p [employer] dual chamber ppm 8/2021      Plan:    - Continue to monitor overnight  - Formal Cardiology consult to follow in AM.       DARREL Albarran  [facility]  4/25/2024  1:41 AM

## 2024-04-26 PROBLEM — G92.8 TOXIC METABOLIC ENCEPHALOPATHY: Status: ACTIVE | Noted: 2024-04-25

## 2024-04-26 LAB
ALBUMIN SERPL-MCNC: 3 G/DL (ref 3.2–4.8)
ANION GAP SERPL CALC-SCNC: 1 MMOL/L (ref 0–18)
BASOPHILS # BLD AUTO: 0.13 X10(3) UL (ref 0–0.2)
BASOPHILS NFR BLD AUTO: 0.9 %
BUN BLD-MCNC: 17 MG/DL (ref 9–23)
BUN/CREAT SERPL: 33.3 (ref 10–20)
CALCIUM BLD-MCNC: 9 MG/DL (ref 8.7–10.4)
CHLORIDE SERPL-SCNC: 96 MMOL/L (ref 98–112)
CO2 SERPL-SCNC: 32 MMOL/L (ref 21–32)
CREAT BLD-MCNC: 0.51 MG/DL
DEPRECATED RDW RBC AUTO: 43.7 FL (ref 35.1–46.3)
EGFRCR SERPLBLD CKD-EPI 2021: 99 ML/MIN/1.73M2 (ref 60–?)
EOSINOPHIL # BLD AUTO: 0.25 X10(3) UL (ref 0–0.7)
EOSINOPHIL NFR BLD AUTO: 1.8 %
ERYTHROCYTE [DISTWIDTH] IN BLOOD BY AUTOMATED COUNT: 12.9 % (ref 11–15)
GLUCOSE BLD-MCNC: 141 MG/DL (ref 70–99)
GLUCOSE BLDC GLUCOMTR-MCNC: 149 MG/DL (ref 70–99)
GLUCOSE BLDC GLUCOMTR-MCNC: 159 MG/DL (ref 70–99)
GLUCOSE BLDC GLUCOMTR-MCNC: 177 MG/DL (ref 70–99)
GLUCOSE BLDC GLUCOMTR-MCNC: 199 MG/DL (ref 70–99)
HCT VFR BLD AUTO: 31.2 %
HGB BLD-MCNC: 10.6 G/DL
IMM GRANULOCYTES # BLD AUTO: 0.48 X10(3) UL (ref 0–1)
IMM GRANULOCYTES NFR BLD: 3.4 %
LYMPHOCYTES # BLD AUTO: 1.99 X10(3) UL (ref 1–4)
LYMPHOCYTES NFR BLD AUTO: 14.2 %
MAGNESIUM SERPL-MCNC: 1.9 MG/DL (ref 1.6–2.6)
MCH RBC QN AUTO: 31.8 PG (ref 26–34)
MCHC RBC AUTO-ENTMCNC: 34 G/DL (ref 31–37)
MCV RBC AUTO: 93.7 FL
MONOCYTES # BLD AUTO: 1.55 X10(3) UL (ref 0.1–1)
MONOCYTES NFR BLD AUTO: 11 %
NEUTROPHILS # BLD AUTO: 9.66 X10 (3) UL (ref 1.5–7.7)
NEUTROPHILS # BLD AUTO: 9.66 X10(3) UL (ref 1.5–7.7)
NEUTROPHILS NFR BLD AUTO: 68.7 %
OSMOLALITY SERPL CALC.SUM OF ELEC: 272 MOSM/KG (ref 275–295)
PHOSPHATE SERPL-MCNC: 3.6 MG/DL (ref 2.4–5.1)
PLATELET # BLD AUTO: 541 10(3)UL (ref 150–450)
POTASSIUM SERPL-SCNC: 4.5 MMOL/L (ref 3.5–5.1)
RBC # BLD AUTO: 3.33 X10(6)UL
SODIUM SERPL-SCNC: 129 MMOL/L (ref 136–145)
WBC # BLD AUTO: 14.1 X10(3) UL (ref 4–11)

## 2024-04-26 PROCEDURE — 99232 SBSQ HOSP IP/OBS MODERATE 35: CPT | Performed by: OTHER

## 2024-04-26 PROCEDURE — 99232 SBSQ HOSP IP/OBS MODERATE 35: CPT | Performed by: INTERNAL MEDICINE

## 2024-04-26 PROCEDURE — 99233 SBSQ HOSP IP/OBS HIGH 50: CPT | Performed by: HOSPITALIST

## 2024-04-26 RX ORDER — MAGNESIUM SULFATE 1 G/100ML
1 INJECTION INTRAVENOUS ONCE
Status: COMPLETED | OUTPATIENT
Start: 2024-04-26 | End: 2024-04-26

## 2024-04-26 NOTE — PLAN OF CARE
Problem: Patient Centered Care  Goal: Patient preferences are identified and integrated in the patient's plan of care  Description: Interventions:  - What would you like us to know as we care for you? Patient is from home with family and he has a daughter that works here who is his support system.  Patient is hard of hearing.  - Provide timely, complete, and accurate information to patient/family  - Incorporate patient and family knowledge, values, beliefs, and cultural backgrounds into the planning and delivery of care  - Encourage patient/family to participate in care and decision-making at the level they choose  - Honor patient and family perspectives and choices  Outcome: Progressing     Problem: Patient/Family Goals  Goal: Patient/Family Long Term Goal  Description: Patient's Long Term Goal: Pain on right shoulder will be resolved and will be able to walk well with walker.    Interventions:  - No weight bearing on right arm till surgeon says so.  - Home health PT/OT as ordered.  - Pain management with ,oral medication.  - Monitor right shoulder for swelling or increasing pain or weakness.  - Follow up with surgery as recommended.  - See additional Care Plan goals for specific interventions  Outcome: Progressing  Goal: Patient/Family Short Term Goal  Description: Patient's Short Term Goal: Home with [facility] when pain is resolved.    Interventions:   - NWB to right arm.  - PT/OT as ordered.  - Administer IV antibiotics as ordered.  - Administer oral antibiotics as ordered.  - Maintain enteric/contact isolation as ordered.  - Out of bed as much as tolerated.  - Administer oral anticoagulation as ordered, SCD's to both legs as well.  - See additional Care Plan goals for specific interventions  Outcome: Progressing     Problem: PAIN - ADULT  Goal: Verbalizes/displays adequate comfort level or patient's stated pain goal  Description: INTERVENTIONS:  - Encourage pt to monitor pain and request assistance  - Assess pain  using appropriate pain scale  - Administer analgesics based on type and severity of pain and evaluate response  - Implement non-pharmacological measures as appropriate and evaluate response  - Consider cultural and social influences on pain and pain management  - Manage/alleviate anxiety  - Utilize distraction and/or relaxation techniques  - Monitor for opioid side effects  - Notify MD/LIP if interventions unsuccessful or patient reports new pain  - Anticipate increased pain with activity and pre-medicate as appropriate  Outcome: Progressing     Problem: RISK FOR INFECTION - ADULT  Goal: Absence of fever/infection during anticipated neutropenic period  Description: INTERVENTIONS  - Monitor WBC  - Administer growth factors as ordered  - Implement neutropenic guidelines  Outcome: Progressing     Problem: SAFETY ADULT - FALL  Goal: Free from fall injury  Description: INTERVENTIONS:  - Assess pt frequently for physical needs  - Identify cognitive and physical deficits and behaviors that affect risk of falls.  - Bayard fall precautions as indicated by assessment.  - Educate pt/family on patient safety including physical limitations  - Instruct pt to call for assistance with activity based on assessment  - Modify environment to reduce risk of injury  - Provide assistive devices as appropriate  - Consider OT/PT consult to assist with strengthening/mobility  - Encourage toileting schedule  Outcome: Progressing     Problem: DISCHARGE PLANNING  Goal: Discharge to home or other facility with appropriate resources  Description: INTERVENTIONS:  - Identify barriers to discharge w/pt and caregiver  - Include patient/family/discharge partner in discharge planning  - Arrange for needed discharge resources and transportation as appropriate  - Identify discharge learning needs (meds, wound care, etc)  - Arrange for interpreters to assist at discharge as needed  - Consider post-discharge preferences of patient/family/discharge  partner  - Complete POLST form as appropriate  - Assess patient's ability to be responsible for managing their own health  - Refer to Case Management Department for coordinating discharge planning if the patient needs post-hospital services based on physician/LIP order or complex needs related to functional status, cognitive ability or social support system  Outcome: Progressing     Problem: CARDIOVASCULAR - ADULT  Goal: Maintains optimal cardiac output and hemodynamic stability  Description: INTERVENTIONS:  - Monitor vital signs, rhythm, and trends  - Monitor for bleeding, hypotension and signs of decreased cardiac output  - Evaluate effectiveness of vasoactive medications to optimize hemodynamic stability  - Monitor arterial and/or venous puncture sites for bleeding and/or hematoma  - Assess quality of pulses, skin color and temperature  - Assess for signs of decreased coronary artery perfusion - ex. Angina  - Evaluate fluid balance, assess for edema, trend weights  Outcome: Progressing  Goal: Absence of cardiac arrhythmias or at baseline  Description: INTERVENTIONS:  - Continuous cardiac monitoring, monitor vital signs, obtain 12 lead EKG if indicated  - Evaluate effectiveness of antiarrhythmic and heart rate control medications as ordered  - Initiate emergency measures for life threatening arrhythmias  - Monitor electrolytes and administer replacement therapy as ordered  Outcome: Progressing     Problem: RESPIRATORY - ADULT  Goal: Achieves optimal ventilation and oxygenation  Description: INTERVENTIONS:  - Assess for changes in respiratory status  - Assess for changes in mentation and behavior  - Position to facilitate oxygenation and minimize respiratory effort  - Oxygen supplementation based on oxygen saturation or ABGs  - Provide Smoking Cessation handout, if applicable  - Encourage broncho-pulmonary hygiene including cough, deep breathe, Incentive Spirometry  - Assess the need for suctioning and perform as  needed  - Assess and instruct to report SOB or any respiratory difficulty  - Respiratory Therapy support as indicated  - Manage/alleviate anxiety  - Monitor for signs/symptoms of CO2 retention  Outcome: Progressing     Problem: GASTROINTESTINAL - ADULT  Goal: Minimal or absence of nausea and vomiting  Description: INTERVENTIONS:  - Maintain adequate hydration with IV or PO as ordered and tolerated  - Nasogastric tube to low intermittent suction as ordered  - Evaluate effectiveness of ordered antiemetic medications  - Provide nonpharmacologic comfort measures as appropriate  - Advance diet as tolerated, if ordered  - Obtain nutritional consult as needed  - Evaluate fluid balance  Outcome: Progressing  Goal: Maintains or returns to baseline bowel function  Description: INTERVENTIONS:  - Assess bowel function  - Maintain adequate hydration with IV or PO as ordered and tolerated  - Evaluate effectiveness of GI medications  - Encourage mobilization and activity  - Obtain nutritional consult as needed  - Establish a toileting routine/schedule  - Consider collaborating with pharmacy to review patient's medication profile  Outcome: Progressing     Problem: GENITOURINARY - ADULT  Goal: Absence of urinary retention  Description: INTERVENTIONS:  - Assess patient’s ability to void and empty bladder  - Monitor intake/output and perform bladder scan as needed  - Follow urinary retention protocol/standard of care  - Consider collaborating with pharmacy to review patient's medication profile  - Implement strategies to promote bladder emptying  Outcome: Progressing     Problem: METABOLIC/FLUID AND ELECTROLYTES - ADULT  Goal: Electrolytes maintained within normal limits  Description: INTERVENTIONS:  - Monitor labs and rhythm and assess patient for signs and symptoms of electrolyte imbalances  - Administer electrolyte replacement as ordered  - Monitor response to electrolyte replacements, including rhythm and repeat lab results as  appropriate  - Fluid restriction as ordered  - Instruct patient on fluid and nutrition restrictions as appropriate  Outcome: Progressing     Problem: MUSCULOSKELETAL - ADULT  Goal: Return mobility to safest level of function  Description: INTERVENTIONS:  - Assess patient stability and activity tolerance for standing, transferring and ambulating w/ or w/o assistive devices  - Assist with transfers and ambulation using safe patient handling equipment as needed  - Ensure adequate protection for wounds/incisions during mobilization  - Obtain PT/OT consults as needed  - Advance activity as appropriate  - Communicate ordered activity level and limitations with patient/family  Outcome: Progressing  Goal: Maintain proper alignment of affected body part  Description: INTERVENTIONS:  - Support and protect limb and body alignment per provider's orders  - Instruct and reinforce with patient and family use of appropriate assistive device and precautions (e.g. spinal or hip dislocation precautions)  Outcome: Progressing     Problem: Impaired Activities of Daily Living  Goal: Achieve highest/safest level of independence in self care  Description: Interventions:  - Assess ability and encourage patient to participate in ADLs to maximize function  - Promote sitting position while performing ADLs such as feeding, grooming, and bathing  - Educate and encourage patient/family in tolerated functional activity level and precautions during self-care  - Encourage patient to incorporate impaired side during daily activities to promote function  Outcome: Progressing     Problem: NEUROLOGICAL - ADULT  Goal: Achieves stable or improved neurological status  Description: INTERVENTIONS  - Assess for and report changes in neurological status  - Initiate measures to prevent increased intracranial pressure  - Maintain blood pressure and fluid volume within ordered parameters to optimize cerebral perfusion and minimize risk of hemorrhage  - Monitor  temperature, glucose, and sodium. Initiate appropriate interventions as ordered  Outcome: Progressing     Problem: Delirium  Goal: Minimize duration of delirium  Description: Interventions:  - Encourage use of hearing aids, eye glasses  - Promote highest level of mobility daily  - Provide frequent reorientation  - Promote wakefulness i.e. lights on, blinds open  - Promote sleep, encourage patient's normal rest cycle i.e. lights off, TV off, minimize noise and interruptions  - Encourage family to assist in orientation and promotion of home routines  Outcome: Progressing     Dennys was resting in bed, alert and verbally responsive. He refused any PRN pain medication at this time, but noted with some pain upon movement during repositioning. Pt still noted with gen weakness, repositioned every 2 hours and as needed. Pt finally had a BM last night, kept clean and dry. Accucheck AC/HS done. Pt has a valladares catheter in place, intact, and draining. Tele in place. IV ABT given as ordered with no adverse reaction noted. Plan for discharge to rehab when medically stable and pending insurance approval.

## 2024-04-26 NOTE — PROGRESS NOTES
[facility]  part of [facility]    Progress Note    Dennys Manzano Patient Status:  Inpatient    1935 MRN [id number]   Location [facility] 4W/SW/SE Attending Charlie Alarcon MD   Hosp Day # 11 PCP CALOS FITZGERALD MD         Subjective:   Subjective:  Patient seen and examined  Comfortable on 2 L of oxygen with no active cough or sputum or fever  Objective:   Blood pressure 102/60, pulse 88, temperature 98.1 °F (36.7 °C), temperature source Oral, resp. rate 14, height 6' (1.829 m), weight 241 lb 13.5 oz (109.7 kg), SpO2 96%.  Physical Exam  Constitutional:       General: He is not in acute distress.  Cardiovascular:      Rate and Rhythm: Normal rate.      Heart sounds:      No gallop.   Pulmonary:      Effort: No respiratory distress.      Breath sounds: No stridor. No wheezing, rhonchi or rales.   Abdominal:      General: Abdomen is flat. Bowel sounds are normal.      Palpations: Abdomen is soft.   Neurological:      Mental Status: Mental status is at baseline.         Results:   Lab Results   Component Value Date    WBC 14.1 (H) 2024    HGB 10.6 (L) 2024    HCT 31.2 (L) 2024    .0 (H) 2024    CREATSERUM 0.51 (L) 2024    BUN 17 2024     (L) 2024    K 4.5 2024    CL 96 (L) 2024    CO2 32.0 2024     (H) 2024    CA 9.0 2024    ALB 3.0 (L) 2024    ALKPHO 91 2024    BILT 1.1 2024    TP 6.8 2024    AST <8 2024    ALT 16 2024    PTT 41.0 (H) 2024    INR 1.15 2024    T4F 1.0 2024    TSH 0.277 (L) 2024    LIP 26 2024    ESRML 111 (H) 2024    CRP 13.9 (H) 02/10/2018    MG 1.9 2024    PHOS 3.6 2024    TROP <0.045 2021    TROPHS 25 2024     2021       No results found.  EKG 12 Lead    Result Date: 2024  Atrial-sensed ventricular-paced rhythm with prolonged AV conduction with Occasional  Premature ventricular complexes Abnormal ECG When compared with ECG of 14-APR-2024 21:48, Vent. rate has increased BY   5 BPM Confirmed by MAHNAZ MENG, DARON ([id number]) on 4/25/2024 8:36:29 AM     Assessment & Plan:        1-Acute and chronic respiratory failure with hypoxia and hypercapnia   underlying COPD and ? NAYAN   decompensated overlap syndrome with acute illness .    PAP during sleep at night  O2 requirement down to 2 L  Anoro and Nebulizers  Avoid extra use of sedative or narcotic    2-encephalopathy?  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

## 2024-04-26 NOTE — PROGRESS NOTES
INFECTIOUS DISEASE PROGRESS NOTE    Dennys Manzano Patient Status:  Inpatient    1935 MRN [id number]   Location [facility] 4W/SW/SE Attending Jeimy Driscoll MD   Hosp Day # 11 PCP CALOS FITZGERALD MD       SUBJECTIVE  In chair today. More awake and alert.    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 Faecalis - colonization     PLAN:     -  continue IV ceftriaxone for R shoulder - EOT 24  -  Follow fever curve, wbc  -  Reviewed labs, micro, imaging reports  -  d/w patient, staff     History of Present Illness:    Dennys Manzano is a a(n) 88 year old male. Patient is a 88-year-old male with a history of CAD post CABG and pacemaker, BPH, CHF, DM, depression, HLD, PAD, CVA, previous right rotator cuff surgery who now presents to the hospital for right shoulder pain with effusion, difficulty with movement started about 2 days prior.  Afebrile here.  WBC initially 18.  X-ray of the shoulder with DJD without fracture.  CT chest, abdomen, pelvis nausea and vomiting without clear infectious process.  Seen by orthopedic surgery underwent aspiration 4/15 noted WBC around 60,000 with 94% segs and positive crystals.  Cultures so far no growth.  On IV vancomycin and Zosyn.  Plan for surgery tomorrow for arthroscopy and I&D.    OBJECTIVE  /60 (BP Location: Left arm)   Pulse 88   Temp 98.1 °F (36.7 °C) (Oral)   Resp 14   Ht 182.9 cm (6')   Wt 241 lb 13.5 oz (109.7 kg)   SpO2 96%   BMI 32.80 kg/m²     General: in chair, awake, alert, and  HEENT: EOMI  Abdomen: Soft, NT/ND  Musculoskeletal:  R shoulder with limited ROM  Integument: No rashes    MD [facility]  [phone]

## 2024-04-26 NOTE — PHYSICAL THERAPY NOTE
PHYSICAL THERAPY TREATMENT NOTE - INPATIENT     Room Number: 429/429-A       Presenting Problem: hyponatremia, recent GI virus, nausea, vomiting, diarrhea, R shoulder pain s/p bedside aspiration       Problem List  Principal Problem:    Hyponatremia  Active Problems:    Leukocytosis, unspecified type    Acute pain of right shoulder    Hypoxia    Transient disorder of initiating or maintaining wakefulness    Acute metabolic encephalopathy    Encephalopathy      PHYSICAL THERAPY ASSESSMENT   Patient demonstrates good  progress this session, goals  remain in progress.    Patient continues to function below baseline with bed mobility, transfers, and gait.  Contributing factors to remaining limitations include decreased functional strength, decreased endurance/aerobic capacity, and pain.  Next session anticipate patient to progress bed mobility, transfers, and gait.  Physical Therapy will continue to follow patient for duration of hospitalization.    Patient continues to benefit from continued skilled PT services: to promote return to prior level of function and safety with continuous assistance and gradual rehabilitative therapy .    PLAN  PT Treatment Plan: Bed mobility;Body mechanics;Endurance  Frequency (Obs): 3-5x/week    SUBJECTIVE  Limited participation    OBJECTIVE  Precautions: Limb alert - right    WEIGHT BEARING RESTRICTION  R Upper Extremity: Weight Bearing as Tolerated             PAIN ASSESSMENT   Ratin  Location: pain with BUE/BLE PROM  Management Techniques: Activity promotion;Body mechanics;Relaxation;Repositioning    BALANCE  Static Sitting: Fair -  Dynamic Sitting: Fair -  Static Standing: Not tested  Dynamic Standing: Not tested    ACTIVITY TOLERANCE                          O2 WALK       AM-PAC '6-Clicks' INPATIENT SHORT FORM - BASIC MOBILITY  How much difficulty does the patient currently have...  Patient Difficulty: Turning over in bed (including adjusting bedclothes, sheets and blankets)?: A  Lot   Patient Difficulty: Sitting down on and standing up from a chair with arms (e.g., wheelchair, bedside commode, etc.): A Lot   Patient Difficulty: Moving from lying on back to sitting on the side of the bed?: A Lot   How much help from another person does the patient currently need...   Help from Another: Moving to and from a bed to a chair (including a wheelchair)?: Total   Help from Another: Need to walk in hospital room?: Total   Help from Another: Climbing 3-5 steps with a railing?: Total     AM-PAC Score:  Raw Score: 9   Approx Degree of Impairment: 81.38%   Standardized Score (AM-PAC Scale): 30.55   CMS Modifier (G-Code): CM    FUNCTIONAL ABILITY STATUS  Functional Mobility/Gait Assessment  Gait Assistance: Maximum assistance  Distance (ft): SPT (Holding on PTA)  Rolling: maximum assist  Supine to Sit: maximum assist  Sit to Supine: maximum assist  Sit to Stand:   Additional information: Pt seen daily..Pt educated on deep breathing and relaxation technique.There ex in supine ;Max a for bed mobility and transfer.EOB sitting balance activity with emphasis on core stabilization.ed session in sitting position.Pt with improved trunk control.Max a for SPT bed to side chair.RN aware.Call light within reach..    The patient's Approx Degree of Impairment: 81.38% has been calculated based on documentation in the [facility] '6 clicks' Inpatient Daily Activity Short Form.  Research supports that patients with this level of impairment may benefit from Sub-acute Rehabilitation..  Final disposition will be made by interdisciplinary medical team.    THERAPEUTIC EXERCISES  Lower Extremity Ankle pumps  Glut sets  Quad sets     Position Supine       Patient End of Session: Up in chair;Call light within reach;RN aware of session/findings;All patient questions and concerns addressed    CURRENT GOALS     Patient Goal Patient's self-stated goal is: none stated   Goal #1 Patient is able to demonstrate supine - sit EOB @ level: SBA      Goal #1   Current Status Max a   Goal #2 Patient is able to demonstrate transfers Sit to/from Stand at assistance level: SBA with cane or les-walker     Goal #2  Current Status Max a   Goal #3 Patient is able to ambulate 25 feet with assist device: cane or les-walker at assistance level: minimum assistance   Goal #3   Current Status Max a for SPT   Goal #4 Patient to demonstrate independence with home activity/exercise instructions provided to patient in preparation for discharge.   Goal #4   Current Status In progress     Gait Training:  minutes  Therapeutic Activity: 20 minutes  Neuromuscular Re-education:  minutes  Therapeutic Exercise: 10 minutes  Canalith Repositioning:  minutes  Manual Therapy:  minutes  Can add/delete any of these

## 2024-04-26 NOTE — PROGRESS NOTES
[facility] Cardiology Progress Note    Dennys Manzano Patient Status:  Inpatient    1935 MRN [id number]   Location [facility] 4W/SW/SE Attending Charlie Alarcon MD   Hosp Day # 11 PCP CALOS FITZGERALD MD     Subjective:  Denies cp, sob, palpitations  Family at bedside     Objective:  /60 (BP Location: Left arm)   Pulse 88   Temp 98.1 °F (36.7 °C) (Oral)   Resp 14   Ht 6' (1.829 m)   Wt 241 lb 13.5 oz (109.7 kg)   SpO2 96%   BMI 32.80 kg/m²     Telemetry: V paced       Intake/Output:    Intake/Output Summary (Last 24 hours) at 2024 1221  Last data filed at 2024 0419  Gross per 24 hour   Intake 390 ml   Output 1275 ml   Net -885 ml       Last 3 Weights   24 0610 241 lb 13.5 oz (109.7 kg)   24 0500 232 lb 12.9 oz (105.6 kg)   24 0430 235 lb 10.8 oz (106.9 kg)   04/15/24 0748 229 lb 6.4 oz (104.1 kg)   04/15/24 0700 229 lb 6.4 oz (104.1 kg)   04/15/24 0444 220 lb (99.8 kg)   24 1543 229 lb 8 oz (104.1 kg)   23 0049 218 lb 6.4 oz (99.1 kg)   23 1609 220 lb (99.8 kg)       Labs:  Recent Labs   Lab 24  04224  0424 24  0507   * 134* 141*   BUN 13 12 17   CREATSERUM 0.49* 0.53* 0.51*   EGFRCR 100 98 99   CA 8.9 9.2 9.0   * 128* 129*   K 4.5 4.4 4.5   CL 95* 93* 96*   CO2 30.0 32.0 32.0     Recent Labs   Lab 24  0412 24  0428 24  0424 24  0507   RBC 3.34* 3.44* 3.32* 3.33*   HGB 10.6* 10.6* 10.5* 10.6*   HCT 31.5* 32.2* 31.2* 31.2*   MCV 94.3 93.6 94.0 93.7   MCH 31.7 30.8 31.6 31.8   MCHC 33.7 32.9 33.7 34.0   RDW 12.7 12.9 12.9 12.9   NEPRELIM 8.79*  --  9.86* 9.66*   WBC 12.1* 13.1* 14.8* 14.1*   .0 410.0 479.0* 541.0*         Recent Labs   Lab 24  2307 24  0047 24  0424   TROPHS 20  25       Diagnostics:         Review of Systems   Respiratory: Negative.     Cardiovascular: Negative.      Physical Exam:    General: Alert and oriented x 3. No apparent  distress.   HEENT: Normocephalic, anicteric sclera, neck supple, no thyromegaly or adenopathy.  Neck: No JVD, carotids 2+, no bruits.  Cardiac: Regular rate & rhythm. S1, S2 normal. No murmur, pericardial rub, S3, or extra cardiac sounds.  Lungs: Clear without wheezes, rales, rhonchi or dullness.  Normal excursions and effort.  Abdomen: Soft, non-tender. No organosplenomegally, mass or rebound, BS-present.  Extremities: Without clubbing or cyanosis.  No left lower extremity edema, no right lower extremity edema.  Neurologic: Alert and oriented, normal affect. No focal defects  Skin: Warm and dry.       Medications:   magnesium sulfate  1 g Intravenous Once    cefTRIAXone  2 g Intravenous Q24H    ipratropium-albuterol  3 mL Nebulization BID    warfarin  8.5 mg Oral Nightly    docusate sodium  100 mg Oral BID    phytonadione (Aqua-Mephton) 10 mg in sodium chloride 0.9% 50 mL IVPB  10 mg Intravenous Once    metoprolol succinate ER  50 mg Oral Daily Beta Blocker    magnesium oxide  400 mg Oral Once    fluticasone propionate  1 spray Each Nare Daily    montelukast  10 mg Oral Nightly    aspirin  81 mg Oral Daily    atorvastatin  40 mg Oral Nightly    finasteride  5 mg Oral Daily    pantoprazole  40 mg Oral QAM AC    senna-docusate  1 tablet Oral BID    umeclidinium-vilanterol  1 puff Inhalation Daily    tamsulosin  0.4 mg Oral Daily with dinner    insulin aspart  1-5 Units Subcutaneous TID CC    ondansetron  4 mg Intravenous Once    sodium chloride  1,000 mL Intravenous Once         Assessment:    NSVT   Preserved EF on echo   Hx of freq PVCs   On toprol xl    CAD, s/p CABG & PCI  Denies anginal sx   On toprol xl, asa, statin therapy     Complete Heart block  S/p [employer] DC PPM in 2021 w/ normal function     Plan:    No recurrent episodes of NSVT overnight & today   Monitor electrolytes. Goal to keep K+ > 4, Mg > 2. Replenish as needed per cardiac protocol   Continue toprol xl, asa, statin therapy   Will sign off at  this time. Please call with any questions/concerns . F/u with Dr. Castro 5/24 as previously scheduled     JONO Carty  4/26/2024  12:21 PM  Ph [phone] ([place])  Ph [phone] ([place])

## 2024-04-26 NOTE — PROGRESS NOTES
[facility]  part of [facility]    Progress Note    Dennys Manzano Patient Status:  Inpatient    1935 MRN [id number]   Location [facility] 4W/SW/SE Attending Charlie Alarcon MD   Hosp Day # 11 PCP CALOS FITZGERALD MD     Chief Complaint:   Chief Complaint   Patient presents with    Abdomen/Flank Pain    Nausea/Vomiting/Diarrhea       Subjective:   Dennys Manzano was seen and examined  Sitting up bed  Alert and awake   No acute distress  No significant pain       Objective:   Objective:    Blood pressure 102/60, pulse 88, temperature 98.1 °F (36.7 °C), temperature source Oral, resp. rate 14, height 6' (1.829 m), weight 241 lb 13.5 oz (109.7 kg), SpO2 96%.    Physical Exam:    General: No acute distress. Awake and alert  Respiratory: Clear to auscultation bilaterally. No wheezes. No rhonchi.  Cardiovascular: S1, S2. Regular rate and rhythm. No murmurs, rubs or gallops.   Abdomen: Soft, nontender, nondistended.  Positive bowel sounds. No rebound or guarding.  Neurologic: No focal neurological deficits.   Musculoskeletal: Moves all extremities.  Extremities: No edema.      Results:   Results:    Labs:  Recent Labs   Lab 24  0507   WBC 14.2* 12.1*  --  13.1* 14.8* 14.1*   HGB 10.6* 10.6*  --  10.6* 10.5* 10.6*   MCV 95.1 94.3  --  93.6 94.0 93.7   .0 363.0  --  410.0 479.0* 541.0*   INR 1.54* 1.19 1.20  --  1.15  --        Recent Labs   Lab 24  0507   * 134* 141*   BUN 13 12 17   CREATSERUM 0.49* 0.53* 0.51*   CA 8.9 9.2 9.0   ALB  --  3.2 3.0*   * 128* 129*   K 4.5 4.4 4.5   CL 95* 93* 96*   CO2 30.0 32.0 32.0       Estimated Creatinine Clearance: 114.1 mL/min (A) (based on SCr of 0.51 mg/dL (L)).    Recent Labs   Lab 242 24  0424   PTP 15.8* 16.0* 15.4*   INR 1.19 1.20 1.15            Culture:  Hospital Encounter on  04/14/24   1. CSF culture     Status: None    Collection Time: 04/23/24 10:00 AM    Specimen: CSF, lumbar puncture; Cerebral spinal fluid   Result Value Ref Range    CSF Culture Result No Growth 3 Days N/A    CSF Smear No WBCs seen N/A    CSF Smear No organisms seen N/A    CSF Smear This is a cytocentrifuged smear. N/A   2. Blood Culture     Status: None    Collection Time: 04/18/24  9:41 PM    Specimen: Blood,peripheral   Result Value Ref Range    Blood Culture Result No Growth 5 Days N/A   3. Tissue Aerobic Culture     Status: None    Collection Time: 04/17/24  2:35 PM    Specimen: Tissue   Result Value Ref Range    Tissue Culture Result No Growth 3 Days N/A    Tissue Smear 1+ WBCs seen N/A    Tissue Smear No organisms seen N/A   4. Anaerobic Culture     Status: None    Collection Time: 04/17/24  2:35 PM    Specimen: Tissue   Result Value Ref Range    Anaerobic Culture No Anaerobes isolated N/A   5. Body Fluid Cult Aerobic and Anaerobic     Status: None    Collection Time: 04/15/24  7:26 AM    Specimen: Synovial fluid,shoulder; Body fluid, unspecified   Result Value Ref Range    Body Fluid Culture Result No Growth 5 Days N/A    Body Fluid Smear 3+ WBCs seen N/A    Body Fluid Smear No organisms seen N/A    Body Fluid Smear This is a cytocentrifuged smear. N/A   6. Urine Culture, Routine     Status: Abnormal    Collection Time: 04/15/24  5:43 AM    Specimen: Urine, clean catch   Result Value Ref Range    Urine Culture >100,000 CFU/ML Enterococcus faecalis NOT VRE (A) N/A       Cardiac  No results for input(s): \"TROP\", \"PBNP\" in the last 168 hours.      Imaging: Imaging data reviewed in Epic.  No results found.    Medications:    cefTRIAXone  2 g Intravenous Q24H    ipratropium-albuterol  3 mL Nebulization BID    warfarin  8.5 mg Oral Nightly    docusate sodium  100 mg Oral BID    phytonadione (Aqua-Mephton) 10 mg in sodium chloride 0.9% 50 mL IVPB  10 mg Intravenous Once    metoprolol succinate ER  50 mg Oral Daily  Beta Blocker    magnesium oxide  400 mg Oral Once    fluticasone propionate  1 spray Each Nare Daily    montelukast  10 mg Oral Nightly    aspirin  81 mg Oral Daily    atorvastatin  40 mg Oral Nightly    finasteride  5 mg Oral Daily    pantoprazole  40 mg Oral QAM AC    senna-docusate  1 tablet Oral BID    umeclidinium-vilanterol  1 puff Inhalation Daily    tamsulosin  0.4 mg Oral Daily with dinner    insulin aspart  1-5 Units Subcutaneous TID CC    ondansetron  4 mg Intravenous Once    sodium chloride  1,000 mL Intravenous Once         Assessment and Plan:   Assessment & Plan:      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   - s/p R shoulder joint aspiration 4/15   - 59,000 WBC's with predominately neutrophils. + calcium pyrophosphate crystals --> pseudogout. Culture negative.   - IV rocephin 2gm q 12, day #7  - ID on consult.   - PT/OT - [facility] eventually   - blood cx x 2 NGTD.   - R shoulder arthroscopy extensive debridement of R shoulder, bicep tenotomy 4/17      Acute encephalopathy now completely resolved  - etiology unclear. ? Meningitis.    - s/p LP. CSF PCR neg  - IV Acyclovir now stopped, remains on rocephin   - CT head no acute findings. MRI no acute CVA  - EEG c/w encephalopathy, now neuro normal  - ID and neuro on consult.   - neuro checks.      Ecoli UTI  - cx + on 4/15, now treatment complete.     Acute respiratory failure with hypoxia  Likely COPD exacerbation in combination with atelectasis  - was on 5L NC wean o2 now on O2  - duonebs q6hrs WA  - SLP eval regular thin liquids.   - Oral diet   - IS/flutter valve.   - CXR atelectasis   - Antitussives PRN   - CT chest on admit showed dense atelectasis no PE.      DVT/pulmonary embolism  - resume coumadin      Hyponatremia  - appears euvolemic now   - Na trending up  - Urine osm high, urine sdoium high.   - free water restriction      NSVT  - ECHO LVEF 55-60%. No WMA.   - keep Mg 2.0 and K 4.0   - cont  metoprolol     DM II  - A1c 6.4  - ISS     BPH  Chronic urinary retention   - Continue Flomax and finasteride  - cont valladares last changed 04/01/24     Constipation  - bowel regimen      >55min spent, >50% spent counseling and coordinating care in the form of educating pt/family and d/w consultants and staff. Most of the time spent discussing the above plan.        Plan of care discussed with patient or family at bedside.    Supplementary Documentation:     Quality:  DVT Prophylaxis: warfarin  CODE status: Full   Dispo: per clinical course           Estimated date of discharge: TBD  Discharge is dependent on: clinical stability  At this point Mr. Manzano is expected to be discharge to: [facility]

## 2024-04-26 NOTE — PLAN OF CARE
Patient alert and oriented x 4. [place]. Tylenol given for pain as needed. Dressing is 2x2 and tegaderm to R shoulder. Up max assist. Voiding via chronic valladares- to be changed on 5/1/2023. Patients diet is general. ACHS. 1:1 feed. 1,500mL fluid restriction. SCDs and warfarin for VTE prophylaxis. Vital signs monitored. Tele. Cough and deep breathe in addition to incentive spirometer. 2L o2 and bipap at night. Mepilex to sacrum for prevention. Bed in lowest position, call light within reach, and non skid socks in place for fall precautions. All needs within reach. Rounding done by nursing staff. Plan for discharge is [facility] pending med clearance.       Problem: Patient Centered Care  Goal: Patient preferences are identified and integrated in the patient's plan of care  Description: Interventions:  - What would you like us to know as we care for you? Patient is from home with family and he has a daughter that works here who is his support system.  Patient is hard of hearing.  - Provide timely, complete, and accurate information to patient/family  - Incorporate patient and family knowledge, values, beliefs, and cultural backgrounds into the planning and delivery of care  - Encourage patient/family to participate in care and decision-making at the level they choose  - Honor patient and family perspectives and choices  Outcome: Progressing     Problem: Patient/Family Goals  Goal: Patient/Family Long Term Goal  Description: Patient's Long Term Goal: Pain on right shoulder will be resolved and will be able to walk well with walker.    Interventions:  - No weight bearing on right arm till surgeon says so.  - Home health PT/OT as ordered.  - Pain management with ,oral medication.  - Monitor right shoulder for swelling or increasing pain or weakness.  - Follow up with surgery as recommended.  - See additional Care Plan goals for specific interventions  Outcome: Progressing  Goal: Patient/Family Short Term Goal  Description: Patient's  Short Term Goal: Home with [facility] when pain is resolved.    Interventions:   - NWB to right arm.  - PT/OT as ordered.  - Administer IV antibiotics as ordered.  - Administer oral antibiotics as ordered.  - Maintain enteric/contact isolation as ordered.  - Out of bed as much as tolerated.  - Administer oral anticoagulation as ordered, SCD's to both legs as well.  - See additional Care Plan goals for specific interventions  Outcome: Progressing     Problem: PAIN - ADULT  Goal: Verbalizes/displays adequate comfort level or patient's stated pain goal  Description: INTERVENTIONS:  - Encourage pt to monitor pain and request assistance  - Assess pain using appropriate pain scale  - Administer analgesics based on type and severity of pain and evaluate response  - Implement non-pharmacological measures as appropriate and evaluate response  - Consider cultural and social influences on pain and pain management  - Manage/alleviate anxiety  - Utilize distraction and/or relaxation techniques  - Monitor for opioid side effects  - Notify MD/LIP if interventions unsuccessful or patient reports new pain  - Anticipate increased pain with activity and pre-medicate as appropriate  Outcome: Progressing     Problem: RISK FOR INFECTION - ADULT  Goal: Absence of fever/infection during anticipated neutropenic period  Description: INTERVENTIONS  - Monitor WBC  - Administer growth factors as ordered  - Implement neutropenic guidelines  Outcome: Progressing     Problem: SAFETY ADULT - FALL  Goal: Free from fall injury  Description: INTERVENTIONS:  - Assess pt frequently for physical needs  - Identify cognitive and physical deficits and behaviors that affect risk of falls.  - [place] fall precautions as indicated by assessment.  - Educate pt/family on patient safety including physical limitations  - Instruct pt to call for assistance with activity based on assessment  - Modify environment to reduce risk of injury  - Provide assistive devices as  appropriate  - Consider OT/PT consult to assist with strengthening/mobility  - Encourage toileting schedule  Outcome: Progressing     Problem: DISCHARGE PLANNING  Goal: Discharge to home or other facility with appropriate resources  Description: INTERVENTIONS:  - Identify barriers to discharge w/pt and caregiver  - Include patient/family/discharge partner in discharge planning  - Arrange for needed discharge resources and transportation as appropriate  - Identify discharge learning needs (meds, wound care, etc)  - Arrange for interpreters to assist at discharge as needed  - Consider post-discharge preferences of patient/family/discharge partner  - Complete POLST form as appropriate  - Assess patient's ability to be responsible for managing their own health  - Refer to Case Management Department for coordinating discharge planning if the patient needs post-hospital services based on physician/LIP order or complex needs related to functional status, cognitive ability or social support system  Outcome: Progressing     Problem: CARDIOVASCULAR - ADULT  Goal: Maintains optimal cardiac output and hemodynamic stability  Description: INTERVENTIONS:  - Monitor vital signs, rhythm, and trends  - Monitor for bleeding, hypotension and signs of decreased cardiac output  - Evaluate effectiveness of vasoactive medications to optimize hemodynamic stability  - Monitor arterial and/or venous puncture sites for bleeding and/or hematoma  - Assess quality of pulses, skin color and temperature  - Assess for signs of decreased coronary artery perfusion - ex. Angina  - Evaluate fluid balance, assess for edema, trend weights  Outcome: Progressing  Goal: Absence of cardiac arrhythmias or at baseline  Description: INTERVENTIONS:  - Continuous cardiac monitoring, monitor vital signs, obtain 12 lead EKG if indicated  - Evaluate effectiveness of antiarrhythmic and heart rate control medications as ordered  - Initiate emergency measures for life  threatening arrhythmias  - Monitor electrolytes and administer replacement therapy as ordered  Outcome: Progressing     Problem: RESPIRATORY - ADULT  Goal: Achieves optimal ventilation and oxygenation  Description: INTERVENTIONS:  - Assess for changes in respiratory status  - Assess for changes in mentation and behavior  - Position to facilitate oxygenation and minimize respiratory effort  - Oxygen supplementation based on oxygen saturation or ABGs  - Provide Smoking Cessation handout, if applicable  - Encourage broncho-pulmonary hygiene including cough, deep breathe, Incentive Spirometry  - Assess the need for suctioning and perform as needed  - Assess and instruct to report SOB or any respiratory difficulty  - Respiratory Therapy support as indicated  - Manage/alleviate anxiety  - Monitor for signs/symptoms of CO2 retention  Outcome: Progressing     Problem: GASTROINTESTINAL - ADULT  Goal: Minimal or absence of nausea and vomiting  Description: INTERVENTIONS:  - Maintain adequate hydration with IV or PO as ordered and tolerated  - Nasogastric tube to low intermittent suction as ordered  - Evaluate effectiveness of ordered antiemetic medications  - Provide nonpharmacologic comfort measures as appropriate  - Advance diet as tolerated, if ordered  - Obtain nutritional consult as needed  - Evaluate fluid balance  Outcome: Progressing  Goal: Maintains or returns to baseline bowel function  Description: INTERVENTIONS:  - Assess bowel function  - Maintain adequate hydration with IV or PO as ordered and tolerated  - Evaluate effectiveness of GI medications  - Encourage mobilization and activity  - Obtain nutritional consult as needed  - Establish a toileting routine/schedule  - Consider collaborating with pharmacy to review patient's medication profile  Outcome: Progressing     Problem: GENITOURINARY - ADULT  Goal: Absence of urinary retention  Description: INTERVENTIONS:  - Assess patient’s ability to void and empty  bladder  - Monitor intake/output and perform bladder scan as needed  - Follow urinary retention protocol/standard of care  - Consider collaborating with pharmacy to review patient's medication profile  - Implement strategies to promote bladder emptying  Outcome: Progressing     Problem: METABOLIC/FLUID AND ELECTROLYTES - ADULT  Goal: Electrolytes maintained within normal limits  Description: INTERVENTIONS:  - Monitor labs and rhythm and assess patient for signs and symptoms of electrolyte imbalances  - Administer electrolyte replacement as ordered  - Monitor response to electrolyte replacements, including rhythm and repeat lab results as appropriate  - Fluid restriction as ordered  - Instruct patient on fluid and nutrition restrictions as appropriate  Outcome: Progressing     Problem: MUSCULOSKELETAL - ADULT  Goal: Return mobility to safest level of function  Description: INTERVENTIONS:  - Assess patient stability and activity tolerance for standing, transferring and ambulating w/ or w/o assistive devices  - Assist with transfers and ambulation using safe patient handling equipment as needed  - Ensure adequate protection for wounds/incisions during mobilization  - Obtain PT/OT consults as needed  - Advance activity as appropriate  - Communicate ordered activity level and limitations with patient/family  Outcome: Progressing  Goal: Maintain proper alignment of affected body part  Description: INTERVENTIONS:  - Support and protect limb and body alignment per provider's orders  - Instruct and reinforce with patient and family use of appropriate assistive device and precautions (e.g. spinal or hip dislocation precautions)  Outcome: Progressing     Problem: Impaired Activities of Daily Living  Goal: Achieve highest/safest level of independence in self care  Description: Interventions:  - Assess ability and encourage patient to participate in ADLs to maximize function  - Promote sitting position while performing ADLs such  as feeding, grooming, and bathing  - Educate and encourage patient/family in tolerated functional activity level and precautions during self-care  - Encourage patient to incorporate impaired side during daily activities to promote function  Outcome: Progressing     Problem: NEUROLOGICAL - ADULT  Goal: Achieves stable or improved neurological status  Description: INTERVENTIONS  - Assess for and report changes in neurological status  - Initiate measures to prevent increased intracranial pressure  - Maintain blood pressure and fluid volume within ordered parameters to optimize cerebral perfusion and minimize risk of hemorrhage  - Monitor temperature, glucose, and sodium. Initiate appropriate interventions as ordered  Outcome: Progressing     Problem: Delirium  Goal: Minimize duration of delirium  Description: Interventions:  - Encourage use of hearing aids, eye glasses  - Promote highest level of mobility daily  - Provide frequent reorientation  - Promote wakefulness i.e. lights on, blinds open  - Promote sleep, encourage patient's normal rest cycle i.e. lights off, TV off, minimize noise and interruptions  - Encourage family to assist in orientation and promotion of home routines  Outcome: Progressing

## 2024-04-26 NOTE — CM/SW NOTE
ROSANA followed up on DC planning.     SW still waiting on medical clearance    Per Rolanda's note, dtr Evangelina has preferences in this order pending when pt is cleared   1. [facility] (currently unavail), 2. [facility], and 3. [facility].    Clinicals sent in aidin - notifying the 3 facilities they are still in the running. And to anticipate a possible Dc over the weekend.     [facility] and [facility] both state they will have beds - [facility] unlikely at this time    PLAN: DC to SNF - pending DC date    Dayna See, JASONW, MSW ext. [phone]

## 2024-04-26 NOTE — PROGRESS NOTES
[facility]  [address] [place] [address], [address]  [facility] [address]  [phone]          INPATIENT NEUROLOGY   FOLLOW UP CONSULT NOTE       [facility]  part of [facility]    Report of Consultation    Dennys Manzano Patient Status:  Inpatient     1935 MRN [id number]    Location [facility] 4W/SW/SE Attending Charlie Alarcon MD    Hosp Day # 11 PCP CALOS FITZGERALD MD      Date of Admission:  2024  Date of Consult Follow Up:  2024        INTERVAL HPI:   -Doing well.  Improving.        ?PHYSICAL EXAM:   /60 (BP Location: Left arm)   Pulse 88   Temp 98.1 °F (36.7 °C) (Oral)   Resp 14   Ht 72\"   Wt 241 lb 13.5 oz (109.7 kg)   SpO2 96%   BMI 32.80 kg/m²   General appearance: Well appearing, and in no acute distress  Skin: skin color, texture normal.  No rashes or lesions.    Head: Normocephalic, atraumatic.    Neurological exam:  Wakes up to name, fully oriented to place, birthday, \"the hospital\", follows commands, fluent speech      LABS/DATA:    Lab Results   Component Value Date    WBC 14.1 2024    HGB 10.6 2024    HCT 31.2 2024    .0 2024    CREATSERUM 0.51 2024    BUN 17 2024     2024    K 4.5 2024    CL 96 2024    CO2 32.0 2024     2024    CA 9.0 2024    ALB 3.0 2024    MG 1.9 2024    PHOS 3.6 2024       HGBA1C:    Lab Results   Component Value Date    A1C 6.4 (H) 2024    A1C 6.6 (H) 2024    A1C 6.6 (H) 2023     (H) 2024              IMAGING:  XR LUMBAR PUNCTURE DIAG, INCLD IMG (CPT=62328)    Result Date: 2024  CONCLUSION:  1. Successful lumbar puncture at L2 without complication.  Four tubes of clear colored CSF with approximately 19 cc of fluid obtained were sent for laboratory analysis.    Dictated by (CST): Miguel Soto MD on 2024 at 3:31 PM     Finalized by (CST): Miguel Soto MD  on 4/23/2024 at 3:33 PM          US VENOUS DOPPLER ARM LEFT - DIAG IMG (CPT=93971)    Result Date: 4/20/2024  CONCLUSION:  Negative for sonographic evidence of deep venous thrombosis in the left upper extremity.    Dictated by (CST): Jay Rojas MD on 4/20/2024 at 1:09 PM     Finalized by (CST): Jay Rojas MD on 4/20/2024 at 1:10 PM          CT SPINE CERVICAL (CPT=72125)    Result Date: 4/18/2024  CONCLUSION:  1. No acute fracture or dislocation involving the cervical spine. 2. Mild vertebral body compression fracture at T2 without posterior osseous retropulsion, which is unchanged from the recent CT performed 04/15/2024 but was not present in 2013. This fracture is of uncertain chronicity; correlate clinically for overlying  point tenderness.  3. Mild-to-moderate multilevel spondylosis throughout the cervical spine.  There is resulting mild central vertebral canal stenosis at C3-C4, C4-C5 and C5-C6.  Multilevel neural foraminal narrowing/stenoses are also seen bilaterally.   Elm-remote  Dictated by (CST): Naren Palomares MD on 4/18/2024 at 1:42 PM     Finalized by (CST): Naren Palomares MD on 4/18/2024 at 1:50 PM          CT BRAIN OR HEAD (12520)    Result Date: 4/18/2024  CONCLUSION:  1. No acute intracranial process. 2. Stable mild generalized atrophy and mild chronic microangiopathic ischemic changes with large vessel calcific atherosclerosis. 3. Lesser incidental findings as above.   Elm-remote  Dictated by (CST): Naren Palomares MD on 4/18/2024 at 1:39 PM     Finalized by (CST): Naren Palomares MD on 4/18/2024 at 1:42 PM          XR CHEST AP PORTABLE  (CPT=71045)    Result Date: 4/17/2024  PROCEDURE: XR CHEST AP PORTABLE  (CPT=71045) TIME: 1841  COMPARISON: [facility], XR CHEST AP PORTABLE (CPT=71045), 4/16/2024, 12:16 PM.  INDICATIONS: Hypoxia post right shoulder arthroscopy, extensive debridement of right shoulder, bicep tenotomy.  TECHNIQUE:   Single view.   Findings and  impression:  Stable heart with persistent vascular congestion but no edema  Lung volumes remain moderately low with extensive basilar opacity, most likely atelectasis  No pneumothorax    Dictated by (CST): Bib Horn MD on 4/17/2024 at 6:59 PM     Finalized by (CST): Bib Horn MD on 4/17/2024 at 7:00 PM          XR CHEST AP PORTABLE  (CPT=71045)    Result Date: 4/16/2024  CONCLUSION:   Low lung volumes with unchanged bibasilar opacities and unchanged trace bilateral pleural effusions.    Dictated by (CST): Rose Marie Fan MD on 4/16/2024 at 4:12 PM     Finalized by (CST): Rose Marie Fan MD on 4/16/2024 at 4:14 PM          CT CHEST+ABDOMEN+PELVIS(ALL CNTRST ONLY)(CPT=71260/70791)    Result Date: 4/15/2024  CONCLUSION:   1. Dense atelectasis in the lung bases.  Superimposed consolidation is not excluded.  2. Moderate colonic stool burden which may indicate constipation.  No small bowel obstruction.  Diverticulosis without evidence of acute diverticulitis.  3. Prostatomegaly.  The urinary bladder is decompressed with Mcelroy catheter in place.  4. L1 vertebral body compression deformity is nonacute appearing, but new from the prior exam dated 06/23/2022.  5. Narrowing at the origins of the SMA and celiac trunk.  6.  Additional chronic or incidental findings are described in the body of this report.    Preliminary report was given by [facility].  There are no clinically significant discrepancies.    Dictated by (CST): Tyler Eng MD on 4/15/2024 at 1:14 PM     Finalized by (CST): Tyler Eng MD on 4/15/2024 at 1:24 PM          XR CHEST AP PORTABLE  (CPT=71045)    Result Date: 4/15/2024  CONCLUSION: Low lung volumes and bibasilar atelectasis.  Question trace bilateral pleural effusions.  Preliminary report was given by [facility].  There are no clinically significant discrepancies.    Dictated by (CST): Tyler Eng MD on 4/15/2024 at 11:38 AM     Finalized by (CST): Tyler Eng MD on 4/15/2024  at 11:45 AM          XR SHOULDER, COMPLETE (MIN 2 VIEWS), RIGHT (CPT=73030)    Result Date: 4/15/2024   No acute fracture or dislocation.  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  Osteopenia.  Soft tissues are unremarkable.  See report of chest x-ray for discussion of the lungs.    Preliminary report was given by [facility].  There are no clinically significant discrepancies.      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  Neurology was consulted due to fluctuating levels of consciousness thought related to polypharmacy.  -CSF 4/23/2024 colorless clear, 2 WBC, 9 RBC, glucose 100, protein mildly elevated at 47.9, culture with no growth, VDRL and cryptococcus were negative, meningitis encephalitis panel were negative  - EEG 4/19/2024 with triphasic waves, diffuse slowing  - CT brain with no acute findings    Toxic metabolic encephalopathy supported by triphasic waves on EEG, mild polypharmacy, hyponatremia.  Relatively bland CSF not concerning for infection.  Mild elevation of protein is nonspecific.  Suspect there was also delirium from his septic arthritis that is slowly improved     No further inpatient neurological testing needed.  Please call w/ further questions.      This note was prepared using Dragon Medical voice recognition dictation software and as a result, errors may occur. When identified, these errors have been corrected. While every attempt is made to correct errors during dictation, discrepancies may still exist    YEFRI Delgado DO   Staff Neurologist    4/26/2024  3:47 PM

## 2024-04-27 PROBLEM — M00.9 PYOGENIC ARTHRITIS OF RIGHT SHOULDER REGION (HCC): Status: ACTIVE | Noted: 2024-04-27

## 2024-04-27 LAB
ALBUMIN SERPL-MCNC: 2.9 G/DL (ref 3.2–4.8)
ANION GAP SERPL CALC-SCNC: 5 MMOL/L (ref 0–18)
BASOPHILS # BLD: 0 X10(3) UL (ref 0–0.2)
BASOPHILS NFR BLD: 0 %
BUN BLD-MCNC: 16 MG/DL (ref 9–23)
BUN/CREAT SERPL: 31.4 (ref 10–20)
CALCIUM BLD-MCNC: 8.9 MG/DL (ref 8.7–10.4)
CHLORIDE SERPL-SCNC: 95 MMOL/L (ref 98–112)
CO2 SERPL-SCNC: 30 MMOL/L (ref 21–32)
CREAT BLD-MCNC: 0.51 MG/DL
DEPRECATED RDW RBC AUTO: 45.5 FL (ref 35.1–46.3)
EGFRCR SERPLBLD CKD-EPI 2021: 99 ML/MIN/1.73M2 (ref 60–?)
EOSINOPHIL # BLD: 0.35 X10(3) UL (ref 0–0.7)
EOSINOPHIL NFR BLD: 3 %
ERYTHROCYTE [DISTWIDTH] IN BLOOD BY AUTOMATED COUNT: 13.3 % (ref 11–15)
GLUCOSE BLD-MCNC: 160 MG/DL (ref 70–99)
GLUCOSE BLDC GLUCOMTR-MCNC: 159 MG/DL (ref 70–99)
GLUCOSE BLDC GLUCOMTR-MCNC: 165 MG/DL (ref 70–99)
GLUCOSE BLDC GLUCOMTR-MCNC: 182 MG/DL (ref 70–99)
GLUCOSE BLDC GLUCOMTR-MCNC: 183 MG/DL (ref 70–99)
HCT VFR BLD AUTO: 31.2 %
HGB BLD-MCNC: 10.3 G/DL
LYMPHOCYTES NFR BLD: 1.62 X10(3) UL (ref 1–4)
LYMPHOCYTES NFR BLD: 14 %
MAGNESIUM SERPL-MCNC: 1.9 MG/DL (ref 1.6–2.6)
MCH RBC QN AUTO: 31.3 PG (ref 26–34)
MCHC RBC AUTO-ENTMCNC: 33 G/DL (ref 31–37)
MCV RBC AUTO: 94.8 FL
METAMYELOCYTES # BLD: 0.12 X10(3) UL
METAMYELOCYTES NFR BLD: 1 %
MONOCYTES # BLD: 0.7 X10(3) UL (ref 0.1–1)
MONOCYTES NFR BLD: 6 %
MORPHOLOGY: NORMAL
NEUTROPHILS # BLD AUTO: 7.28 X10 (3) UL (ref 1.5–7.7)
NEUTROPHILS NFR BLD: 76 %
NEUTS HYPERSEG # BLD: 8.82 X10(3) UL (ref 1.5–7.7)
OSMOLALITY SERPL CALC.SUM OF ELEC: 275 MOSM/KG (ref 275–295)
PHOSPHATE SERPL-MCNC: 4 MG/DL (ref 2.4–5.1)
PLATELET # BLD AUTO: 586 10(3)UL (ref 150–450)
PLATELET MORPHOLOGY: NORMAL
POTASSIUM SERPL-SCNC: 4.3 MMOL/L (ref 3.5–5.1)
RBC # BLD AUTO: 3.29 X10(6)UL
SODIUM SERPL-SCNC: 130 MMOL/L (ref 136–145)
TOTAL CELLS COUNTED BLD: 100
WBC # BLD AUTO: 11.6 X10(3) UL (ref 4–11)

## 2024-04-27 PROCEDURE — 99024 POSTOP FOLLOW-UP VISIT: CPT | Performed by: STUDENT IN AN ORGANIZED HEALTH CARE EDUCATION/TRAINING PROGRAM

## 2024-04-27 PROCEDURE — 99233 SBSQ HOSP IP/OBS HIGH 50: CPT | Performed by: HOSPITALIST

## 2024-04-27 PROCEDURE — 99232 SBSQ HOSP IP/OBS MODERATE 35: CPT | Performed by: INTERNAL MEDICINE

## 2024-04-27 NOTE — PROGRESS NOTES
[facility]  part of [facility]    Progress Note    Dennys Manzano Patient Status:  Inpatient    1935 MRN [id number]   Location [facility] 4W/SW/SE Attending Charlie Alarcon MD   Hosp Day # 12 PCP CALOS FITZGERALD MD     Chief Complaint:   Chief Complaint   Patient presents with    Abdomen/Flank Pain    Nausea/Vomiting/Diarrhea       Subjective:   Dennys Manzano was seen and examined  Sitting up bed  No acute distress  No significant pain   Feels more alert and awake    Objective:   Objective:    Blood pressure 119/64, pulse 73, temperature 98.2 °F (36.8 °C), temperature source Oral, resp. rate 16, height 6' (1.829 m), weight 241 lb 13.5 oz (109.7 kg), SpO2 97%.    Physical Exam:    General: No acute distress. Awake and alert  Respiratory: Clear to auscultation bilaterally. No wheezes. No rhonchi.  Cardiovascular: S1, S2. Regular rate and rhythm. No murmurs, rubs or gallops.   Abdomen: Soft, nontender, nondistended.  Positive bowel sounds. No rebound or guarding.  Neurologic: No focal neurological deficits.   Musculoskeletal: Moves all extremities.  Extremities: No edema.      Results:   Results:    Labs:  Recent Labs   Lab 24  0403   WBC 12.1*  --  13.1* 14.8* 14.1* 11.6*   HGB 10.6*  --  10.6* 10.5* 10.6* 10.3*   MCV 94.3  --  93.6 94.0 93.7 94.8   .0  --  410.0 479.0* 541.0* 586.0*   INR 1.19 1.20  --  1.15  --   --        Recent Labs   Lab 24  05024  0403   * 141* 160*   BUN 12 17 16   CREATSERUM 0.53* 0.51* 0.51*   CA 9.2 9.0 8.9   ALB 3.2 3.0* 2.9*   * 129* 130*   K 4.4 4.5 4.3   CL 93* 96* 95*   CO2 32.0 32.0 30.0       Estimated Creatinine Clearance: 114.1 mL/min (A) (based on SCr of 0.51 mg/dL (L)).    Recent Labs   Lab 24  0424   PTP 15.8* 16.0* 15.4*   INR 1.19 1.20 1.15            Culture:  Hospital  Encounter on 04/14/24   1. CSF culture     Status: None    Collection Time: 04/23/24 10:00 AM    Specimen: CSF, lumbar puncture; Cerebral spinal fluid   Result Value Ref Range    CSF Culture Result No Growth 3 Days N/A    CSF Smear No WBCs seen N/A    CSF Smear No organisms seen N/A    CSF Smear This is a cytocentrifuged smear. N/A   2. Blood Culture     Status: None    Collection Time: 04/18/24  9:41 PM    Specimen: Blood,peripheral   Result Value Ref Range    Blood Culture Result No Growth 5 Days N/A   3. Tissue Aerobic Culture     Status: None    Collection Time: 04/17/24  2:35 PM    Specimen: Tissue   Result Value Ref Range    Tissue Culture Result No Growth 3 Days N/A    Tissue Smear 1+ WBCs seen N/A    Tissue Smear No organisms seen N/A   4. Anaerobic Culture     Status: None    Collection Time: 04/17/24  2:35 PM    Specimen: Tissue   Result Value Ref Range    Anaerobic Culture No Anaerobes isolated N/A   5. Body Fluid Cult Aerobic and Anaerobic     Status: None    Collection Time: 04/15/24  7:26 AM    Specimen: Synovial fluid,shoulder; Body fluid, unspecified   Result Value Ref Range    Body Fluid Culture Result No Growth 5 Days N/A    Body Fluid Smear 3+ WBCs seen N/A    Body Fluid Smear No organisms seen N/A    Body Fluid Smear This is a cytocentrifuged smear. N/A   6. Urine Culture, Routine     Status: Abnormal    Collection Time: 04/15/24  5:43 AM    Specimen: Urine, clean catch   Result Value Ref Range    Urine Culture >100,000 CFU/ML Enterococcus faecalis NOT VRE (A) N/A       Cardiac  No results for input(s): \"TROP\", \"PBNP\" in the last 168 hours.      Imaging: Imaging data reviewed in Epic.  No results found.    Medications:    cefTRIAXone  2 g Intravenous Q24H    ipratropium-albuterol  3 mL Nebulization BID    warfarin  8.5 mg Oral Nightly    docusate sodium  100 mg Oral BID    phytonadione (Aqua-Mephton) 10 mg in sodium chloride 0.9% 50 mL IVPB  10 mg Intravenous Once    metoprolol succinate ER  50  mg Oral Daily Beta Blocker    magnesium oxide  400 mg Oral Once    fluticasone propionate  1 spray Each Nare Daily    montelukast  10 mg Oral Nightly    aspirin  81 mg Oral Daily    atorvastatin  40 mg Oral Nightly    finasteride  5 mg Oral Daily    pantoprazole  40 mg Oral QAM AC    senna-docusate  1 tablet Oral BID    umeclidinium-vilanterol  1 puff Inhalation Daily    tamsulosin  0.4 mg Oral Daily with dinner    insulin aspart  1-5 Units Subcutaneous TID CC    ondansetron  4 mg Intravenous Once    sodium chloride  1,000 mL Intravenous Once         Assessment and Plan:   Assessment & Plan:      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   - s/p R shoulder joint aspiration 4/15   - 59,000 WBC's with predominately neutrophils. + calcium pyrophosphate crystals --> pseudogout. Culture negative.   - IV rocephin 2gm q 12  - ID on consult.   - PT/OT - [facility] eventually   - blood cx x 2 NGTD.   - R shoulder arthroscopy extensive debridement of R shoulder, bicep tenotomy 4/17      Acute encephalopathy now completely resolved  - etiology unclear. ? Meningitis.    - s/p LP. CSF PCR neg  - IV Acyclovir now stopped, remains on rocephin   - CT head no acute findings. MRI no acute CVA  - EEG c/w encephalopathy, now neuro normal  - ID and neuro on consult.   - neuro checks.      Ecoli UTI  - cx + on 4/15, now treatment complete.     Acute respiratory failure with hypoxia  Likely COPD exacerbation in combination with atelectasis  - was on 5L NC wean o2 now on O2  - duonebs q6hrs WA  - SLP eval regular thin liquids.   - Oral diet   - IS/flutter valve.   - CXR atelectasis   - Antitussives PRN   - CT chest on admit showed dense atelectasis no PE.      DVT/pulmonary embolism  - resume coumadin      Hyponatremia  - appears euvolemic now   - Na trending up  - Urine osm high, urine sdoium high.   - free water restriction      NSVT  - ECHO LVEF 55-60%. No WMA.   - keep Mg 2.0 and K 4.0   - cont  metoprolol     DM II  - A1c 6.4  - ISS     BPH  Chronic urinary retention   - Continue Flomax and finasteride  - cont valladares last changed 04/01/24     Constipation  - bowel regimen      >55min spent, >50% spent counseling and coordinating care in the form of educating pt/family and d/w consultants and staff. Most of the time spent discussing the above plan.        Plan of care discussed with patient or family at bedside.    Supplementary Documentation:     Quality:  DVT Prophylaxis: warfarin  CODE status: Full   Dispo: per clinical course           Estimated date of discharge: TBD  Discharge is dependent on: clinical stability  At this point Mr. Manzano is expected to be discharge to: [facility]

## 2024-04-27 NOTE — PHYSICAL THERAPY NOTE
Attempted treatment pt declined at this time. Per RN will get pt up later to the chair with the lift.

## 2024-04-27 NOTE — PROGRESS NOTES
[facility]  part of [facility]     Progress Note    Dennys Manzano Patient Status:  Inpatient    1935 MRN [id number]   Location [facility] 2W/SW Attending Jeimy Driscoll MD   Hosp Day # 12 PCP CALOS FITZGERALD MD       Subjective:   Seen and examined.  Arousable.  No significant distress.  Comfortable otherwise.    Objective:   Blood pressure 119/66, pulse 72, temperature 98.2 °F (36.8 °C), temperature source Oral, resp. rate 16, height 6' (1.829 m), weight 241 lb 13.5 oz (109.7 kg), SpO2 96%.  Intake/Output:   Last 3 shifts: I/O last 3 completed shifts:  In: -   Out:  [Urine:]   This shift: No intake/output data recorded.     Vent Settings:      Hemodynamic parameters (last 24 hours):      Scheduled Meds:   Current Facility-Administered Medications   Medication Dose Route Frequency    cefTRIAXone (Rocephin) 2 g in D5W 100 mL IVPB-ADD  2 g Intravenous Q24H    ipratropium-albuterol (Duoneb) 0.5-2.5 (3) MG/3ML inhalation solution 3 mL  3 mL Nebulization BID    magnesium hydroxide (Milk of Magnesia) 400 MG/5ML oral suspension 30 mL  30 mL Oral Daily PRN    fleet enema (Fleet) 7-19 GM/118ML rectal enema 133 mL  1 enema Rectal Daily PRN    warfarin (Coumadin) tab 8.5 mg  8.5 mg Oral Nightly    docusate sodium (Colace) cap 100 mg  100 mg Oral BID    polyethylene glycol (PEG 3350) (Miralax) 17 g oral packet 17 g  17 g Oral Daily PRN    bisacodyl (Dulcolax) 10 MG rectal suppository 10 mg  10 mg Rectal Daily PRN    phytonadione (Aqua-Mephton) 10 mg in sodium chloride 0.9% 50 mL IVPB  10 mg Intravenous Once    metoprolol succinate ER (Toprol XL) 24 hr tab 50 mg  50 mg Oral Daily Beta Blocker    magnesium oxide (Mag-Ox) tab 400 mg  400 mg Oral Once    ipratropium-albuterol (Duoneb) 0.5-2.5 (3) MG/3ML inhalation solution 3 mL  3 mL Nebulization Q6H PRN    fluticasone propionate (Flonase) 50 MCG/ACT nasal suspension 1 spray  1 spray Each Nare Daily    benzonatate (Tessalon) cap 100 mg   100 mg Oral TID PRN    guaiFENesin-codeine (Robitussin AC) 100-10 MG/5ML oral solution 5 mL  5 mL Oral Q4H PRN    montelukast (Singulair) tab 10 mg  10 mg Oral Nightly    aspirin chewable tab 81 mg  81 mg Oral Daily    atorvastatin (Lipitor) tab 40 mg  40 mg Oral Nightly    finasteride (Proscar) tab 5 mg  5 mg Oral Daily    pantoprazole (Protonix) DR tab 40 mg  40 mg Oral QAM AC    senna-docusate (Senokot-S) 8.6-50 MG per tab 1 tablet  1 tablet Oral BID    umeclidinium-vilanterol (Anoro Ellipta) 62.5-25 MCG/ACT inhaler 1 puff  1 puff Inhalation Daily    tamsulosin (Flomax) cap 0.4 mg  0.4 mg Oral Daily with dinner    ondansetron (Zofran) 4 MG/2ML injection 4 mg  4 mg Intravenous Q6H PRN    acetaminophen (Tylenol) tab 650 mg  650 mg Oral Q6H PRN    hydrALAzine (Apresoline) 20 mg/mL injection 10 mg  10 mg Intravenous Q4H PRN    glucose (Dex4) 15 GM/59ML oral liquid 15 g  15 g Oral Q15 Min PRN    Or    glucose (Glutose) 40% oral gel 15 g  15 g Oral Q15 Min PRN    Or    glucose-vitamin C (Dex-4) chewable tab 4 tablet  4 tablet Oral Q15 Min PRN    Or    dextrose 50% injection 50 mL  50 mL Intravenous Q15 Min PRN    Or    glucose (Dex4) 15 GM/59ML oral liquid 30 g  30 g Oral Q15 Min PRN    Or    glucose (Glutose) 40% oral gel 30 g  30 g Oral Q15 Min PRN    Or    glucose-vitamin C (Dex-4) chewable tab 8 tablet  8 tablet Oral Q15 Min PRN    insulin aspart (NovoLOG) 100 Units/mL FlexPen 1-5 Units  1-5 Units Subcutaneous TID CC    ondansetron (Zofran) 4 MG/2ML injection 4 mg  4 mg Intravenous Once    sodium chloride 0.9 % IV bolus 1,000 mL  1,000 mL Intravenous Once       Continuous Infusions:     Physical Exam  Constitutional: no acute distress somnolent but arousable  Eyes: PERRL  ENT: nares pateint  Neck: supple, no JVD  Cardio: RRR, S1 S2  Respiratory: Basilar crackles  GI: abdomen soft, non tender, active bowel sounds, no organomegaly  Extremities: no clubbing, cyanosis, edema  Neurologic: no gross motor deficits  Skin:  warm, dry      Results:     Lab Results   Component Value Date    WBC 11.6 04/27/2024    HGB 10.3 04/27/2024    HCT 31.2 04/27/2024    .0 04/27/2024    CREATSERUM 0.51 04/27/2024    BUN 16 04/27/2024     04/27/2024    K 4.3 04/27/2024    CL 95 04/27/2024    CO2 30.0 04/27/2024     04/27/2024    CA 8.9 04/27/2024    ALB 2.9 04/27/2024    MG 1.9 04/27/2024    PHOS 4.0 04/27/2024       No results found.          Assessment   1.  Acute encephalopathy  2.  Right shoulder pain status post right shoulder arthroscopy   3.  Acute hypoxemic hypercapnic respiratory failure  4.  COPD   5.  Prior DVT PE  6.  Hyponatremia  7.  Diabetes mellitus     Plan   -Patient initially presented with evidence of intractable right shoulder pain.  Underwent joint aspiration with subsequent right shoulder arthroscopy and extensive debridement on 4/17/2024.  -Worsening lethargy altered mental status noted.  ABG reviewed from 4/17/2024 with hypercapnic respiratory failure with respiratory acidosis noted.  Repeat ABG improved.    -Close neuromonitoring  -CT head with no acute intracranial findings  -EEG with no epileptiform discharges seen  -Lumbar puncture on 4/23/2024 unremarkable  -Antibiotics per ID  -Wean oxygen as tolerated currently on 3 L  -Nebulizer treatments  -CT chest with some mild atelectatic changes seen  -DVT prophylaxis: Coumadin    Harini Zuniga DO  Pulmonary Critical Care Medicine  [facility]

## 2024-04-27 NOTE — PLAN OF CARE
Problem: Patient Centered Care  Goal: Patient preferences are identified and integrated in the patient's plan of care  Description: Interventions:  - What would you like us to know as we care for you? Patient is from home with family and he has a daughter that works here who is his support system.  Patient is hard of hearing.  - Provide timely, complete, and accurate information to patient/family  - Incorporate patient and family knowledge, values, beliefs, and cultural backgrounds into the planning and delivery of care  - Encourage patient/family to participate in care and decision-making at the level they choose  - Honor patient and family perspectives and choices  Outcome: Progressing     Problem: Patient/Family Goals  Goal: Patient/Family Long Term Goal  Description: Patient's Long Term Goal: Pain on right shoulder will be resolved and will be able to walk well with walker.    Interventions:  - No weight bearing on right arm till surgeon says so.  - Home health PT/OT as ordered.  - Pain management with ,oral medication.  - Monitor right shoulder for swelling or increasing pain or weakness.  - Follow up with surgery as recommended.  - See additional Care Plan goals for specific interventions  Outcome: Progressing  Goal: Patient/Family Short Term Goal  Description: Patient's Short Term Goal: Home with [facility] when pain is resolved.    Interventions:   - NWB to right arm.  - PT/OT as ordered.  - Administer IV antibiotics as ordered.  - Administer oral antibiotics as ordered.  - Maintain enteric/contact isolation as ordered.  - Out of bed as much as tolerated.  - Administer oral anticoagulation as ordered, SCD's to both legs as well.  - See additional Care Plan goals for specific interventions  Outcome: Progressing     Problem: PAIN - ADULT  Goal: Verbalizes/displays adequate comfort level or patient's stated pain goal  Description: INTERVENTIONS:  - Encourage pt to monitor pain and request assistance  - Assess pain  using appropriate pain scale  - Administer analgesics based on type and severity of pain and evaluate response  - Implement non-pharmacological measures as appropriate and evaluate response  - Consider cultural and social influences on pain and pain management  - Manage/alleviate anxiety  - Utilize distraction and/or relaxation techniques  - Monitor for opioid side effects  - Notify MD/LIP if interventions unsuccessful or patient reports new pain  - Anticipate increased pain with activity and pre-medicate as appropriate  Outcome: Progressing     Problem: RISK FOR INFECTION - ADULT  Goal: Absence of fever/infection during anticipated neutropenic period  Description: INTERVENTIONS  - Monitor WBC  - Administer growth factors as ordered  - Implement neutropenic guidelines  Outcome: Progressing     Problem: SAFETY ADULT - FALL  Goal: Free from fall injury  Description: INTERVENTIONS:  - Assess pt frequently for physical needs  - Identify cognitive and physical deficits and behaviors that affect risk of falls.  - [place] fall precautions as indicated by assessment.  - Educate pt/family on patient safety including physical limitations  - Instruct pt to call for assistance with activity based on assessment  - Modify environment to reduce risk of injury  - Provide assistive devices as appropriate  - Consider OT/PT consult to assist with strengthening/mobility  - Encourage toileting schedule  Outcome: Progressing     Problem: DISCHARGE PLANNING  Goal: Discharge to home or other facility with appropriate resources  Description: INTERVENTIONS:  - Identify barriers to discharge w/pt and caregiver  - Include patient/family/discharge partner in discharge planning  - Arrange for needed discharge resources and transportation as appropriate  - Identify discharge learning needs (meds, wound care, etc)  - Arrange for interpreters to assist at discharge as needed  - Consider post-discharge preferences of patient/family/discharge  partner  - Complete POLST form as appropriate  - Assess patient's ability to be responsible for managing their own health  - Refer to Case Management Department for coordinating discharge planning if the patient needs post-hospital services based on physician/LIP order or complex needs related to functional status, cognitive ability or social support system  Outcome: Progressing     Problem: CARDIOVASCULAR - ADULT  Goal: Maintains optimal cardiac output and hemodynamic stability  Description: INTERVENTIONS:  - Monitor vital signs, rhythm, and trends  - Monitor for bleeding, hypotension and signs of decreased cardiac output  - Evaluate effectiveness of vasoactive medications to optimize hemodynamic stability  - Monitor arterial and/or venous puncture sites for bleeding and/or hematoma  - Assess quality of pulses, skin color and temperature  - Assess for signs of decreased coronary artery perfusion - ex. Angina  - Evaluate fluid balance, assess for edema, trend weights  Outcome: Progressing  Goal: Absence of cardiac arrhythmias or at baseline  Description: INTERVENTIONS:  - Continuous cardiac monitoring, monitor vital signs, obtain 12 lead EKG if indicated  - Evaluate effectiveness of antiarrhythmic and heart rate control medications as ordered  - Initiate emergency measures for life threatening arrhythmias  - Monitor electrolytes and administer replacement therapy as ordered  Outcome: Progressing     Problem: RESPIRATORY - ADULT  Goal: Achieves optimal ventilation and oxygenation  Description: INTERVENTIONS:  - Assess for changes in respiratory status  - Assess for changes in mentation and behavior  - Position to facilitate oxygenation and minimize respiratory effort  - Oxygen supplementation based on oxygen saturation or ABGs  - Provide Smoking Cessation handout, if applicable  - Encourage broncho-pulmonary hygiene including cough, deep breathe, Incentive Spirometry  - Assess the need for suctioning and perform as  needed  - Assess and instruct to report SOB or any respiratory difficulty  - Respiratory Therapy support as indicated  - Manage/alleviate anxiety  - Monitor for signs/symptoms of CO2 retention  Outcome: Progressing     Problem: GASTROINTESTINAL - ADULT  Goal: Minimal or absence of nausea and vomiting  Description: INTERVENTIONS:  - Maintain adequate hydration with IV or PO as ordered and tolerated  - Nasogastric tube to low intermittent suction as ordered  - Evaluate effectiveness of ordered antiemetic medications  - Provide nonpharmacologic comfort measures as appropriate  - Advance diet as tolerated, if ordered  - Obtain nutritional consult as needed  - Evaluate fluid balance  Outcome: Progressing  Goal: Maintains or returns to baseline bowel function  Description: INTERVENTIONS:  - Assess bowel function  - Maintain adequate hydration with IV or PO as ordered and tolerated  - Evaluate effectiveness of GI medications  - Encourage mobilization and activity  - Obtain nutritional consult as needed  - Establish a toileting routine/schedule  - Consider collaborating with pharmacy to review patient's medication profile  Outcome: Progressing     Problem: GENITOURINARY - ADULT  Goal: Absence of urinary retention  Description: INTERVENTIONS:  - Assess patient’s ability to void and empty bladder  - Monitor intake/output and perform bladder scan as needed  - Follow urinary retention protocol/standard of care  - Consider collaborating with pharmacy to review patient's medication profile  - Implement strategies to promote bladder emptying  Outcome: Progressing     Problem: METABOLIC/FLUID AND ELECTROLYTES - ADULT  Goal: Electrolytes maintained within normal limits  Description: INTERVENTIONS:  - Monitor labs and rhythm and assess patient for signs and symptoms of electrolyte imbalances  - Administer electrolyte replacement as ordered  - Monitor response to electrolyte replacements, including rhythm and repeat lab results as  appropriate  - Fluid restriction as ordered  - Instruct patient on fluid and nutrition restrictions as appropriate  Outcome: Progressing     Problem: MUSCULOSKELETAL - ADULT  Goal: Return mobility to safest level of function  Description: INTERVENTIONS:  - Assess patient stability and activity tolerance for standing, transferring and ambulating w/ or w/o assistive devices  - Assist with transfers and ambulation using safe patient handling equipment as needed  - Ensure adequate protection for wounds/incisions during mobilization  - Obtain PT/OT consults as needed  - Advance activity as appropriate  - Communicate ordered activity level and limitations with patient/family  Outcome: Progressing  Goal: Maintain proper alignment of affected body part  Description: INTERVENTIONS:  - Support and protect limb and body alignment per provider's orders  - Instruct and reinforce with patient and family use of appropriate assistive device and precautions (e.g. spinal or hip dislocation precautions)  Outcome: Progressing     Problem: Impaired Activities of Daily Living  Goal: Achieve highest/safest level of independence in self care  Description: Interventions:  - Assess ability and encourage patient to participate in ADLs to maximize function  - Promote sitting position while performing ADLs such as feeding, grooming, and bathing  - Educate and encourage patient/family in tolerated functional activity level and precautions during self-care  - Encourage patient to incorporate impaired side during daily activities to promote function  Outcome: Progressing     Problem: NEUROLOGICAL - ADULT  Goal: Achieves stable or improved neurological status  Description: INTERVENTIONS  - Assess for and report changes in neurological status  - Initiate measures to prevent increased intracranial pressure  - Maintain blood pressure and fluid volume within ordered parameters to optimize cerebral perfusion and minimize risk of hemorrhage  - Monitor  temperature, glucose, and sodium. Initiate appropriate interventions as ordered  Outcome: Progressing     Problem: Delirium  Goal: Minimize duration of delirium  Description: Interventions:  - Encourage use of hearing aids, eye glasses  - Promote highest level of mobility daily  - Provide frequent reorientation  - Promote wakefulness i.e. lights on, blinds open  - Promote sleep, encourage patient's normal rest cycle i.e. lights off, TV off, minimize noise and interruptions  - Encourage family to assist in orientation and promotion of home routines  Outcome: Progressing     Dennys was resting in bed, noted with drowsiness throughout shift. Pt on bipap at HS and O2 NC during the day, and repositioned as needed. Pt has a valladares catheter in place, intact, and draining. Accucheck AC/HS done. Pt on tele. IV ABT given as ordered with no adverse reaction noted. Plan for discharge to rehab when medically stable and pending insurance approval.

## 2024-04-27 NOTE — PROGRESS NOTES
Orthopaedic Surgery Inpatient Consult Progress Note  _______________________________________________________________________________________________________________  _______________________________________________________________________________________________________________    Dennys Manzano Patient Status:  Inpatient    1935 MRN [id number]   Location [facility] 4W/SW/SE Attending Charlie Alarcon MD     DATE: 2024     HISTORY OF PRESENT ILLNESS: Dennys Manzano is a 88 year old male who presented as a consult to the Orthopaedic Surgery service for concern for right shoulder septic arthritis.  He underwent arthroscopic irrigation and debridement on .     S/24H EVENTS: Pain very well controlled overnight. Denies any overnight issues. Tolerating po fluids. Denies fevers, chills, chest pain, dyspnea.  Specifically denies continued shoulder pain other than some very mild soreness    PHYSICAL EXAM  /66 (BP Location: Left arm)   Pulse 72   Temp 98.2 °F (36.8 °C) (Oral)   Resp 16   Ht 6' (1.829 m)   Wt 241 lb 13.5 oz (109.7 kg)   SpO2 96%   BMI 32.80 kg/m²      Constitutional: The patient is well-developed, well-nourished, in no acute distress.  Neurological: Alert and oriented to person, place, and time.  Psychiatric: Mood and affect normal.  Head: Normocephalic and atraumatic.  Cardiovascular: regular rate by palpation  Pulmonary/Chest: Effort normal. No respiratory distress. Breathing non-labored  Abdominal: Abdomen exhibits no distension.   Right  Shoulder  Inspection/Palpation  No readily apparent visual abnormalities of shoulder.  Appropriately healing incisions.  No drainage or dehiscence  No ecchymosis or erythema  Mild effusion   Focally tender to palpation to joint line  ROM  Forward Flexion: 0-100 degrees  Abduction: 0-90 degrees  Internal Rotation: 60 degrees  External Rotation: 60 degrees  Strength  Forward Flexion: Deferred  Abduction: Deferred  Internal  Rotation: Deferred  External Rotation: Deferred  SILT R/U/M/MSC/axillary  Radial pulse 2+, distally warm and well perfused, brisk capillary refill      LAB RESULTS  Lab Results   Component Value Date    WBC 11.6 (H) 04/27/2024    HGB 10.3 (L) 04/27/2024    HCT 31.2 (L) 04/27/2024    .0 (H) 04/27/2024    CREATSERUM 0.51 (L) 04/27/2024    BUN 16 04/27/2024     (L) 04/27/2024    K 4.3 04/27/2024    CL 95 (L) 04/27/2024    CO2 30.0 04/27/2024     (H) 04/27/2024    CA 8.9 04/27/2024    ALB 2.9 (L) 04/27/2024    ALKPHO 91 04/18/2024    BILT 1.1 04/18/2024    TP 6.8 04/18/2024    AST <8 04/18/2024    ALT 16 04/18/2024    PTT 41.0 (H) 04/14/2024    INR 1.15 04/25/2024    T4F 1.0 04/18/2024    TSH 0.277 (L) 04/18/2024    LIP 26 04/14/2024    ESRML 111 (H) 04/14/2024    CRP 13.9 (H) 02/10/2018    MG 1.9 04/27/2024    PHOS 4.0 04/27/2024    TROP <0.045 08/18/2021     03/23/2021       LABS  Aerobic and anaerobic cultures negative    IMPRESSIONS/RECOMMENDATIONS: Dennys Manzano is a 88 year old male who presents as a consult to the Orthopaedic surgery service for right shoulder pain concerning for possible septic arthritis.  He underwent irrigation and debridement on 17 April and now feels substantially improved.  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     Discussed the history, physical exam, treatment to date, and reviewed relevant imaging an studies with the patient.  SURGICAL PLANS: No further operative intervention at this time.  Plan for suture removal on 3 May.  Will have him return to clinic for this if he has discharged by this time  ABX: Per primary  PAIN: Continue to wean/titrate to an appropriate oral regimen  DIET: Per primary  DVT PPX: Per primary, encourage mobilization  WEIGHT BEARING STATUS: Partial weightbearing (<8lbs)  RANGE-OF-MOTION LIMITATIONS: as tolerated  IMAGING: No further imaging necessary at this  time    I have personally seen Dennys MCCARTY JosefSagrario and discussed in detail their plan of care. Thank you for allowing me to participate in the care of your patient.   Please note that this note was written in combination with voice recognition/dictation software and there is a possibility of transcription errors which were not identified at the time of note submission. If clarification is necessary, please contact the author or clinic staff.    Gregorio Calle MD  Orthopaedic Surgery  4/27/2024

## 2024-04-28 LAB
ALBUMIN SERPL-MCNC: 3 G/DL (ref 3.2–4.8)
ANION GAP SERPL CALC-SCNC: 3 MMOL/L (ref 0–18)
BASOPHILS # BLD: 0 X10(3) UL (ref 0–0.2)
BASOPHILS NFR BLD: 0 %
BUN BLD-MCNC: 16 MG/DL (ref 9–23)
BUN/CREAT SERPL: 30.8 (ref 10–20)
CALCIUM BLD-MCNC: 9.2 MG/DL (ref 8.7–10.4)
CHLORIDE SERPL-SCNC: 93 MMOL/L (ref 98–112)
CO2 SERPL-SCNC: 32 MMOL/L (ref 21–32)
CREAT BLD-MCNC: 0.52 MG/DL
DEPRECATED RDW RBC AUTO: 45.8 FL (ref 35.1–46.3)
EGFRCR SERPLBLD CKD-EPI 2021: 98 ML/MIN/1.73M2 (ref 60–?)
EOSINOPHIL # BLD: 0 X10(3) UL (ref 0–0.7)
EOSINOPHIL NFR BLD: 0 %
ERYTHROCYTE [DISTWIDTH] IN BLOOD BY AUTOMATED COUNT: 13.2 % (ref 11–15)
GLUCOSE BLD-MCNC: 151 MG/DL (ref 70–99)
GLUCOSE BLDC GLUCOMTR-MCNC: 152 MG/DL (ref 70–99)
GLUCOSE BLDC GLUCOMTR-MCNC: 178 MG/DL (ref 70–99)
GLUCOSE BLDC GLUCOMTR-MCNC: 179 MG/DL (ref 70–99)
GLUCOSE BLDC GLUCOMTR-MCNC: 208 MG/DL (ref 70–99)
HCT VFR BLD AUTO: 30.4 %
HGB BLD-MCNC: 9.9 G/DL
INR BLD: 1.31 (ref 0.8–1.2)
LYMPHOCYTES NFR BLD: 1.38 X10(3) UL (ref 1–4)
LYMPHOCYTES NFR BLD: 9 %
MCH RBC QN AUTO: 30.8 PG (ref 26–34)
MCHC RBC AUTO-ENTMCNC: 32.6 G/DL (ref 31–37)
MCV RBC AUTO: 94.7 FL
METAMYELOCYTES # BLD: 0.15 X10(3) UL
METAMYELOCYTES NFR BLD: 1 %
MONOCYTES # BLD: 0.31 X10(3) UL (ref 0.1–1)
MONOCYTES NFR BLD: 2 %
NEUTROPHILS # BLD AUTO: 10.28 X10 (3) UL (ref 1.5–7.7)
NEUTROPHILS NFR BLD: 88 %
NEUTS HYPERSEG # BLD: 13.46 X10(3) UL (ref 1.5–7.7)
OSMOLALITY SERPL CALC.SUM OF ELEC: 270 MOSM/KG (ref 275–295)
OSMOLALITY UR: 662 MOSM/KG (ref 300–1100)
PHOSPHATE SERPL-MCNC: 4.3 MG/DL (ref 2.4–5.1)
PLATELET # BLD AUTO: 664 10(3)UL (ref 150–450)
POTASSIUM SERPL-SCNC: 4.2 MMOL/L (ref 3.5–5.1)
PROTHROMBIN TIME: 17.1 SECONDS (ref 11.6–14.8)
RBC # BLD AUTO: 3.21 X10(6)UL
SODIUM SERPL-SCNC: 128 MMOL/L (ref 136–145)
SODIUM SERPL-SCNC: 34 MMOL/L
TOTAL CELLS COUNTED BLD: 100
WBC # BLD AUTO: 15.3 X10(3) UL (ref 4–11)

## 2024-04-28 PROCEDURE — 99232 SBSQ HOSP IP/OBS MODERATE 35: CPT | Performed by: INTERNAL MEDICINE

## 2024-04-28 PROCEDURE — 99223 1ST HOSP IP/OBS HIGH 75: CPT | Performed by: INTERNAL MEDICINE

## 2024-04-28 PROCEDURE — 99233 SBSQ HOSP IP/OBS HIGH 50: CPT | Performed by: HOSPITALIST

## 2024-04-28 RX ORDER — TOLVAPTAN 15 MG/1
15 TABLET ORAL ONCE
Status: COMPLETED | OUTPATIENT
Start: 2024-04-28 | End: 2024-04-28

## 2024-04-28 NOTE — PROGRESS NOTES
[facility]  part of [facility]    Progress Note    Dennys Manzano Patient Status:  Inpatient    1935 MRN [id number]   Location [facility] 4W/SW/SE Attending Charlie Alarcon MD   Hosp Day # 13 PCP CALOS FITZGERALD MD     Chief Complaint:   Chief Complaint   Patient presents with    Abdomen/Flank Pain    Nausea/Vomiting/Diarrhea       Subjective:   Dennys Manzano was seen and examined  Sitting up bed  No acute distress  No significant pain   Alert and awake  Eating well     Objective:   Objective:    Blood pressure 103/54, pulse 76, temperature 98.4 °F (36.9 °C), temperature source Oral, resp. rate 20, height 6' (1.829 m), weight 241 lb 13.5 oz (109.7 kg), SpO2 96%.    Physical Exam:    General: No acute distress. Awake and alert  Respiratory: Clear to auscultation bilaterally. No wheezes. No rhonchi.  Cardiovascular: S1, S2. Regular rate and rhythm. No murmurs, rubs or gallops.   Abdomen: Soft, nontender, nondistended.  Positive bowel sounds. No rebound or guarding.  Neurologic: No focal neurological deficits.   Musculoskeletal: Moves all extremities.  Extremities: No edema.      Results:   Results:    Labs:  Recent Labs   Lab 24  0433   WBC 12.1*  --  13.1* 14.8* 14.1* 11.6* 15.3*   HGB 10.6*  --  10.6* 10.5* 10.6* 10.3* 9.9*   MCV 94.3  --  93.6 94.0 93.7 94.8 94.7   .0  --  410.0 479.0* 541.0* 586.0* 664.0*   INR 1.19 1.20  --  1.15  --   --  1.31*       Recent Labs   Lab 24  0403 24  0433   * 160* 151*   BUN 17 16 16   CREATSERUM 0.51* 0.51* 0.52*   CA 9.0 8.9 9.2   ALB 3.0* 2.9* 3.0*   * 130* 128*   K 4.5 4.3 4.2   CL 96* 95* 93*   CO2 32.0 30.0 32.0       Estimated Creatinine Clearance: 111.9 mL/min (A) (based on SCr of 0.52 mg/dL (L)).    Recent Labs   Lab 24  0424 24  0433   PTP 16.0* 15.4* 17.1*   INR  1.20 1.15 1.31*            Culture:  Hospital Encounter on 04/14/24   1. CSF culture     Status: None    Collection Time: 04/23/24 10:00 AM    Specimen: CSF, lumbar puncture; Cerebral spinal fluid   Result Value Ref Range    CSF Culture Result No Growth 3 Days N/A    CSF Smear No WBCs seen N/A    CSF Smear No organisms seen N/A    CSF Smear This is a cytocentrifuged smear. N/A   2. Blood Culture     Status: None    Collection Time: 04/18/24  9:41 PM    Specimen: Blood,peripheral   Result Value Ref Range    Blood Culture Result No Growth 5 Days N/A   3. Tissue Aerobic Culture     Status: None    Collection Time: 04/17/24  2:35 PM    Specimen: Tissue   Result Value Ref Range    Tissue Culture Result No Growth 3 Days N/A    Tissue Smear 1+ WBCs seen N/A    Tissue Smear No organisms seen N/A   4. Anaerobic Culture     Status: None    Collection Time: 04/17/24  2:35 PM    Specimen: Tissue   Result Value Ref Range    Anaerobic Culture No Anaerobes isolated N/A   5. Body Fluid Cult Aerobic and Anaerobic     Status: None    Collection Time: 04/15/24  7:26 AM    Specimen: Synovial fluid,shoulder; Body fluid, unspecified   Result Value Ref Range    Body Fluid Culture Result No Growth 5 Days N/A    Body Fluid Smear 3+ WBCs seen N/A    Body Fluid Smear No organisms seen N/A    Body Fluid Smear This is a cytocentrifuged smear. N/A   6. Urine Culture, Routine     Status: Abnormal    Collection Time: 04/15/24  5:43 AM    Specimen: Urine, clean catch   Result Value Ref Range    Urine Culture >100,000 CFU/ML Enterococcus faecalis NOT VRE (A) N/A       Cardiac  No results for input(s): \"TROP\", \"PBNP\" in the last 168 hours.      Imaging: Imaging data reviewed in Epic.  No results found.    Medications:    cefTRIAXone  2 g Intravenous Q24H    ipratropium-albuterol  3 mL Nebulization BID    warfarin  8.5 mg Oral Nightly    docusate sodium  100 mg Oral BID    phytonadione (Aqua-[place]ep[place]) 10 mg in sodium chloride 0.9% 50 mL IVPB  10 mg  Intravenous Once    metoprolol succinate ER  50 mg Oral Daily Beta Blocker    magnesium oxide  400 mg Oral Once    fluticasone propionate  1 spray Each Nare Daily    montelukast  10 mg Oral Nightly    aspirin  81 mg Oral Daily    atorvastatin  40 mg Oral Nightly    finasteride  5 mg Oral Daily    pantoprazole  40 mg Oral QAM AC    senna-docusate  1 tablet Oral BID    umeclidinium-vilanterol  1 puff Inhalation Daily    tamsulosin  0.4 mg Oral Daily with dinner    insulin aspart  1-5 Units Subcutaneous TID CC    ondansetron  4 mg Intravenous Once    sodium chloride  1,000 mL Intravenous Once         Assessment and Plan:   Assessment & Plan:      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   - s/p R shoulder joint aspiration 4/15   - 59,000 WBC's with predominately neutrophils. + calcium pyrophosphate crystals --> pseudogout. Culture negative.   - IV rocephin 2gm q 12  - ID on consult.   - PT/OT - [facility] eventually   - blood cx x 2 NGTD.   - R shoulder arthroscopy extensive debridement of R shoulder, bicep tenotomy 4/17      Acute encephalopathy now resolved  - etiology unclear. ? Meningitis.    - s/p LP. CSF PCR neg  - IV Acyclovir now stopped, remains on rocephin   - CT head no acute findings. MRI no acute CVA  - EEG c/w encephalopathy, now neuro normal  - ID and neuro on consult.   - neuro checks.      Ecoli UTI  - cx + on 4/15, now treatment complete.     Acute respiratory failure with hypoxia  Likely COPD exacerbation in combination with atelectasis  - was on 5L NC wean o2 now on O2  - duonebs q6hrs WA  - SLP eval regular thin liquids.   - Oral diet   - IS/flutter valve.   - CXR atelectasis   - Antitussives PRN   - CT chest on admit showed dense atelectasis no PE.      DVT/pulmonary embolism  - resume coumadin      Hyponatremia  - appears euvolemic now   - Na trended down today  - repeat Uosm and Deya   - will ask nephro to evaluate   - free water restriction      NSVT  -  ECHO LVEF 55-60%. No WMA.   - keep Mg 2.0 and K 4.0   - cont metoprolol     DM II  - A1c 6.4  - ISS     BPH  Chronic urinary retention   - Continue Flomax and finasteride  - cont valladares last changed 04/01/24     Constipation  - bowel regimen      >55min spent, >50% spent counseling and coordinating care in the form of educating pt/family and d/w consultants and staff. Most of the time spent discussing the above plan.        Plan of care discussed with patient or family at bedside.    Supplementary Documentation:     Quality:  DVT Prophylaxis: warfarin  CODE status: Full   Dispo: per clinical course           Estimated date of discharge: TBD  Discharge is dependent on: clinical stability  At this point Mr. Manzano is expected to be discharge to: [facility]

## 2024-04-28 NOTE — PROGRESS NOTES
[facility], [place]  part of [facility]    Progress Note    Dennys Manzano Patient Status:  Inpatient    1935 MRN [id number]   Location [facility] 4W/SW/SE Attending Charlie Alarcon MD   Hosp Day # 13 PCP CALOS FITZGERALD MD         Subjective:   Subjective:  Patient was seen and examined  Comfortable on 2 L  Denies cough or dyspnea or chest pain or sputum  Objective:   Blood pressure 103/54, pulse 76, temperature 98.4 °F (36.9 °C), temperature source Oral, resp. rate 20, height 6' (1.829 m), weight 241 lb 13.5 oz (109.7 kg), SpO2 96%.  Physical Exam  Constitutional:       General: He is not in acute distress.  HENT:      Head: Atraumatic.   Eyes:      General: No scleral icterus.  Cardiovascular:      Rate and Rhythm: Normal rate.      Heart sounds:      No gallop.   Pulmonary:      Effort: No respiratory distress.      Breath sounds: No wheezing, rhonchi or rales.   Abdominal:      General: Abdomen is flat. Bowel sounds are normal.      Palpations: Abdomen is soft.   Musculoskeletal:      Cervical back: No rigidity.   Neurological:      Mental Status: Mental status is at baseline.         Results:   Lab Results   Component Value Date    WBC 15.3 (H) 2024    HGB 9.9 (L) 2024    HCT 30.4 (L) 2024    .0 (H) 2024    CREATSERUM 0.52 (L) 2024    BUN 16 2024     (L) 2024    K 4.2 2024    CL 93 (L) 2024    CO2 32.0 2024     (H) 2024    CA 9.2 2024    ALB 3.0 (L) 2024    ALKPHO 91 2024    BILT 1.1 2024    TP 6.8 2024    AST <8 2024    ALT 16 2024    PTT 41.0 (H) 2024    INR 1.31 (H) 2024    T4F 1.0 2024    TSH 0.277 (L) 2024    LIP 26 2024    ESRML 111 (H) 2024    CRP 13.9 (H) 02/10/2018    MG 1.9 2024    PHOS 4.3 2024    TROP <0.045 2021    TROPHS 25 2024     2021       Assessment & Plan:      1-Acute and chronic respiratory failure with hypoxia and hypercapnia   underlying COPD and ? NAYAN   decompensated overlap syndrome with acute illness .     PAP during sleep at night  O2 requirement down to 2 L  Anoro and Nebulizers  Avoid extra use of sedative or narcotic     2-encephalopathy?  Etiology  Head CT negative and EEG with no seizure  LP on 4/23 negative  Antibiotics per ID     3-right shoulder arthritis and crystal arthropathy s/p aspiration on 4/15   ID and Ortho following    4-hyponatremia  Per renal        5-prior history of VTE  On coumadin              Jaspal Fitch MD  4/28/2024

## 2024-04-28 NOTE — CONSULTS
[facility]  part of [facility]    Report of Consultation    Dennys Manzano Patient Status:  Inpatient    1935 MRN [id number]   Location [facility] 4W/SW/SE Attending Charlie Alarcon MD   Hosp Day # 13 PCP CALOS FITZGERALD MD     Date of Admission:  2024  Date of Consult:  2024   Reason for Consultation:    hyponatremia    History of Present Illness:   Patient is a 88 year old male who was admitted to the hospital for Hyponatremia:    It was admitted to the hospital last week and had right shoulder septic arthritis.  He required debridement and biceps tenotomy.    Patient has had an extensive hospital stay including having lethargy and altered mental status.  He required a lumbar puncture which was unremarkable.    I am seeing him for hyponatremia.  The patient historically has had this, and he tells me he manages it with salt tabs and eating salty foods.  In the hospital he has not had his salt tabs and his sodium has been running lower    The patient is on a fluid restriction    Past Medical History  Past Medical History:    Anxiety state    Arrhythmia    Arthritis    Asbestos exposure    lung    Atherosclerosis of coronary artery    Bleeding tendency (HCC)    Blood disorder    BPH (benign prostatic hyperplasia)    Colitis    Congestive heart disease (HCC)    Coronary atherosclerosis    Deep vein thrombosis (HCC)    Depression    Diabetes (HCC)    Diverticulosis of large intestine    Encephalopathy    Esophageal reflux    Fibromyalgia    Ganglion, finger joint of right hand    Right middle finger excision of painful ganglion, rotator flap closure    Gout    Hearing impairment    Heart attack (HCC)    Heart disease    Heart valve disease    High blood pressure    High cholesterol    History of blood clots    legs & lungs    Hyperlipidemia    IBS (irritable bowel syndrome)    Incontinence    Malignant hyperthermia    Muscle weakness    Neuropathy    Osteoarthritis    PE  (pulmonary embolism)    Peripheral vascular disease (HCC)    Pulmonary embolism (HCC)    RSD (reflex sympathetic dystrophy)    right arm, little in the left    Screening PSA (prostate specific antigen)    Shortness of breath    Thrombophlebitis    Visual impairment    glasses for reading       Past Surgical History  Past Surgical History:   Procedure Laterality Date    Cabg      Cardiac pacemaker placement      Cath percutaneous  transluminal coronary angioplasty      Foot surgery      right     Inguinal herniorrhaphy      Ir ivc filter placement      for blood clots    Other surgical history      heart bypass and stent    Other surgical history      basal cell carcinoma    Shoulder arthroscopy  (?)    Right rotator cuff       Family History  Family History   Problem Relation Age of Onset    Cancer Mother     Stroke Mother     Diabetes Other         Granddaughter has DM       Social History  Social History     Socioeconomic History    Marital status:    Tobacco Use    Smoking status: Former     Current packs/day: 0.00     Types: Cigarettes     Quit date: 1965     Years since quittin.3    Smokeless tobacco: Never   Vaping Use    Vaping status: Never Used   Substance and Sexual Activity    Alcohol use: Not Currently     Comment: rarely    Drug use: No     Social Determinants of Health     Food Insecurity: No Food Insecurity (4/15/2024)    Food Insecurity     Food Insecurity: Never true   Transportation Needs: No Transportation Needs (4/15/2024)    Transportation Needs     Lack of Transportation: No   Housing Stability: Low Risk  (4/15/2024)    Housing Stability     Housing Instability: No       Current Medications:  Current Facility-Administered Medications   Medication Dose Route Frequency    cefTRIAXone (Rocephin) 2 g in D5W 100 mL IVPB-ADD  2 g Intravenous Q24H    ipratropium-albuterol (Duoneb) 0.5-2.5 (3) MG/3ML inhalation solution 3 mL  3 mL Nebulization BID    magnesium hydroxide (Milk of  Magnesia) 400 MG/5ML oral suspension 30 mL  30 mL Oral Daily PRN    fleet enema (Fleet) 7-19 GM/118ML rectal enema 133 mL  1 enema Rectal Daily PRN    warfarin (Coumadin) tab 8.5 mg  8.5 mg Oral Nightly    docusate sodium (Colace) cap 100 mg  100 mg Oral BID    polyethylene glycol (PEG 3350) (Miralax) 17 g oral packet 17 g  17 g Oral Daily PRN    bisacodyl (Dulcolax) 10 MG rectal suppository 10 mg  10 mg Rectal Daily PRN    phytonadione (Aqua-Mephton) 10 mg in sodium chloride 0.9% 50 mL IVPB  10 mg Intravenous Once    metoprolol succinate ER (Toprol XL) 24 hr tab 50 mg  50 mg Oral Daily Beta Blocker    magnesium oxide (Mag-Ox) tab 400 mg  400 mg Oral Once    ipratropium-albuterol (Duoneb) 0.5-2.5 (3) MG/3ML inhalation solution 3 mL  3 mL Nebulization Q6H PRN    fluticasone propionate (Flonase) 50 MCG/ACT nasal suspension 1 spray  1 spray Each Nare Daily    benzonatate (Tessalon) cap 100 mg  100 mg Oral TID PRN    guaiFENesin-codeine (Robitussin AC) 100-10 MG/5ML oral solution 5 mL  5 mL Oral Q4H PRN    montelukast (Singulair) tab 10 mg  10 mg Oral Nightly    aspirin chewable tab 81 mg  81 mg Oral Daily    atorvastatin (Lipitor) tab 40 mg  40 mg Oral Nightly    finasteride (Proscar) tab 5 mg  5 mg Oral Daily    pantoprazole (Protonix) DR tab 40 mg  40 mg Oral QAM AC    senna-docusate (Senokot-S) 8.6-50 MG per tab 1 tablet  1 tablet Oral BID    umeclidinium-vilanterol (Anoro Ellipta) 62.5-25 MCG/ACT inhaler 1 puff  1 puff Inhalation Daily    tamsulosin (Flomax) cap 0.4 mg  0.4 mg Oral Daily with dinner    ondansetron (Zofran) 4 MG/2ML injection 4 mg  4 mg Intravenous Q6H PRN    acetaminophen (Tylenol) tab 650 mg  650 mg Oral Q6H PRN    hydrALAzine (Apresoline) 20 mg/mL injection 10 mg  10 mg Intravenous Q4H PRN    glucose (Dex4) 15 GM/59ML oral liquid 15 g  15 g Oral Q15 Min PRN    Or    glucose (Glutose) 40% oral gel 15 g  15 g Oral Q15 Min PRN    Or    glucose-vitamin C (Dex-4) chewable tab 4 tablet  4 tablet Oral  Q15 Min PRN    Or    dextrose 50% injection 50 mL  50 mL Intravenous Q15 Min PRN    Or    glucose (Dex4) 15 GM/59ML oral liquid 30 g  30 g Oral Q15 Min PRN    Or    glucose (Glutose) 40% oral gel 30 g  30 g Oral Q15 Min PRN    Or    glucose-vitamin C (Dex-4) chewable tab 8 tablet  8 tablet Oral Q15 Min PRN    insulin aspart (NovoLOG) 100 Units/mL FlexPen 1-5 Units  1-5 Units Subcutaneous TID CC    ondansetron (Zofran) 4 MG/2ML injection 4 mg  4 mg Intravenous Once    sodium chloride 0.9 % IV bolus 1,000 mL  1,000 mL Intravenous Once     Medications Prior to Admission   Medication Sig    polyethylene glycol, PEG 3350, 17 g Oral Powd Pack Take 17 g by mouth daily.    warfarin (JANTOVEN) 10 MG Oral Tab Take 8.5 mg by mouth nightly.    Calcium-Magnesium-Vitamin D (CALCIUM 1200+D3 OR) Take 1 capsule by mouth daily.    fluticasone propionate 50 MCG/ACT Nasal Suspension 1 spray(s)    metFORMIN 500 MG Oral Tab Take 1 tablet (500 mg total) by mouth 2 (two) times daily.    metoprolol succinate 25 MG Oral Tablet 24 Hr     montelukast 10 MG Oral Tab     Senna-Docusate Sodium 8.6-50 MG Oral Tab Take 1 tablet by mouth 2 (two) times daily.    Pantoprazole Sodium 40 MG Oral Tab EC Take 1 tablet (40 mg total) by mouth every morning before breakfast.    Escitalopram Oxalate (LEXAPRO) 20 MG Oral Tab Take 1 tablet (20 mg total) by mouth daily.    tamsulosin HCl (FLOMAX) 0.4 MG Oral Cap Take 1 capsule (0.4 mg total) by mouth daily with dinner.    aspirin (BABY ASPIRIN) 81 MG Oral Chew Tab Chew 1 tablet (81 mg total) by mouth daily.    atorvastatin 40 MG Oral Tab Take 1 tablet (40 mg total) by mouth nightly.    Ascorbic Acid (VITAMIN C OR) Take by mouth.    Ergocalciferol (VITAMIN D OR) Take by mouth.    Multiple Vitamins-Minerals (CENTRUM SILVER OR) Take  by mouth.    finasteride (PROSCAR) 5 MG Oral Tab Take 1 tablet (5 mg total) by mouth daily.    STIOLTO RESPIMAT 2.5-2.5 MCG/ACT Inhalation Aero Soln  (Patient not taking: Reported on  4/15/2024)       Allergies  Allergies   Allergen Reactions    Heparin SWELLING     Low platelets    Nitrofurantoin NAUSEA AND VOMITING    Heparin OTHER (SEE COMMENTS)     Low platelets    Tetanus Immune Globulin     Tetanus Toxoids UNKNOWN     As a child    Amoxicillin-Pot Clavulanate DIARRHEA    Azithromycin DIARRHEA       Review of Systems:     General: weak     A comprehensive 12 point review of systems was completed.  Pertinent positives as above and all the rest were negative.     Physical Exam:   /54 (BP Location: Left arm)   Pulse 76   Temp 98.4 °F (36.9 °C) (Oral)   Resp 20   Ht 6' (1.829 m)   Wt 241 lb 13.5 oz (109.7 kg)   SpO2 96%   BMI 32.80 kg/m²      Intake/Output Summary (Last 24 hours) at 4/28/2024 1225  Last data filed at 4/28/2024 0759  Gross per 24 hour   Intake 90 ml   Output 850 ml   Net -760 ml     Wt Readings from Last 1 Encounters:   04/21/24 241 lb 13.5 oz (109.7 kg)       Exam  Gen: No acute distress  Heent: NC AT, mucous memb clear, neck supple  Pulm: Lungs clear, normal respiratory effort  CV: Heart with regular rate and rhythm, no edema  Abd: Abdomen soft, nontender, nondistended, no organomegaly, bowel sounds present  Skin: no symptoms reported  Psych: alert and oriented        Results:     Laboratory Data:  Recent Labs   Lab 04/26/24 0507 04/27/24  0403 04/28/24  0433   RBC 3.33* 3.29* 3.21*   HGB 10.6* 10.3* 9.9*   HCT 31.2* 31.2* 30.4*   MCV 93.7 94.8 94.7   MCH 31.8 31.3 30.8   MCHC 34.0 33.0 32.6   RDW 12.9 13.3 13.2   NEPRELIM 9.66* 7.28 10.28*   WBC 14.1* 11.6* 15.3*   .0* 586.0* 664.0*         Recent Labs   Lab 04/26/24  0507 04/27/24  0403 04/28/24  0433   * 160* 151*   BUN 17 16 16   CREATSERUM 0.51* 0.51* 0.52*   CA 9.0 8.9 9.2   * 130* 128*   K 4.5 4.3 4.2   CL 96* 95* 93*   CO2 32.0 30.0 32.0        Imaging:  No results found.            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   Rather than starting him on salt tablets which may be difficult now, I will give him a dose of tolvaptan    2 -acute encephalopathy  Apparently doing better    3 -right shoulder septic arthritis  Status post debridement.  He is on Rocephin    4 - Dm 2  Accu-Cheks        Thank you for allowing me to participate in the care of your patient.    PASQUALE COLÓN MD  4/28/2024

## 2024-04-28 NOTE — PLAN OF CARE
5 Patient transferred back to bed via Lift. BiPap on at night. Blue boots and SCD's in place. Mcel[place] in place. Fall precautions in place. Dry gauze and tegaderm on R Shoulder. Call light and personal belongings within arms reach,   Problem: Patient Centered Care  Goal: Patient preferences are identified and integrated in the patient's plan of care  Description: Interventions:  - What would you like us to know as we care for you? Patient is from home with family and he has a daughter that works here who is his support system.  Patient is hard of hearing.  - Provide timely, complete, and accurate information to patient/family  - Incorporate patient and family knowledge, values, beliefs, and cultural backgrounds into the planning and delivery of care  - Encourage patient/family to participate in care and decision-making at the level they choose  - Honor patient and family perspectives and choices  Outcome: Progressing     Problem: SAFETY ADULT - FALL  Goal: Free from fall injury  Description: INTERVENTIONS:  - Assess pt frequently for physical needs  - Identify cognitive and physical deficits and behaviors that affect risk of falls.  - [place] fall precautions as indicated by assessment.  - Educate pt/family on patient safety including physical limitations  - Instruct pt to call for assistance with activity based on assessment  - Modify environment to reduce risk of injury  - Provide assistive devices as appropriate  - Consider OT/PT consult to assist with strengthening/mobility  - Encourage toileting schedule  Outcome: Progressing

## 2024-04-28 NOTE — PLAN OF CARE
Up in chair with lift and 2 assist. Arms elevated. Fed breakfast, ate well. O2 2L NC. Tylenol given for pain. May have salty snacks from home.     Dr Alva orders received. Urine samples sent. Fluid restrictions unchanged.     Up in chair with lift and 2 assist. Tylenol for pain. Left shoulder sutures open to air.     Problem: Patient Centered Care  Goal: Patient preferences are identified and integrated in the patient's plan of care  Description: Interventions:  - What would you like us to know as we care for you? Patient is from home with family and he has a daughter that works here who is his support system.  Patient is hard of hearing.  - Provide timely, complete, and accurate information to patient/family  - Incorporate patient and family knowledge, values, beliefs, and cultural backgrounds into the planning and delivery of care  - Encourage patient/family to participate in care and decision-making at the level they choose  - Honor patient and family perspectives and choices  Outcome: Progressing     Problem: Patient/Family Goals  Goal: Patient/Family Long Term Goal  Description: Patient's Long Term Goal: Pain on right shoulder will be resolved and will be able to walk well with walker.    Interventions:  - No weight bearing on right arm till surgeon says so.  - Home health PT/OT as ordered.  - Pain management with ,oral medication.  - Monitor right shoulder for swelling or increasing pain or weakness.  - Follow up with surgery as recommended.  - See additional Care Plan goals for specific interventions  Outcome: Progressing  Goal: Patient/Family Short Term Goal  Description: Patient's Short Term Goal: Home with [facility] when pain is resolved.    Interventions:   - NWB to right arm.  - PT/OT as ordered.  - Administer IV antibiotics as ordered.  - Administer oral antibiotics as ordered.  - Maintain enteric/contact isolation as ordered.  - Out of bed as much as tolerated.  - Administer oral anticoagulation as ordered,  SCD's to both legs as well.  - See additional Care Plan goals for specific interventions  Outcome: Progressing     Problem: PAIN - ADULT  Goal: Verbalizes/displays adequate comfort level or patient's stated pain goal  Description: INTERVENTIONS:  - Encourage pt to monitor pain and request assistance  - Assess pain using appropriate pain scale  - Administer analgesics based on type and severity of pain and evaluate response  - Implement non-pharmacological measures as appropriate and evaluate response  - Consider cultural and social influences on pain and pain management  - Manage/alleviate anxiety  - Utilize distraction and/or relaxation techniques  - Monitor for opioid side effects  - Notify MD/LIP if interventions unsuccessful or patient reports new pain  - Anticipate increased pain with activity and pre-medicate as appropriate  Outcome: Progressing     Problem: RISK FOR INFECTION - ADULT  Goal: Absence of fever/infection during anticipated neutropenic period  Description: INTERVENTIONS  - Monitor WBC  - Administer growth factors as ordered  - Implement neutropenic guidelines  Outcome: Progressing     Problem: SAFETY ADULT - FALL  Goal: Free from fall injury  Description: INTERVENTIONS:  - Assess pt frequently for physical needs  - Identify cognitive and physical deficits and behaviors that affect risk of falls.  - [place] fall precautions as indicated by assessment.  - Educate pt/family on patient safety including physical limitations  - Instruct pt to call for assistance with activity based on assessment  - Modify environment to reduce risk of injury  - Provide assistive devices as appropriate  - Consider OT/PT consult to assist with strengthening/mobility  - Encourage toileting schedule  Outcome: Progressing     Problem: DISCHARGE PLANNING  Goal: Discharge to home or other facility with appropriate resources  Description: INTERVENTIONS:  - Identify barriers to discharge w/pt and caregiver  - Include  patient/family/discharge partner in discharge planning  - Arrange for needed discharge resources and transportation as appropriate  - Identify discharge learning needs (meds, wound care, etc)  - Arrange for interpreters to assist at discharge as needed  - Consider post-discharge preferences of patient/family/discharge partner  - Complete POLST form as appropriate  - Assess patient's ability to be responsible for managing their own health  - Refer to Case Management Department for coordinating discharge planning if the patient needs post-hospital services based on physician/LIP order or complex needs related to functional status, cognitive ability or social support system  Outcome: Progressing     Problem: CARDIOVASCULAR - ADULT  Goal: Maintains optimal cardiac output and hemodynamic stability  Description: INTERVENTIONS:  - Monitor vital signs, rhythm, and trends  - Monitor for bleeding, hypotension and signs of decreased cardiac output  - Evaluate effectiveness of vasoactive medications to optimize hemodynamic stability  - Monitor arterial and/or venous puncture sites for bleeding and/or hematoma  - Assess quality of pulses, skin color and temperature  - Assess for signs of decreased coronary artery perfusion - ex. Angina  - Evaluate fluid balance, assess for edema, trend weights  Outcome: Progressing  Goal: Absence of cardiac arrhythmias or at baseline  Description: INTERVENTIONS:  - Continuous cardiac monitoring, monitor vital signs, obtain 12 lead EKG if indicated  - Evaluate effectiveness of antiarrhythmic and heart rate control medications as ordered  - Initiate emergency measures for life threatening arrhythmias  - Monitor electrolytes and administer replacement therapy as ordered  Outcome: Progressing     Problem: RESPIRATORY - ADULT  Goal: Achieves optimal ventilation and oxygenation  Description: INTERVENTIONS:  - Assess for changes in respiratory status  - Assess for changes in mentation and behavior  -  Position to facilitate oxygenation and minimize respiratory effort  - Oxygen supplementation based on oxygen saturation or ABGs  - Provide Smoking Cessation handout, if applicable  - Encourage broncho-pulmonary hygiene including cough, deep breathe, Incentive Spirometry  - Assess the need for suctioning and perform as needed  - Assess and instruct to report SOB or any respiratory difficulty  - Respiratory Therapy support as indicated  - Manage/alleviate anxiety  - Monitor for signs/symptoms of CO2 retention  Outcome: Progressing     Problem: GASTROINTESTINAL - ADULT  Goal: Minimal or absence of nausea and vomiting  Description: INTERVENTIONS:  - Maintain adequate hydration with IV or PO as ordered and tolerated  - Nasogastric tube to low intermittent suction as ordered  - Evaluate effectiveness of ordered antiemetic medications  - Provide nonpharmacologic comfort measures as appropriate  - Advance diet as tolerated, if ordered  - Obtain nutritional consult as needed  - Evaluate fluid balance  Outcome: Progressing  Goal: Maintains or returns to baseline bowel function  Description: INTERVENTIONS:  - Assess bowel function  - Maintain adequate hydration with IV or PO as ordered and tolerated  - Evaluate effectiveness of GI medications  - Encourage mobilization and activity  - Obtain nutritional consult as needed  - Establish a toileting routine/schedule  - Consider collaborating with pharmacy to review patient's medication profile  Outcome: Progressing     Problem: GENITOURINARY - ADULT  Goal: Absence of urinary retention  Description: INTERVENTIONS:  - Assess patient’s ability to void and empty bladder  - Monitor intake/output and perform bladder scan as needed  - Follow urinary retention protocol/standard of care  - Consider collaborating with pharmacy to review patient's medication profile  - Implement strategies to promote bladder emptying  Outcome: Progressing     Problem: METABOLIC/FLUID AND ELECTROLYTES -  ADULT  Goal: Electrolytes maintained within normal limits  Description: INTERVENTIONS:  - Monitor labs and rhythm and assess patient for signs and symptoms of electrolyte imbalances  - Administer electrolyte replacement as ordered  - Monitor response to electrolyte replacements, including rhythm and repeat lab results as appropriate  - Fluid restriction as ordered  - Instruct patient on fluid and nutrition restrictions as appropriate  Outcome: Progressing     Problem: MUSCULOSKELETAL - ADULT  Goal: Return mobility to safest level of function  Description: INTERVENTIONS:  - Assess patient stability and activity tolerance for standing, transferring and ambulating w/ or w/o assistive devices  - Assist with transfers and ambulation using safe patient handling equipment as needed  - Ensure adequate protection for wounds/incisions during mobilization  - Obtain PT/OT consults as needed  - Advance activity as appropriate  - Communicate ordered activity level and limitations with patient/family  Outcome: Progressing  Goal: Maintain proper alignment of affected body part  Description: INTERVENTIONS:  - Support and protect limb and body alignment per provider's orders  - Instruct and reinforce with patient and family use of appropriate assistive device and precautions (e.g. spinal or hip dislocation precautions)  Outcome: Progressing     Problem: Impaired Activities of Daily Living  Goal: Achieve highest/safest level of independence in self care  Description: Interventions:  - Assess ability and encourage patient to participate in ADLs to maximize function  - Promote sitting position while performing ADLs such as feeding, grooming, and bathing  - Educate and encourage patient/family in tolerated functional activity level and precautions during self-care  - Encourage patient to incorporate impaired side during daily activities to promote function  Outcome: Progressing     Problem: NEUROLOGICAL - ADULT  Goal: Achieves stable or  improved neurological status  Description: INTERVENTIONS  - Assess for and report changes in neurological status  - Initiate measures to prevent increased intracranial pressure  - Maintain blood pressure and fluid volume within ordered parameters to optimize cerebral perfusion and minimize risk of hemorrhage  - Monitor temperature, glucose, and sodium. Initiate appropriate interventions as ordered  Outcome: Progressing     Problem: Delirium  Goal: Minimize duration of delirium  Description: Interventions:  - Encourage use of hearing aids, eye glasses  - Promote highest level of mobility daily  - Provide frequent reorientation  - Promote wakefulness i.e. lights on, blinds open  - Promote sleep, encourage patient's normal rest cycle i.e. lights off, TV off, minimize noise and interruptions  - Encourage family to assist in orientation and promotion of home routines  Outcome: Progressing

## 2024-04-29 LAB
ALBUMIN SERPL-MCNC: 3.1 G/DL (ref 3.2–4.8)
ANION GAP SERPL CALC-SCNC: 1 MMOL/L (ref 0–18)
BASOPHILS # BLD AUTO: 0.14 X10(3) UL (ref 0–0.2)
BASOPHILS NFR BLD AUTO: 1.2 %
BUN BLD-MCNC: 15 MG/DL (ref 9–23)
BUN/CREAT SERPL: 29.4 (ref 10–20)
CALCIUM BLD-MCNC: 9.4 MG/DL (ref 8.7–10.4)
CHLORIDE SERPL-SCNC: 97 MMOL/L (ref 98–112)
CO2 SERPL-SCNC: 32 MMOL/L (ref 21–32)
CREAT BLD-MCNC: 0.51 MG/DL
DEPRECATED RDW RBC AUTO: 45.1 FL (ref 35.1–46.3)
EGFRCR SERPLBLD CKD-EPI 2021: 99 ML/MIN/1.73M2 (ref 60–?)
EOSINOPHIL # BLD AUTO: 0.44 X10(3) UL (ref 0–0.7)
EOSINOPHIL NFR BLD AUTO: 3.8 %
ERYTHROCYTE [DISTWIDTH] IN BLOOD BY AUTOMATED COUNT: 13.2 % (ref 11–15)
GLUCOSE BLD-MCNC: 168 MG/DL (ref 70–99)
GLUCOSE BLDC GLUCOMTR-MCNC: 133 MG/DL (ref 70–99)
GLUCOSE BLDC GLUCOMTR-MCNC: 156 MG/DL (ref 70–99)
GLUCOSE BLDC GLUCOMTR-MCNC: 170 MG/DL (ref 70–99)
HCT VFR BLD AUTO: 30.6 %
HGB BLD-MCNC: 10.2 G/DL
IMM GRANULOCYTES # BLD AUTO: 0.48 X10(3) UL (ref 0–1)
IMM GRANULOCYTES NFR BLD: 4.1 %
INR BLD: 1.49 (ref 0.8–1.2)
LYMPHOCYTES # BLD AUTO: 2.15 X10(3) UL (ref 1–4)
LYMPHOCYTES NFR BLD AUTO: 18.4 %
MCH RBC QN AUTO: 31.8 PG (ref 26–34)
MCHC RBC AUTO-ENTMCNC: 33.3 G/DL (ref 31–37)
MCV RBC AUTO: 95.3 FL
MONOCYTES # BLD AUTO: 1.1 X10(3) UL (ref 0.1–1)
MONOCYTES NFR BLD AUTO: 9.4 %
NEUTROPHILS # BLD AUTO: 7.36 X10 (3) UL (ref 1.5–7.7)
NEUTROPHILS # BLD AUTO: 7.36 X10(3) UL (ref 1.5–7.7)
NEUTROPHILS NFR BLD AUTO: 63.1 %
OSMOLALITY SERPL CALC.SUM OF ELEC: 275 MOSM/KG (ref 275–295)
PHOSPHATE SERPL-MCNC: 4 MG/DL (ref 2.4–5.1)
PLATELET # BLD AUTO: 616 10(3)UL (ref 150–450)
POTASSIUM SERPL-SCNC: 4.6 MMOL/L (ref 3.5–5.1)
PROTHROMBIN TIME: 18.9 SECONDS (ref 11.6–14.8)
RBC # BLD AUTO: 3.21 X10(6)UL
SODIUM SERPL-SCNC: 130 MMOL/L (ref 136–145)
WBC # BLD AUTO: 11.7 X10(3) UL (ref 4–11)

## 2024-04-29 PROCEDURE — 99233 SBSQ HOSP IP/OBS HIGH 50: CPT | Performed by: HOSPITALIST

## 2024-04-29 PROCEDURE — 99232 SBSQ HOSP IP/OBS MODERATE 35: CPT | Performed by: INTERNAL MEDICINE

## 2024-04-29 NOTE — PROGRESS NOTES
[facility]  part of [facility]    Progress Note    Dennys Manzano Patient Status:  Inpatient    1935 MRN [id number]   Location [facility] 4W/SW/SE Attending Charlie Alarcon MD   Hosp Day # 14 PCP CALOS FITZGERALD MD     Chief Complaint:   Chief Complaint   Patient presents with    Abdomen/Flank Pain    Nausea/Vomiting/Diarrhea       Subjective:   Dennys Manzano was seen and examined  Pt was seen and examined  No acute events overnight  Feeling okay  No cp or sob   Wants to drink more     Objective:   Objective:    Blood pressure 109/54, pulse 69, temperature 98.1 °F (36.7 °C), temperature source Oral, resp. rate 14, height 6' (1.829 m), weight 241 lb 13.5 oz (109.7 kg), SpO2 95%.    Physical Exam:    General: No acute distress. Awake and alert  Respiratory: Clear to auscultation bilaterally. No wheezes. No rhonchi.  Cardiovascular: S1, S2. Regular rate and rhythm. No murmurs, rubs or gallops.   Abdomen: Soft, nontender, nondistended.  Positive bowel sounds. No rebound or guarding.  Neurologic: No focal neurological deficits.   Musculoskeletal: Moves all extremities.  Extremities: No edema.      Results:   Results:    Labs:  Recent Labs   Lab 24  0507 24  0615   WBC  --    < > 14.8* 14.1* 11.6* 15.3* 11.7*   HGB  --    < > 10.5* 10.6* 10.3* 9.9* 10.2*   MCV  --    < > 94.0 93.7 94.8 94.7 95.3   PLT  --    < > 479.0* 541.0* 586.0* 664.0* 616.0*   INR 1.20  --  1.15  --   --  1.31*  --     < > = values in this interval not displayed.       Recent Labs   Lab 24  0433 24  0615   * 151* 168*   BUN 16 16 15   CREATSERUM 0.51* 0.52* 0.51*   CA 8.9 9.2 9.4   ALB 2.9* 3.0* 3.1*   * 128* 130*   K 4.3 4.2 4.6   CL 95* 93* 97*   CO2 30.0 32.0 32.0       Estimated Creatinine Clearance: 114.1 mL/min (A) (based on SCr of 0.51 mg/dL (L)).    Recent Labs   Lab  04/23/24 2001 04/25/24  0424 04/28/24  0433   PTP 16.0* 15.4* 17.1*   INR 1.20 1.15 1.31*            Culture:  Hospital Encounter on 04/14/24   1. CSF culture     Status: None    Collection Time: 04/23/24 10:00 AM    Specimen: CSF, lumbar puncture; Cerebral spinal fluid   Result Value Ref Range    CSF Culture Result No Growth 3 Days N/A    CSF Smear No WBCs seen N/A    CSF Smear No organisms seen N/A    CSF Smear This is a cytocentrifuged smear. N/A   2. Blood Culture     Status: None    Collection Time: 04/18/24  9:41 PM    Specimen: Blood,peripheral   Result Value Ref Range    Blood Culture Result No Growth 5 Days N/A   3. Tissue Aerobic Culture     Status: None    Collection Time: 04/17/24  2:35 PM    Specimen: Tissue   Result Value Ref Range    Tissue Culture Result No Growth 3 Days N/A    Tissue Smear 1+ WBCs seen N/A    Tissue Smear No organisms seen N/A   4. Anaerobic Culture     Status: None    Collection Time: 04/17/24  2:35 PM    Specimen: Tissue   Result Value Ref Range    Anaerobic Culture No Anaerobes isolated N/A   5. Body Fluid Cult Aerobic and Anaerobic     Status: None    Collection Time: 04/15/24  7:26 AM    Specimen: Synovial fluid,shoulder; Body fluid, unspecified   Result Value Ref Range    Body Fluid Culture Result No Growth 5 Days N/A    Body Fluid Smear 3+ WBCs seen N/A    Body Fluid Smear No organisms seen N/A    Body Fluid Smear This is a cytocentrifuged smear. N/A   6. Urine Culture, Routine     Status: Abnormal    Collection Time: 04/15/24  5:43 AM    Specimen: Urine, clean catch   Result Value Ref Range    Urine Culture >100,000 CFU/ML Enterococcus faecalis NOT VRE (A) N/A       Cardiac  No results for input(s): \"TROP\", \"PBNP\" in the last 168 hours.      Imaging: Imaging data reviewed in Epic.  No results found.    Medications:    cefTRIAXone  2 g Intravenous Q24H    ipratropium-albuterol  3 mL Nebulization BID    warfarin  8.5 mg Oral Nightly    docusate sodium  100 mg Oral BID     phytonadione (Aqua-[place]ep[place]) 10 mg in sodium chloride 0.9% 50 mL IVPB  10 mg Intravenous Once    metoprolol succinate ER  50 mg Oral Daily Beta Blocker    magnesium oxide  400 mg Oral Once    fluticasone propionate  1 spray Each Nare Daily    montelukast  10 mg Oral Nightly    aspirin  81 mg Oral Daily    atorvastatin  40 mg Oral Nightly    finasteride  5 mg Oral Daily    pantoprazole  40 mg Oral QAM AC    senna-docusate  1 tablet Oral BID    umeclidinium-vilanterol  1 puff Inhalation Daily    tamsulosin  0.4 mg Oral Daily with dinner    insulin aspart  1-5 Units Subcutaneous TID CC    ondansetron  4 mg Intravenous Once    sodium chloride  1,000 mL Intravenous Once         Assessment and Plan:   Assessment & Plan:      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   - s/p R shoulder joint aspiration 4/15   - 59,000 WBC's with predominately neutrophils. + calcium pyrophosphate crystals --> pseudogout. Culture negative.   - IV rocephin 2gm q 12  - ID on consult.   - PT/OT - [facility] eventually   - blood cx x 2 NGTD.   - R shoulder arthroscopy extensive debridement of R shoulder, bicep tenotomy 4/17      Acute encephalopathy now resolved  - etiology unclear. ? Meningitis.    - s/p LP. CSF PCR neg  - IV Acyclovir now stopped, remains on rocephin   - CT head no acute findings. MRI no acute CVA  - EEG c/w encephalopathy, now neuro normal  - ID and neuro on consult.   - neuro checks.      Ecoli UTI  - cx + on 4/15, now treatment complete.     Acute respiratory failure with hypoxia  Likely COPD exacerbation in combination with atelectasis  - was on 5L NC wean o2 now on 2L O2  - duonebs q6hrs WA  - SLP eval regular thin liquids.   - Oral diet   - IS/flutter valve.   - CXR atelectasis   - Antitussives PRN   - CT chest on admit showed dense atelectasis no PE.      DVT/pulmonary embolism  - resumed coumadin      Hyponatremia  - appears euvolemic now   - Na trended up s/p samsca   - repeated  Uosm and Deya   - nephrology on consult      NSVT  - ECHO LVEF 55-60%. No WMA.   - keep Mg 2.0 and K 4.0   - cont metoprolol     DM II  - A1c 6.4  - ISS     BPH  Chronic urinary retention   - Continue Flomax and finasteride  - cont valladares last changed 04/01/24     Constipation  - bowel regimen      >55min spent, >50% spent counseling and coordinating care in the form of educating pt/family and d/w consultants and staff. Most of the time spent discussing the above plan.        Plan of care discussed with patient or family at bedside.    Supplementary Documentation:     Quality:  DVT Prophylaxis: warfarin  CODE status: Full   Dispo: per clinical course           Estimated date of discharge: TBD  Discharge is dependent on: clinical stability  At this point Mr. Manzano is expected to be discharge to: [facility]

## 2024-04-29 NOTE — PROGRESS NOTES
[facility]  part of [facility]    Progress Note    Dennys Manzano Patient Status:  Inpatient    1935 MRN [id number]   Location [facility] 4W/SW/SE Attending Charlie Alarcon MD   Hosp Day # 14 PCP CALOS FITZGERALD MD       Subjective:   Subjective:  Patient was seen and examined  Breathing is better and patient was comfortable on 2 L of oxygen with no significant productive cough or sputum  Generalized fatigue and weakness  No abdominal pain  Objective:   Blood pressure 109/54, pulse 69, temperature 98.1 °F (36.7 °C), temperature source Oral, resp. rate 14, height 6' (1.829 m), weight 241 lb 13.5 oz (109.7 kg), SpO2 95%.  Physical Exam  Constitutional:       General: He is not in acute distress.     Appearance: He is obese. He is ill-appearing.   HENT:      Head: Atraumatic.      Mouth/Throat:      Mouth: Mucous membranes are moist.   Eyes:      General: No scleral icterus.  Cardiovascular:      Rate and Rhythm: Normal rate.      Heart sounds:      No gallop.   Pulmonary:      Comments: Diminished in the bases otherwise clear with no wheezes or rhonchi  Abdominal:      General: Abdomen is flat. Bowel sounds are normal.      Palpations: Abdomen is soft.   Musculoskeletal:      Cervical back: Neck supple.      Right lower leg: Edema present.      Left lower leg: Edema present.   Neurological:      Mental Status: Mental status is at baseline.         Results:   Lab Results   Component Value Date    WBC 11.7 (H) 2024    HGB 10.2 (L) 2024    HCT 30.6 (L) 2024    .0 (H) 2024    CREATSERUM 0.51 (L) 2024    BUN 15 2024     (L) 2024    K 4.6 2024    CL 97 (L) 2024    CO2 32.0 2024     (H) 2024    CA 9.4 2024    ALB 3.1 (L) 2024    ALKPHO 91 2024    BILT 1.1 2024    TP 6.8 2024    AST <8 2024    ALT 16 2024    PTT 41.0 (H) 2024    INR 1.49 (H) 2024     T4F 1.0 04/18/2024    TSH 0.277 (L) 04/18/2024    LIP 26 04/14/2024    ESRML 111 (H) 04/14/2024    CRP 13.9 (H) 02/10/2018    MG 1.9 04/27/2024    PHOS 4.0 04/29/2024    TROP <0.045 08/18/2021    TROPHS 25 04/25/2024     03/23/2021       Assessment & Plan:     1-Acute and chronic respiratory failure with hypoxia and hypercapnia   underlying COPD and ? NAYAN   decompensated overlap syndrome with acute illness .     PAP during sleep at night  O2 requirement down to 2 L  Anoro and Nebulizers  Avoid extra use of sedative or narcotic     2-encephalopathy?  Etiology  Head CT negative and EEG with no seizure  LP on 4/23 negative  Antibiotics per ID     3-right shoulder arthritis and crystal arthropathy s/p aspiration on 4/15   ID and Ortho following     4-hyponatremia  Per renal        5-prior history of VTE  On coumadin           Jaspal Fitch MD  4/29/2024

## 2024-04-29 NOTE — PROGRESS NOTES
[facility]  part of [facility]    Nephrology Progress Note    Chief Complaint   Patient presents with    Abdomen/Flank Pain    Nausea/Vomiting/Diarrhea       Subjective:   88 year old male, following for hyponatremia.     Reports feeling better today.     Review of Systems:   Review of Systems   Constitutional:  Positive for fatigue. Negative for chills and fever.   Respiratory:  Negative for cough and shortness of breath.    Cardiovascular:  Negative for chest pain and leg swelling.   Gastrointestinal:  Negative for abdominal pain, constipation, diarrhea, nausea and vomiting.       Objective:   Temp:  [97.8 °F (36.6 °C)-98.1 °F (36.7 °C)] 98.1 °F (36.7 °C)  Pulse:  [65-69] 69  Resp:  [14-21] 14  BP: (109-120)/(52-56) 109/54  SpO2:  [95 %-100 %] 95 %  FiO2 (%):  [30 %] 30 %  SpO2: 95 %     Intake/Output Summary (Last 24 hours) at 4/29/2024 1134  Last data filed at 4/29/2024 0837  Gross per 24 hour   Intake 558 ml   Output 2900 ml   Net -2342 ml     Wt Readings from Last 3 Encounters:   04/21/24 241 lb 13.5 oz (109.7 kg)   04/18/24 229 lb 8 oz (104.1 kg)   03/18/23 218 lb 6.4 oz (99.1 kg)     Physical Exam  Constitutional:       Appearance: Normal appearance.   Cardiovascular:      Rate and Rhythm: Normal rate and regular rhythm.      Heart sounds: Normal heart sounds.   Pulmonary:      Effort: Pulmonary effort is normal.      Breath sounds: Normal breath sounds.   Musculoskeletal:         General: Normal range of motion.      Comments: Neg edema   Skin:     General: Skin is warm and dry.   Neurological:      General: No focal deficit present.      Mental Status: He is alert and oriented to person, place, and time.   Psychiatric:         Mood and Affect: Mood normal.         Behavior: Behavior normal.         Medications:  Current Facility-Administered Medications   Medication Dose Route Frequency    cefTRIAXone (Rocephin) 2 g in D5W 100 mL IVPB-ADD  2 g Intravenous Q24H    ipratropium-albuterol  (Duoneb) 0.5-2.5 (3) MG/3ML inhalation solution 3 mL  3 mL Nebulization BID    magnesium hydroxide (Milk of Magnesia) 400 MG/5ML oral suspension 30 mL  30 mL Oral Daily PRN    fleet enema (Fleet) 7-19 GM/118ML rectal enema 133 mL  1 enema Rectal Daily PRN    warfarin (Coumadin) tab 8.5 mg  8.5 mg Oral Nightly    docusate sodium (Colace) cap 100 mg  100 mg Oral BID    polyethylene glycol (PEG 3350) (Miralax) 17 g oral packet 17 g  17 g Oral Daily PRN    bisacodyl (Dulcolax) 10 MG rectal suppository 10 mg  10 mg Rectal Daily PRN    phytonadione (Aqua-Mephton) 10 mg in sodium chloride 0.9% 50 mL IVPB  10 mg Intravenous Once    metoprolol succinate ER (Toprol XL) 24 hr tab 50 mg  50 mg Oral Daily Beta Blocker    magnesium oxide (Mag-Ox) tab 400 mg  400 mg Oral Once    ipratropium-albuterol (Duoneb) 0.5-2.5 (3) MG/3ML inhalation solution 3 mL  3 mL Nebulization Q6H PRN    fluticasone propionate (Flonase) 50 MCG/ACT nasal suspension 1 spray  1 spray Each Nare Daily    benzonatate (Tessalon) cap 100 mg  100 mg Oral TID PRN    guaiFENesin-codeine (Robitussin AC) 100-10 MG/5ML oral solution 5 mL  5 mL Oral Q4H PRN    montelukast (Singulair) tab 10 mg  10 mg Oral Nightly    aspirin chewable tab 81 mg  81 mg Oral Daily    atorvastatin (Lipitor) tab 40 mg  40 mg Oral Nightly    finasteride (Proscar) tab 5 mg  5 mg Oral Daily    pantoprazole (Protonix) DR tab 40 mg  40 mg Oral QAM AC    senna-docusate (Senokot-S) 8.6-50 MG per tab 1 tablet  1 tablet Oral BID    umeclidinium-vilanterol (Anoro Ellipta) 62.5-25 MCG/ACT inhaler 1 puff  1 puff Inhalation Daily    tamsulosin (Flomax) cap 0.4 mg  0.4 mg Oral Daily with dinner    ondansetron (Zofran) 4 MG/2ML injection 4 mg  4 mg Intravenous Q6H PRN    acetaminophen (Tylenol) tab 650 mg  650 mg Oral Q6H PRN    hydrALAzine (Apresoline) 20 mg/mL injection 10 mg  10 mg Intravenous Q4H PRN    glucose (Dex4) 15 GM/59ML oral liquid 15 g  15 g Oral Q15 Min PRN    Or    glucose (Glutose) 40%  oral gel 15 g  15 g Oral Q15 Min PRN    Or    glucose-vitamin C (Dex-4) chewable tab 4 tablet  4 tablet Oral Q15 Min PRN    Or    dextrose 50% injection 50 mL  50 mL Intravenous Q15 Min PRN    Or    glucose (Dex4) 15 GM/59ML oral liquid 30 g  30 g Oral Q15 Min PRN    Or    glucose (Glutose) 40% oral gel 30 g  30 g Oral Q15 Min PRN    Or    glucose-vitamin C (Dex-4) chewable tab 8 tablet  8 tablet Oral Q15 Min PRN    insulin aspart (NovoLOG) 100 Units/mL FlexPen 1-5 Units  1-5 Units Subcutaneous TID CC    ondansetron (Zofran) 4 MG/2ML injection 4 mg  4 mg Intravenous Once    sodium chloride 0.9 % IV bolus 1,000 mL  1,000 mL Intravenous Once              Results:     Recent Labs   Lab 04/27/24  0403 04/28/24  0433 04/29/24  0615   RBC 3.29* 3.21* 3.21*   HGB 10.3* 9.9* 10.2*   HCT 31.2* 30.4* 30.6*   MCV 94.8 94.7 95.3   NEPRELIM 7.28 10.28* 7.36   WBC 11.6* 15.3* 11.7*   .0* 664.0* 616.0*     Recent Labs   Lab 04/27/24  0403 04/28/24  0433 04/29/24  0615   * 151* 168*   BUN 16 16 15   CREATSERUM 0.51* 0.52* 0.51*   CA 8.9 9.2 9.4   ALB 2.9* 3.0* 3.1*   * 128* 130*   K 4.3 4.2 4.6   CL 95* 93* 97*   CO2 30.0 32.0 32.0     PTT   Date Value Ref Range Status   04/14/2024 41.0 (H) 23.3 - 35.6 seconds Final     Comment:     Elevations of the aPTT in patients not receiving  anticoagulant therapy (Heparin, etc.), may be  seen in Factor deficiency, vitamin K deficiency,  factor inhibitors, liver disease, etc.  Clinical correlation is recommended.       INR   Date Value Ref Range Status   04/28/2024 1.31 (H) 0.80 - 1.20 Final     Comment:     Therapeutic INR range for patients on warfarin:  2.0-3.0 for most medical conditions and surgical prophylaxis   2.5-3.5 for mechanical heart valves and recurrent thromboembolism    Direct thrombin inhibitors (e.g. argatroban, bivalirudin), factor Xa inhibitors (e.g. apixaban, rivaroxaban), and conditions such as coagulation factor deficiency, vitamin K deficiency, and  liver disease will prolong the prothrombin time/INR.    Unfractionated heparin and low molecular weight heparin do not affect the prothrombin time/INR up to a concentration of 1 IU/mL and 1.5 IU/mL, respectively.         No results for input(s): \"BNP\" in the last 168 hours.  Recent Labs   Lab 04/25/24  0424 04/26/24  0507 04/27/24  0403 04/28/24  0433 04/29/24  0615   MG 1.9 1.9 1.9  --   --    PHOS 4.5 3.6 4.0 4.3 4.0        Recent Labs   Lab 04/27/24  0403 04/28/24  0433 04/29/24  0615   PHOS 4.0 4.3 4.0   ALB 2.9* 3.0* 3.1*         Assessment and Plan:     88 year old male with a past medical history of chronic hyponatremia, T2DM, HTN, CAD s/p CABG, CHF, COPD, PE and h/o right shoulder rotator cuff tear, who presented with acute on chronic right shoulder pain.     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   Sodium improved to 130. Tolvaptan 15 mg 4/28.   If sodium starts to decrease again, can restart salt tabs.   Discussed with him that he should stay on 48 oz fluid restriction.     Right shoulder septic arthritis:   Status post debridement. On Cetriaxone.        aNi Del Real MD  4/29/2024

## 2024-04-29 NOTE — PROGRESS NOTES
INFECTIOUS DISEASE PROGRESS NOTE    Dennys Manzano Patient Status:  Inpatient    1935 MRN [id number]   Location [facility] 4W/SW/SE Attending Jeimy Driscoll MD   Hosp Day # 14 PCP CALOS FITZGERALD MD       SUBJECTIVE  Ate breakfast. More awake and alert.    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 Faecalis - colonization     PLAN:     -  continue IV ceftriaxone for R shoulder - EOT 24 - convert to cefdinir on discharge  -  Follow fever curve, wbc  -  Reviewed labs, micro, imaging reports  -  d/w patient, staff     History of Present Illness:    Dennys Manzano is a a(n) 88 year old male. Patient is a 88-year-old male with a history of CAD post CABG and pacemaker, BPH, CHF, DM, depression, HLD, PAD, CVA, previous right rotator cuff surgery who now presents to the hospital for right shoulder pain with effusion, difficulty with movement started about 2 days prior.  Afebrile here.  WBC initially 18.  X-ray of the shoulder with DJD without fracture.  CT chest, abdomen, pelvis nausea and vomiting without clear infectious process.  Seen by orthopedic surgery underwent aspiration 4/15 noted WBC around 60,000 with 94% segs and positive crystals.  Cultures so far no growth.  On IV vancomycin and Zosyn.  Plan for surgery tomorrow for arthroscopy and I&D.    OBJECTIVE  /54 (BP Location: Left arm)   Pulse 69   Temp 98.1 °F (36.7 °C) (Oral)   Resp 14   Ht 182.9 cm (6')   Wt 241 lb 13.5 oz (109.7 kg)   SpO2 95%   BMI 32.80 kg/m²     General: in bed, awake, alert  HEENT: EOMI  Abdomen:  Soft, NT/ND  Musculoskeletal: R shoulder with limited ROM  Integument: R hand edema    MD [facility]  [phone]

## 2024-04-29 NOTE — PLAN OF CARE
Patient Alert and Oriented x4. Can be forgetful at times. Max assist with lift. Bipap at night. 2.5 L NC. Saline locked. Fall precautions in place. Call light and personal belongings within arms reach. Mcelroy in place. Fluid restrictions. Plan for rehab  Problem: Patient Centered Care  Goal: Patient preferences are identified and integrated in the patient's plan of care  Description: Interventions:  - What would you like us to know as we care for you? Patient is from home with family and he has a daughter that works here who is his support system.  Patient is hard of hearing.  - Provide timely, complete, and accurate information to patient/family  - Incorporate patient and family knowledge, values, beliefs, and cultural backgrounds into the planning and delivery of care  - Encourage patient/family to participate in care and decision-making at the level they choose  - Honor patient and family perspectives and choices  4/29/2024 0202 by Kvng Willingham, RN  Outcome: Progressing  4/28/2024 2137 by Kvng Willingham, RN  Outcome: Progressing     Problem: PAIN - ADULT  Goal: Verbalizes/displays adequate comfort level or patient's stated pain goal  Description: INTERVENTIONS:  - Encourage pt to monitor pain and request assistance  - Assess pain using appropriate pain scale  - Administer analgesics based on type and severity of pain and evaluate response  - Implement non-pharmacological measures as appropriate and evaluate response  - Consider cultural and social influences on pain and pain management  - Manage/alleviate anxiety  - Utilize distraction and/or relaxation techniques  - Monitor for opioid side effects  - Notify MD/LIP if interventions unsuccessful or patient reports new pain  - Anticipate increased pain with activity and pre-medicate as appropriate  Outcome: Progressing     Problem: SAFETY ADULT - FALL  Goal: Free from fall injury  Description: INTERVENTIONS:  - Assess pt frequently for physical needs  - Identify  cognitive and physical deficits and behaviors that affect risk of falls.  - [place] fall precautions as indicated by assessment.  - Educate pt/family on patient safety including physical limitations  - Instruct pt to call for assistance with activity based on assessment  - Modify environment to reduce risk of injury  - Provide assistive devices as appropriate  - Consider OT/PT consult to assist with strengthening/mobility  - Encourage toileting schedule  Outcome: Progressing

## 2024-04-29 NOTE — PHYSICAL THERAPY NOTE
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    Patient continues to function below baseline with bed mobility, transfers, gait, stair negotiation, maintaining seated position, standing prolonged periods, performing household tasks, and wheelchair mobility.  Contributing factors to remaining limitations include decreased functional strength, decreased endurance/aerobic capacity, pain, impaired sitting and standing balance, impaired coordination, impaired motor planning, decreased muscular endurance, medical status, and limited LE, UE ROM.  Next session anticipate patient to progress bed mobility, transfers, gait, stair negotiation, maintaining seated position, standing prolonged periods, and performing household tasks.  Physical Therapy will continue to follow patient for duration of hospitalization.    Patient continues to benefit from continued skilled PT services: to promote return to prior level of function and safety with continuous assistance and gradual rehabilitative therapy .    PLAN  PT Treatment Plan: Bed mobility;Body mechanics;Endurance;Energy conservation;Patient education;Gait training;Range of motion;Strengthening;Transfer training;Balance training  Frequency (Obs): 3-5x/week    SUBJECTIVE  I am ready to try to sit up in the chair but every hurts with movement and I think I am going to need a lot of help. Maybe  the lift sling because I can not stand it hurts and I am very weak.     OBJECTIVE  Precautions: Limb alert - right    WEIGHT BEARING RESTRICTION  R Upper Extremity: Weight Bearing as Tolerated             PAIN ASSESSMENT   Ratin  Location: pain B UE's LE's with movement  Management Techniques: Activity promotion;Body mechanics;Breathing techniques;Relaxation;Repositioning    BALANCE  Static Sitting: Fair -  Dynamic Sitting: Poor +  Static Standing: Dependent  Dynamic Standing: Dependent    ACTIVITY TOLERANCE                          O2 WALK       AM-PAC '6-Clicks' INPATIENT SHORT FORM - BASIC MOBILITY  How much difficulty does the patient currently have...  Patient Difficulty: Turning over in bed (including adjusting bedclothes, sheets and blankets)?: A Lot   Patient Difficulty: Sitting down on and standing up from a chair with arms (e.g., wheelchair, bedside commode, etc.): A Lot   Patient Difficulty: Moving from lying on back to sitting on the side of the bed?: A Lot   How much help from another person does the patient currently need...   Help from Another: Moving to and from a bed to a chair (including a wheelchair)?: Total   Help from Another: Need to walk in hospital room?: Total   Help from Another: Climbing 3-5 steps with a railing?: Total     AM-PAC Score:  Raw Score: 9   Approx Degree of Impairment: 81.38%   Standardized Score (AM-PAC Scale): 30.55   CMS Modifier (G-Code): CM    FUNCTIONAL ABILITY STATUS  Functional Mobility/Gait Assessment  Gait Assistance: Dependent assistance  Distance (ft): nimesh to chair pain with movement  Rolling: maximum assist  Supine to Sit: maximum assist  Sit to Supine: dependent  Sit to Stand: dependent    Additional information: Pt ed with bed mobility and rolling with max a to roll L and R with PT assist and use of bed rails for support. Pt c/o pain with all PROM of LE's and UE's. PT ed with a dependant transfer from supine to sitting at the EOB with a nimesh mechanical  lift utilized for optimal pt and staff safety to prevent lift injuries and to prevent falls for pt. Pt ed with transfers to a chair with nimesh and AAROM and PROM in chair. Pt will benefit from ongoing gradual rehab with PT OT to regain his maximal PLOF and safety. Pt has been encouraged to increase his frequency out of bed and staff can use nimesh for transfers as needed to bed, chair or toilet chair as indicated.     The patient's Approx Degree of Impairment: 81.38% has been calculated based on documentation in the [facility] '6 clicks' Inpatient Daily Activity Short Form.  Research supports that patients with this level of impairment may benefit from IP Rehab.  Final disposition will be made by interdisciplinary medical team.    THERAPEUTIC EXERCISES  Lower Extremity Ankle pumps  Heel slides  LAQ     Position Sitting & Standing       Patient End of Session: Up in chair;With  staff;Needs met;Call light within reach;RN aware of session/findings;All patient questions and concerns addressed;Alarm set    CURRENT GOALS   Goals to be met by: 5/5/24  Patient Goal Patient's self-stated goal is: not formally stated   Goal #1 Patient is able to demonstrate supine - sit EOB @ level: moderate assistance      Goal #1   Current Status  Max A    Goal #2 Patient is able to demonstrate transfers Sit to/from Stand at assistance level: moderate assistance with  least restrictive device      Goal #2  Current Status  Max A / dependant sitting at EOB assist x 2 to stand with RW limited by weakness and pain. / nimesh transfer to chair   Goal #3 Patient is able to ambulate >10 feet with assist device:  least restrictive device  at assistance level: moderate assistance   Goal #3   Current Status  NT unable / ongoing   Goal #4 Patient will negotiate 1 stairs/one curb w/ assistive device and moderate assistance   Goal #4   Current Status  Ongoing   Goal #5 Patient to demonstrate independence with home activity/exercise instructions provided to  patient in preparation for discharge.   Goal #5   Current Status  Ongoing   Goal #6         Therapeutic Activity: 15 minutes  Therapeutic Exercise: 9 minutes

## 2024-04-30 LAB
ALBUMIN SERPL-MCNC: 3 G/DL (ref 3.2–4.8)
ANION GAP SERPL CALC-SCNC: 5 MMOL/L (ref 0–18)
BASOPHILS # BLD AUTO: 0.17 X10(3) UL (ref 0–0.2)
BASOPHILS NFR BLD AUTO: 1.4 %
BUN BLD-MCNC: 9 MG/DL (ref 9–23)
BUN/CREAT SERPL: 18 (ref 10–20)
CALCIUM BLD-MCNC: 9.4 MG/DL (ref 8.7–10.4)
CHLORIDE SERPL-SCNC: 97 MMOL/L (ref 98–112)
CO2 SERPL-SCNC: 26 MMOL/L (ref 21–32)
CREAT BLD-MCNC: 0.5 MG/DL
DEPRECATED RDW RBC AUTO: 47.4 FL (ref 35.1–46.3)
EGFRCR SERPLBLD CKD-EPI 2021: 99 ML/MIN/1.73M2 (ref 60–?)
EOSINOPHIL # BLD AUTO: 0.44 X10(3) UL (ref 0–0.7)
EOSINOPHIL NFR BLD AUTO: 3.7 %
ERYTHROCYTE [DISTWIDTH] IN BLOOD BY AUTOMATED COUNT: 13.3 % (ref 11–15)
GLUCOSE BLD-MCNC: 134 MG/DL (ref 70–99)
GLUCOSE BLDC GLUCOMTR-MCNC: 148 MG/DL (ref 70–99)
GLUCOSE BLDC GLUCOMTR-MCNC: 162 MG/DL (ref 70–99)
GLUCOSE BLDC GLUCOMTR-MCNC: 166 MG/DL (ref 70–99)
GLUCOSE BLDC GLUCOMTR-MCNC: 194 MG/DL (ref 70–99)
HCT VFR BLD AUTO: 32.2 %
HGB BLD-MCNC: 10 G/DL
IMM GRANULOCYTES # BLD AUTO: 0.43 X10(3) UL (ref 0–1)
IMM GRANULOCYTES NFR BLD: 3.6 %
INR BLD: 1.77 (ref 0.8–1.2)
INR BLD: 1.89 (ref 0.8–1.2)
LYMPHOCYTES # BLD AUTO: 2.46 X10(3) UL (ref 1–4)
LYMPHOCYTES NFR BLD AUTO: 20.8 %
MAGNESIUM SERPL-MCNC: 1.8 MG/DL (ref 1.6–2.6)
MCH RBC QN AUTO: 30.6 PG (ref 26–34)
MCHC RBC AUTO-ENTMCNC: 31.1 G/DL (ref 31–37)
MCV RBC AUTO: 98.5 FL
MONOCYTES # BLD AUTO: 1.02 X10(3) UL (ref 0.1–1)
MONOCYTES NFR BLD AUTO: 8.6 %
NEUTROPHILS # BLD AUTO: 7.31 X10 (3) UL (ref 1.5–7.7)
NEUTROPHILS # BLD AUTO: 7.31 X10(3) UL (ref 1.5–7.7)
NEUTROPHILS NFR BLD AUTO: 61.9 %
OSMOLALITY SERPL CALC.SUM OF ELEC: 267 MOSM/KG (ref 275–295)
PHOSPHATE SERPL-MCNC: 4.7 MG/DL (ref 2.4–5.1)
PLATELET # BLD AUTO: 572 10(3)UL (ref 150–450)
POTASSIUM SERPL-SCNC: 4.6 MMOL/L (ref 3.5–5.1)
PROTHROMBIN TIME: 21.8 SECONDS (ref 11.6–14.8)
PROTHROMBIN TIME: 22.9 SECONDS (ref 11.6–14.8)
RBC # BLD AUTO: 3.27 X10(6)UL
SODIUM SERPL-SCNC: 128 MMOL/L (ref 136–145)
WBC # BLD AUTO: 11.8 X10(3) UL (ref 4–11)

## 2024-04-30 PROCEDURE — 99232 SBSQ HOSP IP/OBS MODERATE 35: CPT | Performed by: INTERNAL MEDICINE

## 2024-04-30 PROCEDURE — 99233 SBSQ HOSP IP/OBS HIGH 50: CPT | Performed by: HOSPITALIST

## 2024-04-30 RX ORDER — IPRATROPIUM BROMIDE AND ALBUTEROL SULFATE 2.5; .5 MG/3ML; MG/3ML
3 SOLUTION RESPIRATORY (INHALATION) EVERY 6 HOURS PRN
Status: DISCONTINUED | OUTPATIENT
Start: 2024-04-30 | End: 2024-05-01

## 2024-04-30 RX ORDER — WARFARIN SODIUM 3 MG/1
9 TABLET ORAL NIGHTLY
Status: DISCONTINUED | OUTPATIENT
Start: 2024-04-30 | End: 2024-05-01

## 2024-04-30 NOTE — PLAN OF CARE
Pt alert and oriented x4. On 2lt o2 via nasal canula, Bipap HS. Coumadin increased, will have new dose tonight. On tele, no calls this shift. 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 Sodium still low, md aware. Max assist. Plan to discharge on PO ABX and possibly to rehab once medically clear.     Problem: Patient Centered Care  Goal: Patient preferences are identified and integrated in the patient's plan of care  Description: Interventions:  - What would you like us to know as we care for you? Patient is from home with family and he has a daughter that works here who is his support system.  Patient is hard of hearing.  - Provide timely, complete, and accurate information to patient/family  - Incorporate patient and family knowledge, values, beliefs, and cultural backgrounds into the planning and delivery of care  - Encourage patient/family to participate in care and decision-making at the level they choose  - Honor patient and family perspectives and choices  Outcome: Progressing     Problem: Patient/Family Goals  Goal: Patient/Family Long Term Goal  Description: Patient's Long Term Goal: Pain on right shoulder will be resolved and will be able to walk well with walker.    Interventions:  - No weight bearing on right arm till surgeon says so.  - Home health PT/OT as ordered.  - Pain management with ,oral medication.  - Monitor right shoulder for swelling or increasing pain or weakness.  - Follow up with surgery as recommended.  - See additional Care Plan goals for specific interventions  Outcome: Progressing  Goal: Patient/Family Short Term Goal  Description: Patient's Short Term Goal: Home with [facility] when pain is resolved.    Interventions:   - NWB to right arm.  - PT/OT as ordered.  - Administer IV antibiotics as ordered.  - Administer oral antibiotics as ordered.  - Maintain enteric/contact isolation as ordered.  - Out of bed as much as tolerated.  - Administer oral anticoagulation as ordered, SCD's to  both legs as well.  - See additional Care Plan goals for specific interventions  Outcome: Progressing     Problem: PAIN - ADULT  Goal: Verbalizes/displays adequate comfort level or patient's stated pain goal  Description: INTERVENTIONS:  - Encourage pt to monitor pain and request assistance  - Assess pain using appropriate pain scale  - Administer analgesics based on type and severity of pain and evaluate response  - Implement non-pharmacological measures as appropriate and evaluate response  - Consider cultural and social influences on pain and pain management  - Manage/alleviate anxiety  - Utilize distraction and/or relaxation techniques  - Monitor for opioid side effects  - Notify MD/LIP if interventions unsuccessful or patient reports new pain  - Anticipate increased pain with activity and pre-medicate as appropriate  Outcome: Progressing     Problem: RISK FOR INFECTION - ADULT  Goal: Absence of fever/infection during anticipated neutropenic period  Description: INTERVENTIONS  - Monitor WBC  - Administer growth factors as ordered  - Implement neutropenic guidelines  Outcome: Progressing     Problem: SAFETY ADULT - FALL  Goal: Free from fall injury  Description: INTERVENTIONS:  - Assess pt frequently for physical needs  - Identify cognitive and physical deficits and behaviors that affect risk of falls.  - [place] fall precautions as indicated by assessment.  - Educate pt/family on patient safety including physical limitations  - Instruct pt to call for assistance with activity based on assessment  - Modify environment to reduce risk of injury  - Provide assistive devices as appropriate  - Consider OT/PT consult to assist with strengthening/mobility  - Encourage toileting schedule  Outcome: Progressing     Problem: DISCHARGE PLANNING  Goal: Discharge to home or other facility with appropriate resources  Description: INTERVENTIONS:  - Identify barriers to discharge w/pt and caregiver  - Include  patient/family/discharge partner in discharge planning  - Arrange for needed discharge resources and transportation as appropriate  - Identify discharge learning needs (meds, wound care, etc)  - Arrange for interpreters to assist at discharge as needed  - Consider post-discharge preferences of patient/family/discharge partner  - Complete POLST form as appropriate  - Assess patient's ability to be responsible for managing their own health  - Refer to Case Management Department for coordinating discharge planning if the patient needs post-hospital services based on physician/LIP order or complex needs related to functional status, cognitive ability or social support system  Outcome: Progressing     Problem: CARDIOVASCULAR - ADULT  Goal: Maintains optimal cardiac output and hemodynamic stability  Description: INTERVENTIONS:  - Monitor vital signs, rhythm, and trends  - Monitor for bleeding, hypotension and signs of decreased cardiac output  - Evaluate effectiveness of vasoactive medications to optimize hemodynamic stability  - Monitor arterial and/or venous puncture sites for bleeding and/or hematoma  - Assess quality of pulses, skin color and temperature  - Assess for signs of decreased coronary artery perfusion - ex. Angina  - Evaluate fluid balance, assess for edema, trend weights  Outcome: Progressing  Goal: Absence of cardiac arrhythmias or at baseline  Description: INTERVENTIONS:  - Continuous cardiac monitoring, monitor vital signs, obtain 12 lead EKG if indicated  - Evaluate effectiveness of antiarrhythmic and heart rate control medications as ordered  - Initiate emergency measures for life threatening arrhythmias  - Monitor electrolytes and administer replacement therapy as ordered  Outcome: Progressing     Problem: RESPIRATORY - ADULT  Goal: Achieves optimal ventilation and oxygenation  Description: INTERVENTIONS:  - Assess for changes in respiratory status  - Assess for changes in mentation and behavior  -  Position to facilitate oxygenation and minimize respiratory effort  - Oxygen supplementation based on oxygen saturation or ABGs  - Provide Smoking Cessation handout, if applicable  - Encourage broncho-pulmonary hygiene including cough, deep breathe, Incentive Spirometry  - Assess the need for suctioning and perform as needed  - Assess and instruct to report SOB or any respiratory difficulty  - Respiratory Therapy support as indicated  - Manage/alleviate anxiety  - Monitor for signs/symptoms of CO2 retention  Outcome: Progressing     Problem: GENITOURINARY - ADULT  Goal: Absence of urinary retention  Description: INTERVENTIONS:  - Assess patient’s ability to void and empty bladder  - Monitor intake/output and perform bladder scan as needed  - Follow urinary retention protocol/standard of care  - Consider collaborating with pharmacy to review patient's medication profile  - Implement strategies to promote bladder emptying  Outcome: Progressing     Problem: METABOLIC/FLUID AND ELECTROLYTES - ADULT  Goal: Electrolytes maintained within normal limits  Description: INTERVENTIONS:  - Monitor labs and rhythm and assess patient for signs and symptoms of electrolyte imbalances  - Administer electrolyte replacement as ordered  - Monitor response to electrolyte replacements, including rhythm and repeat lab results as appropriate  - Fluid restriction as ordered  - Instruct patient on fluid and nutrition restrictions as appropriate  Outcome: Progressing     Problem: MUSCULOSKELETAL - ADULT  Goal: Return mobility to safest level of function  Description: INTERVENTIONS:  - Assess patient stability and activity tolerance for standing, transferring and ambulating w/ or w/o assistive devices  - Assist with transfers and ambulation using safe patient handling equipment as needed  - Ensure adequate protection for wounds/incisions during mobilization  - Obtain PT/OT consults as needed  - Advance activity as appropriate  - Communicate  ordered activity level and limitations with patient/family  Outcome: Progressing  Goal: Maintain proper alignment of affected body part  Description: INTERVENTIONS:  - Support and protect limb and body alignment per provider's orders  - Instruct and reinforce with patient and family use of appropriate assistive device and precautions (e.g. spinal or hip dislocation precautions)  Outcome: Progressing     Problem: Impaired Activities of Daily Living  Goal: Achieve highest/safest level of independence in self care  Description: Interventions:  - Assess ability and encourage patient to participate in ADLs to maximize function  - Promote sitting position while performing ADLs such as feeding, grooming, and bathing  - Educate and encourage patient/family in tolerated functional activity level and precautions during self-care  - Encourage patient to incorporate impaired side during daily activities to promote function  Outcome: Progressing     Problem: NEUROLOGICAL - ADULT  Goal: Achieves stable or improved neurological status  Description: INTERVENTIONS  - Assess for and report changes in neurological status  - Initiate measures to prevent increased intracranial pressure  - Maintain blood pressure and fluid volume within ordered parameters to optimize cerebral perfusion and minimize risk of hemorrhage  - Monitor temperature, glucose, and sodium. Initiate appropriate interventions as ordered  Outcome: Progressing     Problem: Delirium  Goal: Minimize duration of delirium  Description: Interventions:  - Encourage use of hearing aids, eye glasses  - Promote highest level of mobility daily  - Provide frequent reorientation  - Promote wakefulness i.e. lights on, blinds open  - Promote sleep, encourage patient's normal rest cycle i.e. lights off, TV off, minimize noise and interruptions  - Encourage family to assist in orientation and promotion of home routines  Outcome: Progressing

## 2024-04-30 NOTE — PLAN OF CARE
Alert and oriented. Fed breakfast. Good appetite. O2 2L NC. Tele. Bowel regimen in place, abdomen soft. Chronic valladares with good output (will change 5/1) Left grasp moderate, left arm stronger than right; right grasp very weak, but able to grasp phone. Lower legs weak bilaterally. Heels elevated in heel boots. Stated has had neuropathy for more than 10 years. Instructed we will get him up with lift today. DC planning is in progress.       ID Shubham stated no additional ABX will be needed if he is leaving tomorrow.     1316- feeding himself lunch    1445- Plan is rehab tomorrow    Problem: Patient Centered Care  Goal: Patient preferences are identified and integrated in the patient's plan of care  Description: Interventions:  - What would you like us to know as we care for you? Patient is from home with family and he has a daughter that works here who is his support system.  Patient is hard of hearing.  - Provide timely, complete, and accurate information to patient/family  - Incorporate patient and family knowledge, values, beliefs, and cultural backgrounds into the planning and delivery of care  - Encourage patient/family to participate in care and decision-making at the level they choose  - Honor patient and family perspectives and choices  Outcome: Progressing     Problem: Patient/Family Goals  Goal: Patient/Family Long Term Goal  Description: Patient's Long Term Goal: Pain on right shoulder will be resolved and will be able to walk well with walker.    Interventions:  - No weight bearing on right arm till surgeon says so.  - Home health PT/OT as ordered.  - Pain management with ,oral medication.  - Monitor right shoulder for swelling or increasing pain or weakness.  - Follow up with surgery as recommended.  - See additional Care Plan goals for specific interventions  Outcome: Progressing  Goal: Patient/Family Short Term Goal  Description: Patient's Short Term Goal: Home with [facility] when pain is  resolved.    Interventions:   - NWB to right arm.  - PT/OT as ordered.  - Administer IV antibiotics as ordered.  - Administer oral antibiotics as ordered.  - Maintain enteric/contact isolation as ordered.  - Out of bed as much as tolerated.  - Administer oral anticoagulation as ordered, SCD's to both legs as well.  - See additional Care Plan goals for specific interventions  Outcome: Progressing     Problem: PAIN - ADULT  Goal: Verbalizes/displays adequate comfort level or patient's stated pain goal  Description: INTERVENTIONS:  - Encourage pt to monitor pain and request assistance  - Assess pain using appropriate pain scale  - Administer analgesics based on type and severity of pain and evaluate response  - Implement non-pharmacological measures as appropriate and evaluate response  - Consider cultural and social influences on pain and pain management  - Manage/alleviate anxiety  - Utilize distraction and/or relaxation techniques  - Monitor for opioid side effects  - Notify MD/LIP if interventions unsuccessful or patient reports new pain  - Anticipate increased pain with activity and pre-medicate as appropriate  Outcome: Progressing     Problem: RISK FOR INFECTION - ADULT  Goal: Absence of fever/infection during anticipated neutropenic period  Description: INTERVENTIONS  - Monitor WBC  - Administer growth factors as ordered  - Implement neutropenic guidelines  Outcome: Progressing     Problem: SAFETY ADULT - FALL  Goal: Free from fall injury  Description: INTERVENTIONS:  - Assess pt frequently for physical needs  - Identify cognitive and physical deficits and behaviors that affect risk of falls.  - [place] fall precautions as indicated by assessment.  - Educate pt/family on patient safety including physical limitations  - Instruct pt to call for assistance with activity based on assessment  - Modify environment to reduce risk of injury  - Provide assistive devices as appropriate  - Consider OT/PT consult to  assist with strengthening/mobility  - Encourage toileting schedule  Outcome: Progressing     Problem: DISCHARGE PLANNING  Goal: Discharge to home or other facility with appropriate resources  Description: INTERVENTIONS:  - Identify barriers to discharge w/pt and caregiver  - Include patient/family/discharge partner in discharge planning  - Arrange for needed discharge resources and transportation as appropriate  - Identify discharge learning needs (meds, wound care, etc)  - Arrange for interpreters to assist at discharge as needed  - Consider post-discharge preferences of patient/family/discharge partner  - Complete POLST form as appropriate  - Assess patient's ability to be responsible for managing their own health  - Refer to Case Management Department for coordinating discharge planning if the patient needs post-hospital services based on physician/LIP order or complex needs related to functional status, cognitive ability or social support system  Outcome: Progressing     Problem: CARDIOVASCULAR - ADULT  Goal: Maintains optimal cardiac output and hemodynamic stability  Description: INTERVENTIONS:  - Monitor vital signs, rhythm, and trends  - Monitor for bleeding, hypotension and signs of decreased cardiac output  - Evaluate effectiveness of vasoactive medications to optimize hemodynamic stability  - Monitor arterial and/or venous puncture sites for bleeding and/or hematoma  - Assess quality of pulses, skin color and temperature  - Assess for signs of decreased coronary artery perfusion - ex. Angina  - Evaluate fluid balance, assess for edema, trend weights  Outcome: Progressing  Goal: Absence of cardiac arrhythmias or at baseline  Description: INTERVENTIONS:  - Continuous cardiac monitoring, monitor vital signs, obtain 12 lead EKG if indicated  - Evaluate effectiveness of antiarrhythmic and heart rate control medications as ordered  - Initiate emergency measures for life threatening arrhythmias  - Monitor  electrolytes and administer replacement therapy as ordered  Outcome: Progressing     Problem: RESPIRATORY - ADULT  Goal: Achieves optimal ventilation and oxygenation  Description: INTERVENTIONS:  - Assess for changes in respiratory status  - Assess for changes in mentation and behavior  - Position to facilitate oxygenation and minimize respiratory effort  - Oxygen supplementation based on oxygen saturation or ABGs  - Provide Smoking Cessation handout, if applicable  - Encourage broncho-pulmonary hygiene including cough, deep breathe, Incentive Spirometry  - Assess the need for suctioning and perform as needed  - Assess and instruct to report SOB or any respiratory difficulty  - Respiratory Therapy support as indicated  - Manage/alleviate anxiety  - Monitor for signs/symptoms of CO2 retention  Outcome: Progressing     Problem: GASTROINTESTINAL - ADULT  Goal: Minimal or absence of nausea and vomiting  Description: INTERVENTIONS:  - Maintain adequate hydration with IV or PO as ordered and tolerated  - Nasogastric tube to low intermittent suction as ordered  - Evaluate effectiveness of ordered antiemetic medications  - Provide nonpharmacologic comfort measures as appropriate  - Advance diet as tolerated, if ordered  - Obtain nutritional consult as needed  - Evaluate fluid balance  Outcome: Progressing  Goal: Maintains or returns to baseline bowel function  Description: INTERVENTIONS:  - Assess bowel function  - Maintain adequate hydration with IV or PO as ordered and tolerated  - Evaluate effectiveness of GI medications  - Encourage mobilization and activity  - Obtain nutritional consult as needed  - Establish a toileting routine/schedule  - Consider collaborating with pharmacy to review patient's medication profile  Outcome: Progressing     Problem: GENITOURINARY - ADULT  Goal: Absence of urinary retention  Description: INTERVENTIONS:  - Assess patient’s ability to void and empty bladder  - Monitor intake/output and  perform bladder scan as needed  - Follow urinary retention protocol/standard of care  - Consider collaborating with pharmacy to review patient's medication profile  - Implement strategies to promote bladder emptying  Outcome: Progressing     Problem: METABOLIC/FLUID AND ELECTROLYTES - ADULT  Goal: Electrolytes maintained within normal limits  Description: INTERVENTIONS:  - Monitor labs and rhythm and assess patient for signs and symptoms of electrolyte imbalances  - Administer electrolyte replacement as ordered  - Monitor response to electrolyte replacements, including rhythm and repeat lab results as appropriate  - Fluid restriction as ordered  - Instruct patient on fluid and nutrition restrictions as appropriate  Outcome: Progressing     Problem: MUSCULOSKELETAL - ADULT  Goal: Return mobility to safest level of function  Description: INTERVENTIONS:  - Assess patient stability and activity tolerance for standing, transferring and ambulating w/ or w/o assistive devices  - Assist with transfers and ambulation using safe patient handling equipment as needed  - Ensure adequate protection for wounds/incisions during mobilization  - Obtain PT/OT consults as needed  - Advance activity as appropriate  - Communicate ordered activity level and limitations with patient/family  Outcome: Progressing  Goal: Maintain proper alignment of affected body part  Description: INTERVENTIONS:  - Support and protect limb and body alignment per provider's orders  - Instruct and reinforce with patient and family use of appropriate assistive device and precautions (e.g. spinal or hip dislocation precautions)  Outcome: Progressing     Problem: Impaired Activities of Daily Living  Goal: Achieve highest/safest level of independence in self care  Description: Interventions:  - Assess ability and encourage patient to participate in ADLs to maximize function  - Promote sitting position while performing ADLs such as feeding, grooming, and bathing  -  Educate and encourage patient/family in tolerated functional activity level and precautions during self-care  - Encourage patient to incorporate impaired side during daily activities to promote function  Outcome: Progressing     Problem: NEUROLOGICAL - ADULT  Goal: Achieves stable or improved neurological status  Description: INTERVENTIONS  - Assess for and report changes in neurological status  - Initiate measures to prevent increased intracranial pressure  - Maintain blood pressure and fluid volume within ordered parameters to optimize cerebral perfusion and minimize risk of hemorrhage  - Monitor temperature, glucose, and sodium. Initiate appropriate interventions as ordered  Outcome: Progressing     Problem: Delirium  Goal: Minimize duration of delirium  Description: Interventions:  - Encourage use of hearing aids, eye glasses  - Promote highest level of mobility daily  - Provide frequent reorientation  - Promote wakefulness i.e. lights on, blinds open  - Promote sleep, encourage patient's normal rest cycle i.e. lights off, TV off, minimize noise and interruptions  - Encourage family to assist in orientation and promotion of home routines  Outcome: Progressing

## 2024-04-30 NOTE — PLAN OF CARE
Patient is Aox4 on 2L NC - Bipap (noc). Max 2 asst lift. Coumadin and SCDs. Remote tele no calls. 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 Fr 2000ml. 1:1 feed. IV rocephin given per order. No complains of pain. LBM 4/35. ACHS accucheck. Plan for Rehab need DC orders.      Problem: Patient Centered Care  Goal: Patient preferences are identified and integrated in the patient's plan of care  Description: Interventions:  - What would you like us to know as we care for you? Patient is from home with family and he has a daughter that works here who is his support system.  Patient is hard of hearing.  - Provide timely, complete, and accurate information to patient/family  - Incorporate patient and family knowledge, values, beliefs, and cultural backgrounds into the planning and delivery of care  - Encourage patient/family to participate in care and decision-making at the level they choose  - Honor patient and family perspectives and choices  Outcome: Progressing     Problem: Patient/Family Goals  Goal: Patient/Family Long Term Goal  Description: Patient's Long Term Goal: Pain on right shoulder will be resolved and will be able to walk well with walker.    Interventions:  - No weight bearing on right arm till surgeon says so.  - Home health PT/OT as ordered.  - Pain management with ,oral medication.  - Monitor right shoulder for swelling or increasing pain or weakness.  - Follow up with surgery as recommended.  - See additional Care Plan goals for specific interventions  Outcome: Progressing  Goal: Patient/Family Short Term Goal  Description: Patient's Short Term Goal: Home with [facility] when pain is resolved.    Interventions:   - NWB to right arm.  - PT/OT as ordered.  - Administer IV antibiotics as ordered.  - Administer oral antibiotics as ordered.  - Maintain enteric/contact isolation as ordered.  - Out of bed as much as tolerated.  - Administer oral anticoagulation as ordered, SCD's to both legs as well.  - See additional Care Plan goals  for specific interventions  Outcome: Progressing     Problem: PAIN - ADULT  Goal: Verbalizes/displays adequate comfort level or patient's stated pain goal  Description: INTERVENTIONS:  - Encourage pt to monitor pain and request assistance  - Assess pain using appropriate pain scale  - Administer analgesics based on type and severity of pain and evaluate response  - Implement non-pharmacological measures as appropriate and evaluate response  - Consider cultural and social influences on pain and pain management  - Manage/alleviate anxiety  - Utilize distraction and/or relaxation techniques  - Monitor for opioid side effects  - Notify MD/LIP if interventions unsuccessful or patient reports new pain  - Anticipate increased pain with activity and pre-medicate as appropriate  Outcome: Progressing     Problem: RISK FOR INFECTION - ADULT  Goal: Absence of fever/infection during anticipated neutropenic period  Description: INTERVENTIONS  - Monitor WBC  - Administer growth factors as ordered  - Implement neutropenic guidelines  Outcome: Progressing     Problem: SAFETY ADULT - FALL  Goal: Free from fall injury  Description: INTERVENTIONS:  - Assess pt frequently for physical needs  - Identify cognitive and physical deficits and behaviors that affect risk of falls.  - [place] fall precautions as indicated by assessment.  - Educate pt/family on patient safety including physical limitations  - Instruct pt to call for assistance with activity based on assessment  - Modify environment to reduce risk of injury  - Provide assistive devices as appropriate  - Consider OT/PT consult to assist with strengthening/mobility  - Encourage toileting schedule  Outcome: Progressing     Problem: DISCHARGE PLANNING  Goal: Discharge to home or other facility with appropriate resources  Description: INTERVENTIONS:  - Identify barriers to discharge w/pt and caregiver  - Include patient/family/discharge partner in discharge planning  - Arrange for  needed discharge resources and transportation as appropriate  - Identify discharge learning needs (meds, wound care, etc)  - Arrange for interpreters to assist at discharge as needed  - Consider post-discharge preferences of patient/family/discharge partner  - Complete POLST form as appropriate  - Assess patient's ability to be responsible for managing their own health  - Refer to Case Management Department for coordinating discharge planning if the patient needs post-hospital services based on physician/LIP order or complex needs related to functional status, cognitive ability or social support system  Outcome: Progressing     Problem: CARDIOVASCULAR - ADULT  Goal: Maintains optimal cardiac output and hemodynamic stability  Description: INTERVENTIONS:  - Monitor vital signs, rhythm, and trends  - Monitor for bleeding, hypotension and signs of decreased cardiac output  - Evaluate effectiveness of vasoactive medications to optimize hemodynamic stability  - Monitor arterial and/or venous puncture sites for bleeding and/or hematoma  - Assess quality of pulses, skin color and temperature  - Assess for signs of decreased coronary artery perfusion - ex. Angina  - Evaluate fluid balance, assess for edema, trend weights  Outcome: Progressing  Goal: Absence of cardiac arrhythmias or at baseline  Description: INTERVENTIONS:  - Continuous cardiac monitoring, monitor vital signs, obtain 12 lead EKG if indicated  - Evaluate effectiveness of antiarrhythmic and heart rate control medications as ordered  - Initiate emergency measures for life threatening arrhythmias  - Monitor electrolytes and administer replacement therapy as ordered  Outcome: Progressing     Problem: RESPIRATORY - ADULT  Goal: Achieves optimal ventilation and oxygenation  Description: INTERVENTIONS:  - Assess for changes in respiratory status  - Assess for changes in mentation and behavior  - Position to facilitate oxygenation and minimize respiratory effort  -  Oxygen supplementation based on oxygen saturation or ABGs  - Provide Smoking Cessation handout, if applicable  - Encourage broncho-pulmonary hygiene including cough, deep breathe, Incentive Spirometry  - Assess the need for suctioning and perform as needed  - Assess and instruct to report SOB or any respiratory difficulty  - Respiratory Therapy support as indicated  - Manage/alleviate anxiety  - Monitor for signs/symptoms of CO2 retention  Outcome: Progressing     Problem: GASTROINTESTINAL - ADULT  Goal: Minimal or absence of nausea and vomiting  Description: INTERVENTIONS:  - Maintain adequate hydration with IV or PO as ordered and tolerated  - Nasogastric tube to low intermittent suction as ordered  - Evaluate effectiveness of ordered antiemetic medications  - Provide nonpharmacologic comfort measures as appropriate  - Advance diet as tolerated, if ordered  - Obtain nutritional consult as needed  - Evaluate fluid balance  Outcome: Progressing  Goal: Maintains or returns to baseline bowel function  Description: INTERVENTIONS:  - Assess bowel function  - Maintain adequate hydration with IV or PO as ordered and tolerated  - Evaluate effectiveness of GI medications  - Encourage mobilization and activity  - Obtain nutritional consult as needed  - Establish a toileting routine/schedule  - Consider collaborating with pharmacy to review patient's medication profile  Outcome: Progressing     Problem: GENITOURINARY - ADULT  Goal: Absence of urinary retention  Description: INTERVENTIONS:  - Assess patient’s ability to void and empty bladder  - Monitor intake/output and perform bladder scan as needed  - Follow urinary retention protocol/standard of care  - Consider collaborating with pharmacy to review patient's medication profile  - Implement strategies to promote bladder emptying  Outcome: Progressing     Problem: METABOLIC/FLUID AND ELECTROLYTES - ADULT  Goal: Electrolytes maintained within normal limits  Description:  INTERVENTIONS:  - Monitor labs and rhythm and assess patient for signs and symptoms of electrolyte imbalances  - Administer electrolyte replacement as ordered  - Monitor response to electrolyte replacements, including rhythm and repeat lab results as appropriate  - Fluid restriction as ordered  - Instruct patient on fluid and nutrition restrictions as appropriate  Outcome: Progressing     Problem: MUSCULOSKELETAL - ADULT  Goal: Return mobility to safest level of function  Description: INTERVENTIONS:  - Assess patient stability and activity tolerance for standing, transferring and ambulating w/ or w/o assistive devices  - Assist with transfers and ambulation using safe patient handling equipment as needed  - Ensure adequate protection for wounds/incisions during mobilization  - Obtain PT/OT consults as needed  - Advance activity as appropriate  - Communicate ordered activity level and limitations with patient/family  Outcome: Progressing  Goal: Maintain proper alignment of affected body part  Description: INTERVENTIONS:  - Support and protect limb and body alignment per provider's orders  - Instruct and reinforce with patient and family use of appropriate assistive device and precautions (e.g. spinal or hip dislocation precautions)  Outcome: Progressing     Problem: Impaired Activities of Daily Living  Goal: Achieve highest/safest level of independence in self care  Description: Interventions:  - Assess ability and encourage patient to participate in ADLs to maximize function  - Promote sitting position while performing ADLs such as feeding, grooming, and bathing  - Educate and encourage patient/family in tolerated functional activity level and precautions during self-care  - Encourage patient to incorporate impaired side during daily activities to promote function  Outcome: Progressing     Problem: NEUROLOGICAL - ADULT  Goal: Achieves stable or improved neurological status  Description: INTERVENTIONS  - Assess for  and report changes in neurological status  - Initiate measures to prevent increased intracranial pressure  - Maintain blood pressure and fluid volume within ordered parameters to optimize cerebral perfusion and minimize risk of hemorrhage  - Monitor temperature, glucose, and sodium. Initiate appropriate interventions as ordered  Outcome: Progressing     Problem: Delirium  Goal: Minimize duration of delirium  Description: Interventions:  - Encourage use of hearing aids, eye glasses  - Promote highest level of mobility daily  - Provide frequent reorientation  - Promote wakefulness i.e. lights on, blinds open  - Promote sleep, encourage patient's normal rest cycle i.e. lights off, TV off, minimize noise and interruptions  - Encourage family to assist in orientation and promotion of home routines  Outcome: Progressing

## 2024-04-30 NOTE — PROGRESS NOTES
[facility]  part of [facility]    Nephrology Progress Note    Chief Complaint   Patient presents with    Abdomen/Flank Pain    Nausea/Vomiting/Diarrhea       Subjective:   88 year old male, following for hyponatremia.     Reports feeling better today.     Review of Systems:   Review of Systems   Constitutional:  Positive for fatigue. Negative for chills and fever.   Respiratory:  Negative for cough and shortness of breath.    Cardiovascular:  Negative for chest pain and leg swelling.   Gastrointestinal:  Negative for abdominal pain, constipation, diarrhea, nausea and vomiting.       Objective:   Temp:  [97.8 °F (36.6 °C)-98.8 °F (37.1 °C)] 98.4 °F (36.9 °C)  Pulse:  [68-70] 68  Resp:  [16] 16  BP: (118-135)/(57-88) 135/88  SpO2:  [97 %-98 %] 98 %  SpO2: 98 %     Intake/Output Summary (Last 24 hours) at 4/30/2024 1319  Last data filed at 4/30/2024 0737  Gross per 24 hour   Intake 320 ml   Output 2375 ml   Net -2055 ml     Wt Readings from Last 3 Encounters:   04/21/24 241 lb 13.5 oz (109.7 kg)   04/18/24 229 lb 8 oz (104.1 kg)   03/18/23 218 lb 6.4 oz (99.1 kg)     Physical Exam  Constitutional:       Appearance: Normal appearance.   Cardiovascular:      Rate and Rhythm: Normal rate and regular rhythm.      Heart sounds: Normal heart sounds.   Pulmonary:      Effort: Pulmonary effort is normal.      Breath sounds: Normal breath sounds.   Musculoskeletal:         General: Normal range of motion.      Comments: Neg edema   Skin:     General: Skin is warm and dry.   Neurological:      General: No focal deficit present.      Mental Status: He is alert and oriented to person, place, and time.   Psychiatric:         Mood and Affect: Mood normal.         Behavior: Behavior normal.         Medications:  Current Facility-Administered Medications   Medication Dose Route Frequency    warfarin (Coumadin) tab 10 mg  10 mg Oral Nightly    cefTRIAXone (Rocephin) 2 g in D5W 100 mL IVPB-ADD  2 g Intravenous Q24H     ipratropium-albuterol (Duoneb) 0.5-2.5 (3) MG/3ML inhalation solution 3 mL  3 mL Nebulization BID    magnesium hydroxide (Milk of Magnesia) 400 MG/5ML oral suspension 30 mL  30 mL Oral Daily PRN    fleet enema (Fleet) 7-19 GM/118ML rectal enema 133 mL  1 enema Rectal Daily PRN    docusate sodium (Colace) cap 100 mg  100 mg Oral BID    polyethylene glycol (PEG 3350) (Miralax) 17 g oral packet 17 g  17 g Oral Daily PRN    bisacodyl (Dulcolax) 10 MG rectal suppository 10 mg  10 mg Rectal Daily PRN    phytonadione (Aqua-Mephton) 10 mg in sodium chloride 0.9% 50 mL IVPB  10 mg Intravenous Once    metoprolol succinate ER (Toprol XL) 24 hr tab 50 mg  50 mg Oral Daily Beta Blocker    magnesium oxide (Mag-Ox) tab 400 mg  400 mg Oral Once    ipratropium-albuterol (Duoneb) 0.5-2.5 (3) MG/3ML inhalation solution 3 mL  3 mL Nebulization Q6H PRN    fluticasone propionate (Flonase) 50 MCG/ACT nasal suspension 1 spray  1 spray Each Nare Daily    benzonatate (Tessalon) cap 100 mg  100 mg Oral TID PRN    guaiFENesin-codeine (Robitussin AC) 100-10 MG/5ML oral solution 5 mL  5 mL Oral Q4H PRN    montelukast (Singulair) tab 10 mg  10 mg Oral Nightly    aspirin chewable tab 81 mg  81 mg Oral Daily    atorvastatin (Lipitor) tab 40 mg  40 mg Oral Nightly    finasteride (Proscar) tab 5 mg  5 mg Oral Daily    pantoprazole (Protonix) DR tab 40 mg  40 mg Oral QAM AC    senna-docusate (Senokot-S) 8.6-50 MG per tab 1 tablet  1 tablet Oral BID    umeclidinium-vilanterol (Anoro Ellipta) 62.5-25 MCG/ACT inhaler 1 puff  1 puff Inhalation Daily    tamsulosin (Flomax) cap 0.4 mg  0.4 mg Oral Daily with dinner    ondansetron (Zofran) 4 MG/2ML injection 4 mg  4 mg Intravenous Q6H PRN    acetaminophen (Tylenol) tab 650 mg  650 mg Oral Q6H PRN    hydrALAzine (Apresoline) 20 mg/mL injection 10 mg  10 mg Intravenous Q4H PRN    glucose (Dex4) 15 GM/59ML oral liquid 15 g  15 g Oral Q15 Min PRN    Or    glucose (Glutose) 40% oral gel 15 g  15 g Oral Q15 Min  PRN    Or    glucose-vitamin C (Dex-4) chewable tab 4 tablet  4 tablet Oral Q15 Min PRN    Or    dextrose 50% injection 50 mL  50 mL Intravenous Q15 Min PRN    Or    glucose (Dex4) 15 GM/59ML oral liquid 30 g  30 g Oral Q15 Min PRN    Or    glucose (Glutose) 40% oral gel 30 g  30 g Oral Q15 Min PRN    Or    glucose-vitamin C (Dex-4) chewable tab 8 tablet  8 tablet Oral Q15 Min PRN    insulin aspart (NovoLOG) 100 Units/mL FlexPen 1-5 Units  1-5 Units Subcutaneous TID CC    ondansetron (Zofran) 4 MG/2ML injection 4 mg  4 mg Intravenous Once    sodium chloride 0.9 % IV bolus 1,000 mL  1,000 mL Intravenous Once              Results:     Recent Labs   Lab 04/28/24 0433 04/29/24 0615 04/30/24 0411   RBC 3.21* 3.21* 3.27*   HGB 9.9* 10.2* 10.0*   HCT 30.4* 30.6* 32.2*   MCV 94.7 95.3 98.5   NEPRELIM 10.28* 7.36 7.31   WBC 15.3* 11.7* 11.8*   .0* 616.0* 572.0*     Recent Labs   Lab 04/28/24 0433 04/29/24 0615 04/30/24 0412   * 168* 134*   BUN 16 15 9   CREATSERUM 0.52* 0.51* 0.50*   CA 9.2 9.4 9.4   ALB 3.0* 3.1* 3.0*   * 130* 128*   K 4.2 4.6 4.6   CL 93* 97* 97*   CO2 32.0 32.0 26.0     PTT   Date Value Ref Range Status   04/14/2024 41.0 (H) 23.3 - 35.6 seconds Final     Comment:     Elevations of the aPTT in patients not receiving  anticoagulant therapy (Heparin, etc.), may be  seen in Factor deficiency, vitamin K deficiency,  factor inhibitors, liver disease, etc.  Clinical correlation is recommended.       INR   Date Value Ref Range Status   04/30/2024 1.77 (H) 0.80 - 1.20 Final     Comment:     Therapeutic INR range for patients on warfarin:  2.0-3.0 for most medical conditions and surgical prophylaxis   2.5-3.5 for mechanical heart valves and recurrent thromboembolism    Direct thrombin inhibitors (e.g. argatroban, bivalirudin), factor Xa inhibitors (e.g. apixaban, rivaroxaban), and conditions such as coagulation factor deficiency, vitamin K deficiency, and liver disease will prolong the  prothrombin time/INR.    Unfractionated heparin and low molecular weight heparin do not affect the prothrombin time/INR up to a concentration of 1 IU/mL and 1.5 IU/mL, respectively.         No results for input(s): \"BNP\" in the last 168 hours.  Recent Labs   Lab 04/26/24  0507 04/27/24  0403 04/28/24  0433 04/29/24  0615 04/30/24  0412   MG 1.9 1.9  --   --  1.8   PHOS 3.6 4.0 4.3 4.0 4.7        Recent Labs   Lab 04/28/24  0433 04/29/24  0615 04/30/24  0412   PHOS 4.3 4.0 4.7   ALB 3.0* 3.1* 3.0*         Assessment and Plan:     88 year old male with a past medical history of chronic hyponatremia, T2DM, HTN, CAD s/p CABG, CHF, COPD, PE and h/o right shoulder rotator cuff tear, who presented with acute on chronic right shoulder pain.     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   Sodium 128 today. Tolvaptan 15 mg 4/28.   If sodium starts to decrease again, can restart salt tabs.   Discussed with him that he should stay on 48 oz fluid restriction.     Right shoulder septic arthritis:   Status post debridement. On Cetriaxone.   ID following.        Nai Del Real MD  4/30/2024

## 2024-04-30 NOTE — PROGRESS NOTES
[facility]  part of [facility]    Progress Note    Dennys Manzano Patient Status:  Inpatient    1935 MRN [id number]   Location [facility] 4W/SW/SE Attending Charlie Alarcon MD   Hosp Day # 15 PCP CALOS FITZGERALD MD     Chief Complaint:   Chief Complaint   Patient presents with    Abdomen/Flank Pain    Nausea/Vomiting/Diarrhea       Subjective:   Dennys Manzano was seen and examined  Pt was seen and examined  Sitting in chair, NAD  Alert and oriented  No significant pain    Objective:   Objective:    Blood pressure 135/88, pulse 68, temperature 98.4 °F (36.9 °C), temperature source Axillary, resp. rate 16, height 6' (1.829 m), weight 241 lb 13.5 oz (109.7 kg), SpO2 98%.    Physical Exam:    General: No acute distress. Awake and alert  Respiratory: Clear to auscultation bilaterally. No wheezes. No rhonchi.  Cardiovascular: S1, S2. Regular rate and rhythm. No murmurs, rubs or gallops.   Abdomen: Soft, nontender, nondistended.  Positive bowel sounds. No rebound or guarding.  Neurologic: No focal neurological deficits.   Musculoskeletal: Moves all extremities.  Extremities: No edema.      Results:   Results:    Labs:  Recent Labs   Lab 24  0428 24  0424 24  0507 24  0403 24  0433 24  0615 24  1153 24  0411   WBC  --    < > 14.8* 14.1* 11.6* 15.3* 11.7*  --  11.8*   HGB  --    < > 10.5* 10.6* 10.3* 9.9* 10.2*  --  10.0*   MCV  --    < > 94.0 93.7 94.8 94.7 95.3  --  98.5   PLT  --    < > 479.0* 541.0* 586.0* 664.0* 616.0*  --  572.0*   INR 1.20  --  1.15  --   --  1.31*  --  1.49* 1.77*    < > = values in this interval not displayed.       Recent Labs   Lab 24  0433 24  0615 24  0412   * 168* 134*   BUN 16 15 9   CREATSERUM 0.52* 0.51* 0.50*   CA 9.2 9.4 9.4   ALB 3.0* 3.1* 3.0*   * 130* 128*   K 4.2 4.6 4.6   CL 93* 97* 97*   CO2 32.0 32.0 26.0       Estimated Creatinine Clearance: 116.4  mL/min (A) (based on SCr of 0.5 mg/dL (L)).    Recent Labs   Lab 04/28/24  0433 04/29/24  1153 04/30/24  0411   PTP 17.1* 18.9* 21.8*   INR 1.31* 1.49* 1.77*            Culture:  Hospital Encounter on 04/14/24   1. CSF culture     Status: None    Collection Time: 04/23/24 10:00 AM    Specimen: CSF, lumbar puncture; Cerebral spinal fluid   Result Value Ref Range    CSF Culture Result No Growth 3 Days N/A    CSF Smear No WBCs seen N/A    CSF Smear No organisms seen N/A    CSF Smear This is a cytocentrifuged smear. N/A   2. Blood Culture     Status: None    Collection Time: 04/18/24  9:41 PM    Specimen: Blood,peripheral   Result Value Ref Range    Blood Culture Result No Growth 5 Days N/A   3. Tissue Aerobic Culture     Status: None    Collection Time: 04/17/24  2:35 PM    Specimen: Tissue   Result Value Ref Range    Tissue Culture Result No Growth 3 Days N/A    Tissue Smear 1+ WBCs seen N/A    Tissue Smear No organisms seen N/A   4. Anaerobic Culture     Status: None    Collection Time: 04/17/24  2:35 PM    Specimen: Tissue   Result Value Ref Range    Anaerobic Culture No Anaerobes isolated N/A   5. Body Fluid Cult Aerobic and Anaerobic     Status: None    Collection Time: 04/15/24  7:26 AM    Specimen: Synovial fluid,shoulder; Body fluid, unspecified   Result Value Ref Range    Body Fluid Culture Result No Growth 5 Days N/A    Body Fluid Smear 3+ WBCs seen N/A    Body Fluid Smear No organisms seen N/A    Body Fluid Smear This is a cytocentrifuged smear. N/A   6. Urine Culture, Routine     Status: Abnormal    Collection Time: 04/15/24  5:43 AM    Specimen: Urine, clean catch   Result Value Ref Range    Urine Culture >100,000 CFU/ML Enterococcus faecalis NOT VRE (A) N/A       Cardiac  No results for input(s): \"TROP\", \"PBNP\" in the last 168 hours.      Imaging: Imaging data reviewed in Epic.  No results found.    Medications:    warfarin  10 mg Oral Nightly    cefTRIAXone  2 g Intravenous Q24H    ipratropium-albuterol   3 mL Nebulization BID    docusate sodium  100 mg Oral BID    phytonadione (Aqua-Mephton) 10 mg in sodium chloride 0.9% 50 mL IVPB  10 mg Intravenous Once    metoprolol succinate ER  50 mg Oral Daily Beta Blocker    magnesium oxide  400 mg Oral Once    fluticasone propionate  1 spray Each Nare Daily    montelukast  10 mg Oral Nightly    aspirin  81 mg Oral Daily    atorvastatin  40 mg Oral Nightly    finasteride  5 mg Oral Daily    pantoprazole  40 mg Oral QAM AC    senna-docusate  1 tablet Oral BID    umeclidinium-vilanterol  1 puff Inhalation Daily    tamsulosin  0.4 mg Oral Daily with dinner    insulin aspart  1-5 Units Subcutaneous TID CC    ondansetron  4 mg Intravenous Once    sodium chloride  1,000 mL Intravenous Once         Assessment and Plan:   Assessment & Plan:      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   - s/p R shoulder joint aspiration 4/15   - 59,000 WBC's with predominately neutrophils. + calcium pyrophosphate crystals --> pseudogout. Culture negative.   - IV rocephin 2gm q 12  - ID on consult.   - PT/OT - [facility] eventually   - blood cx x 2 NGTD.   - R shoulder arthroscopy extensive debridement of R shoulder, bicep tenotomy 4/17      Acute encephalopathy now resolved  - etiology unclear. ? Meningitis.    - s/p LP. CSF PCR neg  - IV Acyclovir now stopped, remains on rocephin   - CT head no acute findings. MRI no acute CVA  - EEG c/w encephalopathy, now neuro normal  - ID and neuro on consult.   - neuro checks.      Ecoli UTI  - cx + on 4/15, now treatment complete.     Acute respiratory failure with hypoxia  Likely COPD exacerbation in combination with atelectasis  - was on 5L NC wean o2 now on 2L O2  - duonebs q6hrs WA  - SLP eval regular thin liquids.   - Oral diet   - IS/flutter valve.   - CXR atelectasis   - Antitussives PRN   - CT chest on admit showed dense atelectasis no PE.      DVT/pulmonary embolism  - resumed coumadin      Hyponatremia  -  appears euvolemic now   - Na down trended again today  - pt has been given samsca  - repeated Uosm and Deya   - nephrology on consult   - may need salt tabs     NSVT  - ECHO LVEF 55-60%. No WMA.   - keep Mg 2.0 and K 4.0   - cont metoprolol     DM II  - A1c 6.4  - ISS     BPH  Chronic urinary retention   - Continue Flomax and finasteride  - cont valladares last changed 04/01/24     Constipation  - bowel regimen      >55min spent, >50% spent counseling and coordinating care in the form of educating pt/family and d/w consultants and staff. Most of the time spent discussing the above plan.        Plan of care discussed with patient or family at bedside.    Supplementary Documentation:     Quality:  DVT Prophylaxis: warfarin  CODE status: Full   Dispo: per clinical course           Estimated date of discharge: TBD  Discharge is dependent on: clinical stability  At this point Mr. Manzano is expected to be discharge to: [facility]

## 2024-04-30 NOTE — PROGRESS NOTES
INFECTIOUS DISEASE PROGRESS NOTE    Dennys Manzano Patient Status:  Inpatient    1935 MRN [id number]   Location [facility] 4W/SW/SE Attending Jeimy Driscoll MD   Hosp Day # 15 PCP CALOS FITZGERALD MD       SUBJECTIVE  No acute events. Tolerating abx. Feels better.    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 Faecalis - colonization     PLAN:     -  continue IV ceftriaxone for R shoulder - EOT 24 - likely stop prior to discharge  -  Follow fever curve, wbc  -  Reviewed labs, micro, imaging reports  -  d/w patient, staff     History of Present Illness:    Dennys Manzano is a a(n) 88 year old male. Patient is a 88-year-old male with a history of CAD post CABG and pacemaker, BPH, CHF, DM, depression, HLD, PAD, CVA, previous right rotator cuff surgery who now presents to the hospital for right shoulder pain with effusion, difficulty with movement started about 2 days prior.  Afebrile here.  WBC initially 18.  X-ray of the shoulder with DJD without fracture.  CT chest, abdomen, pelvis nausea and vomiting without clear infectious process.  Seen by orthopedic surgery underwent aspiration 4/15 noted WBC around 60,000 with 94% segs and positive crystals.  Cultures so far no growth.  On IV vancomycin and Zosyn.  Plan for surgery tomorrow for arthroscopy and I&D.    OBJECTIVE  /88 (BP Location: Left arm)   Pulse 68   Temp 98.4 °F (36.9 °C) (Axillary)   Resp 16   Ht 182.9 cm (6')   Wt 241 lb 13.5 oz (109.7 kg)   SpO2 98%   BMI 32.80 kg/m²     General: in bed, awake, alert  HEENT:  EOMI  Abdomen: Soft, NT/ND  Musculoskeletal: R shoulder with limited ROM  Integument: R hand edema    MD Barrett [facility]  [phone]

## 2024-04-30 NOTE — PROGRESS NOTES
[facility]  part of [facility]    Progress Note    Dennys Manzano Patient Status:  Inpatient    1935 MRN [id number]   Location [facility] 4W/SW/SE Attending Charlie Alarcon MD   Hosp Day # 15 PCP CALOS FITZGERALD MD         Subjective:     Constitutional:  Positive for fatigue. Negative for fever.   Respiratory:  Negative for cough and shortness of breath.    Cardiovascular:  Negative for chest pain.   Gastrointestinal:  Negative for abdominal distention.     Patient was seen and examined  Comfortable on 2 L of oxygen with no cough or dyspnea  Awake and alert  No active cough or sputum  Objective:   Blood pressure 135/88, pulse 68, temperature 98.4 °F (36.9 °C), temperature source Axillary, resp. rate 16, height 6' (1.829 m), weight 241 lb 13.5 oz (109.7 kg), SpO2 98%.  Physical Exam  Constitutional:       General: He is not in acute distress.  HENT:      Head: Atraumatic.      Nose: Nose normal.      Mouth/Throat:      Mouth: Mucous membranes are moist.   Eyes:      General: No scleral icterus.  Cardiovascular:      Rate and Rhythm: Normal rate.      Heart sounds:      No gallop.   Pulmonary:      Comments: Slightly diminished in bases otherwise fair air exchange to both lungs and clear with no wheezes or rales or rhonchi  Abdominal:      General: Abdomen is flat. Bowel sounds are normal.      Palpations: Abdomen is soft.   Musculoskeletal:      Cervical back: Normal range of motion.      Right lower leg: No edema.      Left lower leg: No edema.   Neurological:      Mental Status: He is oriented to person, place, and time.         Results:   Lab Results   Component Value Date    WBC 11.8 (H) 2024    HGB 10.0 (L) 2024    HCT 32.2 (L) 2024    .0 (H) 2024    CREATSERUM 0.50 (L) 2024    BUN 9 2024     (L) 2024    K 4.6 2024    CL 97 (L) 2024    CO2 26.0 2024     (H) 2024    CA 9.4 2024     ALB 3.0 (L) 04/30/2024    ALKPHO 91 04/18/2024    BILT 1.1 04/18/2024    TP 6.8 04/18/2024    AST <8 04/18/2024    ALT 16 04/18/2024    PTT 41.0 (H) 04/14/2024    INR 1.77 (H) 04/30/2024    T4F 1.0 04/18/2024    TSH 0.277 (L) 04/18/2024    LIP 26 04/14/2024    ESRML 111 (H) 04/14/2024    CRP 13.9 (H) 02/10/2018    MG 1.8 04/30/2024    PHOS 4.7 04/30/2024    TROP <0.045 08/18/2021    TROPHS 25 04/25/2024     03/23/2021         Assessment & Plan:      1-s/p Acute and chronic respiratory failure with hypoxia and hypercapnia   underlying COPD and NAYAN   decompensated overlap syndrome with acute illness .    Now better / likely about baseline      PAP during sleep at night  O2 requirement down to 2 L  Anoro and Nebulizers  Avoid extra use of sedative or narcotic     2-s/p toxic and metabolic encephalopathy  Resolved   Head CT negative and EEG with no seizure  LP on 4/23 negative  Antibiotics per ID     3-right shoulder arthritis and crystal arthropathy s/p aspiration on 4/15   ID and Ortho following     4-hyponatremia  Per renal        5-prior history of VTE  On coumadin     Discharge planning to rehab            Jaspal Fitch MD  4/30/2024

## 2024-05-01 VITALS
SYSTOLIC BLOOD PRESSURE: 125 MMHG | HEIGHT: 72 IN | BODY MASS INDEX: 32.76 KG/M2 | DIASTOLIC BLOOD PRESSURE: 54 MMHG | OXYGEN SATURATION: 92 % | HEART RATE: 82 BPM | TEMPERATURE: 98 F | RESPIRATION RATE: 18 BRPM | WEIGHT: 241.88 LBS

## 2024-05-01 LAB
ALBUMIN SERPL-MCNC: 3.1 G/DL (ref 3.2–4.8)
ANION GAP SERPL CALC-SCNC: 2 MMOL/L (ref 0–18)
BASOPHILS # BLD AUTO: 0.13 X10(3) UL (ref 0–0.2)
BASOPHILS NFR BLD AUTO: 1.1 %
BUN BLD-MCNC: 15 MG/DL (ref 9–23)
BUN/CREAT SERPL: 30 (ref 10–20)
CALCIUM BLD-MCNC: 9.2 MG/DL (ref 8.7–10.4)
CHLORIDE SERPL-SCNC: 97 MMOL/L (ref 98–112)
CO2 SERPL-SCNC: 32 MMOL/L (ref 21–32)
CREAT BLD-MCNC: 0.5 MG/DL
DEPRECATED RDW RBC AUTO: 45.2 FL (ref 35.1–46.3)
EGFRCR SERPLBLD CKD-EPI 2021: 99 ML/MIN/1.73M2 (ref 60–?)
EOSINOPHIL # BLD AUTO: 0.57 X10(3) UL (ref 0–0.7)
EOSINOPHIL NFR BLD AUTO: 4.7 %
ERYTHROCYTE [DISTWIDTH] IN BLOOD BY AUTOMATED COUNT: 13.2 % (ref 11–15)
GLUCOSE BLD-MCNC: 146 MG/DL (ref 70–99)
GLUCOSE BLDC GLUCOMTR-MCNC: 148 MG/DL (ref 70–99)
GLUCOSE BLDC GLUCOMTR-MCNC: 179 MG/DL (ref 70–99)
HCT VFR BLD AUTO: 31.2 %
HGB BLD-MCNC: 10.2 G/DL
IMM GRANULOCYTES # BLD AUTO: 0.39 X10(3) UL (ref 0–1)
IMM GRANULOCYTES NFR BLD: 3.2 %
INR BLD: 2.03 (ref 0.8–1.2)
LYMPHOCYTES # BLD AUTO: 2.32 X10(3) UL (ref 1–4)
LYMPHOCYTES NFR BLD AUTO: 19.2 %
MCH RBC QN AUTO: 30.6 PG (ref 26–34)
MCHC RBC AUTO-ENTMCNC: 32.7 G/DL (ref 31–37)
MCV RBC AUTO: 93.7 FL
MONOCYTES # BLD AUTO: 0.91 X10(3) UL (ref 0.1–1)
MONOCYTES NFR BLD AUTO: 7.5 %
NEUTROPHILS # BLD AUTO: 7.78 X10 (3) UL (ref 1.5–7.7)
NEUTROPHILS # BLD AUTO: 7.78 X10(3) UL (ref 1.5–7.7)
NEUTROPHILS NFR BLD AUTO: 64.3 %
OSMOLALITY SERPL CALC.SUM OF ELEC: 275 MOSM/KG (ref 275–295)
PHOSPHATE SERPL-MCNC: 4.6 MG/DL (ref 2.4–5.1)
PLATELET # BLD AUTO: 660 10(3)UL (ref 150–450)
POTASSIUM SERPL-SCNC: 4.3 MMOL/L (ref 3.5–5.1)
PROTHROMBIN TIME: 24.2 SECONDS (ref 11.6–14.8)
RBC # BLD AUTO: 3.33 X10(6)UL
SODIUM SERPL-SCNC: 131 MMOL/L (ref 136–145)
WBC # BLD AUTO: 12.1 X10(3) UL (ref 4–11)
WNV IGG CSF ENCEPH: 0.2 IV
WNV IGM CSF ENCEPH: 0.2 IV

## 2024-05-01 PROCEDURE — 99239 HOSP IP/OBS DSCHRG MGMT >30: CPT | Performed by: HOSPITALIST

## 2024-05-01 PROCEDURE — 99232 SBSQ HOSP IP/OBS MODERATE 35: CPT | Performed by: INTERNAL MEDICINE

## 2024-05-01 RX ORDER — WARFARIN SODIUM 3 MG/1
9 TABLET ORAL NIGHTLY
Qty: 42 TABLET | Refills: 0 | Status: SHIPPED | OUTPATIENT
Start: 2024-05-01 | End: 2024-05-15

## 2024-05-01 RX ORDER — METOPROLOL SUCCINATE 50 MG/1
50 TABLET, EXTENDED RELEASE ORAL
Qty: 30 TABLET | Refills: 0 | Status: SHIPPED | OUTPATIENT
Start: 2024-05-02 | End: 2024-06-01

## 2024-05-01 NOTE — PROGRESS NOTES
[facility]    Progress Note    Dennys Manzano Patient Status:  Inpatient    1935 MRN [id number]   Location [facility] 4W/SW/SE Attending Jeimy Driscoll MD   Hosp Day # 16 PCP CALOS FITZGERALD MD     Chief Complaint:   Chief Complaint   Patient presents with    Abdomen/Flank Pain    Nausea/Vomiting/Diarrhea       Subjective:   Dennys Manzano is doing much better today! He is wide awake alert talkative. Ate 100% of his breakfast. Feels fatigued and weakness. No F/C no CP or SOB. Neck pain resolved. R shoulder pain much better.       Objective:   Objective:    Blood pressure 125/54, pulse 69, temperature 98.3 °F (36.8 °C), temperature source Oral, resp. rate 18, height 6' (1.829 m), weight 241 lb 13.5 oz (109.7 kg), SpO2 100%.    Physical Exam:    General: No acute distress.   Respiratory: Clear to auscultation bilaterally. No wheezes. No rhonchi.  Cardiovascular: S1, S2. Regular rate and rhythm. No murmurs, rubs or gallops.   Abdomen: Soft, nontender, nondistended.  Positive bowel sounds. No rebound or guarding.  Neurologic: No focal neurological deficits.   Musculoskeletal: Moves all extremities.  Extremities: No edema.      Results:   Results:    Labs:  Recent Labs   Lab 24  0403 24  0433 24  0615 24  1153 24  0411 24  1534 24  0500   WBC 11.6* 15.3* 11.7*  --  11.8*  --  12.1*   HGB 10.3* 9.9* 10.2*  --  10.0*  --  10.2*   MCV 94.8 94.7 95.3  --  98.5  --  93.7   .0* 664.0* 616.0*  --  572.0*  --  660.0*   INR  --  1.31*  --  1.49* 1.77* 1.89* 2.03*       Recent Labs   Lab 24  0615 24  0412 24  0500   * 134* 146*   BUN 15 9 15   CREATSERUM 0.51* 0.50* 0.50*   CA 9.4 9.4 9.2   ALB 3.1* 3.0* 3.1*   * 128* 131*   K 4.6 4.6 4.3   CL 97* 97* 97*   CO2 32.0 26.0 32.0       Estimated Creatinine Clearance: 116.4 mL/min (A) (based on SCr of 0.5 mg/dL (L)).    Recent Labs   Lab  04/30/24  0411 04/30/24  1534 05/01/24  0500   PTP 21.8* 22.9* 24.2*   INR 1.77* 1.89* 2.03*            Culture:  Hospital Encounter on 04/14/24   1. CSF culture     Status: None    Collection Time: 04/23/24 10:00 AM    Specimen: CSF, lumbar puncture; Cerebral spinal fluid   Result Value Ref Range    CSF Culture Result No Growth 3 Days N/A    CSF Smear No WBCs seen N/A    CSF Smear No organisms seen N/A    CSF Smear This is a cytocentrifuged smear. N/A   2. Blood Culture     Status: None    Collection Time: 04/18/24  9:41 PM    Specimen: Blood,peripheral   Result Value Ref Range    Blood Culture Result No Growth 5 Days N/A   3. Tissue Aerobic Culture     Status: None    Collection Time: 04/17/24  2:35 PM    Specimen: Tissue   Result Value Ref Range    Tissue Culture Result No Growth 3 Days N/A    Tissue Smear 1+ WBCs seen N/A    Tissue Smear No organisms seen N/A   4. Anaerobic Culture     Status: None    Collection Time: 04/17/24  2:35 PM    Specimen: Tissue   Result Value Ref Range    Anaerobic Culture No Anaerobes isolated N/A   5. Body Fluid Cult Aerobic and Anaerobic     Status: None    Collection Time: 04/15/24  7:26 AM    Specimen: Synovial fluid,shoulder; Body fluid, unspecified   Result Value Ref Range    Body Fluid Culture Result No Growth 5 Days N/A    Body Fluid Smear 3+ WBCs seen N/A    Body Fluid Smear No organisms seen N/A    Body Fluid Smear This is a cytocentrifuged smear. N/A   6. Urine Culture, Routine     Status: Abnormal    Collection Time: 04/15/24  5:43 AM    Specimen: Urine, clean catch   Result Value Ref Range    Urine Culture >100,000 CFU/ML Enterococcus faecalis NOT VRE (A) N/A       Cardiac  No results for input(s): \"TROP\", \"PBNP\" in the last 168 hours.      Imaging: Imaging data reviewed in Epic.  No results found.    Medications:    warfarin  9 mg Oral Nightly    cefTRIAXone  2 g Intravenous Q24H    docusate sodium  100 mg Oral BID    phytonadione (Aqua-Mephton) 10 mg in sodium chloride  0.9% 50 mL IVPB  10 mg Intravenous Once    metoprolol succinate ER  50 mg Oral Daily Beta Blocker    magnesium oxide  400 mg Oral Once    fluticasone propionate  1 spray Each Nare Daily    montelukast  10 mg Oral Nightly    aspirin  81 mg Oral Daily    atorvastatin  40 mg Oral Nightly    finasteride  5 mg Oral Daily    pantoprazole  40 mg Oral QAM AC    senna-docusate  1 tablet Oral BID    umeclidinium-vilanterol  1 puff Inhalation Daily    tamsulosin  0.4 mg Oral Daily with dinner    insulin aspart  1-5 Units Subcutaneous TID CC    ondansetron  4 mg Intravenous Once    sodium chloride  1,000 mL Intravenous Once         Assessment and Plan:   Assessment & Plan:      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   - s/p R shoulder joint aspiration 4/15   - 59,000 WBC's with predominately neutrophils. + calcium pyrophosphate crystals --> pseudogout. Culture negative.   - IV rocephin 2gm q 24hrs - likely stop today   - ID on consult.   - PT/OT - [facility] eventually   - blood cx x 2 NGTD.   - R shoulder arthroscopy extensive debridement of R shoulder, bicep tenotomy 4/17      Acute encephalopathy now resolved  - slowly improving   - etiology unclear. CSF PRC neg.   - Likely metabolic encephalopathy   - s/p LP. CSF PCR neg  - IV Acyclovir now stopped, remains on rocephin   - CT head no acute findings. MRI no acute CVA  - EEG c/w encephalopathy, now neuro normal  - neuro signed off.      Ecoli UTI  - cx + on 4/15, now treatment complete.     Acute respiratory failure with hypoxia  Likely COPD exacerbation in combination with atelectasis  - wean o2. On 2L will wean down today   - duonebs q6hrs WA  - SLP eval regular thin liquids.   - Oral diet   - IS/flutter valve.   - CXR atelectasis   - Antitussives PRN   - CT chest on admit showed dense atelectasis no PE.      DVT/pulmonary embolism  - resumed coumadin   - INR therapeutic.      Hyponatremia  SIADH  - appears euvolemic now   - pt has  been given samsca  - nephrology on consult   - may need salt tabs   - fluid restriction      NSVT  - ECHO LVEF 55-60%. No WMA.   - keep Mg 2.0 and K 4.0   - cards signed off.   - cont metoprolol     DM II  - A1c 6.4  - ISS     BPH  Chronic urinary retention   - Continue Flomax and finasteride  - cont valladares last changed 04/01/24 will change prior to discharge.        >55min spent, >50% spent counseling and coordinating care in the form of educating pt/family and d/w consultants and staff. Most of the time spent discussing the above plan.        Plan of care discussed with patient or family at bedside.    Jeimy Driscoll MD  Hospitalist          Supplementary Documentation:     Quality:  DVT Prophylaxis: warfarin   CODE status: Full  Dispo: per clinical course           Estimated date of discharge: TBD  Discharge is dependent on: clinical stability  At this point Mr. Manzano is expected to be discharge to: [facility]

## 2024-05-01 NOTE — CM/SW NOTE
Patient discussed during nursing rounds. Patient cleared for discharge today.     [facility] does not have a bed today per liaison Shey.     Second choice per previously documentation is [facility]. Per Reyna with facility, they have a private room and can accept patient today.     CM met with patient at bedside, he is agreeable to [facility] at discharge. CM spoke to daughter Evangelina, she is also agreeable to discharge plan.    Per RN, patient requires ambulance for transport (max assist/lift). PCS done, to be printed by RN with discharge paperwork. [facility] arranged for 2p . Daughter aware. RN updated and to notify patient of time.     RN Report #: [phone]    Plan: [facility] for [facility] via ambulance at 2p today    Dayna Higgins RN, BSN

## 2024-05-01 NOTE — PROGRESS NOTES
INFECTIOUS DISEASE PROGRESS NOTE    Dennys Manzano Patient Status:  Inpatient    1935 MRN [id number]   Location [facility] 4W/SW/SE Attending Jeimy Driscoll MD   Hosp Day # 16 PCP CALOS FITZGERALD MD       SUBJECTIVE  Discharge today. No acute events.    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 Faecalis - colonization     PLAN:     -  continue IV ceftriaxone for R shoulder - EOT 24 - stop on discharge  -  Follow fever curve, wbc  -  Reviewed labs, micro, imaging reports  -  d/w patient, staff     History of Present Illness:    Dennys Manzano is a a(n) 88 year old male. Patient is a 88-year-old male with a history of CAD post CABG and pacemaker, BPH, CHF, DM, depression, HLD, PAD, CVA, previous right rotator cuff surgery who now presents to the hospital for right shoulder pain with effusion, difficulty with movement started about 2 days prior.  Afebrile here.  WBC initially 18.  X-ray of the shoulder with DJD without fracture.  CT chest, abdomen, pelvis nausea and vomiting without clear infectious process.  Seen by orthopedic surgery underwent aspiration 4/15 noted WBC around 60,000 with 94% segs and positive crystals.  Cultures so far no growth.  On IV vancomycin and Zosyn.  Plan for surgery tomorrow for arthroscopy and I&D.    OBJECTIVE  /54 (BP Location: Left arm)   Pulse 82   Temp 98.3 °F (36.8 °C) (Oral)   Resp 18   Ht 182.9 cm (6')   Wt 241 lb 13.5 oz (109.7 kg)   SpO2 92%   BMI 32.80 kg/m²     General: in bed, awake, alert  HEENT: EOMI  Abdomen: Soft,  NT/ND  Musculoskeletal: R shoulder with limited ROM  Integument: R hand edema    MD Barrett [facility]  [phone]

## 2024-05-01 NOTE — DISCHARGE SUMMARY
[facility] Hospitalist Discharge Summary   Patient ID:  Dennys Manzano  [id number]  88 year old  8/14/1935    Admit date: 4/14/2024  Discharge date: 5/1/2024  Primary Care Physician: CALOS FITZGERALD MD   Attending Physician: Jeimy Driscoll MD   Consults:   Consultants         Provider   Role Specialty     Lise Alva MD      Consulting Physician NEPHROLOGY     Edison Carrillo MD      Consulting Physician NEUROLOGY     Geovany Castro MD      Consulting Physician Interventional, Cardiology     Babita Olmos APRN      Consulting Physician Nurse Practitioner     Juan Perez MD      Consulting Physician INTERVENTIONAL, RADIOLOGY     Harini Zuniga DO      Consulting Physician PULMONARY DISEASES     Tad Mcmahon DO      Consulting Physician NEUROLOGY     Howie Jalloh MD      Consulting Physician INFECTIOUS DISEASES     Gregorio Calle MD      Consulting Physician SURGERY, ORTHOPEDIC            Discharge Diagnoses:   Hyponatremia    Reason for admission  Copied from admission H&P: Dennys Manzano is a(n) 88 year old male, with a past medical history significant for coronary artery disease status post CABG, CHF, COPD, PE diabetes along with right-sided repair for complete rotator cuff tear complicated by RSD presents with a complaint of acute onset right shoulder pain, claims he is unable to move his shoulder at all secondary to pain also experiencing weakness at this time.  Rates his pain as a 10 out of 10 in severity nonradiating in nature denies any associated trauma to the affected area.   He has felt diaphoretic at times however no documented fevers, also has been experiencing increasing shortness of breath particular with exertion.  Recently had viral gastroenteritis that has since improved.  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   - s/p R shoulder joint aspiration 4/15   - 59,000 WBC's with predominately neutrophils. + calcium pyrophosphate crystals --> pseudogout. Culture negative.   - IV rocephin 2gm q 24hrs - likely stop today   - ID on consult.   - PT/OT - LISA eventually   - blood cx x 2 NGTD.   - R shoulder arthroscopy extensive debridement of R shoulder, bicep tenotomy 4/17      Acute encephalopathy now resolved  - slowly improving   - etiology unclear. CSF PRC neg.   - Likely metabolic encephalopathy   - s/p LP. CSF PCR neg  - IV Acyclovir now stopped, remains on rocephin   - CT head no acute findings. MRI no acute CVA  - EEG c/w encephalopathy, now neuro normal  - neuro signed off.      Ecoli UTI  - cx + on 4/15, now treatment complete.     Acute respiratory failure with hypoxia  Likely COPD exacerbation in combination with atelectasis  - wean o2. On 2L will wean down today   - duonebs q6hrs WA  - SLP eval regular thin liquids.   - Oral diet   - IS/flutter valve.   - CXR atelectasis   - Antitussives PRN   - CT chest on admit showed dense atelectasis no PE.      DVT/pulmonary embolism  - resumed coumadin   - INR therapeutic.      Hyponatremia  SIADH  - appears euvolemic now   - pt has been given samsca  - nephrology on consult   - sodium back to baseline   - fluid restriction      NSVT  - ECHO LVEF 55-60%. No WMA.   - keep Mg 2.0 and K 4.0   - cards signed off.   - cont metoprolol     DM II  - A1c 6.4  - ISS     BPH  Chronic urinary retention   - Continue Flomax and finasteride  - cont valladares last changed 04/01/24 will change prior to discharge.     EXAM:   GENERAL: no apparent distress, comfortable  NEURO: A/A Ox3, no focal deficits  RESP: non labored, CTAB/L  CARDIO: Regular, no murmur  ABD: soft, NT, ND  EXTREMITIES: no edema, no calf tenderness    Operative Procedures: Procedure(s) (LRB):  Right shoulder arthroscopy, extensive debridement of right shoulder, bicep tenotomy (Right)    Discharge Instructions      Medication List        START taking these medications      umeclidinium-vilanterol 62.5-25 MCG/ACT Aepb  Commonly known as: Anoro Ellipta  Inhale 1 puff into the lungs daily.  Replaces: Stiolto Respimat 2.5-2.5 MCG/ACT Aers            CHANGE how you take these medications      metoprolol succinate ER 50 MG Tb24  Commonly known as: Toprol XL  Take 1 tablet (50 mg total) by mouth Daily Beta Blocker.  Start taking on: May 2, 2024  What changed:   medication strength  how much to take  how to take this  when to take this     warfarin 3 MG Tabs  Commonly known as: Coumadin  Take 3 tablets (9 mg total) by mouth nightly for 14 days.  What changed:   medication strength  how much to take            CONTINUE taking these medications      atorvastatin 40 MG Tabs  Commonly known as: Lipitor     Baby Aspirin 81 MG Chew  Generic drug: aspirin     CALCIUM 1200+D3 OR     CENTRUM SILVER OR     finasteride 5 MG Tabs  Commonly known as: Proscar     fluticasone propionate 50 MCG/ACT Susp  Commonly known as: Flonase     metFORMIN 500 MG Tabs  Commonly known as: Glucophage     montelukast 10 MG Tabs  Commonly known as: Singulair     pantoprazole 40 MG Tbec  Commonly known as: Protonix  Take 1 tablet (40 mg total) by mouth every morning before breakfast.     polyethylene glycol (PEG 3350) 17 g Pack  Commonly known as: Miralax     senna-docusate 8.6-50 MG Tabs  Commonly known as: Senokot-S     tamsulosin 0.4 MG Caps  Commonly known as: Flomax     VITAMIN C OR     VITAMIN D OR            STOP taking these medications      escitalopram 20 MG Tabs  Commonly known as: Lexapro     Stiolto Respimat 2.5-2.5 MCG/ACT Aers  Generic drug: Tiotropium Bromide-Olodaterol  Replaced by: umeclidinium-vilanterol 62.5-25 MCG/ACT Aepb               Where to Get Your Medications        These medications were sent to [id number] - [place], [place] - [address], [phone], [phone]  [address]  [place] [address]      Phone: [phone]   metoprolol succinate ER 50 MG Tb24  umeclidinium-vilanterol 62.5-25 MCG/ACT Aepb  warfarin 3 MG Tabs         Activity: activity as tolerated  Diet: regular diet  Wound Care: NA  Code Status: Full Code          Important follow up:   Follow-up Information       Gregorio Calle MD Follow up on 5/3/2024.    Specialty: SURGERY, ORTHOPEDIC  Why: As needed  Contact information:  [address]  [address]  [place] [address]  [phone]               Geovany Castro MD Follow up on 5/24/2024.    Specialties: Interventional, Cardiology, CARDIOLOGY  Contact information:  [address] [facility]  [address]  [facility] [id number] [phone]               Bib Sorto MD Follow up in 1 week(s).    Specialty: Internal Medicine  Contact information:  [address]  [address]  [facility] [id number] [phone]                             -PCP in [] within 7 days [] within 14 days [] other     Disposition: SNF  Discharged Condition: good    Hospital Discharge Diagnoses:  R shoulder crystal arthropathy, acute encephalopathy    Lace+ Score: 59  59-90 High Risk  29-58 Medium Risk  0-28   Low Risk.    TCM Follow-Up Recommendation:  LACE > 58: High Risk of readmission after discharge from the hospital.            Total Time Coordinating Care: Greater than 30 minutes    Patient had opportunity to ask questions, state understanding, and agree with therapeutic plan as outlined    Jeimy Driscoll MD  Hospitalist  5/1/2024

## 2024-05-01 NOTE — PROGRESS NOTES
[facility]  part of [facility]    Progress Note    Dennys Manzano Patient Status:  Inpatient    1935 MRN [id number]   Location [facility] 4W/SW/SE Attending Jeimy Driscoll MD   Hosp Day # 16 PCP CALOS FITZGERALD MD         Subjective:   Subjective:  Overall feeling better  Comfortable on room air  No significant cough or dyspnea or chest pain  No abdominal pain  Overall feeling better  Objective:   Blood pressure 125/54, pulse 82, temperature 98.3 °F (36.8 °C), temperature source Oral, resp. rate 18, height 6' (1.829 m), weight 241 lb 13.5 oz (109.7 kg), SpO2 92%.  Physical Exam  Constitutional:       General: He is not in acute distress.  HENT:      Head: Atraumatic.   Eyes:      General: No scleral icterus.  Cardiovascular:      Rate and Rhythm: Normal rate.      Heart sounds:      No gallop.   Pulmonary:      Effort: No respiratory distress.      Breath sounds: No stridor. No wheezing, rhonchi or rales.   Abdominal:      General: Abdomen is flat. Bowel sounds are normal.      Palpations: Abdomen is soft.   Musculoskeletal:      Cervical back: Normal range of motion.   Neurological:      Mental Status: He is oriented to person, place, and time. Mental status is at baseline.         Results:   Lab Results   Component Value Date    WBC 12.1 (H) 2024    HGB 10.2 (L) 2024    HCT 31.2 (L) 2024    .0 (H) 2024    CREATSERUM 0.50 (L) 2024    BUN 15 2024     (L) 2024    K 4.3 2024    CL 97 (L) 2024    CO2 32.0 2024     (H) 2024    CA 9.2 2024    ALB 3.1 (L) 2024    ALKPHO 91 2024    BILT 1.1 2024    TP 6.8 2024    AST <8 2024    ALT 16 2024    PTT 41.0 (H) 2024    INR 2.03 (H) 2024    T4F 1.0 2024    TSH 0.277 (L) 2024    LIP 26 2024    ESRML 111 (H) 2024    CRP 13.9 (H) 02/10/2018    MG 1.8 2024    PHOS 4.6 2024     TROP <0.045 08/18/2021    TROPHS 25 04/25/2024     03/23/2021           Assessment & Plan:       1-s/p Acute and chronic respiratory failure with hypoxia and hypercapnia   underlying COPD and NAYAN   decompensated overlap syndrome with acute illness .     Now better / likely about baseline      PAP during sleep at night  Now room air  Anoro and Nebulizers  Avoid extra use of sedative or narcotic     2-s/p toxic and metabolic encephalopathy  Resolved   Head CT negative and EEG with no seizure  LP on 4/23 negative  Antibiotics per ID     3-right shoulder arthritis and crystal arthropathy s/p aspiration on 4/15   ID and Ortho following     4-hyponatremia  Per renal        5-prior history of VTE  On coumadin      Discharge planning /okay with me to discharge to rehab          Jaspal Fitch MD  5/1/2024

## 2024-05-01 NOTE — PLAN OF CARE
Patient has safety precautions in place bed in the lowest position, bed alarm on, and call light within reach. Plan of care ongoing. No further concerns as of present.    Problem: Patient Centered Care  Goal: Patient preferences are identified and integrated in the patient's plan of care  Description: Interventions:  - What would you like us to know as we care for you? Patient is from home with family and he has a daughter that works here who is his support system.  Patient is hard of hearing.  - Provide timely, complete, and accurate information to patient/family  - Incorporate patient and family knowledge, values, beliefs, and cultural backgrounds into the planning and delivery of care  - Encourage patient/family to participate in care and decision-making at the level they choose  - Honor patient and family perspectives and choices  Outcome: Progressing     Problem: Patient/Family Goals  Goal: Patient/Family Long Term Goal  Description: Patient's Long Term Goal: Pain on right shoulder will be resolved and will be able to walk well with walker.    Interventions:  - No weight bearing on right arm till surgeon says so.  - Home health PT/OT as ordered.  - Pain management with ,oral medication.  - Monitor right shoulder for swelling or increasing pain or weakness.  - Follow up with surgery as recommended.  - See additional Care Plan goals for specific interventions  Outcome: Progressing  Goal: Patient/Family Short Term Goal  Description: Patient's Short Term Goal: Home with [facility] when pain is resolved.    Interventions:   - NWB to right arm.  - PT/OT as ordered.  - Administer IV antibiotics as ordered.  - Administer oral antibiotics as ordered.  - Maintain enteric/contact isolation as ordered.  - Out of bed as much as tolerated.  - Administer oral anticoagulation as ordered, SCD's to both legs as well.  - See additional Care Plan goals for specific interventions  Outcome: Progressing     Problem: PAIN - ADULT  Goal:  Verbalizes/displays adequate comfort level or patient's stated pain goal  Description: INTERVENTIONS:  - Encourage pt to monitor pain and request assistance  - Assess pain using appropriate pain scale  - Administer analgesics based on type and severity of pain and evaluate response  - Implement non-pharmacological measures as appropriate and evaluate response  - Consider cultural and social influences on pain and pain management  - Manage/alleviate anxiety  - Utilize distraction and/or relaxation techniques  - Monitor for opioid side effects  - Notify MD/LIP if interventions unsuccessful or patient reports new pain  - Anticipate increased pain with activity and pre-medicate as appropriate  Outcome: Progressing     Problem: RISK FOR INFECTION - ADULT  Goal: Absence of fever/infection during anticipated neutropenic period  Description: INTERVENTIONS  - Monitor WBC  - Administer growth factors as ordered  - Implement neutropenic guidelines  Outcome: Progressing     Problem: SAFETY ADULT - FALL  Goal: Free from fall injury  Description: INTERVENTIONS:  - Assess pt frequently for physical needs  - Identify cognitive and physical deficits and behaviors that affect risk of falls.  - [place] fall precautions as indicated by assessment.  - Educate pt/family on patient safety including physical limitations  - Instruct pt to call for assistance with activity based on assessment  - Modify environment to reduce risk of injury  - Provide assistive devices as appropriate  - Consider OT/PT consult to assist with strengthening/mobility  - Encourage toileting schedule  Outcome: Progressing     Problem: DISCHARGE PLANNING  Goal: Discharge to home or other facility with appropriate resources  Description: INTERVENTIONS:  - Identify barriers to discharge w/pt and caregiver  - Include patient/family/discharge partner in discharge planning  - Arrange for needed discharge resources and transportation as appropriate  - Identify discharge  learning needs (meds, wound care, etc)  - Arrange for interpreters to assist at discharge as needed  - Consider post-discharge preferences of patient/family/discharge partner  - Complete POLST form as appropriate  - Assess patient's ability to be responsible for managing their own health  - Refer to Case Management Department for coordinating discharge planning if the patient needs post-hospital services based on physician/LIP order or complex needs related to functional status, cognitive ability or social support system  Outcome: Progressing     Problem: CARDIOVASCULAR - ADULT  Goal: Maintains optimal cardiac output and hemodynamic stability  Description: INTERVENTIONS:  - Monitor vital signs, rhythm, and trends  - Monitor for bleeding, hypotension and signs of decreased cardiac output  - Evaluate effectiveness of vasoactive medications to optimize hemodynamic stability  - Monitor arterial and/or venous puncture sites for bleeding and/or hematoma  - Assess quality of pulses, skin color and temperature  - Assess for signs of decreased coronary artery perfusion - ex. Angina  - Evaluate fluid balance, assess for edema, trend weights  Outcome: Progressing  Goal: Absence of cardiac arrhythmias or at baseline  Description: INTERVENTIONS:  - Continuous cardiac monitoring, monitor vital signs, obtain 12 lead EKG if indicated  - Evaluate effectiveness of antiarrhythmic and heart rate control medications as ordered  - Initiate emergency measures for life threatening arrhythmias  - Monitor electrolytes and administer replacement therapy as ordered  Outcome: Progressing     Problem: RESPIRATORY - ADULT  Goal: Achieves optimal ventilation and oxygenation  Description: INTERVENTIONS:  - Assess for changes in respiratory status  - Assess for changes in mentation and behavior  - Position to facilitate oxygenation and minimize respiratory effort  - Oxygen supplementation based on oxygen saturation or ABGs  - Provide Smoking  Cessation handout, if applicable  - Encourage broncho-pulmonary hygiene including cough, deep breathe, Incentive Spirometry  - Assess the need for suctioning and perform as needed  - Assess and instruct to report SOB or any respiratory difficulty  - Respiratory Therapy support as indicated  - Manage/alleviate anxiety  - Monitor for signs/symptoms of CO2 retention  Outcome: Progressing     Problem: GASTROINTESTINAL - ADULT  Goal: Minimal or absence of nausea and vomiting  Description: INTERVENTIONS:  - Maintain adequate hydration with IV or PO as ordered and tolerated  - Nasogastric tube to low intermittent suction as ordered  - Evaluate effectiveness of ordered antiemetic medications  - Provide nonpharmacologic comfort measures as appropriate  - Advance diet as tolerated, if ordered  - Obtain nutritional consult as needed  - Evaluate fluid balance  Outcome: Progressing  Goal: Maintains or returns to baseline bowel function  Description: INTERVENTIONS:  - Assess bowel function  - Maintain adequate hydration with IV or PO as ordered and tolerated  - Evaluate effectiveness of GI medications  - Encourage mobilization and activity  - Obtain nutritional consult as needed  - Establish a toileting routine/schedule  - Consider collaborating with pharmacy to review patient's medication profile  Outcome: Progressing     Problem: GENITOURINARY - ADULT  Goal: Absence of urinary retention  Description: INTERVENTIONS:  - Assess patient’s ability to void and empty bladder  - Monitor intake/output and perform bladder scan as needed  - Follow urinary retention protocol/standard of care  - Consider collaborating with pharmacy to review patient's medication profile  - Implement strategies to promote bladder emptying  Outcome: Progressing     Problem: METABOLIC/FLUID AND ELECTROLYTES - ADULT  Goal: Electrolytes maintained within normal limits  Description: INTERVENTIONS:  - Monitor labs and rhythm and assess patient for signs and  symptoms of electrolyte imbalances  - Administer electrolyte replacement as ordered  - Monitor response to electrolyte replacements, including rhythm and repeat lab results as appropriate  - Fluid restriction as ordered  - Instruct patient on fluid and nutrition restrictions as appropriate  Outcome: Progressing     Problem: MUSCULOSKELETAL - ADULT  Goal: Return mobility to safest level of function  Description: INTERVENTIONS:  - Assess patient stability and activity tolerance for standing, transferring and ambulating w/ or w/o assistive devices  - Assist with transfers and ambulation using safe patient handling equipment as needed  - Ensure adequate protection for wounds/incisions during mobilization  - Obtain PT/OT consults as needed  - Advance activity as appropriate  - Communicate ordered activity level and limitations with patient/family  Outcome: Progressing  Goal: Maintain proper alignment of affected body part  Description: INTERVENTIONS:  - Support and protect limb and body alignment per provider's orders  - Instruct and reinforce with patient and family use of appropriate assistive device and precautions (e.g. spinal or hip dislocation precautions)  Outcome: Progressing     Problem: Impaired Activities of Daily Living  Goal: Achieve highest/safest level of independence in self care  Description: Interventions:  - Assess ability and encourage patient to participate in ADLs to maximize function  - Promote sitting position while performing ADLs such as feeding, grooming, and bathing  - Educate and encourage patient/family in tolerated functional activity level and precautions during self-care  - Encourage patient to incorporate impaired side during daily activities to promote function  Outcome: Progressing     Problem: NEUROLOGICAL - ADULT  Goal: Achieves stable or improved neurological status  Description: INTERVENTIONS  - Assess for and report changes in neurological status  - Initiate measures to prevent  increased intracranial pressure  - Maintain blood pressure and fluid volume within ordered parameters to optimize cerebral perfusion and minimize risk of hemorrhage  - Monitor temperature, glucose, and sodium. Initiate appropriate interventions as ordered  Outcome: Progressing     Problem: Delirium  Goal: Minimize duration of delirium  Description: Interventions:  - Encourage use of hearing aids, eye glasses  - Promote highest level of mobility daily  - Provide frequent reorientation  - Promote wakefulness i.e. lights on, blinds open  - Promote sleep, encourage patient's normal rest cycle i.e. lights off, TV off, minimize noise and interruptions  - Encourage family to assist in orientation and promotion of home routines  Outcome: Progressing

## 2024-05-01 NOTE — PLAN OF CARE
Pt is A&Ox3-4. Clear for dc to [facility] via ambulance.     Problem: Patient Centered Care  Goal: Patient preferences are identified and integrated in the patient's plan of care  Description: Interventions:  - What would you like us to know as we care for you? Patient is from home with family and he has a daughter that works here who is his support system.  Patient is hard of hearing.  - Provide timely, complete, and accurate information to patient/family  - Incorporate patient and family knowledge, values, beliefs, and cultural backgrounds into the planning and delivery of care  - Encourage patient/family to participate in care and decision-making at the level they choose  - Honor patient and family perspectives and choices  Outcome: Adequate for Discharge     Problem: Patient/Family Goals  Goal: Patient/Family Long Term Goal  Description: Patient's Long Term Goal: Pain on right shoulder will be resolved and will be able to walk well with walker.    Interventions:  - No weight bearing on right arm till surgeon says so.  - Home health PT/OT as ordered.  - Pain management with ,oral medication.  - Monitor right shoulder for swelling or increasing pain or weakness.  - Follow up with surgery as recommended.  - See additional Care Plan goals for specific interventions  Outcome: Adequate for Discharge  Goal: Patient/Family Short Term Goal  Description: Patient's Short Term Goal: Home with [facility] when pain is resolved.    Interventions:   - NWB to right arm.  - PT/OT as ordered.  - Administer IV antibiotics as ordered.  - Administer oral antibiotics as ordered.  - Maintain enteric/contact isolation as ordered.  - Out of bed as much as tolerated.  - Administer oral anticoagulation as ordered, SCD's to both legs as well.  - See additional Care Plan goals for specific interventions  Outcome: Adequate for Discharge     Problem: PAIN - ADULT  Goal: Verbalizes/displays adequate comfort level or patient's stated pain  goal  Description: INTERVENTIONS:  - Encourage pt to monitor pain and request assistance  - Assess pain using appropriate pain scale  - Administer analgesics based on type and severity of pain and evaluate response  - Implement non-pharmacological measures as appropriate and evaluate response  - Consider cultural and social influences on pain and pain management  - Manage/alleviate anxiety  - Utilize distraction and/or relaxation techniques  - Monitor for opioid side effects  - Notify MD/LIP if interventions unsuccessful or patient reports new pain  - Anticipate increased pain with activity and pre-medicate as appropriate  Outcome: Adequate for Discharge     Problem: RISK FOR INFECTION - ADULT  Goal: Absence of fever/infection during anticipated neutropenic period  Description: INTERVENTIONS  - Monitor WBC  - Administer growth factors as ordered  - Implement neutropenic guidelines  Outcome: Adequate for Discharge     Problem: SAFETY ADULT - FALL  Goal: Free from fall injury  Description: INTERVENTIONS:  - Assess pt frequently for physical needs  - Identify cognitive and physical deficits and behaviors that affect risk of falls.  - [employer] fall precautions as indicated by assessment.  - Educate pt/family on patient safety including physical limitations  - Instruct pt to call for assistance with activity based on assessment  - Modify environment to reduce risk of injury  - Provide assistive devices as appropriate  - Consider OT/PT consult to assist with strengthening/mobility  - Encourage toileting schedule  Outcome: Adequate for Discharge     Problem: DISCHARGE PLANNING  Goal: Discharge to home or other facility with appropriate resources  Description: INTERVENTIONS:  - Identify barriers to discharge w/pt and caregiver  - Include patient/family/discharge partner in discharge planning  - Arrange for needed discharge resources and transportation as appropriate  - Identify discharge learning needs (meds, wound care,  etc)  - Arrange for interpreters to assist at discharge as needed  - Consider post-discharge preferences of patient/family/discharge partner  - Complete POLST form as appropriate  - Assess patient's ability to be responsible for managing their own health  - Refer to Case Management Department for coordinating discharge planning if the patient needs post-hospital services based on physician/LIP order or complex needs related to functional status, cognitive ability or social support system  Outcome: Adequate for Discharge     Problem: CARDIOVASCULAR - ADULT  Goal: Maintains optimal cardiac output and hemodynamic stability  Description: INTERVENTIONS:  - Monitor vital signs, rhythm, and trends  - Monitor for bleeding, hypotension and signs of decreased cardiac output  - Evaluate effectiveness of vasoactive medications to optimize hemodynamic stability  - Monitor arterial and/or venous puncture sites for bleeding and/or hematoma  - Assess quality of pulses, skin color and temperature  - Assess for signs of decreased coronary artery perfusion - ex. Angina  - Evaluate fluid balance, assess for edema, trend weights  Outcome: Adequate for Discharge  Goal: Absence of cardiac arrhythmias or at baseline  Description: INTERVENTIONS:  - Continuous cardiac monitoring, monitor vital signs, obtain 12 lead EKG if indicated  - Evaluate effectiveness of antiarrhythmic and heart rate control medications as ordered  - Initiate emergency measures for life threatening arrhythmias  - Monitor electrolytes and administer replacement therapy as ordered  Outcome: Adequate for Discharge     Problem: RESPIRATORY - ADULT  Goal: Achieves optimal ventilation and oxygenation  Description: INTERVENTIONS:  - Assess for changes in respiratory status  - Assess for changes in mentation and behavior  - Position to facilitate oxygenation and minimize respiratory effort  - Oxygen supplementation based on oxygen saturation or ABGs  - Provide Smoking  Cessation handout, if applicable  - Encourage broncho-pulmonary hygiene including cough, deep breathe, Incentive Spirometry  - Assess the need for suctioning and perform as needed  - Assess and instruct to report SOB or any respiratory difficulty  - Respiratory Therapy support as indicated  - Manage/alleviate anxiety  - Monitor for signs/symptoms of CO2 retention  Outcome: Adequate for Discharge     Problem: GASTROINTESTINAL - ADULT  Goal: Minimal or absence of nausea and vomiting  Description: INTERVENTIONS:  - Maintain adequate hydration with IV or PO as ordered and tolerated  - Nasogastric tube to low intermittent suction as ordered  - Evaluate effectiveness of ordered antiemetic medications  - Provide nonpharmacologic comfort measures as appropriate  - Advance diet as tolerated, if ordered  - Obtain nutritional consult as needed  - Evaluate fluid balance  Outcome: Adequate for Discharge  Goal: Maintains or returns to baseline bowel function  Description: INTERVENTIONS:  - Assess bowel function  - Maintain adequate hydration with IV or PO as ordered and tolerated  - Evaluate effectiveness of GI medications  - Encourage mobilization and activity  - Obtain nutritional consult as needed  - Establish a toileting routine/schedule  - Consider collaborating with pharmacy to review patient's medication profile  Outcome: Adequate for Discharge     Problem: GENITOURINARY - ADULT  Goal: Absence of urinary retention  Description: INTERVENTIONS:  - Assess patient’s ability to void and empty bladder  - Monitor intake/output and perform bladder scan as needed  - Follow urinary retention protocol/standard of care  - Consider collaborating with pharmacy to review patient's medication profile  - Implement strategies to promote bladder emptying  Outcome: Adequate for Discharge     Problem: METABOLIC/FLUID AND ELECTROLYTES - ADULT  Goal: Electrolytes maintained within normal limits  Description: INTERVENTIONS:  - Monitor labs and  rhythm and assess patient for signs and symptoms of electrolyte imbalances  - Administer electrolyte replacement as ordered  - Monitor response to electrolyte replacements, including rhythm and repeat lab results as appropriate  - Fluid restriction as ordered  - Instruct patient on fluid and nutrition restrictions as appropriate  Outcome: Adequate for Discharge     Problem: MUSCULOSKELETAL - ADULT  Goal: Return mobility to safest level of function  Description: INTERVENTIONS:  - Assess patient stability and activity tolerance for standing, transferring and ambulating w/ or w/o assistive devices  - Assist with transfers and ambulation using safe patient handling equipment as needed  - Ensure adequate protection for wounds/incisions during mobilization  - Obtain PT/OT consults as needed  - Advance activity as appropriate  - Communicate ordered activity level and limitations with patient/family  Outcome: Adequate for Discharge  Goal: Maintain proper alignment of affected body part  Description: INTERVENTIONS:  - Support and protect limb and body alignment per provider's orders  - Instruct and reinforce with patient and family use of appropriate assistive device and precautions (e.g. spinal or hip dislocation precautions)  Outcome: Adequate for Discharge     Problem: Impaired Activities of Daily Living  Goal: Achieve highest/safest level of independence in self care  Description: Interventions:  - Assess ability and encourage patient to participate in ADLs to maximize function  - Promote sitting position while performing ADLs such as feeding, grooming, and bathing  - Educate and encourage patient/family in tolerated functional activity level and precautions during self-care  - Encourage patient to incorporate impaired side during daily activities to promote function  Outcome: Adequate for Discharge     Problem: NEUROLOGICAL - ADULT  Goal: Achieves stable or improved neurological status  Description: INTERVENTIONS  -  Assess for and report changes in neurological status  - Initiate measures to prevent increased intracranial pressure  - Maintain blood pressure and fluid volume within ordered parameters to optimize cerebral perfusion and minimize risk of hemorrhage  - Monitor temperature, glucose, and sodium. Initiate appropriate interventions as ordered  Outcome: Adequate for Discharge     Problem: Delirium  Goal: Minimize duration of delirium  Description: Interventions:  - Encourage use of hearing aids, eye glasses  - Promote highest level of mobility daily  - Provide frequent reorientation  - Promote wakefulness i.e. lights on, blinds open  - Promote sleep, encourage patient's normal rest cycle i.e. lights off, TV off, minimize noise and interruptions  - Encourage family to assist in orientation and promotion of home routines  Outcome: Adequate for Discharge

## 2024-05-01 NOTE — PROGRESS NOTES
[facility]  part of [facility]    Nephrology Progress Note    Chief Complaint   Patient presents with    Abdomen/Flank Pain    Nausea/Vomiting/Diarrhea       Subjective:   88 year old male, following for hyponatremia.     Reports feeling better today.   R shoulder pain is improving.     Review of Systems:   Review of Systems   Constitutional:  Positive for fatigue. Negative for chills and fever.   Respiratory:  Negative for cough and shortness of breath.    Cardiovascular:  Negative for chest pain and leg swelling.   Gastrointestinal:  Negative for abdominal pain, constipation, diarrhea, nausea and vomiting.   Musculoskeletal:  Positive for arthralgias (R shoulder).       Objective:   Temp:  [98 °F (36.7 °C)-98.7 °F (37.1 °C)] 98.3 °F (36.8 °C)  Pulse:  [63-82] 82  Resp:  [16-18] 18  BP: (114-125)/(54-60) 125/54  SpO2:  [92 %-100 %] 92 %  SpO2: 92 %     Intake/Output Summary (Last 24 hours) at 5/1/2024 1211  Last data filed at 5/1/2024 0749  Gross per 24 hour   Intake 450 ml   Output 1450 ml   Net -1000 ml     Wt Readings from Last 3 Encounters:   04/21/24 241 lb 13.5 oz (109.7 kg)   04/18/24 229 lb 8 oz (104.1 kg)   03/18/23 218 lb 6.4 oz (99.1 kg)     Physical Exam  Constitutional:       Appearance: Normal appearance.   Cardiovascular:      Rate and Rhythm: Normal rate and regular rhythm.      Heart sounds: Normal heart sounds.   Pulmonary:      Effort: Pulmonary effort is normal.      Breath sounds: Normal breath sounds.   Musculoskeletal:         General: Normal range of motion.      Comments: Neg edema   Skin:     General: Skin is warm and dry.   Neurological:      General: No focal deficit present.      Mental Status: He is alert and oriented to person, place, and time.   Psychiatric:         Mood and Affect: Mood normal.         Behavior: Behavior normal.         Medications:  Current Facility-Administered Medications   Medication Dose Route Frequency    warfarin (Coumadin) tab 9 mg  9 mg  Oral Nightly    ipratropium-albuterol (Duoneb) 0.5-2.5 (3) MG/3ML inhalation solution 3 mL  3 mL Nebulization Q6H PRN    cefTRIAXone (Rocephin) 2 g in D5W 100 mL IVPB-ADD  2 g Intravenous Q24H    magnesium hydroxide (Milk of Magnesia) 400 MG/5ML oral suspension 30 mL  30 mL Oral Daily PRN    fleet enema (Fleet) 7-19 GM/118ML rectal enema 133 mL  1 enema Rectal Daily PRN    docusate sodium (Colace) cap 100 mg  100 mg Oral BID    polyethylene glycol (PEG 3350) (Miralax) 17 g oral packet 17 g  17 g Oral Daily PRN    bisacodyl (Dulcolax) 10 MG rectal suppository 10 mg  10 mg Rectal Daily PRN    phytonadione (Aqua-Mephton) 10 mg in sodium chloride 0.9% 50 mL IVPB  10 mg Intravenous Once    metoprolol succinate ER (Toprol XL) 24 hr tab 50 mg  50 mg Oral Daily Beta Blocker    magnesium oxide (Mag-Ox) tab 400 mg  400 mg Oral Once    ipratropium-albuterol (Duoneb) 0.5-2.5 (3) MG/3ML inhalation solution 3 mL  3 mL Nebulization Q6H PRN    fluticasone propionate (Flonase) 50 MCG/ACT nasal suspension 1 spray  1 spray Each Nare Daily    benzonatate (Tessalon) cap 100 mg  100 mg Oral TID PRN    guaiFENesin-codeine (Robitussin AC) 100-10 MG/5ML oral solution 5 mL  5 mL Oral Q4H PRN    montelukast (Singulair) tab 10 mg  10 mg Oral Nightly    aspirin chewable tab 81 mg  81 mg Oral Daily    atorvastatin (Lipitor) tab 40 mg  40 mg Oral Nightly    finasteride (Proscar) tab 5 mg  5 mg Oral Daily    pantoprazole (Protonix) DR tab 40 mg  40 mg Oral QAM AC    senna-docusate (Senokot-S) 8.6-50 MG per tab 1 tablet  1 tablet Oral BID    umeclidinium-vilanterol (Anoro Ellipta) 62.5-25 MCG/ACT inhaler 1 puff  1 puff Inhalation Daily    tamsulosin (Flomax) cap 0.4 mg  0.4 mg Oral Daily with dinner    ondansetron (Zofran) 4 MG/2ML injection 4 mg  4 mg Intravenous Q6H PRN    acetaminophen (Tylenol) tab 650 mg  650 mg Oral Q6H PRN    hydrALAzine (Apresoline) 20 mg/mL injection 10 mg  10 mg Intravenous Q4H PRN    glucose (Dex4) 15 GM/59ML oral liquid  15 g  15 g Oral Q15 Min PRN    Or    glucose (Glutose) 40% oral gel 15 g  15 g Oral Q15 Min PRN    Or    glucose-vitamin C (Dex-4) chewable tab 4 tablet  4 tablet Oral Q15 Min PRN    Or    dextrose 50% injection 50 mL  50 mL Intravenous Q15 Min PRN    Or    glucose (Dex4) 15 GM/59ML oral liquid 30 g  30 g Oral Q15 Min PRN    Or    glucose (Glutose) 40% oral gel 30 g  30 g Oral Q15 Min PRN    Or    glucose-vitamin C (Dex-4) chewable tab 8 tablet  8 tablet Oral Q15 Min PRN    insulin aspart (NovoLOG) 100 Units/mL FlexPen 1-5 Units  1-5 Units Subcutaneous TID CC    ondansetron (Zofran) 4 MG/2ML injection 4 mg  4 mg Intravenous Once    sodium chloride 0.9 % IV bolus 1,000 mL  1,000 mL Intravenous Once              Results:     Recent Labs   Lab 04/29/24  0615 04/30/24  0411 05/01/24  0500   RBC 3.21* 3.27* 3.33*   HGB 10.2* 10.0* 10.2*   HCT 30.6* 32.2* 31.2*   MCV 95.3 98.5 93.7   NEPRELIM 7.36 7.31 7.78*   WBC 11.7* 11.8* 12.1*   .0* 572.0* 660.0*     Recent Labs   Lab 04/29/24  0615 04/30/24  0412 05/01/24  0500   * 134* 146*   BUN 15 9 15   CREATSERUM 0.51* 0.50* 0.50*   CA 9.4 9.4 9.2   ALB 3.1* 3.0* 3.1*   * 128* 131*   K 4.6 4.6 4.3   CL 97* 97* 97*   CO2 32.0 26.0 32.0     PTT   Date Value Ref Range Status   04/14/2024 41.0 (H) 23.3 - 35.6 seconds Final     Comment:     Elevations of the aPTT in patients not receiving  anticoagulant therapy (Heparin, etc.), may be  seen in Factor deficiency, vitamin K deficiency,  factor inhibitors, liver disease, etc.  Clinical correlation is recommended.       INR   Date Value Ref Range Status   05/01/2024 2.03 (H) 0.80 - 1.20 Final     Comment:     Therapeutic INR range for patients on warfarin:  2.0-3.0 for most medical conditions and surgical prophylaxis   2.5-3.5 for mechanical heart valves and recurrent thromboembolism    Direct thrombin inhibitors (e.g. argatroban, bivalirudin), factor Xa inhibitors (e.g. apixaban, rivaroxaban), and conditions such as  coagulation factor deficiency, vitamin K deficiency, and liver disease will prolong the prothrombin time/INR.    Unfractionated heparin and low molecular weight heparin do not affect the prothrombin time/INR up to a concentration of 1 IU/mL and 1.5 IU/mL, respectively.         No results for input(s): \"BNP\" in the last 168 hours.  Recent Labs   Lab 04/26/24  0507 04/27/24  0403 04/28/24  0433 04/29/24  0615 04/30/24  0412 05/01/24  0500   MG 1.9 1.9  --   --  1.8  --    PHOS 3.6 4.0   < > 4.0 4.7 4.6    < > = values in this interval not displayed.        Recent Labs   Lab 04/29/24  0615 04/30/24  0412 05/01/24  0500   PHOS 4.0 4.7 4.6   ALB 3.1* 3.0* 3.1*         Assessment and Plan:     88 year old male with a past medical history of chronic hyponatremia, T2DM, HTN, CAD s/p CABG, CHF, COPD, PE and h/o right shoulder rotator cuff tear, who presented with acute on chronic right shoulder pain.     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   Sodium 131 today, at baseline. Tolvaptan 15 mg 4/28.    Discussed with him that he should stay on 48 oz fluid restriction.     Right shoulder septic arthritis:   Status post debridement. On Cetriaxone.   ID following.       Planning dc to rehab facility today.        Nai Del Real MD  5/1/2024

## 2024-05-02 ENCOUNTER — TELEPHONE (OUTPATIENT)
Dept: ORTHOPEDICS CLINIC | Facility: CLINIC | Age: 89
End: 2024-05-02

## 2024-05-02 NOTE — TELEPHONE ENCOUNTER
Per Farshad patient had surgery and has stitches in his shoulder and asking if they can remove them.  Please advise    Fax: [phone]

## 2024-05-06 NOTE — TELEPHONE ENCOUNTER
Per Farshad asking if they can remove patient's stitches. Please call [phone]    Fax: [phone]

## 2024-05-06 NOTE — TELEPHONE ENCOUNTER
Patient had right shoulder arthroscopy on 4/17/24. Please advise if rehab can remove the stitches from the shoulder

## 2024-05-06 NOTE — TELEPHONE ENCOUNTER
S/w Farshad at [facility]- Informed her of order. She states that it needs to be written. I created order in note and faxed to provided fax number

## 2024-10-11 ENCOUNTER — IMMUNIZATION (OUTPATIENT)
Dept: LAB | Age: 89
End: 2024-10-11
Attending: EMERGENCY MEDICINE
Payer: MEDICARE

## 2024-10-11 DIAGNOSIS — Z23 NEED FOR VACCINATION: Primary | ICD-10-CM

## 2024-10-11 PROCEDURE — 90471 IMMUNIZATION ADMIN: CPT

## 2024-10-11 PROCEDURE — 90480 ADMN SARSCOV2 VAC 1/ONLY CMP: CPT

## 2024-10-11 PROCEDURE — 90662 IIV NO PRSV INCREASED AG IM: CPT

## 2024-11-21 ENCOUNTER — TELEPHONE (OUTPATIENT)
Dept: SURGERY | Facility: CLINIC | Age: 89
End: 2024-11-21

## 2024-11-21 NOTE — TELEPHONE ENCOUNTER
Per Evangelina requesting call back today, was just informed patient's prescription for catheters is , requesting to speak to RN for new order. Please call thank you.

## 2024-11-25 NOTE — TELEPHONE ENCOUNTER
Patients daughter Evangelina calling for update, will try nurse line or call at [phone],thanks.

## 2024-11-27 NOTE — TELEPHONE ENCOUNTER
If the patient hasn't had a visit with us in 2 years, unfortunately we can't write orders for him. They are welcome to set up a telephone visit if they would like for us to try to order more catheters. Otherwise I would reach out to his PCP and they may be able to place the order as well.

## 2024-11-27 NOTE — TELEPHONE ENCOUNTER
Called patient's daughter Evangelina, verified name and  of her father Dennys. Manuelelif says that Dennys has been in hospice since 2024 and their team is not able to order more valladares catheters because the brand Dennys uses is called Bard and the hospice team cannot order these types of catheter. Daughter says she insist on getting Bard catheter brand and the only way to get them is through their home health company. I let daughter know that Dennys was last seen in , and daughter agrees, but she is asking in his situation what they need to to do to get an order for more catheter. I let daughter know I will sent a message to our nurse practitioner and wait for her advise. ( The order should say \"Change every 4 weeks and or as needed) per daughter.   Patient's daughter agreed, verbalized understandings and has no further questions.

## 2024-11-27 NOTE — TELEPHONE ENCOUNTER
Called patient's daughter again and Let her know of this, I offered her a virtual appointment, and helped her schedule that. Daughter agreed, verbalized understandings and has no further questions.

## 2024-11-29 NOTE — PROGRESS NOTES
Virtual Telephone Check-In    Dennys Manzano verbally consents to a Virtual Telephone. Presents with his daughter, Evangelina.   Check-In service on 11/29/24.  Patient understands and accepts financial responsibility for any deductible, co-insurance and/or co-pays associated with this service.    Duration of the service: 28 minutes    Summary of topics discussed:  See below     The patient was last seen by Nona ROJO on 11/29/2022. Patient with a chronic valladares catheter secondary to BPH with urinary retention. He has been getting this changed by home health. He states problem is stable. He was having problems with recurrent infection and large sediment in urine causing obstruction, his daughter has switched him to Bard catheters which has significantly improved the problem. They currently irrigate the catheter as needed.     The patient is currently on hospice since 07/2024. They are requesting another prescription for indwelling catheters , as hospice is unable to get the Bard brand. They currently use [facility] [employer] [employer] to obtain them .    Recommendations:  - Continue Bard valladares catheter exchanges every 3 weeks or as needed

## 2024-12-02 ENCOUNTER — TELEPHONE (OUTPATIENT)
Dept: SURGERY | Facility: CLINIC | Age: 89
End: 2024-12-02

## 2024-12-02 ENCOUNTER — VIRTUAL PHONE E/M (OUTPATIENT)
Dept: SURGERY | Facility: CLINIC | Age: 89
End: 2024-12-02

## 2024-12-02 DIAGNOSIS — Z97.8 CHRONIC INDWELLING FOLEY CATHETER: Primary | ICD-10-CM

## 2024-12-02 DIAGNOSIS — N13.8 BPH WITH OBSTRUCTION/LOWER URINARY TRACT SYMPTOMS: ICD-10-CM

## 2024-12-02 DIAGNOSIS — N40.1 BPH WITH OBSTRUCTION/LOWER URINARY TRACT SYMPTOMS: ICD-10-CM

## 2024-12-02 NOTE — TELEPHONE ENCOUNTER
DARREL Olivera asked me to fax over some paperwork for this patient to [phone]   I faxed the following requested on the sheet.   -demographic sheet  -  SL last notes  - filled form  -patients insurance cards.     Confirmation came in and now I will send this to scanning bin.

## (undated) DIAGNOSIS — N39.0 URINARY TRACT INFECTION, SITE NOT SPECIFIED: ICD-10-CM

## (undated) DIAGNOSIS — N39.0 RECURRENT UTI: Primary | ICD-10-CM

## (undated) DEVICE — WRAP COOLING SHLDR W/ICE PILLO

## (undated) DEVICE — DERMABOND LIQUID ADHESIVE

## (undated) DEVICE — AMBIENT SUPER TURBOVAC 90 IFS: Brand: COBLATION

## (undated) DEVICE — SUT MCRYL 3-0 27IN ABSRB UD L24MM PS-1

## (undated) DEVICE — 3M(TM) MEDIPORE(TM) H SOFT CLOTH TAPE 2866: Brand: 3M™ MEDIPORE™

## (undated) DEVICE — REM POLYHESIVE ADULT PATIENT RETURN ELECTRODE: Brand: VALLEYLAB

## (undated) DEVICE — SUT ETHLN 3-0 30IN FS-1 NABSRB BLK 24MM 3/8 C

## (undated) DEVICE — GAMMEX® NON-LATEX PI ORTHO SIZE 7.5, STERILE POLYISOPRENE POWDER-FREE SURGICAL GLOVE: Brand: GAMMEX

## (undated) DEVICE — VIOLET BRAIDED (POLYGLACTIN 910), SYNTHETIC ABSORBABLE SUTURE: Brand: COATED VICRYL

## (undated) DEVICE — DRAPE SHEET LG

## (undated) DEVICE — OUTPATIENT: Brand: MEDLINE INDUSTRIES, INC.

## (undated) DEVICE — CANNULA ARTHSCP L7CM DIA7MM TRNSLUC THRD FLX

## (undated) DEVICE — GAUZE SPONGES: Brand: DEROYAL

## (undated) DEVICE — 3M™ TEGADERM™ TRANSPARENT FILM DRESSING FRAME STYLE, 1626W, 4 IN X 4-3/4 IN (10 CM X 12 CM), 50/CT 4CT/CASE: Brand: 3M™ TEGADERM™

## (undated) DEVICE — SLEEVE TRAC VELC ROT FOR 3 PNT SHLDR DISTR

## (undated) DEVICE — SOL  .9 1000ML BTL

## (undated) DEVICE — BEACH CHAIR MASK SGL USE

## (undated) DEVICE — KIT BEACH CHR FOAM W/ SUPP ARM DRP

## (undated) DEVICE — BANDAGE,GAUZE,BULKEE II,4.5"X4.1YD,STRL: Brand: MEDLINE

## (undated) DEVICE — CLIP SM INTNL HMCLP TNTLM ESCP

## (undated) DEVICE — TUBING PMP L16FT DISP

## (undated) DEVICE — STERILE LATEX POWDER-FREE SURGICAL GLOVESWITH NITRILE COATING: Brand: PROTEXIS

## (undated) DEVICE — SPONGE: SPECIALTY PEANUT XR 100/CS: Brand: MEDICAL ACTION INDUSTRIES

## (undated) DEVICE — GOWN SURG AERO BLUE PERF LG

## (undated) DEVICE — MEDI-VAC NON-CONDUCTIVE SUCTION TUBING: Brand: CARDINAL HEALTH

## (undated) DEVICE — GAMMEX® NON-LATEX PI ORTHO SIZE 8, STERILE POLYISOPRENE POWDER-FREE SURGICAL GLOVE: Brand: GAMMEX

## (undated) DEVICE — APPLICATOR PREP 26ML W/ ST SOL CHLORAPREP

## (undated) DEVICE — BLADE SHV L13CM DIA4MM BNE CUT AGG DEB

## (undated) DEVICE — COVER MAYOSTAND 23X54IN NWVN FAB

## (undated) DEVICE — CONTAINER,SPECIMEN,OR STERILE,4OZ: Brand: MEDLINE

## (undated) DEVICE — ADHESIVE SKIN TOP FOR WND CLSR DERMBND ADV

## (undated) DEVICE — CONTAINER SPEC COLL AND TRNSPRT W/ WHT CAP

## (undated) DEVICE — SUTURE SILK 3-0 SA64H

## (undated) DEVICE — COVER SGL STRL LGHT HNDL BLU

## (undated) DEVICE — CHLORAPREP 26ML APPLICATOR

## (undated) DEVICE — SHOULDER ARTHROSCOPY: Brand: MEDLINE INDUSTRIES, INC.

## (undated) DEVICE — SUTURE VICRYL 4-0 J494G

## (undated) DEVICE — 60 ML SYRINGE LUER-LOCK TIP: Brand: MONOJECT

## (undated) DEVICE — 3M™ TEGADERM™ HP TRANSPARENT FILM DRESSING FRAME STYLE, 9534HP, 2-3/8 X 2-3/4 IN (6 CM X 7 CM), 100/CT 4CT/CASE: Brand: 3M™ TEGADERM™

## (undated) DEVICE — SOLUTION IRRIG 3000ML 0.9% NACL FLX CONT

## (undated) NOTE — LETTER
Hospital Discharge Documentation  Patient was discharged to [facility] for LISA [phone]     From: [facility] Hospitalist's Office  Phone: [phone]    Patient discharged time/date: 5/1/2024  2:46 PM  Patient discharge disposition:  SNF Subacute Rehab       Discharge Summary - D/C Summary        Discharge Summary signed by Jeimy Driscoll MD at 5/1/2024  2:06 PM  Version 1 of 1      Author: Jeimy Driscoll MD Service: Internal Medicine Author Type: Physician    Filed: 5/1/2024  2:06 PM Date of Service: 5/1/2024  8:36 AM Status: Signed    : Jeimy Driscoll MD (Physician)           [facility] Hospitalist Discharge Summary   Patient ID:  Dennys Manzano  [id number]  88 year old  8/14/1935    Admit date: 4/14/2024  Discharge date: 5/1/2024  Primary Care Physician: CALOS FITZGERALD MD   Attending Physician: Jeimy Driscoll MD   Consults:   Consultants         Provider   Role Specialty     Lise Alva MD      Consulting Physician NEPHROLOGY     Edison Carrillo MD      Consulting Physician NEUROLOGY     Geovany Castro MD      Consulting Physician Interventional, Cardiology     Babita Olmos APRN      Consulting Physician Nurse Practitioner     Juan Perez MD      Consulting Physician INTERVENTIONAL, RADIOLOGY     Harini Zuniga DO      Consulting Physician PULMONARY DISEASES     Tad Mcmahon DO      Consulting Physician NEUROLOGY     Howie Jalloh MD      Consulting Physician INFECTIOUS DISEASES     Gregorio Calle MD      Consulting Physician SURGERY, ORTHOPEDIC            Discharge Diagnoses:   Hyponatremia    Reason for admission  Copied from admission H&P: Dennys Manzano is a(n) 88 year old male, with a past medical history significant for coronary artery disease status post CABG, CHF, COPD, PE diabetes along with right-sided repair for complete rotator cuff tear complicated by RSD presents with a complaint of acute onset right shoulder pain, claims he is unable to move  his shoulder at all secondary to pain also experiencing weakness at this time.  Rates his pain as a 10 out of 10 in severity nonradiating in nature denies any associated trauma to the affected area.   He has felt diaphoretic at times however no documented fevers, also has been experiencing increasing shortness of breath particular with exertion.  Recently had viral gastroenteritis that has since improved.  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   - s/p R shoulder joint aspiration 4/15   - 59,000 WBC's with predominately neutrophils. + calcium pyrophosphate crystals --> pseudogout. Culture negative.   - IV rocephin 2gm q 24hrs - likely stop today   - ID on consult.   - PT/OT - LISA eventually   - blood cx x 2 NGTD.   - R shoulder arthroscopy extensive debridement of R shoulder, bicep tenotomy 4/17      Acute encephalopathy now resolved  - slowly improving   - etiology unclear. CSF PRC neg.   - Likely metabolic encephalopathy   - s/p LP. CSF PCR neg  - IV Acyclovir now stopped, remains on rocephin   - CT head no acute findings. MRI no acute CVA  - EEG c/w encephalopathy, now neuro normal  - neuro signed off.      Ecoli UTI  - cx + on 4/15, now treatment complete.     Acute respiratory failure with hypoxia  Likely COPD exacerbation in combination with atelectasis  - wean o2. On 2L will wean down today   - duonebs q6hrs WA  - SLP eval regular thin liquids.   - Oral diet   - IS/flutter valve.   - CXR atelectasis   - Antitussives PRN   - CT chest on admit showed dense atelectasis no PE.      DVT/pulmonary embolism  - resumed coumadin   - INR therapeutic.      Hyponatremia  SIADH  - appears euvolemic now   - pt has been given samsca  - nephrology on consult   - sodium back to baseline   - fluid restriction      NSVT  - ECHO LVEF 55-60%. No WMA.   -  keep Mg 2.0 and K 4.0   - cards signed off.   - cont metoprolol     DM II  - A1c 6.4  - ISS     BPH  Chronic urinary retention   - Continue Flomax and finasteride  - cont valladares last changed 04/01/24 will change prior to discharge.     EXAM:   GENERAL: no apparent distress, comfortable  NEURO: A/A Ox3, no focal deficits  RESP: non labored, CTAB/L  CARDIO: Regular, no murmur  ABD: soft, NT, ND  EXTREMITIES: no edema, no calf tenderness    Operative Procedures: Procedure(s) (LRB):  Right shoulder arthroscopy, extensive debridement of right shoulder, bicep tenotomy (Right)    Discharge Instructions     Medication List        START taking these medications      umeclidinium-vilanterol 62.5-25 MCG/ACT Aepb  Commonly known as: Anoro Ellipta  Inhale 1 puff into the lungs daily.  Replaces: Stiolto Respimat 2.5-2.5 MCG/ACT Aers            CHANGE how you take these medications      metoprolol succinate ER 50 MG Tb24  Commonly known as: Toprol XL  Take 1 tablet (50 mg total) by mouth Daily Beta Blocker.  Start taking on: May 2, 2024  What changed:   medication strength  how much to take  how to take this  when to take this     warfarin 3 MG Tabs  Commonly known as: Coumadin  Take 3 tablets (9 mg total) by mouth nightly for 14 days.  What changed:   medication strength  how much to take            CONTINUE taking these medications      atorvastatin 40 MG Tabs  Commonly known as: Lipitor     Baby Aspirin 81 MG Chew  Generic drug: aspirin     CALCIUM 1200+D3 OR     CENTRUM SILVER OR     finasteride 5 MG Tabs  Commonly known as: Proscar     fluticasone propionate 50 MCG/ACT Susp  Commonly known as: Flonase     metFORMIN 500 MG Tabs  Commonly known as: Glucophage     montelukast 10 MG Tabs  Commonly known as: Singulair     pantoprazole 40 MG Tbec  Commonly known as: Protonix  Take 1 tablet (40 mg total) by mouth every morning before breakfast.     polyethylene glycol (PEG 3350) 17 g Pack  Commonly known as: Miralax      senna-docusate 8.6-50 MG Tabs  Commonly known as: Senokot-S     tamsulosin 0.4 MG Caps  Commonly known as: Flomax     VITAMIN C OR     VITAMIN D OR            STOP taking these medications      escitalopram 20 MG Tabs  Commonly known as: Lexapro     Stiolto Respimat 2.5-2.5 MCG/ACT Aers  Generic drug: Tiotropium Bromide-Olodaterol  Replaced by: umeclidinium-vilanterol 62.5-25 MCG/ACT Aepb               Where to Get Your Medications        These medications were sent to [id number] - [place], [place] - [address] AT [facility], [phone], [phone]  [address] [facility] [address]      Phone: [phone]   metoprolol succinate ER 50 MG Tb24  umeclidinium-vilanterol 62.5-25 MCG/ACT Aepb  warfarin 3 MG Tabs         Activity: activity as tolerated  Diet: regular diet  Wound Care: NA  Code Status: Full Code          Important follow up:   Follow-up Information       Gregorio Calle MD Follow up on 5/3/2024.    Specialty: SURGERY, ORTHOPEDIC  Why: As needed  Contact information:  [address] [facility]  [address]  [place] [address]  [phone]               Geovany Castro MD Follow up on 5/24/2024.    Specialties: Interventional, Cardiology, CARDIOLOGY  Contact information:  [address] [facility]  [address]  [facility] [address]  [phone]               Bib Sorto MD Follow up in 1 week(s).    Specialty: Internal Medicine  Contact information:  [address]  [address]  [facility] [id number] [phone]                             -PCP in [] within 7 days [] within 14 days [] other     Disposition: SNF  Discharged Condition: good    Hospital Discharge Diagnoses:  R shoulder crystal arthropathy, acute encephalopathy    Lace+ Score: 59  59-90 High Risk  29-58 Medium Risk  0-28   Low Risk.    TCM Follow-Up Recommendation:  LACE > 58: High Risk of readmission after discharge from the hospital.            Total Time Coordinating Care: Greater than 30 minutes    Patient had opportunity to ask  questions, state understanding, and agree with therapeutic plan as outlined    Jeimy Driscoll MD  Hospitalist  5/1/2024          Electronically signed by Jeimy Driscoll MD on 5/1/2024  2:06 PM

## (undated) NOTE — ED AVS SNAPSHOT
Santiago Larios   MRN: [id number]    Department:  [facility] Emergency Department   Date of Visit:  2/15/2018           Disclosure     Insurance plans vary and the physician(s) referred by the ER may not be covered by your plan.  Please contact within the next three months to obtain basic health screening including reassessment of your blood pressure.     IF THERE IS ANY CHANGE OR WORSENING OF YOUR CONDITION, CALL YOUR PRIMARY CARE PHYSICIAN AT ONCE OR RETURN IMMEDIATELY TO THE EMERGENCY DEPARTMEN

## (undated) NOTE — LETTER
5/6/2024          To Whom It May Concern:    Dennys LUL Manzano is currently under my medical care. Please remove sutures on left shoulder surgical site for patient.    If you require additional information please contact our office.        Sincerely,    Gregorio Calle MD

## (undated) NOTE — IP AVS SNAPSHOT
Patient Demographics     Address  [address]  [facility] [address] Phone  [phone] (Home) *Preferred*  [phone] (Mobile) E-mail Address  [email or web address]      Patient Contacts     Name Relation Home Work Mobile    Evangelina Blackman Daughter   [phone]    Oralia Ramirez Daughter [phone]      STEPHANY TAVARES Daughter   [phone]    Bessie Manzano Spouse [phone]        Allergies as of 5/1/2024  Review status set to In Progress on 4/17/2024       Noted Reaction Type Reactions    Heparin 05/05/2014    SWELLING    Low platelets    Nitrofurantoin 11/30/2021    NAUSEA AND VOMITING    Heparin 08/24/2021    OTHER (SEE COMMENTS)    Low platelets    Tetanus Immune Globulin 05/05/2014        Tetanus Toxoids 08/18/2021    UNKNOWN    As a child    Amoxicillin-pot Clavulanate 08/25/2021    DIARRHEA    Azithromycin 08/25/2021    DIARRHEA      Code Status Information     Code Status    Full Code      Patient Instructions    None      Follow-up Information     Gregorio Calle MD Follow up on 5/3/2024.    Specialty: SURGERY, ORTHOPEDIC  Why: As needed  Contact information:  [address]  [address]  [facility] [address]  [phone]             Geovany Castro MD Follow up on 5/24/2024.    Specialties: Interventional, Cardiology, CARDIOLOGY  Contact information:  [address] [facility]  [address]  [facility] [id number] [phone]             Bib Sorto MD Follow up in 1 week(s).    Specialty: Internal Medicine  Contact information:  [address]  [address]  [facility] [id number] [phone]                        Your Home Meds List      TAKE these medications       Instructions Authorizing Provider Morning Afternoon Evening As Needed   atorvastatin 40 MG Tabs  Commonly known as: Lipitor  Next dose due: tonight      Take 1 tablet (40 mg total) by mouth nightly.          Baby Aspirin 81 MG Chew  Generic drug: aspirin  Next dose due: tomorrow      Chew 1 tablet (81 mg total) by mouth daily.          CALCIUM 1200+D3  OR  Next dose due: tomorrow      Take 1 capsule by mouth daily.          CENTRUM SILVER OR  Next dose due: Take as directed      Take  by mouth.          finasteride 5 MG Tabs  Commonly known as: Proscar  Next dose due: tomorrow      Take 1 tablet (5 mg total) by mouth daily.          fluticasone propionate 50 MCG/ACT Susp  Commonly known as: Flonase  Next dose due: tomorrow      1 spray(s)          metFORMIN 500 MG Tabs  Commonly known as: Glucophage  Next dose due: tonight      Take 1 tablet (500 mg total) by mouth 2 (two) times daily.          metoprolol succinate ER 50 MG Tb24  Commonly known as: Toprol XL  Start taking on: May 2, 2024  Next dose due: Tomorrow morning      Take 1 tablet (50 mg total) by mouth Daily Beta Blocker.  Stop taking on: June 1, 2024   Gadiel Driscoll          montelukast 10 MG Tabs  Commonly known as: Singulair  Next dose due: tonight              pantoprazole 40 MG Tbec  Commonly known as: Protonix  Next dose due: Tomorrow morning before breakfast      Take 1 tablet (40 mg total) by mouth every morning before breakfast.   CALOS FITZGERALD         polyethylene glycol (PEG 3350) 17 g Pack  Commonly known as: Miralax  Next dose due: tomorrow      Take 17 g by mouth daily.          senna-docusate 8.6-50 MG Tabs  Commonly known as: Senokot-S  Next dose due: tonight      Take 1 tablet by mouth 2 (two) times daily.          tamsulosin 0.4 MG Caps  Commonly known as: Flomax  Next dose due: Today with dinner      Take 1 capsule (0.4 mg total) by mouth daily with dinner.          umeclidinium-vilanterol 62.5-25 MCG/ACT Aepb  Commonly known as: Anoro Ellipta  Next dose due: tomorrow      Inhale 1 puff into the lungs daily.   Charlie Alarcon         VITAMIN C OR  Next dose due: Take as directed      Take by mouth.          VITAMIN D OR  Next dose due: Take as directed      Take by mouth.          warfarin 3 MG Tabs  Commonly known as: Coumadin  Next dose due: This evening      Take 3 tablets (9 mg total)  by mouth nightly for 14 days.  Stop taking on: May 15, 2024   Gadiel Driscoll                Where to Get Your Medications      These medications were sent to [id number] - [place], [place] - [address] AT [facility], [phone], [phone]  [address] [facility] [address]    Phone: [phone]   metoprolol succinate ER 50 MG Tb24  umeclidinium-vilanterol 62.5-25 MCG/ACT Aepb  warfarin 3 MG Tabs           [phone] - MAR ACTION REPORT  (last 48 hrs)    ** SITE UNKNOWN **     Order ID Medication Name Action Time Action Reason Comments    [id number] acetaminophen (Tylenol) tab 650 mg 04/30/24 0844 Given      [id number] acetaminophen (Tylenol) tab 650 mg 04/30/24 1802 Given      [id number] aspirin chewable tab 81 mg 04/30/24 0844 Given      [id number] aspirin chewable tab 81 mg 05/01/24 0823 Given      [id number] atorvastatin (Lipitor) tab 40 mg 04/29/24 2016 Given      [id number] atorvastatin (Lipitor) tab 40 mg 04/30/24 2058 Given      [id number] cefTRIAXone (Rocephin) 2 g in D5W 100 mL IVPB-ADD 04/30/24 0632 New Bag      [id number] cefTRIAXone (Rocephin) 2 g in D5W 100 mL IVPB-ADD 05/01/24 0519 New Bag      [id number] docusate sodium (Colace) cap 100 mg 04/29/24 2016 Given      [id number] docusate sodium (Colace) cap 100 mg 04/30/24 0844 Given      [id number] docusate sodium (Colace) cap 100 mg 04/30/24 2058 Given      [id number] docusate sodium (Colace) cap 100 mg 05/01/24 0823 Given      [id number] finasteride (Proscar) tab 5 mg 04/30/24 0843 Given      [id number] finasteride (Proscar) tab 5 mg 05/01/24 0824 Given      [id number] fluticasone propionate (Flonase) 50 MCG/ACT nasal suspension 1 spray 04/30/24 0842 Given      [id number] fluticasone propionate (Flonase) 50 MCG/ACT nasal suspension 1 spray 05/01/24 0823 Given      [id number] magnesium hydroxide (Milk of Magnesia) 400 MG/5ML oral suspension 30 mL 04/30/24 0843 Given      [id number] metoprolol succinate ER (Toprol XL) 24 hr tab 50 mg  04/30/24 0844 Given      [id number] metoprolol succinate ER (Toprol XL) 24 hr tab 50 mg 05/01/24 0823 Given      [id number] montelukast (Singulair) tab 10 mg 04/29/24 2014 Given      [id number] montelukast (Singulair) tab 10 mg 04/30/24 2058 Given      [id number] pantoprazole (Protonix) DR tab 40 mg 04/30/24 0632 Given      [id number] pantoprazole (Protonix) DR tab 40 mg 05/01/24 0519 Given      [id number] polyethylene glycol (PEG 3350) (Miralax) 17 g oral packet 17 g 04/30/24 0842 Given      [id number] senna-docusate (Senokot-S) 8.6-50 MG per tab 1 tablet 04/29/24 2016 Given      [id number] senna-docusate (Senokot-S) 8.6-50 MG per tab 1 tablet 04/30/24 0844 Given      [id number] senna-docusate (Senokot-S) 8.6-50 MG per tab 1 tablet 04/30/24 2058 Given      [id number] senna-docusate (Senokot-S) 8.6-50 MG per tab 1 tablet 05/01/24 0823 Given      [id number] tamsulosin (Flomax) cap 0.4 mg 04/29/24 1855 Given      [id number] tamsulosin (Flomax) cap 0.4 mg 04/30/24 1802 Given      [id number] umeclidinium-vilanterol (Anoro Ellipta) 62.5-25 MCG/ACT inhaler 1 puff 04/30/24 0842 Given      [id number] umeclidinium-vilanterol (Anoro Ellipta) 62.5-25 MCG/ACT inhaler 1 puff 05/01/24 0823 Given      [id number] warfarin (Coumadin) tab 9 mg 04/30/24 2138 Given            LEFT UPPER ARM     Order ID Medication Name Action Time Action Reason Comments    [id number] insulin aspart (NovoLOG) 100 Units/mL FlexPen 1-5 Units 04/30/24 0842 Given      [id number] insulin aspart (NovoLOG) 100 Units/mL FlexPen 1-5 Units 04/30/24 1843 Given              Recent Vital Signs    Flowsheet Row Most Recent Value   /54 Filed at 05/01/2024 0749   Pulse 69 Filed at 05/01/2024 0507   Resp 18 Filed at 05/01/2024 0749   Temp 98.3 °F (36.8 °C) Filed at 05/01/2024 0749   SpO2 92 % Filed at 05/01/2024 0820      Patient's Most Recent Weight    Flowsheet Row Most Recent Value   Patient Weight 109.7 kg (241 lb 13.5 oz)      CPAP Settings (Inpatient)    Flowsheet Row Most  Recent Value   Mode Spontaneous   Interface Full face mask   Mask size Large   Set rate 12 breaths/min   Set IPAP 12   Set EPAP 5   O2% 30 %   I time 1   Flow rate 2 lpm         Lab Results Last 24 Hours      Renal Function Panel [[id number]] (Abnormal)  Resulted: 05/01/24 0528, Result status: Final result   Ordering provider: Charlie Alarcon MD  04/30/24 2300 Resulting lab: [facility] ([facility])    Specimen Information    Type Source Collected On   Blood — 05/01/24 0500          Components    Component Value Reference Range Flag Lab   Glucose 146 70 - 99 mg/dL [facility] ([facility])   Sodium 131 136 - 145 mmol/L [facility])   Potassium 4.3 3.5 - 5.1 mmol/L — [facility])   Chloride 97 98 - 112 mmol/L L [facility])   CO2 32.0 21.0 - 32.0 mmol/L — [facility])   Anion Gap 2 0 - 18 mmol/L — [facility])   BUN 15 9 - 23 mg/dL — [facility])   Creatinine 0.50 0.70 - 1.30 mg/dL [facility] ([facility])   BUN/CREA Ratio 30.0 10.0 - 20.0 H [facility] ([facility])   Calcium, Total 9.2 8.7 - 10.4 mg/dL — [facility] ([facility])   Calculated Osmolality 275 275 - 295 mOsm/kg — [facility])   eGFR-Cr 99 >=60 mL/min/1.73m2 — [facility])   Albumin 3.1 3.2 - 4.8 g/dL [facility] ([facility])   Phosphorus 4.6 2.4 - 5.1 mg/dL — [facility] ([facility])            CBC With Differential With Platelet [[id number]] (Abnormal)  Resulted: 05/01/24 0521, Result status: Final result   Ordering provider: Charlie Alarcon MD  04/30/24 2300 Resulting lab: [facility] ([facility])   Narrative:  The following orders were created for panel order CBC With Differential With Platelet.  Procedure                               Abnormality         Status                     ---------                               -----------         ------                     CBC W/ DIFFERENTIAL[[id number]]          Abnormal            Final result                 Please view results for  these tests on the individual orders.    Specimen Information    Type Source Collected On   Blood — 05/01/24 0500            Prothrombin Time (PT) [[id number]] (Abnormal)  Resulted: 05/01/24 0518, Result status: Final result   Ordering provider: Charlie Alarcon MD  04/30/24 2300 Resulting lab: [facility] ([facility])    Specimen Information    Type Source Collected On   Blood — 05/01/24 0500          Components    Component Value Reference Range Flag Lab   PT 24.2 11.6 - 14.8 seconds H [facility] ([facility])   Comment:        Elevations of the PT and/or INR in patients not  receiving anticoagulant therapy may be seen in  factor deficiency, vitamin K deficiency, liver  disease, etc. Clinical correlation is recommended.       INR 2.03 0.80 - 1.20 H [facility] ([facility])   Comment:        Therapeutic INR range for patients on warfarin:  2.0-3.0 for most medical conditions and surgical prophylaxis   2.5-3.5 for mechanical heart valves and recurrent thromboembolism    Direct thrombin inhibitors (e.g. argatroban, bivalirudin), factor Xa inhibitors (e.g. apixaban, rivaroxaban), and conditions such as coagulation factor deficiency, vitamin K deficiency, and liver disease will prolong the prothrombin time/INR.    Unfractionated heparin and low molecular weight heparin do not affect the prothrombin time/INR up to a concentration of 1 IU/mL and 1.5 IU/mL, respectively.                Prothrombin Time (PT) [[id number]] (Abnormal)  Resulted: 04/30/24 1636, Result status: Final result   Ordering provider: Charlie Alarcon MD  04/30/24 1519 Resulting lab: [facility] ([facility])    Specimen Information    Type Source Collected On   Blood — 04/30/24 1534          Components    Component Value Reference Range Flag Lab   PT 22.9 11.6 - 14.8 seconds H [facility] ([facility])   Comment:        Elevations of the PT and/or INR in patients not  receiving anticoagulant therapy may be seen in  factor  deficiency, vitamin K deficiency, liver  disease, etc. Clinical correlation is recommended.       INR 1.89 0.80 - 1.20 H [facility] ([facility])   Comment:        Therapeutic INR range for patients on warfarin:  2.0-3.0 for most medical conditions and surgical prophylaxis   2.5-3.5 for mechanical heart valves and recurrent thromboembolism    Direct thrombin inhibitors (e.g. argatroban, bivalirudin), factor Xa inhibitors (e.g. apixaban, rivaroxaban), and conditions such as coagulation factor deficiency, vitamin K deficiency, and liver disease will prolong the prothrombin time/INR.    Unfractionated heparin and low molecular weight heparin do not affect the prothrombin time/INR up to a concentration of 1 IU/mL and 1.5 IU/mL, respectively.                Testing Performed By     Lab - Abbreviation Name Director Address Valid Date Range    162 - [facility] ([facility]) [facility] ([facility]) Jez Iniguez M.D. [facility] [address] [facility] [address] 03/19/20 1442 - Present            Microbiology Results (All)     Procedure Component Value Units Date/Time    CSF culture [[id number]] Collected: 04/23/24 1000    Order Status: Completed Lab Status: Final result Updated: 04/26/24 1119    Specimen: Cerebral spinal fluid from CSF, lumbar puncture      CSF Culture Result No Growth 3 Days     CSF Smear No WBCs seen      No organisms seen      This is a cytocentrifuged smear.    Blood Culture [[id number]] Collected: 04/18/24 2137    Order Status: Completed Lab Status: Final result Updated: 04/23/24 2300    Specimen: Blood,peripheral      Blood Culture Result No Growth 5 Days    Blood Culture [[id number]] Collected: 04/18/24 2141    Order Status: Completed Lab Status: Final result Updated: 04/23/24 2300    Specimen: Blood,peripheral      Blood Culture Result No Growth 5 Days    Cryptococcus Antigen, CSF [[id number]]  (Normal) Collected: 04/23/24 1000    Order Status: Completed Lab Status: Final result Updated:  04/23/24 1511    Specimen: Cerebral spinal fluid      Cryptococcal Antigen Negative    Anaerobic Culture [[id number]] Collected: 04/17/24 1435    Order Status: Completed Lab Status: Final result Updated: 04/22/24 0855    Specimen: Tissue      Anaerobic Culture No Anaerobes isolated    Tissue Aerobic Culture [[id number]] Collected: 04/17/24 1435    Order Status: Completed Lab Status: Final result Updated: 04/20/24 1018    Specimen: Tissue      Tissue Culture Result No Growth 3 Days     Tissue Smear 1+ WBCs seen      No organisms seen    Body Fluid Cult Aerobic and Anaerobic [[id number]] Collected: 04/15/24 0726    Order Status: Completed Lab Status: Final result Updated: 04/20/24 1001    Specimen: Body fluid, unspecified from Synovial fluid,shoulder      Body Fluid Culture Result No Growth 5 Days     Body Fluid Smear 3+ WBCs seen      No organisms seen      This is a cytocentrifuged smear.    Blood Culture [[id number]] Collected: 04/15/24 0205    Order Status: Completed Lab Status: Final result Updated: 04/20/24 0501    Specimen: Blood,peripheral      Blood Culture Result No Growth 5 Days    Blood Culture [[id number]] Collected: 04/15/24 0227    Order Status: Completed Lab Status: Final result Updated: 04/20/24 0501    Specimen: Blood,peripheral      Blood Culture Result No Growth 5 Days    Urine Culture, Routine [[id number]]  (Abnormal) Collected: 04/15/24 0543    Order Status: Completed Lab Status: Final result Updated: 04/17/24 0703    Specimen: Urine, clean catch      Urine Culture >100,000 CFU/ML Enterococcus faecalis NOT VRE    $$$$Respiratory Flu Expanded Panel + Covid-19$$$$ [[id number]]  (Normal) Collected: 04/15/24 0525    Order Status: Completed Lab Status: Final result Updated: 04/15/24 0637    Specimen: Other from Nasopharyngeal swab      SARS-CoV-2 PCR: Not Detected     Adenovirus PCR: Not Detected     Coronavirus 229E PCR: Not Detected     Coronavirus Hku1 PCR: Not Detected     Coronavirus Nl63 PCR: Not  Detected     Coronavirus Oc43 PCR: Not Detected     Metapneumovirus PCR: Not Detected     Rhinovirus/Entero PCR: Not Detected     Influenza A PCR: Not Detected     Influenza B PCR: Not Detected     Parainfluenza 1 PCR: Not Detected     Parainfluenza 2 PCR Not Detected     Parainfluenza 3 PCR Not Detected     Parainfluenza 4 PCR Not Detected     Resp Syncytial Virus PCR Not Detected     Bordetella Pertussis PCR Not Detected     Bordetella Parapertussis PCR Not Detected     Chlamydia pneumonia PCR: Not Detected     Mycoplasma pneumonia PCR: Not Detected    Narrative:      This test is intended for the simultaneous qualitative detection and differentiation of nucleic acids from multiple viral and bacterial respiratory organisms, including nucleic acid from Severe Acute Respiratory Syndrome Coronavirus 2 (SARS-CoV-2) in nasopharyngeal swab from individuals suspected of respiratory viral infection consistent with COVID-19 by their healthcare provider.    Test performed using the RACTIV Respiratory Panel 2.1 (RP2.1) assay on the [employer] 2.0 System, Buffer, [employer], [place] [address].    This test is being used under the Food and Drug Administration's Emergency Use Authorization.    The authorized Fact Sheet for Healthcare Providers for this assay is available upon request from the laboratory.    SARS and MERS coronaviruses are not tested on this assay.    SARS-CoV-2/Flu A and B/RSV by PCR (GeneXpert) [[id number]]  (Normal) Collected: 04/14/24 2321    Order Status: Completed Lab Status: Final result Updated: 04/15/24 0015    Specimen: Other from Nares      SARS-CoV-2 (COVID-19) - (GeneXpert) Not Detected     Influenza A by PCR Negative     Influenza B by PCR Negative     RSV by PCR Negative    Narrative:      This test is intended for the qualitative detection and differentiation of SARS-CoV-2, influenza A, influenza B, and respiratory syncytial virus (RSV) viral RNA in nasopharyngeal or nares swabs from  individuals suspected of respiratory viral infection consistent with COVID-19 by their healthcare provider. Signs and symptoms of respiratory viral infection due to SARS-CoV-2, influenza, and RSV can be similar.    Test performed using the Xpert Xpress SARS-CoV-2/FLU/RSV (real time RT-PCR)  assay on the Gene[employer]pert instrument, [employer], [place] [address].   This test is being used under the Food and Drug Administration's Emergency Use Authorization.    The authorized Fact Sheet for Healthcare Providers for this assay is available upon request from the laboratory.      Pending Labs     Order Current Status    Arbovirus/West Nile Antibody Panel, Cerebrospinal Fluid In process         H&P - H&P Note      H&P signed by Chelo Batista MD at 4/15/2024  6:03 AM  Version 1 of 1    Author: Chelo Batista MD Service: — Author Type: Physician    Filed: 4/15/2024  6:03 AM Date of Service: 4/15/2024  5:28 AM Status: Signed    : Chelo Batista MD (Physician)       [facility]  part of [facility]    History & Physical    Dennys Manzano Patient Status:  Emergency    1935 MRN [id number]   Location [facility] EMERGENCY DEPARTMENT Attending Ana Freeman MD   Hosp Day # 0 PCP CALOS FITZGERALD MD     Date:  4/15/2024  Date of Admission:  2024    Chief Complaint:  Chief Complaint   Patient presents with    Abdomen/Flank Pain    Nausea/Vomiting/Diarrhea       History of Present Illness:  Dennys Manzano is a(n) 88 year old male, with a past medical history significant for coronary artery disease status post CABG, CHF, COPD, PE diabetes along with right-sided repair for complete rotator cuff tear complicated by RSD presents with a complaint of acute onset right shoulder pain, claims he is unable to move his shoulder at all secondary to pain also experiencing weakness at this time.  Rates his pain as a 10 out of 10 in severity nonradiating in nature denies any associated trauma to the  affected area.   He has felt diaphoretic at times however no documented fevers, also has been experiencing increasing shortness of breath particular with exertion.  Recently had viral gastroenteritis that has since improved.  Patient extremely somnolent this time after having received multiple doses of morphine for pain control    History:  Past Medical History:    Anxiety state    Arrhythmia    Arthritis    Asbestos exposure    lung    Atherosclerosis of coronary artery    Bleeding tendency (HCC)    Blood disorder    BPH (benign prostatic hyperplasia)    Colitis    Congestive heart disease (HCC)    Coronary atherosclerosis    Deep vein thrombosis (HCC)    Depression    Diabetes (HCC)    Diverticulosis of large intestine    Esophageal reflux    Fibromyalgia    Ganglion, finger joint of right hand    Right middle finger excision of painful ganglion, rotator flap closure    Gout    Hearing impairment    Heart attack (HCC)    Heart disease    Heart valve disease    High blood pressure    High cholesterol    History of blood clots    legs & lungs    Hyperlipidemia    IBS (irritable bowel syndrome)    Incontinence    Malignant hyperthermia    Muscle weakness    Neuropathy    Osteoarthritis    PE (pulmonary embolism)    Peripheral vascular disease (HCC)    Pulmonary embolism (HCC)    RSD (reflex sympathetic dystrophy)    right arm, little in the left    Screening PSA (prostate specific antigen)    Shortness of breath    Thrombophlebitis    Visual impairment    glasses for reading     Past Surgical History:   Procedure Laterality Date    Cabg      Cardiac pacemaker placement      Cath percutaneous  transluminal coronary angioplasty      Foot surgery      right     Inguinal herniorrhaphy      Ir ivc filter placement      for blood clots    Other surgical history  2013    heart bypass and stent    Other surgical history      basal cell carcinoma    Shoulder arthroscopy  1994(?)    Right rotator cuff     Family History    Problem Relation Age of Onset    Cancer Mother     Stroke Mother     Diabetes Other         Granddaughter has DM      reports that he quit smoking about 59 years ago. His smoking use included cigarettes. He has never used smokeless tobacco. He reports that he does not currently use alcohol. He reports that he does not use drugs.    Allergies:  Allergies   Allergen Reactions    Heparin SWELLING     Arms swelling    Nitrofurantoin NAUSEA AND VOMITING    Heparin OTHER (SEE COMMENTS)    Tetanus Immune Globulin     Tetanus Toxoids UNKNOWN    Amoxicillin-Pot Clavulanate DIARRHEA    Azithromycin DIARRHEA       Home Medications:  Prior to Admission Medications   Prescriptions Last Dose Informant Patient Reported? Taking?   Ascorbic Acid (VITAMIN C OR)   Yes No   Sig: Take  by mouth.   Patient not taking: Reported on 3/18/2023   Calcium-Magnesium-Vitamin D (CALCIUM 1200+D3 OR)   Yes No   Sig: Take 1 capsule by mouth daily.   Ergocalciferol (VITAMIN D OR)   Yes No   Sig: Take  by mouth.   Patient not taking: Reported on 3/18/2023   Escitalopram Oxalate (LEXAPRO) 20 MG Oral Tab   Yes No   Sig: Take 1 tablet (20 mg total) by mouth daily.   Multiple Vitamins-Minerals (CENTRUM SILVER OR)   Yes No   Sig: Take  by mouth.   Pantoprazole Sodium 40 MG Oral Tab EC   No No   Sig: Take 1 tablet (40 mg total) by mouth every morning before breakfast.   STIOLTO RESPIMAT 2.5-2.5 MCG/ACT Inhalation Aero Soln   Yes No   Senna-Docusate Sodium 8.6-50 MG Oral Tab   Yes No   Sig: Take 1 tablet by mouth 2 (two) times daily.   aspirin (BABY ASPIRIN) 81 MG Oral Chew Tab   Yes No   Sig: Chew 1 tablet (81 mg total) by mouth daily.   atorvastatin 40 MG Oral Tab   Yes No   Sig: Take 1 tablet (40 mg total) by mouth nightly.   finasteride (PROSCAR) 5 MG Oral Tab   Yes No   Sig: Take 1 tablet (5 mg total) by mouth daily.   fluticasone propionate 50 MCG/ACT Nasal Suspension   Yes No   Si spray(s)   metFORMIN 500 MG Oral Tab   Yes No   Sig: Take 1  tablet (500 mg total) by mouth 2 (two) times daily.   metoprolol succinate 25 MG Oral Tablet 24 Hr   Yes No   montelukast 10 MG Oral Tab   Yes No   tamsulosin HCl (FLOMAX) 0.4 MG Oral Cap   Yes No   Sig: Take 1 capsule (0.4 mg total) by mouth daily with dinner.   warfarin (JANTOVEN) 10 MG Oral Tab   Yes No   Sig: Take 8.5 mg by mouth nightly.      Facility-Administered Medications: None       Review of Systems:  Constitutional:  Weakness, Fatigue.  Eye:  Negative.  Ear/Nose/Mouth/Throat: Hard of hearing  Respiratory: Shortness of breath  Cardiovascular: Negative  Gastrointestinal: Diarrhea  Genitourinary:  Negative  Endocrine:  Negative.  Immunologic:  Negative.  Musculoskeletal: Right shoulder pain  Integumentary:  Negative.  Neurologic:  Negative.  Psychiatric:  Negative.  ROS reviewed as documented in chart    Physical Exam:  Temp:  [97.1 °F (36.2 °C)] 97.1 °F (36.2 °C)  Pulse:  [72-86] 76  Resp:  [19-23] 20  BP: (134-168)/(62-83) 134/62  SpO2:  [89 %-94 %] 92 %    General: Somnolent but arousable  Diffuse skin problem:  None.  Eye:  Pupils are equal, round and reactive to light, extraocular movements are intact, Normal conjunctiva.  HENT:  Normocephalic, oral mucosa is moist.  Head:  Normocephalic, atraumatic.  Neck:  Supple, non-tender, no carotid bruit, no jugular venous distention, no lymphadenopathy, no thyromegaly.  Respiratory: Poor air entry, occasional wheeze respirations are mildly-labored, breath sounds are equal, symmetrical chest wall expansion.  Cardiovascular:  Normal rate, regular rhythm, no murmur, no edema.  Gastrointestinal:  Soft, non-tender, non-distended, normal bowel sounds, no organomegaly.  Lymphatics:  No lymphadenopathy neck, axilla, groin.  Musculoskeletal: Decreased range of motion right arm secondary to pain, extremely limited range of motion  Feet:  Normal pulses.  Neurologic: Somnolent but arousable, no focal deficits, cranial nerves II-XII are grossly intact.  Cognition and  Speech: Limited secondary to somnolence  Psychiatric:  Cooperative, appropriate mood & affect.      Laboratory Data:   Lab Results   Component Value Date    WBC 18.0 04/14/2024    HGB 14.0 04/14/2024    HCT 39.6 04/14/2024    .0 04/14/2024    CREATSERUM 0.84 04/14/2024    BUN 15 04/14/2024     04/14/2024    K 4.3 04/14/2024    CL 97 04/14/2024    CO2 27.0 04/14/2024     04/14/2024    CA 9.5 04/14/2024    ALB 4.1 04/14/2024    ALKPHO 79 04/14/2024    BILT 1.1 04/14/2024    TP 7.4 04/14/2024    AST 21 04/14/2024    ALT 24 04/14/2024    PTT 41.0 04/14/2024    INR 3.00 04/14/2024    LIP 26 04/14/2024    ESRML 111 04/14/2024       Imaging:  No results found.     Assessment and Plan:    Intractable right shoulder pain  Orthopedics consulted will need to rule out septic arthritis, antibiotics initiated, will continue vancomycin and Zosyn for now.  Use morphine as needed for pain.    Acute respiratory failure with hypoxia  Likely COPD exacerbation in combination with atelectasis, will use DuoNebs every 6 hours and as needed, hold off on Solu-Medrol for now till septic arthritis ruled out.  O2 protocol initiated.  Use incentive spirometry    DVT/pulmonary embolism  INR elevated, will hold for now in view of possible arthrocentesis and surgery as well.  Repeat INR in AM.    Hyponatremia  Likely dehydration related, check urine for sodium and osmolality.  Will initiate IV fluids for now.    Uncontrolled diabetes  Hold oral hypoglycemics for now, will use sliding scale coverage as needed, check A1c.    BPH  Continue Flomax and finasteride    Prophylaxis  Anticoagulated on Coumadin    CODE STATUS  Full    Primary care physician  CALOS FITZGERALD MD    MDM: High, acute and severe exacerbation of chronic illness posing threat to life.  IV medications requiring close inpatient monitoring  75 minutes spent on this admission - examining patient, obtaining history, reviewing previous medical records, going over test  results/imaging and discussing plan of care. All questions answered.     Disposition  Clinical course will dictate outcome      SEB BATISTA MD  4/15/2024  5:28 AM      Electronically signed by Seb Batista MD on 4/15/2024  6:03 AM              Consults - MD Consult Notes      Consults signed by Lise Alva MD at 2024 12:30 PM     Author: Lise Alva MD Service: Nephrology Author Type: Physician    Filed: 2024 12:30 PM Date of Service: 2024 12:25 PM Status: Signed    : Lise Alva MD (Physician)     Consult Orders    1. Consult to Nephrology [[id number]] ordered by Charlie Alarcon MD at 24 [facility]             [facility]  part of [facility]    Report of Consultation    Dennys Manzano Patient Status:  Inpatient    1935 MRN [id number]   Location [facility] 4W//SE Attending Charlie Alarcon MD   Hosp Day # 13 PCP CALOS FITZGERALD MD     Date of Admission:  2024  Date of Consult:  2024   Reason for Consultation:    hyponatremia    History of Present Illness:   Patient is a 88 year old male who was admitted to the hospital for Hyponatremia:    It was admitted to the hospital last week and had right shoulder septic arthritis.  He required debridement and biceps tenotomy.    Patient has had an extensive hospital stay including having lethargy and altered mental status.  He required a lumbar puncture which was unremarkable.    I am seeing him for hyponatremia.  The patient historically has had this, and he tells me he manages it with salt tabs and eating salty foods.  In the hospital he has not had his salt tabs and his sodium has been running lower    The patient is on a fluid restriction    Past Medical History  Past Medical History:    Anxiety state    Arrhythmia    Arthritis    Asbestos exposure    lung    Atherosclerosis of coronary artery    Bleeding tendency (HCC)    Blood disorder    BPH (benign prostatic hyperplasia)     Colitis    Congestive heart disease (HCC)    Coronary atherosclerosis    Deep vein thrombosis (HCC)    Depression    Diabetes (HCC)    Diverticulosis of large intestine    Encephalopathy    Esophageal reflux    Fibromyalgia    Ganglion, finger joint of right hand    Right middle finger excision of painful ganglion, rotator flap closure    Gout    Hearing impairment    Heart attack (HCC)    Heart disease    Heart valve disease    High blood pressure    High cholesterol    History of blood clots    legs & lungs    Hyperlipidemia    IBS (irritable bowel syndrome)    Incontinence    Malignant hyperthermia    Muscle weakness    Neuropathy    Osteoarthritis    PE (pulmonary embolism)    Peripheral vascular disease (HCC)    Pulmonary embolism (HCC)    RSD (reflex sympathetic dystrophy)    right arm, little in the left    Screening PSA (prostate specific antigen)    Shortness of breath    Thrombophlebitis    Visual impairment    glasses for reading       Past Surgical History  Past Surgical History:   Procedure Laterality Date    Cabg      Cardiac pacemaker placement      Cath percutaneous  transluminal coronary angioplasty      Foot surgery      right     Inguinal herniorrhaphy      Ir ivc filter placement      for blood clots    Other surgical history  2013    heart bypass and stent    Other surgical history      basal cell carcinoma    Shoulder arthroscopy  (?)    Right rotator cuff       Family History  Family History   Problem Relation Age of Onset    Cancer Mother     Stroke Mother     Diabetes Other         Granddaughter has DM       Social History  Social History     Socioeconomic History    Marital status:    Tobacco Use    Smoking status: Former     Current packs/day: 0.00     Types: Cigarettes     Quit date: 1965     Years since quittin.3    Smokeless tobacco: Never   Vaping Use    Vaping status: Never Used   Substance and Sexual Activity    Alcohol use: Not Currently     Comment: rarely     Drug use: No     Social Determinants of Health     Food Insecurity: No Food Insecurity (4/15/2024)    Food Insecurity     Food Insecurity: Never true   Transportation Needs: No Transportation Needs (4/15/2024)    Transportation Needs     Lack of Transportation: No   Housing Stability: Low Risk  (4/15/2024)    Housing Stability     Housing Instability: No       Current Medications:  Current Facility-Administered Medications   Medication Dose Route Frequency    cefTRIAXone (Rocephin) 2 g in D5W 100 mL IVPB-ADD  2 g Intravenous Q24H    ipratropium-albuterol (Duoneb) 0.5-2.5 (3) MG/3ML inhalation solution 3 mL  3 mL Nebulization BID    magnesium hydroxide (Milk of Magnesia) 400 MG/5ML oral suspension 30 mL  30 mL Oral Daily PRN    fleet enema (Fleet) 7-19 GM/118ML rectal enema 133 mL  1 enema Rectal Daily PRN    warfarin (Coumadin) tab 8.5 mg  8.5 mg Oral Nightly    docusate sodium (Colace) cap 100 mg  100 mg Oral BID    polyethylene glycol (PEG 3350) (Miralax) 17 g oral packet 17 g  17 g Oral Daily PRN    bisacodyl (Dulcolax) 10 MG rectal suppository 10 mg  10 mg Rectal Daily PRN    phytonadione (Aqua-Mephton) 10 mg in sodium chloride 0.9% 50 mL IVPB  10 mg Intravenous Once    metoprolol succinate ER (Toprol XL) 24 hr tab 50 mg  50 mg Oral Daily Beta Blocker    magnesium oxide (Mag-Ox) tab 400 mg  400 mg Oral Once    ipratropium-albuterol (Duoneb) 0.5-2.5 (3) MG/3ML inhalation solution 3 mL  3 mL Nebulization Q6H PRN    fluticasone propionate (Flonase) 50 MCG/ACT nasal suspension 1 spray  1 spray Each Nare Daily    benzonatate (Tessalon) cap 100 mg  100 mg Oral TID PRN    guaiFENesin-codeine (Robitussin AC) 100-10 MG/5ML oral solution 5 mL  5 mL Oral Q4H PRN    montelukast (Singulair) tab 10 mg  10 mg Oral Nightly    aspirin chewable tab 81 mg  81 mg Oral Daily    atorvastatin (Lipitor) tab 40 mg  40 mg Oral Nightly    finasteride (Proscar) tab 5 mg  5 mg Oral Daily    pantoprazole (Protonix) DR tab 40 mg  40 mg Oral  QAM AC    senna-docusate (Senokot-S) 8.6-50 MG per tab 1 tablet  1 tablet Oral BID    umeclidinium-vilanterol (Anoro Ellipta) 62.5-25 MCG/ACT inhaler 1 puff  1 puff Inhalation Daily    tamsulosin (Flomax) cap 0.4 mg  0.4 mg Oral Daily with dinner    ondansetron (Zofran) 4 MG/2ML injection 4 mg  4 mg Intravenous Q6H PRN    acetaminophen (Tylenol) tab 650 mg  650 mg Oral Q6H PRN    hydrALAzine (Apresoline) 20 mg/mL injection 10 mg  10 mg Intravenous Q4H PRN    glucose (Dex4) 15 GM/59ML oral liquid 15 g  15 g Oral Q15 Min PRN    Or    glucose (Glutose) 40% oral gel 15 g  15 g Oral Q15 Min PRN    Or    glucose-vitamin C (Dex-4) chewable tab 4 tablet  4 tablet Oral Q15 Min PRN    Or    dextrose 50% injection 50 mL  50 mL Intravenous Q15 Min PRN    Or    glucose (Dex4) 15 GM/59ML oral liquid 30 g  30 g Oral Q15 Min PRN    Or    glucose (Glutose) 40% oral gel 30 g  30 g Oral Q15 Min PRN    Or    glucose-vitamin C (Dex-4) chewable tab 8 tablet  8 tablet Oral Q15 Min PRN    insulin aspart (NovoLOG) 100 Units/mL FlexPen 1-5 Units  1-5 Units Subcutaneous TID CC    ondansetron (Zofran) 4 MG/2ML injection 4 mg  4 mg Intravenous Once    sodium chloride 0.9 % IV bolus 1,000 mL  1,000 mL Intravenous Once     Medications Prior to Admission   Medication Sig    polyethylene glycol, PEG 3350, 17 g Oral Powd Pack Take 17 g by mouth daily.    warfarin (JANTOVEN) 10 MG Oral Tab Take 8.5 mg by mouth nightly.    Calcium-Magnesium-Vitamin D (CALCIUM 1200+D3 OR) Take 1 capsule by mouth daily.    fluticasone propionate 50 MCG/ACT Nasal Suspension 1 spray(s)    metFORMIN 500 MG Oral Tab Take 1 tablet (500 mg total) by mouth 2 (two) times daily.    metoprolol succinate 25 MG Oral Tablet 24 Hr     montelukast 10 MG Oral Tab     Senna-Docusate Sodium 8.6-50 MG Oral Tab Take 1 tablet by mouth 2 (two) times daily.    Pantoprazole Sodium 40 MG Oral Tab EC Take 1 tablet (40 mg total) by mouth every morning before breakfast.    Escitalopram Oxalate  (LEXAPRO) 20 MG Oral Tab Take 1 tablet (20 mg total) by mouth daily.    tamsulosin HCl (FLOMAX) 0.4 MG Oral Cap Take 1 capsule (0.4 mg total) by mouth daily with dinner.    aspirin (BABY ASPIRIN) 81 MG Oral Chew Tab Chew 1 tablet (81 mg total) by mouth daily.    atorvastatin 40 MG Oral Tab Take 1 tablet (40 mg total) by mouth nightly.    Ascorbic Acid (VITAMIN C OR) Take by mouth.    Ergocalciferol (VITAMIN D OR) Take by mouth.    Multiple Vitamins-Minerals (CENTRUM SILVER OR) Take  by mouth.    finasteride (PROSCAR) 5 MG Oral Tab Take 1 tablet (5 mg total) by mouth daily.    STIOLTO RESPIMAT 2.5-2.5 MCG/ACT Inhalation Aero Soln  (Patient not taking: Reported on 4/15/2024)       Allergies  Allergies   Allergen Reactions    Heparin SWELLING     Low platelets    Nitrofurantoin NAUSEA AND VOMITING    Heparin OTHER (SEE COMMENTS)     Low platelets    Tetanus Immune Globulin     Tetanus Toxoids UNKNOWN     As a child    Amoxicillin-Pot Clavulanate DIARRHEA    Azithromycin DIARRHEA       Review of Systems:     General: weak     A comprehensive 12 point review of systems was completed.  Pertinent positives as above and all the rest were negative.     Physical Exam:   /54 (BP Location: Left arm)   Pulse 76   Temp 98.4 °F (36.9 °C) (Oral)   Resp 20   Ht 6' (1.829 m)   Wt 241 lb 13.5 oz (109.7 kg)   SpO2 96%   BMI 32.80 kg/m²      Intake/Output Summary (Last 24 hours) at 4/28/2024 1225  Last data filed at 4/28/2024 0759  Gross per 24 hour   Intake 90 ml   Output 850 ml   Net -760 ml     Wt Readings from Last 1 Encounters:   04/21/24 241 lb 13.5 oz (109.7 kg)       Exam  Gen: No acute distress  Heent: NC AT, mucous memb clear, neck supple  Pulm: Lungs clear, normal respiratory effort  CV: Heart with regular rate and rhythm, no edema  Abd: Abdomen soft, nontender, nondistended, no organomegaly, bowel sounds present  Skin: no symptoms reported  Psych: alert and oriented        Results:     Laboratory Data:  Recent  Labs   Lab 04/26/24  0507 04/27/24  0403 04/28/24  0433   RBC 3.33* 3.29* 3.21*   HGB 10.6* 10.3* 9.9*   HCT 31.2* 31.2* 30.4*   MCV 93.7 94.8 94.7   MCH 31.8 31.3 30.8   MCHC 34.0 33.0 32.6   RDW 12.9 13.3 13.2   NEPRELIM 9.66* 7.28 10.28*   WBC 14.1* 11.6* 15.3*   .0* 586.0* 664.0*         Recent Labs   Lab 04/26/24  0507 04/27/24  0403 04/28/24  0433   * 160* 151*   BUN 17 16 16   CREATSERUM 0.51* 0.51* 0.52*   CA 9.0 8.9 9.2   * 130* 128*   K 4.5 4.3 4.2   CL 96* 95* 93*   CO2 32.0 30.0 32.0        Imaging:  No results found.            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  Rather than starting him on salt tablets which may be difficult now, I will give him a dose of tolvaptan    2 -acute encephalopathy  Apparently doing better    3 -right shoulder septic arthritis  Status post debridement.  He is on Rocephin    4 - Dm 2  Accu-Cheks        Thank you for allowing me to participate in the care of your patient.    PASQUALE ALVA MD  4/28/2024     Electronically signed by Pasquale Alva MD on 4/28/2024 12:30 PM           D/C Summary    No notes of this type exist for this encounter.        Physical Therapy Notes (last 72 hours)      Physical Therapy Note signed by Moises Engel PT, DPT [facility] at 4/29/2024 11:54 AM  Version 1 of 1    Author: Moises Engel PT, DPT [facility] Service: Rehab Author Type: Physical Therapist    Filed: 4/29/2024 11:54 AM Date of Service: 4/29/2024 11:00 AM Status: Signed    : Toro, Moises, PT, DPT [facility] (Physical Therapist)       PHYSICAL THERAPY TREATMENT NOTE - INPATIENT     Room Number: 417/417-A       Presenting Problem: hyponatremia, recent GI virus, nausea, vomiting, diarrhea, R shoulder pain s/p bedside aspiration       Problem List  Principal Problem:    Hyponatremia  Active Problems:    Leukocytosis, unspecified type    Acute pain of right shoulder    Hypoxia    Transient disorder of initiating or  maintaining wakefulness    Acute metabolic encephalopathy    Toxic metabolic encephalopathy    Pyogenic arthritis of right shoulder region (HCC)      PHYSICAL THERAPY ASSESSMENT   Patient demonstrates limited progress this session, goals  remain in progress.    Patient continues to function below baseline with bed mobility, transfers, gait, stair negotiation, maintaining seated position, standing prolonged periods, performing household tasks, and wheelchair mobility.  Contributing factors to remaining limitations include decreased functional strength, decreased endurance/aerobic capacity, pain, impaired sitting and standing balance, impaired coordination, impaired motor planning, decreased muscular endurance, medical status, and limited LE, UE ROM.  Next session anticipate patient to progress bed mobility, transfers, gait, stair negotiation, maintaining seated position, standing prolonged periods, and performing household tasks.  Physical Therapy will continue to follow patient for duration of hospitalization.    Patient continues to benefit from continued skilled PT services: to promote return to prior level of function and safety with continuous assistance and gradual rehabilitative therapy .    PLAN  PT Treatment Plan: Bed mobility;Body mechanics;Endurance;Energy conservation;Patient education;Gait training;Range of motion;Strengthening;Transfer training;Balance training  Frequency (Obs): 3-5x/week    SUBJECTIVE  I am ready to try to sit up in the chair but every hurts with movement and I think I am going to need a lot of help. Maybe the lift sling because I can not stand it hurts and I am very weak.     OBJECTIVE  Precautions: Limb alert - right    WEIGHT BEARING RESTRICTION  R Upper Extremity: Weight Bearing as Tolerated             PAIN ASSESSMENT   Ratin  Location: pain B UE's LE's with movement  Management Techniques: Activity promotion;Body mechanics;Breathing  techniques;Relaxation;Repositioning    BALANCE  Static Sitting: Fair -  Dynamic Sitting: Poor +  Static Standing: Dependent  Dynamic Standing: Dependent    ACTIVITY TOLERANCE                          O2 WALK       AM-PAC '6-Clicks' INPATIENT SHORT FORM - BASIC MOBILITY  How much difficulty does the patient currently have...  Patient Difficulty: Turning over in bed (including adjusting bedclothes, sheets and blankets)?: A Lot   Patient Difficulty: Sitting down on and standing up from a chair with arms (e.g., wheelchair, bedside commode, etc.): A Lot   Patient Difficulty: Moving from lying on back to sitting on the side of the bed?: A Lot   How much help from another person does the patient currently need...   Help from Another: Moving to and from a bed to a chair (including a wheelchair)?: Total   Help from Another: Need to walk in hospital room?: Total   Help from Another: Climbing 3-5 steps with a railing?: Total     AM-PAC Score:  Raw Score: 9   Approx Degree of Impairment: 81.38%   Standardized Score (AM-PAC Scale): 30.55   CMS Modifier (G-Code): CM    FUNCTIONAL ABILITY STATUS  Functional Mobility/Gait Assessment  Gait Assistance: Dependent assistance  Distance (ft): nimesh to chair pain with movement  Rolling: maximum assist  Supine to Sit: maximum assist  Sit to Supine: dependent  Sit to Stand: dependent    Additional information: Pt ed with bed mobility and rolling with max a to roll L and R with PT assist and use of bed rails for support. Pt c/o pain with all PROM of LE's and UE's. PT ed with a dependant transfer from supine to sitting at the EOB with a nimesh mechanical lift utilized for optimal pt and staff safety to prevent lift injuries and to prevent falls for pt. Pt ed with transfers to a chair with nimesh and AAROM and PROM in chair. Pt will benefit from ongoing gradual rehab with PT OT to regain his maximal PLOF and safety. Pt has been encouraged to increase his frequency out of bed and staff can use  nimesh for transfers as needed to bed, chair or toilet chair as indicated.     The patient's Approx Degree of Impairment: 81.38% has been calculated based on documentation in the [facility] '6 clicks' Inpatient Daily Activity Short Form.  Research supports that patients with this level of impairment may benefit from IP Rehab.  Final disposition will be made by interdisciplinary medical team.    THERAPEUTIC EXERCISES  Lower Extremity Ankle pumps  Heel slides  LAQ     Position Sitting & Standing       Patient End of Session: Up in chair;With  staff;Needs met;Call light within reach;RN aware of session/findings;All patient questions and concerns addressed;Alarm set    CURRENT GOALS   Goals to be met by: 5/5/24  Patient Goal Patient's self-stated goal is: not formally stated   Goal #1 Patient is able to demonstrate supine - sit EOB @ level: moderate assistance      Goal #1   Current Status  Max A    Goal #2 Patient is able to demonstrate transfers Sit to/from Stand at assistance level: moderate assistance with  least restrictive device      Goal #2  Current Status  Max A / dependant sitting at EOB assist x 2 to stand with RW limited by weakness and pain. / nimesh transfer to chair   Goal #3 Patient is able to ambulate >10 feet with assist device:  least restrictive device  at assistance level: moderate assistance   Goal #3   Current Status  NT unable / ongoing   Goal #4 Patient will negotiate 1 stairs/one curb w/ assistive device and moderate assistance   Goal #4   Current Status  Ongoing   Goal #5 Patient to demonstrate independence with home activity/exercise instructions provided to patient in preparation for discharge.   Goal #5   Current Status  Ongoing   Goal #6         Therapeutic Activity: 15 minutes  Therapeutic Exercise: 9 minutes                 Occupational Therapy Notes (last 72 hours)  Notes from 4/28/2024 11:06 AM through 5/1/2024 11:06 AM   No notes of this type exist for this encounter.     Video Swallow  Study Notes    No notes of this type exist for this encounter.        SLP Note - SLP Notes      SLP Note signed by Nel Odonnell SLP at 4/25/2024 11:29 AM  Version 1 of 1    Author: Nel Odonnell SLP Service: — Author Type: Speech and Language Pathologist    Filed: 4/25/2024 11:29 AM Date of Service: 4/25/2024 11:08 AM Status: Signed    : Nel Odonnell SLP (Speech and Language Pathologist)       SPEECH DAILY NOTE - INPATIENT    ASSESSMENT & PLAN   ASSESSMENT  PPE REQUIRED. THIS THERAPIST WORE GLOVES, DROPLET MASK, AND GOGGLES FOR DURATION OF EVALUATION. HANDS WASHED UPON ENTRANCE/EXIT.    SLP f/u for ongoing dysphagia tx/meal assessment per recommendations of regular (per MD)/thin per BSE. RN reports pt tolerates diet and medication well with no overt clinical s/s aspiration. Pt denies any swallowing challenges.     Pt positioned upright in bedside chair. Pt afebrile, tolerating 2L/Min O2NC with oxygen status 95 prior to the start of oral trials. SLP reviewed aspiration precautions and safe swallowing compensatory strategies with the patient. Safe swallow guidelines remain written on the white board in purple. Diet recommendations remain on the whiteboard in the room. Patient v/u. Provided 1:1 feed assistance, pt tolerates regular and thin liquids via open cup with no overt clinical signs/symptoms of aspiration. Oxygen status remained >94 t/o the entire session. Oral/buccal cavities clear of residue following all trials.     MOST RECENT CXR 4/17/24:  Findings and impression:      Stable heart with persistent vascular congestion but no edema      Lung volumes remain moderately low with extensive basilar opacity, most likely atelectasis      No pneumothorax      Dictated by (CST): Bib Horn MD on 4/17/2024 at 6:59 PM       Finalized by (CST): iBb Horn MD on 4/17/2024 at 7:00 PM     PLAN: SLP to sign off at this time secondary to pt tolerating least restrictive diet with no CSA.     Diet Recommendations -  Solids: Regular  Diet Recommendations - Liquids: Thin Liquids    Compensatory Strategies Recommended: Slow rate;Small bites and sips  Aspiration Precautions: Upright position;Slow rate;Small bites and sips  Medication Administration Recommendations: Whole in puree    Patient Experiencing Pain: No    Treatment Plan  Treatment Plan/Recommendations: Aspiration precautions    Interdisciplinary Communication: Discussed with RN  Recommendations posted at bedside    GOALS  Goal #1 The patient will tolerate regular consistency and thin liquids without overt signs or symptoms of aspiration with 100 % accuracy over 1-2 session(s).     Goal met   Goal #2 The patient/family/caregiver will demonstrate understanding and implementation of aspiration precautions and swallow strategies independently over 1-2 session(s).    Goal met   Goal #3 The patient will utilize compensatory strategies as outlined by  BSSE (clinical evaluation) including Slow rate, Small bites, Small sips, Upright 90 degrees, Eliminate distractions, alternating consistencies, Supervision with meals with PRN assistance 100 % of the time across 1-2 sessions.    Goal met     FOLLOW UP  Follow Up Needed (Documentation Required): No  SLP Follow-up Date:  (n/a)  Number of Visits to Meet Established Goals: 3 (since transfer to CCU)    Session: 4    If you have any questions, please contact MONIQUE Layton M.S. CCC-SLP  Speech Language Pathologist  Phone Number Ext. [phone]      Electronically signed by Nel Odonnell, SLP on 4/25/2024 11:29 AM           Immunizations     Name Date      Covid-19 Pfizer 09/22/21     Covid-19 Pfizer Bivalent 10/11/22     INFLUENZA 10/11/23     INFLUENZA 10/11/22     Kenalog Per 10mg Inj 05/05/14     Pfizer Covid-19 Vaccine 30mcg/0.3mL 12yrs+ 10/11/23       Multidisciplinary Problems     Active Goals     Not on file          Resolved Goals        Problem: Patient/Family Goals    Goal Priority Disciplines Outcome Interventions    Patient/Family Long Term Goal   (Resolved)     Interdisciplinary Adequate for Discharge    Description: Patient's Long Term Goal: Pain on right shoulder will be resolved and will be able to walk well with walker.    Interventions:  - No weight bearing on right arm till surgeon says so.  - Home health PT/OT as ordered.  - Pain management with ,oral medication.  - Monitor right shoulder for swelling or increasing pain or weakness.  - Follow up with surgery as recommended.  - See additional Care Plan goals for specific interventions   Patient/Family Short Term Goal   (Resolved)     Interdisciplinary Adequate for Discharge    Description: Patient's Short Term Goal: Home with [facility] when pain is resolved.    Interventions:   - NWB to right arm.  - PT/OT as ordered.  - Administer IV antibiotics as ordered.  - Administer oral antibiotics as ordered.  - Maintain enteric/contact isolation as ordered.  - Out of bed as much as tolerated.  - Administer oral anticoagulation as ordered, SCD's to both legs as well.  - See additional Care Plan goals for specific interventions               Discharge Treatment Preferences    Flowsheet Row Most Recent Value   Preferences    Submitted to [facility] Yes   PASRR Level 1 Submitted Yes

## (undated) NOTE — LETTER
[address], [place]  Authorization for Invasive Procedures  1.  I hereby authorize Dr. Damian Lane , my physician and whomever may be designated as the doctor's assistant, to perform the following operation and/or procedure: Dual Chamber occur: fever and allergic reactions, hemolytic reactions, transmission of disease such as hepatitis, AIDS, cytomegalovirus (CMV), and flluid overload.  In the event that I wish to have autologous transfusions of my own blood, or a directed donor transfusion Signature of Patient:  ________________________________________________ Date: _________Time: _________    Responsible person in case of minor or unconscious: _____________________________Relationship: ____________     Witness Signature: _______________

## (undated) NOTE — ED AVS SNAPSHOT
Santiago Larios   MRN: [id number]    Department:  [facility] Emergency Department   Date of Visit:  8/18/2018           Disclosure     Insurance plans vary and the physician(s) referred by the ER may not be covered by your plan.  Please contact within the next three months to obtain basic health screening including reassessment of your blood pressure.     IF THERE IS ANY CHANGE OR WORSENING OF YOUR CONDITION, CALL YOUR PRIMARY CARE PHYSICIAN AT ONCE OR RETURN IMMEDIATELY TO THE EMERGENCY DEPARTMEN

## (undated) NOTE — LETTER
[facility]  [address], [place], [place]  Authorization for Surgical Operation and Procedure                                                                                           I hereby authorize Gregorio Calle MD, my physician and his/her assistants (if applicable), which may include medical students, residents, and/or fellows, to perform the following surgical operation/ procedure and administer such anesthesia as may be determined necessary by my physician: Operation/Procedure name (s) Right shoulder arthroscopy, irrigation and debridement on Dennys Manzano   2.   I recognize that during the surgical operation/procedure, unforeseen conditions may necessitate additional or different procedures than those listed above.  I, therefore, further authorize and request that the above-named surgeon, assistants, or designees perform such procedures as are, in their judgment, necessary and desirable.    3.   My surgeon/physician has discussed prior to my surgery the potential benefits, risks and side effects of this procedure; the likelihood of achieving goals; and potential problems that might occur during recuperation.  They also discussed reasonable alternatives to the procedure, including risks, benefits, and side effects related to the alternatives and risks related to not receiving this procedure.  I have had all my questions answered and I acknowledge that no guarantee has been made as to the result that may be obtained.    4.   Should the need arise during my operation/procedure, which includes change of level of care prior to discharge, I also consent to the administration of blood and/or blood products.  Further, I understand that despite careful testing and screening of blood or blood products by collecting agencies, I may still be subject to ill effects as a result of receiving a blood transfusion and/or blood products.  The following are some, but not all, of the potential risks  that can occur: fever and allergic reactions, hemolytic reactions, transmission of diseases such as Hepatitis, AIDS and Cytomegalovirus (CMV) and fluid overload.  In the event that I wish to have an autologous transfusion of my own blood, or a directed donor transfusion, I will discuss this with my physician.  Check only if Refusing Blood or Blood Products  I understand refusal of blood or blood products as deemed necessary by my physician may have serious consequences to my condition to include possible death. I hereby assume responsibility for my refusal and release the hospital, its personnel, and my physicians from any responsibility for the consequences of my refusal.    o  Refuse   5.   I authorize the use of any specimen, organs, tissues, body parts or foreign objects that may be removed from my body during the operation/procedure for diagnosis, research or teaching purposes and their subsequent disposal by hospital authorities.  I also authorize the release of specimen test results and/or written reports to my treating physician on the hospital medical staff or other referring or consulting physicians involved in my care, at the discretion of the Pathologist or my treating physician.    6.   I consent to the photographing or videotaping of the operations or procedures to be performed, including appropriate portions of my body for medical, scientific, or educational purposes, provided my identity is not revealed by the pictures or by descriptive texts accompanying them.  If the procedure has been photographed/videotaped, the surgeon will obtain the original picture, image, videotape or CD.  The hospital will not be responsible for storage, release or maintenance of the picture, image, tape or CD.    7.   I consent to the presence of a  or observers in the operating room as deemed necessary by my physician or their designees.    8.   I recognize that in the event my procedure results in  extended X-Ray/fluoroscopy time, I may develop a skin reaction.    9. If I have a Do Not Attempt Resuscitation (DNAR) order in place, that status will be suspended while in the operating room, procedural suite, and during the recovery period unless otherwise explicitly stated by me (or a person authorized to consent on my behalf). The surgeon or my attending physician will determine when the applicable recovery period ends for purposes of reinstating the DNAR order.  10. Patients having a sterilization procedure: I understand that if the procedure is successful the results will be permanent and it will therefore be impossible for me to inseminate, conceive, or bear children.  I also understand that the procedure is intended to result in sterility, although the result has not been guaranteed.   11. I acknowledge that my physician has explained sedation/analgesia administration to me including the risk and benefits I consent to the administration of sedation/analgesia as may be necessary or desirable in the judgment of my physician.    I CERTIFY THAT I HAVE READ AND FULLY UNDERSTAND THE ABOVE CONSENT TO OPERATION and/or OTHER PROCEDURE.     _________________________________________ _________________________________     ___________________________________  Signature of Patient     Signature of Responsible Person                   Printed Name of Responsible Person                              _________________________________________ ______________________________        ___________________________________  Signature of Witness         Date  Time         Relationship to Patient    STATEMENT OF PHYSICIAN My signature below affirms that prior to the time of the procedure; I have explained to the patient and/or his/her legal representative, the risks and benefits involved in the proposed treatment and any reasonable alternative to the proposed treatment. I have also explained the risks and benefits involved in refusal of  the proposed treatment and alternatives to the proposed treatment and have answered the patient's questions. If I have a significant financial interest in a co-management agreement or a significant financial interest in any product or implant, or other significant relationship used in this procedure/surgery, I have disclosed this and had a discussion with my patient.     _______________________________________________________________ _____________________________  (Signature of Physician)                                                                                         (Date)                                   (Time)  Patient Name: Dennys MCCARTY Jose Juan    : 1935   Printed: 2024      Medical Record #: [id number]                                              Page 1 of 1

## (undated) NOTE — LETTER
No referring provider defined for this encounter. 01/21/22        Patient: Katelin Munoz   YOB: 1935   Date of Visit: 1/19/2022       Dear  Dr. Maral Gordon MD,      Thank you for referring Katelin Munoz to my practice.   Ple and daughter, Delvin Holstein, concerning further treatment options of the following urological problems :         Urinary Retention  Problem started 08/18/2021 following his pacemaker surgery. Patient currently has Mcelroy catheter, he has bladder spasm.  Patient prev like symptoms starting 01/18/2022, including dysuria. He submitted a Urine culture and empirically started Bactrim DS.      BPH  Chronic. Patient is currently taking finasteride 5 mg. He denies any associated pelvic pain or discomfort.  He feels the conditi he would rather have an indwelling Valladares; may still need intermittent catheterization even after a TURP; Rectal exam 20 g; irrigate as needed if no drainage or blood clot; jacobo Coumadin; instructed on valladares care; see me in the office in 10 days for a vo Urine culture = No Growth at 18-24 hrs  12/26/2021 Urine Culture = >100,000 CFU/ML Proteus mirabilis (Resistant: Nitrofurantoin); UA blood = large; protein = 100; WBC = >50; RBC = >10  12/14/2021 Creatinine = 0.80; GFR = 82  11/30/2021 Urine Culture = >100 above urological history, and in light of the above urological problems,   I explained to the patient and daughter, Murali Jaramillo, the following treatment options :    DIAGNOSES & DISCUSSION      (R33.9) Urinary retention  (primary encounter diagnosis)  Problem st prostatic hyperplasia with urinary retention  Patient is currently in urinary retention with Mcelroy catheter in place. On 10/20/2021 US kidney/bladder=  Enlarged prostate causing Bladder bas indentation.  On 11/30/202 JOSÉ MANUEL, prostate 3+ enlarged, flat, wide, 4 cystoscopy = bladder spasm during the procedure.      (N39.0) Recurrent UTI  Patient currently has valladares catheter inserted.   Patient has three positive Urine culture since 09/2021, He was previously prescribed antibiotic Macrobid; however, this causes naus risk for bladder cancer by 3x and if blood is ever seen in the urine pt should receive urological work up to exclude possibility of bladder cancer.  Patient submit a urine specimen today from valladares for urine cytology.     (Z79.01) On warfarin therapy  [facility] did not plate a urine culture; please call laboratory and asked them to use urine specimen for complete urinalysis and perform urine culture on it    5.    Please seek consult for infectious service consult Dr. Case Fletcher to see if there is a role

## (undated) NOTE — LETTER
No referring provider defined for this encounter.        11/30/21        Patient: Odilia Delgado   YOB: 1935   Date of Visit: 11/30/2021       Dear  Dr. Linda Ortiz MD,      I had the pleasure of seeing your patient  Odilia Delgado i (current) use of aspirin  Irritable bowel syndrome with both constipation and diarrhea  Recurrent deep vein thrombosis (dvt) (hcc)  Pulmonary embolism, unspecified chronicity, unspecified pulmonary embolism type, unspecified whether acute cor pulmonale pre these finding to the patient and his daughter. We discussed CT urogram vs obervation.  I fully explained to patient the benefits, risks, and alternatives to this treatment option and I answered patient's questions; patient decides to undergo CT urogram. hospital or surgery center.  I fully explained to patient the benefits, risks, complications, side effects, reasons for, nature of, alternatives to the above treatment and I answered questions on treatment; patient understands all of this and wants to proce diarrhea  Chronic. Patient states that his IBS switches between constipation and diarrhea. He has constipation majority of the time He is currently taking Metamucil intermittently.  I explain to patient and daughter that severe constipation can interfere wi only 1 species of bacteria in the specimen and only IF there appears to be a sudden worsening in symptoms with the worsening persisting    2. Continue finasteride 5 mg daily    3. Continue tamsulosin 0.4 mg daily    4.   Start MiraLAX or generic equivalen from your catheter 7 to 10  days before the procedure so that any bacteria in your bladder can be sterilized or cleared out by the time we perform the cystoscopy. I am asking my nurses to coordinate that.     10.  Since your Mcelroy catheter was last changed

## (undated) NOTE — IP AVS SNAPSHOT
[facility]            (For Outpatient Use Only) Initial Admit Date: 2/5/2018   Inpt/Obs Admit Date: Inpt: 2/5/18 / Obs: 02/05/18   Discharge Date:    Ernesto Pritchard:  [de-identified]   MRN: [de-identified]   CSN: [id number]        ENCOUNTER  Patient Cl Subscriber ID:  Pt Rel to Subscriber:    Hospital Account Financial Class: Medicare    February 12, 2018

## (undated) NOTE — IP AVS SNAPSHOT
Patient Demographics     Address  [address] [address] Phone  [phone] [facility])  [phone] (Work) E-mail Address  [email or web address]      Emergency Contact(s)     Name Relation Home Work Akiko Kelley Spouse [phone] Delmy Fischer MD         Sotalol HCl (AF) 80 MG Tabs  Next dose due:  2/12 in the evening  Notes to patient:  bid      Take  by mouth.           tamsulosin HCl 0.4 MG Caps  Commonly known as:  FLOMAX  Next dose due:  2/13/18 in the evening              TAYLOR [id number] predniSONE (DELTASONE) tab 50 mg 02/12/18 0846 Given            LEFT LOWER ABDOMEN     Order ID Medication Name Action Time Action Reason Comments    [id number] Insulin Aspart Pen (NOVOLOG) 100 UNIT/ML flexpen 1-5 Units 02/12/18 0725 Given GFR, -American >60 >=60 — [facility]   Comment:           Estimated GFR units: mL/min/1.73 square meters   eGFR calculated by the CKD-EPI equation.             PROTHROMBIN TIME (PT) [[id number]] (Abnormal)  Resulted: 02/12/18 0600, Result status: Fi Filed:  2/8/2018  9:24 AM Date of Service:  2/6/2018  9:31 AM Status:  Signed    :  Rambo Velazco MD (Physician)       [facility]    PATIENT'S NAME: James Sheehan   ATTENDING PHYSICIAN: Theresa Lopez MD   PATIENT ACCOUNT#:   [id number] obstructive sleep apnea; hyperglycemia; DVT with PE, for which the patient is anticoagulated.     PAST SURGICAL HISTORY:  Coronary artery bypass graft surgery; neuroma removal; rotator cuff repair; bunionectomy; EGD with biopsy and balloon dilatation of the NECK:  No palpable lymph nodes. The thyroid is not enlarged, and there are no palpable nodules. LUNGS:  Clear to auscultation. There are no wheezes, rales, or rhonchi. There is no dullness to percussion. There is no CVA tenderness.   HEART:  Regular ra 2.   Supratherapeutic INR-vitamin K at 2.5 mg is being given today, as the INR is now over 5.  3.   Headache-responding to fluids and pain medications. This is related to the viral syndrome.   4.   Viral syndrome-the patient presented with 2-week history o PHYSICAL THERAPY ASSESSMENT     Patient is a [de-identified]year old male admitted 2/5/2018 for hyponatremia. Patient's current functional deficits include bed mobility, transfers, gait and stair navigation, which are below the patient's pre-admission status.  Patient * No active hospital problems.  *      Past Medical History  Past Medical History:   Diagnosis Date   • Arthritis    • Asbestos exposure     lung   • Atherosclerosis of coronary artery    • Basal cell carcinoma 11/22/2005    BCC right forehead, left upper \"I am [de-identified], I'm an old man\"    PHYSICAL THERAPY EXAMINATION     OBJECTIVE  Precautions: None (neuropathy)  Fall Risk: Standard fall risk    WEIGHT BEARING RESTRICTION  Weight Bearing Restriction: None                PAIN ASSESSMENT  Ratin          COGN Functional activity tolerated  Gait training  Transfer training    Patient End of Session: Up in chair;Needs met;Call light within reach; All patient questions and concerns addressed;RN aware of session/findings    CURRENT GOALS    Goals to be met by: 2/28/